# Patient Record
Sex: FEMALE | Race: WHITE | NOT HISPANIC OR LATINO | Employment: OTHER | URBAN - METROPOLITAN AREA
[De-identification: names, ages, dates, MRNs, and addresses within clinical notes are randomized per-mention and may not be internally consistent; named-entity substitution may affect disease eponyms.]

---

## 2017-04-24 ENCOUNTER — OFFICE VISIT (OUTPATIENT)
Dept: INTERNAL MEDICINE | Facility: CLINIC | Age: 52
End: 2017-04-24

## 2017-04-24 VITALS
HEART RATE: 82 BPM | SYSTOLIC BLOOD PRESSURE: 132 MMHG | BODY MASS INDEX: 35.78 KG/M2 | WEIGHT: 215 LBS | RESPIRATION RATE: 16 BRPM | DIASTOLIC BLOOD PRESSURE: 80 MMHG

## 2017-04-24 DIAGNOSIS — J30.89 SEASONAL ALLERGIC RHINITIS DUE TO OTHER ALLERGIC TRIGGER: ICD-10-CM

## 2017-04-24 DIAGNOSIS — M79.7 FIBROMYALGIA: ICD-10-CM

## 2017-04-24 DIAGNOSIS — F41.9 ANXIETY: ICD-10-CM

## 2017-04-24 DIAGNOSIS — I10 ESSENTIAL HYPERTENSION: Primary | ICD-10-CM

## 2017-04-24 DIAGNOSIS — E78.00 ELEVATED CHOLESTEROL: ICD-10-CM

## 2017-04-24 DIAGNOSIS — K21.9 GASTROESOPHAGEAL REFLUX DISEASE WITHOUT ESOPHAGITIS: ICD-10-CM

## 2017-04-24 DIAGNOSIS — K58.9 IRRITABLE BOWEL SYNDROME WITHOUT DIARRHEA: ICD-10-CM

## 2017-04-24 DIAGNOSIS — G47.00 INSOMNIA, UNSPECIFIED TYPE: ICD-10-CM

## 2017-04-24 DIAGNOSIS — R73.03 PREDIABETES: ICD-10-CM

## 2017-04-24 LAB
EXPIRATION DATE: NORMAL
HBA1C MFR BLD: 6.2 %
Lab: NORMAL

## 2017-04-24 PROCEDURE — 36415 COLL VENOUS BLD VENIPUNCTURE: CPT | Performed by: INTERNAL MEDICINE

## 2017-04-24 PROCEDURE — 83036 HEMOGLOBIN GLYCOSYLATED A1C: CPT | Performed by: INTERNAL MEDICINE

## 2017-04-24 PROCEDURE — 99214 OFFICE O/P EST MOD 30 MIN: CPT | Performed by: INTERNAL MEDICINE

## 2017-04-24 RX ORDER — LORATADINE 10 MG/1
10 TABLET ORAL DAILY
COMMUNITY
End: 2018-04-09

## 2017-04-24 RX ORDER — DICYCLOMINE HCL 20 MG
20 TABLET ORAL 3 TIMES DAILY PRN
Qty: 90 TABLET | Refills: 0 | Status: SHIPPED | OUTPATIENT
Start: 2017-04-24 | End: 2019-10-02 | Stop reason: SDUPTHER

## 2017-04-24 RX ORDER — LISINOPRIL AND HYDROCHLOROTHIAZIDE 25; 20 MG/1; MG/1
1 TABLET ORAL DAILY
Qty: 90 TABLET | Refills: 3 | Status: SHIPPED | OUTPATIENT
Start: 2017-04-24 | End: 2018-04-09 | Stop reason: SDUPTHER

## 2017-04-24 NOTE — PROGRESS NOTES
Subjective   Lizette Keith is a 51 y.o. female.   Pt is here to follow up on HTN,elevated cholesterol,seasonal allergies,GERD,IBS,fibromyalgia,anxiety,insomnia and prediabetes.               History of Present Illness   Pt is here to follow up on HTN,elevated cholesterol,seasonal allergies,GERD,IBS,fibromyalgia,anxiety,insomnia and prediabetes.    Pt states all chronic issues are doing well except for her IBS. She states her symptoms of diarrhea are daily after she eats her meals, she was prescribed Bentyl in the past but states she did not use it , she would like to restart this and see if it works before trying something different.                 The following portions of the patient's history were reviewed and updated as appropriate: allergies, current medications, past family history, past medical history, past social history, past surgical history and problem list.              Review of Systems   Constitutional: Negative.    HENT: Negative.    Eyes: Negative.    Respiratory: Negative.    Cardiovascular:        See HPI .   Gastrointestinal:        See HPI.    Endocrine:        See HPI.    Genitourinary: Negative.    Musculoskeletal:        See HPI.    Skin: Negative.    Allergic/Immunologic: Negative.    Neurological: Negative.    Hematological: Negative.    Psychiatric/Behavioral:        See HPI.                 Objective   Physical Exam   Constitutional: She is oriented to person, place, and time. She appears well-developed and well-nourished.   HENT:   Head: Normocephalic and atraumatic.   Right Ear: External ear normal.   Left Ear: External ear normal.   Nose: Nose normal.   Mouth/Throat: Oropharynx is clear and moist.   Eyes: Conjunctivae and EOM are normal. Pupils are equal, round, and reactive to light.   Neck: Normal range of motion. Neck supple. No tracheal deviation present. No thyromegaly present.   Cardiovascular: Normal rate, regular rhythm, normal heart sounds and intact distal pulses.     Pulmonary/Chest: Effort normal and breath sounds normal.   Abdominal: Soft. Bowel sounds are normal. She exhibits no distension. There is no tenderness.   Neurological: She is alert and oriented to person, place, and time. She has normal reflexes.   Skin: Skin is warm and dry.   Psychiatric: She has a normal mood and affect.   Nursing note and vitals reviewed.               Assessment/Plan      Essential hypertension  -     lisinopril-hydrochlorothiazide (PRINZIDE,ZESTORETIC) 20-25 MG per tablet; Take 1 tablet by mouth Daily.  -     Comprehensive Metabolic Panel  -     CBC (No Diff)    Elevated cholesterol  -     Lipid Panel    Seasonal allergic rhinitis due to other allergic trigger    Gastroesophageal reflux disease without esophagitis    Irritable bowel syndrome without diarrhea    Fibromyalgia    Anxiety    Insomnia, unspecified type    Prediabetes  -     POC Glycosylated Hemoglobin (Hb A1C)    Other orders  -     dicyclomine (BENTYL) 20 MG tablet; Take 1 tablet by mouth 3 (Three) Times a Day As Needed (IBS- diarrhea).          1.) Check CBC,CMP,lipid panel and A1C ( pt ate a piece cheese 2 hours ago)   2.) Follow up in 5 months   3.) Refill chronic meds.   4.) Bentyl 20 mg PO TID prn, how to dose and possible s/e discussed.

## 2017-04-25 LAB
ALBUMIN SERPL-MCNC: 4.6 G/DL (ref 3.5–5.5)
ALBUMIN/GLOB SERPL: 1.9 {RATIO} (ref 1.2–2.2)
ALP SERPL-CCNC: 122 IU/L (ref 39–117)
ALT SERPL-CCNC: 16 IU/L (ref 0–32)
AST SERPL-CCNC: 13 IU/L (ref 0–40)
BILIRUB SERPL-MCNC: 0.3 MG/DL (ref 0–1.2)
BUN SERPL-MCNC: 12 MG/DL (ref 6–24)
BUN/CREAT SERPL: 21 (ref 9–23)
CALCIUM SERPL-MCNC: 9.8 MG/DL (ref 8.7–10.2)
CHLORIDE SERPL-SCNC: 98 MMOL/L (ref 96–106)
CHOLEST SERPL-MCNC: 250 MG/DL (ref 100–199)
CO2 SERPL-SCNC: 21 MMOL/L (ref 18–29)
CREAT SERPL-MCNC: 0.57 MG/DL (ref 0.57–1)
ERYTHROCYTE [DISTWIDTH] IN BLOOD BY AUTOMATED COUNT: 13.8 % (ref 12.3–15.4)
GLOBULIN SER CALC-MCNC: 2.4 G/DL (ref 1.5–4.5)
GLUCOSE SERPL-MCNC: 102 MG/DL (ref 65–99)
HCT VFR BLD AUTO: 40.6 % (ref 34–46.6)
HDLC SERPL-MCNC: 39 MG/DL
HGB BLD-MCNC: 15 G/DL (ref 11.1–15.9)
LDLC SERPL CALC-MCNC: 155 MG/DL (ref 0–99)
MCH RBC QN AUTO: 32.4 PG (ref 26.6–33)
MCHC RBC AUTO-ENTMCNC: 36.9 G/DL (ref 31.5–35.7)
MCV RBC AUTO: 88 FL (ref 79–97)
MORPHOLOGY BLD-IMP: ABNORMAL
PLATELET # BLD AUTO: 280 X10E3/UL (ref 150–379)
POTASSIUM SERPL-SCNC: 3.7 MMOL/L (ref 3.5–5.2)
PROT SERPL-MCNC: 7 G/DL (ref 6–8.5)
RBC # BLD AUTO: 4.63 X10E6/UL (ref 3.77–5.28)
SODIUM SERPL-SCNC: 140 MMOL/L (ref 134–144)
TRIGL SERPL-MCNC: 278 MG/DL (ref 0–149)
VLDLC SERPL CALC-MCNC: 56 MG/DL (ref 5–40)
WBC # BLD AUTO: 12.1 X10E3/UL (ref 3.4–10.8)

## 2017-05-18 ENCOUNTER — TELEPHONE (OUTPATIENT)
Dept: INTERNAL MEDICINE | Facility: CLINIC | Age: 52
End: 2017-05-18

## 2017-05-19 ENCOUNTER — HOSPITAL ENCOUNTER (EMERGENCY)
Facility: HOSPITAL | Age: 52
Discharge: LEFT BEFORE DISCHARGE | End: 2017-05-19

## 2017-05-19 VITALS
BODY MASS INDEX: 35.82 KG/M2 | DIASTOLIC BLOOD PRESSURE: 81 MMHG | SYSTOLIC BLOOD PRESSURE: 129 MMHG | HEART RATE: 99 BPM | RESPIRATION RATE: 16 BRPM | WEIGHT: 215 LBS | HEIGHT: 65 IN | TEMPERATURE: 97.7 F | OXYGEN SATURATION: 99 %

## 2017-05-19 PROCEDURE — 99211 OFF/OP EST MAY X REQ PHY/QHP: CPT

## 2017-10-19 ENCOUNTER — TELEPHONE (OUTPATIENT)
Dept: OBSTETRICS AND GYNECOLOGY | Facility: CLINIC | Age: 52
End: 2017-10-19

## 2017-10-19 RX ORDER — ESTRADIOL 2 MG/1
2 TABLET ORAL DAILY
Qty: 30 TABLET | Refills: 0 | Status: SHIPPED | OUTPATIENT
Start: 2017-10-19 | End: 2017-12-11 | Stop reason: SDUPTHER

## 2017-10-19 NOTE — TELEPHONE ENCOUNTER
Johanne Park Pt    Former Pt of Dr RODRIGUEZ scheduled with Johanne for annual on 11/10/17.  Pt will need a refill on Estradiol 2 gm sent to [facility] on [facility].

## 2017-10-19 NOTE — TELEPHONE ENCOUNTER
ASHLEY Sanz patient  Patient is scheduled to see ASHLEY Sanz for her annual on 11/10/2017. Patient was last seen on 9/12/2016 by Dr. Domi Mckeon and was Rx's Estradiol 2 mg #30 with 12 refills. I will send in a prescription for Estradiol 2 mg #30 no refills

## 2017-12-11 ENCOUNTER — OFFICE VISIT (OUTPATIENT)
Dept: OBSTETRICS AND GYNECOLOGY | Facility: CLINIC | Age: 52
End: 2017-12-11

## 2017-12-11 VITALS
BODY MASS INDEX: 33.82 KG/M2 | DIASTOLIC BLOOD PRESSURE: 70 MMHG | HEIGHT: 65 IN | SYSTOLIC BLOOD PRESSURE: 126 MMHG | WEIGHT: 203 LBS

## 2017-12-11 DIAGNOSIS — Z01.419 WELL WOMAN EXAM WITH ROUTINE GYNECOLOGICAL EXAM: Primary | ICD-10-CM

## 2017-12-11 DIAGNOSIS — Z12.11 SCREENING FOR COLON CANCER: ICD-10-CM

## 2017-12-11 DIAGNOSIS — R30.0 DYSURIA: ICD-10-CM

## 2017-12-11 DIAGNOSIS — F17.200 TOBACCO DEPENDENCE: ICD-10-CM

## 2017-12-11 DIAGNOSIS — R39.14 FEELING OF INCOMPLETE BLADDER EMPTYING: ICD-10-CM

## 2017-12-11 DIAGNOSIS — Z12.31 ENCOUNTER FOR SCREENING MAMMOGRAM FOR BREAST CANCER: ICD-10-CM

## 2017-12-11 LAB
BILIRUB BLD-MCNC: ABNORMAL MG/DL
CLARITY, POC: ABNORMAL
COLOR UR: ABNORMAL
GLUCOSE UR STRIP-MCNC: NEGATIVE MG/DL
KETONES UR QL: ABNORMAL
LEUKOCYTE EST, POC: ABNORMAL
NITRITE UR-MCNC: NEGATIVE MG/ML
PH UR: 5 [PH] (ref 5–8)
PROT UR STRIP-MCNC: ABNORMAL MG/DL
RBC # UR STRIP: ABNORMAL /UL
SP GR UR: 1.02 (ref 1–1.03)
UROBILINOGEN UR QL: ABNORMAL

## 2017-12-11 PROCEDURE — 99396 PREV VISIT EST AGE 40-64: CPT | Performed by: NURSE PRACTITIONER

## 2017-12-11 PROCEDURE — 99406 BEHAV CHNG SMOKING 3-10 MIN: CPT | Performed by: NURSE PRACTITIONER

## 2017-12-11 RX ORDER — ESTRADIOL 2 MG/1
2 TABLET ORAL DAILY
Qty: 30 TABLET | Refills: 12 | Status: SHIPPED | OUTPATIENT
Start: 2017-12-11 | End: 2018-12-17 | Stop reason: SDUPTHER

## 2017-12-11 RX ORDER — NITROFURANTOIN 25; 75 MG/1; MG/1
100 CAPSULE ORAL 2 TIMES DAILY
Qty: 14 CAPSULE | Refills: 0 | Status: SHIPPED | OUTPATIENT
Start: 2017-12-11 | End: 2017-12-18

## 2017-12-11 NOTE — PROGRESS NOTES
[facility]AN VISIT      Lizette Keith  [id number]  1965      Chief Complaint: Annual Exam (c/o bladder leaking and occ dysuria)        History of present illness:    Lizette Keith is a 52 y.o. year old  menopausal female presenting to be seen for her annual exam.  She is a previous patient of Dr. Domi Mckeon, last seen for annual exam on 2016. This past year she has been on hormone replacement therapy.  There has not been vaginal bleeding in the last 12 months.  Menopausal symptoms are not present.    Her only concern upon arrival today is of occasional bladder leaking. She has noticed darker urine and occ dysuria recently. She describes a feeling of incomplete emptying as she will void and then leak. She admits to poor water intake. She works 3rd shift at [employer] and drinks mostly Mountain Dew to help her stay awake. She states she is working on cutting back and increasing her water.     UA obtained upon arrival today.     SEXUAL Hx:  She is not currently sexually active.  In the past year there has not been new sexual partners.    Condoms are not typically used.  She would not like to be screened for STD's at today's exam.  HEALTH Hx:  She exercises regularly: no (and has no plans to become more active).  She wears her seat belt:yes.  She has concerns about domestic violence: no.  She has noticed changes in height: no.   She is a current 1/2 - 1 PPD smoker.    Past Medical History:   Diagnosis Date   • Allergic    • Anxiety    • Blood in urine    • Chronic fatigue    • Depression    • Elevated cholesterol    • Endometriosis    • Fibromyalgia, primary    • GERD (gastroesophageal reflux disease)    • HL (hearing loss)    • Hypertension    • Irritable bowel syndrome        Past Surgical History:   Procedure Laterality Date   • GALLBLADDER SURGERY     • HYSTERECTOMY     • INNER EAR SURGERY     • TEMPOROMANDIBULAR JOINT ARTHROPLASTY         MEDICATIONS:  The current medication list was reviewed and reconciled.     Allergies:  is allergic to erythromycin and penicillins.    Family History   Problem Relation Age of Onset   • Melanoma Mother    • Diabetes Mother    • Heart disease Mother    • Arthritis Mother    • Depression Mother    • Colon polyps Mother    • Thyroid disease Mother    • Hyperlipidemia Mother    • Cancer Father      pancreatic and liver   • Diabetes Father    • Depression Father    • Colon polyps Father    • ADD / ADHD Son    • Diabetes Maternal Aunt    • Diabetes Maternal Uncle    • Diabetes Paternal Aunt    • Diabetes Paternal Uncle    • Cancer Maternal Grandmother      uterine/cervical   • Heart disease Maternal Grandfather    • Heart disease Paternal Grandfather        Health Maintenance:  Last mammogram was 2015 or earlier. Last pap smear was 2016, results were  normal PAP..    Review of Systems   Constitutional: Negative for fatigue, fever and unexpected weight change.   HENT: Negative for congestion, ear pain, hearing loss, sinus pressure and trouble swallowing.    Eyes: Negative for visual disturbance.   Respiratory: Negative for cough, chest tightness, shortness of breath and wheezing.    Cardiovascular: Negative for chest pain, palpitations and leg swelling.   Gastrointestinal: Negative for abdominal distention, abdominal pain, constipation, diarrhea, nausea and vomiting.   Endocrine: Negative for cold intolerance, heat intolerance, polydipsia, polyphagia and polyuria.   Genitourinary: Positive for difficulty urinating (incomplete emptying with leaking) and dysuria. Negative for dyspareunia, frequency, hematuria, pelvic pain, urgency, vaginal bleeding, vaginal discharge and vaginal pain.   Musculoskeletal: Negative for arthralgias, gait problem, joint swelling and myalgias.   Skin: Negative for color change, pallor and rash.   Neurological: Negative for dizziness, seizures, syncope, weakness, light-headedness, numbness and headaches.  "  Hematological: Negative for adenopathy. Does not bruise/bleed easily.   Psychiatric/Behavioral: Negative for agitation, confusion, sleep disturbance and suicidal ideas. The patient is not nervous/anxious.        Physical Exam  Vital Signs: /70  Ht 163.8 cm (64.5\")  Wt 92.1 kg (203 lb)  BMI 34.31 kg/m2   General Appearance:  alert, cooperative, no apparent distress, appears stated age and obese   Neurologic/Psychiatric: A&O x 3, gait steady, appropriate affect   HEENT:  Normocephalic, without obvious abnormality, mucous membranes moist   Neck: Supple, symmetrical, trachea midline, no adenopathy;  No thyromegaly, masses, or tenderness   Back:   Symmetric, no curvature, ROM normal, no CVA tenderness   Lungs:   Clear to auscultation bilaterally; respirations regular, even, and unlabored bilaterally   Heart:  Regular rate and rhythm, no murmurs appreciated   Breasts:  Symmetrical, no masses, no lesions and no nipple discharge   Abdomen:   Soft, non-tender, non-distended, no organomegaly and Exam limited d/t habitus.   Lymph nodes: No cervical, supraclavicular, inguinal or axillary adenopathy noted   Extremities: Normal, atraumatic; no clubbing, cyanosis, or edema    Skin: No rashes, ulcers, or suspicious lesions noted   Pelvic: External Genitalia  without lesions or skin changes  Vagina  is pink, moist, without lesions.   Vaginal Cuff  Female Vaginal Cuff: smooth, intact and without visible lesions  Uterus  surgically absent  Ovaries  surgically absent bilaterallly  Parametria  smooth  Rectovaginal  Female rectovaginal: deferred       Procedure Note:  No notes on file    Assessment and Plan:    Lizette was seen today for annual exam.    Diagnoses and all orders for this visit:    Well woman exam with routine gynecological exam    Tobacco dependence    Encounter for screening mammogram for breast cancer  -     Mammo Screening Digital Tomosynthesis Bilateral With CAD; Future    Dysuria  -     POC Urinalysis " Dipstick  -     Urine Culture - Urine, Urine, Clean Catch; Future    Feeling of incomplete bladder emptying  -     POC Urinalysis Dipstick  -     Urine Culture - Urine, Urine, Clean Catch; Future    Screening for colon cancer  -     Ambulatory Referral For Screening Colonoscopy    Other orders  -     nitrofurantoin, macrocrystal-monohydrate, (MACROBID) 100 MG capsule; Take 1 capsule by mouth 2 (Two) Times a Day for 7 days.  -     estradiol (ESTRACE) 2 MG tablet; Take 1 tablet by mouth Daily.        Pap was not done today.  I explained to Lizette that the Pap smears are no longer recommended in patient's after hysterectomy.   I stressed to Lizette that she still should be seen to be seen yearly for a full physical including breast and pelvic exam.    She was encouraged to get yearly mammograms.  Screening mammogram ordered today. She should report any palpable breast lump(s) or skin changes regardless of mammographic findings.  I explained to Lizette that notification regarding her mammogram results will come from the center performing the study.  Our office will not be routinely calling with mammogram results.  It is her responsibility to make sure that the results from the mammogram are communicated to her by the breast center.  If she has any questions about the results, she is welcome to call our office anytime.  She must keep mammograms UTD to continue her HRT going forward due to breast risk. She v/u.     She was recommended to begin colonoscopy at age 50 for colon cancer screening and bone density scans (DEXA) at age 60-65 for osteoporosis screening.    We discussed her recent urinary symptoms. UA in office today positive for ketone, trace protein, and blood. Nitrites negative. Will send for culture and treat with Macrobid today pending final C&S. She is encouraged to call with any worsening symptoms, fevers, or flank pain. We discussed importance of increasing water intake. She understands that large amounts  of sodas can dehydrate, markedly increase blood sugar, and are empty calories. She desires to work on this. She will be notified of final results.     I advised the patient of the risks in continuing to use tobacco. We specifically discussed the risks of smoking and ERT. We discussed a plan to purposefully cut back. Estrace refilled today as she is asymptomatic, but she was notified if smoking continues, she will have to discontinue her HRT. The patient has expressed the willingness to quit.    During this visit, I spent 3-10 mintues counseling the patient regarding smoking cessation.         Return in about 1 year (around 12/11/2018) for annual exam or PRN.      Johanne Park, ASHLEY      Note: Speech recognition transcription software was used to dictate portions of this document.  An attempt at proofreading has been made though minor errors in transcription may still be present.  Please do not hesitate to call our office with any questions.

## 2017-12-13 PROCEDURE — 87086 URINE CULTURE/COLONY COUNT: CPT | Performed by: NURSE PRACTITIONER

## 2017-12-15 LAB
BACTERIA SPEC AEROBE CULT: NORMAL
BACTERIA SPEC AEROBE CULT: NORMAL

## 2018-02-08 ENCOUNTER — LAB REQUISITION (OUTPATIENT)
Dept: LAB | Facility: HOSPITAL | Age: 53
End: 2018-02-08

## 2018-02-08 ENCOUNTER — OUTSIDE FACILITY SERVICE (OUTPATIENT)
Dept: GASTROENTEROLOGY | Facility: CLINIC | Age: 53
End: 2018-02-08

## 2018-02-08 DIAGNOSIS — Z12.11 ENCOUNTER FOR SCREENING FOR MALIGNANT NEOPLASM OF COLON: ICD-10-CM

## 2018-02-08 PROCEDURE — 45385 COLONOSCOPY W/LESION REMOVAL: CPT | Performed by: INTERNAL MEDICINE

## 2018-02-08 PROCEDURE — 45380 COLONOSCOPY AND BIOPSY: CPT | Performed by: INTERNAL MEDICINE

## 2018-02-08 PROCEDURE — 88305 TISSUE EXAM BY PATHOLOGIST: CPT | Performed by: INTERNAL MEDICINE

## 2018-02-08 PROCEDURE — 45388 COLONOSCOPY W/ABLATION: CPT | Performed by: INTERNAL MEDICINE

## 2018-02-09 LAB
CYTO UR: NORMAL
LAB AP CASE REPORT: NORMAL
LAB AP CLINICAL INFORMATION: NORMAL
Lab: NORMAL
PATH REPORT.FINAL DX SPEC: NORMAL
PATH REPORT.GROSS SPEC: NORMAL

## 2018-02-12 ENCOUNTER — TELEPHONE (OUTPATIENT)
Dept: GASTROENTEROLOGY | Facility: CLINIC | Age: 53
End: 2018-02-12

## 2018-02-12 NOTE — TELEPHONE ENCOUNTER
----- Message from Skyler Waller MD sent at 2/12/2018 10:33 AM EST -----  Please inform the patient that we would recommend a repeat colonoscopy in 3 years due to a combination of both the endoscopic results and what our pathologists saw under the microscope.    Thank you,    Dr. Waller

## 2018-02-21 ENCOUNTER — HOSPITAL ENCOUNTER (OUTPATIENT)
Dept: MAMMOGRAPHY | Facility: HOSPITAL | Age: 53
Discharge: HOME | End: 2018-02-21
Admitting: NURSE PRACTITIONER

## 2018-02-21 DIAGNOSIS — Z12.31 ENCOUNTER FOR SCREENING MAMMOGRAM FOR BREAST CANCER: ICD-10-CM

## 2018-02-21 PROCEDURE — 77067 SCR MAMMO BI INCL CAD: CPT

## 2018-02-21 PROCEDURE — 77063 BREAST TOMOSYNTHESIS BI: CPT

## 2018-02-21 PROCEDURE — 77067 SCR MAMMO BI INCL CAD: CPT | Performed by: RADIOLOGY

## 2018-02-21 PROCEDURE — 77063 BREAST TOMOSYNTHESIS BI: CPT | Performed by: RADIOLOGY

## 2018-04-04 ENCOUNTER — TRANSCRIBE ORDERS (OUTPATIENT)
Dept: PHYSICAL THERAPY | Facility: HOSPITAL | Age: 53
End: 2018-04-04

## 2018-04-04 ENCOUNTER — HOSPITAL ENCOUNTER (OUTPATIENT)
Dept: PHYSICAL THERAPY | Facility: HOSPITAL | Age: 53
Setting detail: THERAPIES SERIES
Discharge: HOME | End: 2018-04-04

## 2018-04-04 DIAGNOSIS — M77.11 RIGHT LATERAL EPICONDYLITIS: Primary | ICD-10-CM

## 2018-04-04 DIAGNOSIS — M77.11 LATERAL EPICONDYLITIS OF RIGHT ELBOW: Primary | ICD-10-CM

## 2018-04-04 PROCEDURE — 97110 THERAPEUTIC EXERCISES: CPT | Performed by: PHYSICAL THERAPIST

## 2018-04-04 PROCEDURE — 97161 PT EVAL LOW COMPLEX 20 MIN: CPT | Performed by: PHYSICAL THERAPIST

## 2018-04-04 NOTE — THERAPY EVALUATION
Outpatient Physical Therapy Ortho Initial Evaluation   [facility]     Patient Name: Lizette Keith  : 1965  MRN: [id number]  Today's Date: 2018      Visit Date: 2018    Patient Active Problem List   Diagnosis   • Blood in urine   • Seasonal allergies   • Hypertension   • Elevated cholesterol   • GERD (gastroesophageal reflux disease)   • IBS (irritable bowel syndrome)   • Fibromyalgia   • Hearing loss   • Chronic fatigue   • Depression   • Anxiety   • Insomnia   • Encounter for tobacco use cessation counseling   • Vasomotor flushing   • Tachycardia   • Libido, decreased   • Onychomycosis   • Prediabetes   • Tobacco dependence        Past Medical History:   Diagnosis Date   • Allergic    • Anxiety    • Blood in urine    • Chronic fatigue    • Depression    • Elevated cholesterol    • Endometriosis    • Fibromyalgia, primary    • GERD (gastroesophageal reflux disease)    • HL (hearing loss)    • Hypertension    • Irritable bowel syndrome         Past Surgical History:   Procedure Laterality Date   • COLONOSCOPY  2018   • GALLBLADDER SURGERY     • HYSTERECTOMY     • INNER EAR SURGERY     • OOPHORECTOMY     • TEMPOROMANDIBULAR JOINT ARTHROPLASTY         Visit Dx:     ICD-10-CM ICD-9-CM   1. Lateral epicondylitis of right elbow M77.11 726.32             Patient History     Row Name 18 1300             History    Chief Complaint Pain  -CP      Type of Pain Elbow pain  -CP      Date Current Problem(s) Began 18  -CP      Brief Description of Current Complaint Pt states her right elbow has hurt for the past 3 months. She doesn't recall any fall or particular incident. Pt states she has tried NSAIDs, muscle relaxer, and brace without success. She states she is limited with what she can do job task wise. Unable to lift full coffee pot, severe pain with washing dishes. Pt states she took oral steroids about 1 month ago without relief.   -CP      Previous treatment for THIS PROBLEM  Medication  -CP      Patient/Caregiver Goals Relieve pain  -CP      Current Tobacco Use yes  -CP      Smoking Status daily smoker, 1/2 pack /day  -CP      Patient's Rating of General Health Good  -CP      Hand Dominance right-handed  -CP      Occupation/sports/leisure activities Pt is employed at [employer]. Pt enjoyd reading books, states pain with holding books and turning pages. She states she is limited with driving distance d/t elbow pain.   -CP      Patient seeing anyone else for problem(s)? Yes  -CP      How has patient tried to help current problem? rest, NSAIDs, muscle relaxer   -CP      What clinical tests have you had for this problem? X-ray  -CP      History of Previous Related Injuries pt unable to recall.   -CP      Are you or can you be pregnant No  -CP         Pain     Pain Location Elbow  -CP      Pain at Present 5  -CP      Pain at Best 4  -CP      Pain at Worst 9   increases with movement  -CP      Pain Frequency Constant/continuous  -CP      Is your sleep disturbed? Yes  -CP      Is medication used to assist with sleep? Yes  -CP      What position do you sleep in? Left sidelying  -CP      Difficulties with ADL's? increased time required to perform. Pain in elbow with fixing hair and brushing teeth.   -CP      Difficulties with recreational activities? pain with reading, holding book.   -CP         Fall Risk Assessment    Any falls in the past year: No  -CP         Daily Activities    Primary Language English  -CP      Are you able to read Yes  -CP      Are you able to write Yes  -CP      Pt Participated in POC and Goals Yes  -CP         Safety    Are you being hurt, hit, or frightened by anyone at home or in your life? No  -CP        User Key  (r) = Recorded By, (t) = Taken By, (c) = Cosigned By    Initials Name Provider Type    CP Corinne E Perkins, PT Physical Therapist                PT Ortho     Row Name 04/04/18 1300       Subjective Comments    Subjective Comments Pt presents with c/o  right elbow pain.   -CP       Subjective Pain    Able to rate subjective pain? yes  -CP    Pre-Treatment Pain Level 5  -CP    Post-Treatment Pain Level 5  -CP       Special Tests/Palpation    Special Tests/Palpation Elbow/Forearm  -CP       Elbow/Forearm Palpation    Lateral Epicondyle Right:;Tender;Swollen  -CP    Extensor Tendon Right:;Guarded/taut  -CP    Radial Head Right:;Tender  -CP    Olecranon Right:   not tender  -CP    Elbow/Forearm Palpation? Yes  -CP       Elbow Accessory Motions    Elbow Accesssory Motions Tested? Yes  -CP    Radial deviation of ulna/radius on humerus (valgus) Right pain  -CP    Ulnar deviation of ulna/radius on humerus (varus) WNL  -CP       Elbow/Forearm Special Tests    Medial Epicondyle Test (Golfer's Elbow) Negative  -CP    Lateral Epicondyle Test (Tennis Elbow) Right:;Positive  -CP    Tinel's Sign (Ulnar Nerve Irritation) Negative  -CP    Pronator Teres Syndrome Test (Median Nerve Entrapment) Negative  -CP       General ROM    RT Upper Ext --   elbow flex WFL pain end range, ext limited by 5 deg from mike  -CP    GENERAL ROM COMMENTS R abd 163, flex 180, ER 90, IR T10 ext WFL  -CP       MMT (Manual Muscle Testing)    Additional Documentation General Assessment (Manual Muscle Testing) (Group)  -CP       General Assessment (Manual Muscle Testing)    General Manual Muscle Testing (MMT) Assessment upper extremity strength deficits identified  -CP       Upper Extremity (Manual Muscle Testing)    Upper Extremity: Manual Muscle Testing (MMT) right shoulder strength deficit;right elbow/forearm strength deficit;right wrist strength deficit;left UE strength is WFL  -CP       Right Shoulder (Manual Muscle Testing)    Right Shoulder Manual Muscle Testing (MMT) flexion;abduction;external rotation;internal rotation  -CP    MMT: Flexion, Right Shoulder flexion  -CP    MMT, Gross Movement: Right Shoulder Flexion (4/5) good  -CP    MMT: ABduction, Right Shoulder abduction  -CP    MMT, Gross  Movement: Right Shoulder ABduction (4/5) good  -CP    MMT: Internal Rotation, Right Shoulder internal rotation  -CP    MMT, Gross Movement: Right Shoulder Internal Rotation (4/5) good  -CP    MMT: External Rotation, Right Shoulder external rotation  -CP    MMT, Gross Movement: Right Shoulder External Rotation (4/5) good   4+  -CP       Right Elbow/Forearm (Manual Muscle Testing)    Right Elbow/Forearm Manual Muscle Testing (MMT) flexion;extension;supination;pronation  -CP    MMT: Flexion, Right Elbow flexion  -CP    MMT, Gross Movement: Right Elbow Flexion (3+/5) fair plus   pain  -CP    MMT: Extension, Right Elbow extension  -CP    MMT, Gross Movement: Right Elbow Extension (3-/5) fair minus  -CP    MMT: Supination, Right Forearm supination  -CP    MMT, Gross Movement: Right Forearm Supination (4/5) good  -CP    MMT: Pronation, Right Forearm pronation  -CP    MMT, Gross Movement: Right Forearm Pronation (4-/5) good minus   pain  -CP    Comment, MMT: Right Elbow/Forearm full pronation causes pain  -CP       Left Wrist (Manual Muscle Testing)    Comment, Manual Muscle Testing: Left Wrist WFL  -CP       Right Wrist (Manual Muscle Testing)    Right Wrist Manual Muscle Testing (MMT) flexion;extension  -CP    MMT, Gross Movement: Right Wrist Flexion (4/5) good  -CP    MMT, Gross Movement: Right Wrist Extension (4-/5) good minus   pain  -CP       Sensation    Light Touch No apparent deficits   hypersensitivity; however states d/t fibromyalgia  -CP      User Key  (r) = Recorded By, (t) = Taken By, (c) = Cosigned By    Initials Name Provider Type    CP Corinne E Perkins, PT Physical Therapist                                  PT OP Goals     Row Name 04/04/18 1300          PT Short Term Goals    STG Date to Achieve 04/18/18  -CP     STG 1 Patient will demonstrate independence with home exercise program.  -CP     STG 1 Progress New  -CP     STG 2 Patient will demonstrate right AROM elbow supination to 80°/ pronation 80°  without c/o pain.    -CP     STG 2 Progress New  -CP        Long Term Goals    LTG Date to Achieve 05/04/18  -CP     LTG 1 Patient will open jar, write, prepare meal, carry/lift object up to 5# without complaint of pain.  -CP     LTG 1 Progress New  -CP     LTG 2 Patient will demonstrate pain free full motion of shoulder, elbow, wrist; and pain free  strength.  -CP     LTG 2 Progress New  -CP     LTG 3 Patient will report overall Quick Dash improvements > 15 points to demonstrate increased independence with daily tasks.  -CP     LTG 3 Progress New  -CP     LTG 4 Patient will manage simulated work activities with pain less than 3 of 10.  -CP     LTG 4 Progress New  -CP        Time Calculation    PT Goal Re-Cert Due Date 07/03/18  -CP       User Key  (r) = Recorded By, (t) = Taken By, (c) = Cosigned By    Initials Name Provider Type    CP Corinne E Perkins, PT Physical Therapist                PT Assessment/Plan     Row Name 04/04/18 1300          PT Assessment    Functional Limitations Performance in work activities;Limitation in home management;Performance in leisure activities;Performance in self-care ADL  -CP     Impairments Pain;Range of motion;Joint mobility;Muscle strength  -CP     Assessment Comments Pt is a 51 yo female referred to PT for right lateral epicondylitis who demonstrated signs and symptoms consistent with diagnosis. Pt limited with right elbow AROM, strength, and decrease  strength. Pt unable to perform work tasks and would benefit from skilled PT to address above stated deficits.   -CP     Please refer to paper survey for additional self-reported information Yes  -CP     Rehab Potential Good  -CP     Patient/caregiver participated in establishment of treatment plan and goals Yes  -CP     Patient would benefit from skilled therapy intervention Yes  -CP        PT Plan    PT Frequency 2x/week  -CP     Predicted Duration of Therapy Intervention (OT Eval) 8 visits  -CP     Planned CPT's? PT  EVAL LOW COMPLEXITY: 98172;PT RE-EVAL: 85206;PT THER PROC EA 15 MIN: 98673;PT MANUAL THERAPY EA 15 MIN: 33195;PT ELECTRICAL STIM UNATTEND: ;PT ULTRASOUND EA 15 MIN: 10845;PT HOT/COLD PACK WC NONMCARE: 83062;PT IONTOPHORESIS EA 15 MIN: 48263  -CP     PT Plan Comments Assess compliance with initial HEP. Manual and modalities, including US, ionto, and/or DN next visit pending symptoms.   -CP       User Key  (r) = Recorded By, (t) = Taken By, (c) = Cosigned By    Initials Name Provider Type    CP Corinne E Perkins, PT Physical Therapist                  Exercises     Row Name 04/04/18 1300             Subjective Comments    Subjective Comments Pt presents with c/o right elbow pain.   -CP         Subjective Pain    Able to rate subjective pain? yes  -CP      Pre-Treatment Pain Level 5  -CP      Post-Treatment Pain Level 5  -CP        User Key  (r) = Recorded By, (t) = Taken By, (c) = Cosigned By    Initials Name Provider Type    CP Corinne E Perkins, PT Physical Therapist                        Outcome Measure Options: Quick DASH  Quick DASH  Open a tight or new jar.: Severe Difficulty  Do heavy household chores (e.g., wash walls, wash floors): Severe Difficulty  Carry a shopping bag or briefcase: Moderate Difficulty  Wash your back: Moderate Difficulty  Use a knife to cut food: Moderate Difficulty  Recreational activities in which you take some force or impact through your arm, should or hand (e.g. golf, hammering, tennis, etc.): Severe Difficulty  During the past week, to what extent has your arm, shoulder, or hand problem interfered with your normal social activites with family, friends, neighbors or groups?: Quite a bit  During the past week, were you limited in your work or other regular daily activities as a result of your arm, shoulder or hand problem?: Very limited  Arm, Shoulder, or hand pain: Severe  Tingling (pins and needles) in your arm, shoulder, or hand: Moderate  During the past week, how much  difficulty have you had sleeping because of the pain in your arm, shoulder or hand?: Moderate Difficiculty  Number of Questions Answered: 11  Quick DASH Score: 63.64         Time Calculation:   Start Time: 1328  Total Timed Code Minutes- PT: 10 minute(s)     Therapy Charges for Today     Code Description Service Date Service Provider Modifiers Qty    72339570461 HC PT EVAL LOW COMPLEXITY 3 4/4/2018 Corinne E Perkins, PT GP 1    41441035116 HC PT THER PROC EA 15 MIN 4/4/2018 Corinne E Perkins, PT GP 1          PT G-Codes  Outcome Measure Options: Quick DASH         Corinne E. Perkins, PT  4/4/2018

## 2018-04-09 ENCOUNTER — OFFICE VISIT (OUTPATIENT)
Dept: INTERNAL MEDICINE | Facility: CLINIC | Age: 53
End: 2018-04-09

## 2018-04-09 VITALS
HEIGHT: 64 IN | SYSTOLIC BLOOD PRESSURE: 130 MMHG | BODY MASS INDEX: 34.93 KG/M2 | TEMPERATURE: 96.9 F | WEIGHT: 204.6 LBS | DIASTOLIC BLOOD PRESSURE: 86 MMHG | HEART RATE: 104 BPM | OXYGEN SATURATION: 98 %

## 2018-04-09 DIAGNOSIS — I10 ESSENTIAL HYPERTENSION: ICD-10-CM

## 2018-04-09 DIAGNOSIS — R73.09 ABNORMAL GLUCOSE: ICD-10-CM

## 2018-04-09 DIAGNOSIS — M77.11 RIGHT LATERAL EPICONDYLITIS: ICD-10-CM

## 2018-04-09 DIAGNOSIS — E78.00 ELEVATED CHOLESTEROL: Primary | ICD-10-CM

## 2018-04-09 PROCEDURE — 99214 OFFICE O/P EST MOD 30 MIN: CPT | Performed by: FAMILY MEDICINE

## 2018-04-09 RX ORDER — NAPROXEN 500 MG/1
500 TABLET ORAL 2 TIMES DAILY WITH MEALS
COMMUNITY
End: 2019-06-27 | Stop reason: SDUPTHER

## 2018-04-09 RX ORDER — METHOCARBAMOL 500 MG/1
500 TABLET, FILM COATED ORAL 4 TIMES DAILY PRN
Qty: 120 TABLET | Refills: 3 | Status: SHIPPED | OUTPATIENT
Start: 2018-04-09 | End: 2018-09-08

## 2018-04-09 RX ORDER — LISINOPRIL AND HYDROCHLOROTHIAZIDE 25; 20 MG/1; MG/1
1 TABLET ORAL DAILY
Qty: 30 TABLET | Refills: 3 | Status: SHIPPED | OUTPATIENT
Start: 2018-04-09 | End: 2018-09-08 | Stop reason: SDUPTHER

## 2018-04-09 RX ORDER — METHOCARBAMOL 500 MG/1
500 TABLET, FILM COATED ORAL 4 TIMES DAILY
COMMUNITY
End: 2018-04-09 | Stop reason: SDUPTHER

## 2018-04-09 NOTE — PROGRESS NOTES
"Dulce Keith is a 52 y.o. female.     Chief Complaint   Patient presents with   • Establish Care       Visit Vitals  /86   Pulse 104   Temp 96.9 °F (36.1 °C)   Ht 163.8 cm (64.49\")   Wt 92.8 kg (204 lb 9.6 oz)   SpO2 98%   BMI 34.59 kg/m²       Upper Extremity Issue   This is a chronic (right lateral epicondylitis) problem. The current episode started more than 1 month ago. The problem occurs constantly. The problem has been unchanged. Associated symptoms include arthralgias, fatigue, headaches and myalgias. Pertinent negatives include no abdominal pain, anorexia, change in bowel habit, chest pain, chills, congestion, coughing, diaphoresis, fever, joint swelling, nausea, neck pain, numbness, rash, sore throat, swollen glands, urinary symptoms, vertigo, visual change, vomiting or weakness. The symptoms are aggravated by exertion (pt is a , pt is right handed). Treatments tried: muscle relaxer and PT. The treatment provided mild relief.   Hypertension   This is a chronic problem. The current episode started more than 1 year ago. The problem is unchanged. The problem is controlled. Associated symptoms include anxiety (mild), headaches and malaise/fatigue. Pertinent negatives include no blurred vision, chest pain, neck pain, orthopnea, palpitations, peripheral edema, PND, shortness of breath or sweats. There are no associated agents to hypertension. Risk factors for coronary artery disease include family history, dyslipidemia, post-menopausal state, obesity, smoking/tobacco exposure and stress. Past treatments include ACE inhibitors and diuretics. Current antihypertension treatment includes ACE inhibitors and diuretics. The current treatment provides significant improvement. There are no compliance problems.  There is no history of angina, kidney disease, CAD/MI, CVA, heart failure, left ventricular hypertrophy, PVD, retinopathy or a thyroid problem. There is no history of sleep apnea.    "   Pt has had tennis elbow on the right for 3 months.  Pt is seeing Dr Celso Dawn. Pt does go to PT.   Pt states that the robaxin helps the tennis elbow     The following portions of the patient's history were reviewed and updated as appropriate: allergies, current medications, past family history, past medical history, past social history, past surgical history and problem list.    Past Medical History:   Diagnosis Date   • Allergic    • Anemia     during pregnancy   • Anxiety    • Arthritis    • Blood in urine    • Chronic fatigue    • Colon polyp 02/2018    7 at last colonoscopy   • Depression    • Elevated cholesterol    • Endometriosis    • Fibromyalgia    • Fibromyalgia, primary    • Fracture    • GERD (gastroesophageal reflux disease)    • H/O mammogram 02/2018   • HL (hearing loss)    • Hypertension    • Irritable bowel syndrome    • Pap smear for cervical cancer screening 2018?     Past Surgical History:   Procedure Laterality Date   • CHOLECYSTECTOMY     • COLONOSCOPY  02/06/2018    3 year f/u polypectomy Dr MARTA Waller   • GALLBLADDER SURGERY     • HYSTERECTOMY     • INNER EAR SURGERY     • OOPHORECTOMY     • TEMPOROMANDIBULAR JOINT ARTHROPLASTY  1984     Allergies   Allergen Reactions   • Erythromycin GI Intolerance     Tolerates zpak   • Penicillins Hives   • Statins Myalgia     Family History   Problem Relation Age of Onset   • Melanoma Mother    • Diabetes Mother    • Heart disease Mother    • Arthritis Mother    • Depression Mother    • Colon polyps Mother    • Thyroid disease Mother    • Hyperlipidemia Mother    • Cancer Mother    • Heart attack Mother    • Migraines Mother    • Cancer Father      pancreatic and liver   • Diabetes Father    • Depression Father    • Colon polyps Father    • ADD / ADHD Son    • Diabetes Maternal Aunt    • Diabetes Maternal Uncle    • Diabetes Paternal Aunt    • Diabetes Paternal Uncle    • Cancer Maternal Grandmother      uterine/cervical   • Heart disease  Maternal Grandfather    • Heart disease Paternal Grandfather    • Migraines Sister    • Breast cancer Neg Hx    • Ovarian cancer Neg Hx        Social History     Social History   • Marital status:      Spouse name: N/A   • Number of children: N/A   • Years of education: N/A     Occupational History   • Not on file.     Social History Main Topics   • Smoking status: Current Every Day Smoker     Packs/day: 0.50     Years: 34.00     Types: Cigarettes   • Smokeless tobacco: Never Used   • Alcohol use No   • Drug use: No   • Sexual activity: Not Currently     Birth control/ protection: None     Other Topics Concern   • Not on file     Social History Narrative   • No narrative on file       Review of Systems   Constitutional: Positive for fatigue and malaise/fatigue. Negative for chills, diaphoresis and fever.   HENT: Negative for congestion, ear pain, nosebleeds, postnasal drip, rhinorrhea, sinus pressure, sneezing and sore throat.    Eyes: Negative.  Negative for blurred vision, redness and itching.   Respiratory: Negative.  Negative for cough, shortness of breath and wheezing.    Cardiovascular: Negative.  Negative for chest pain, palpitations, orthopnea, leg swelling and PND.   Gastrointestinal: Negative.  Negative for abdominal pain, anorexia, change in bowel habit, constipation, diarrhea, nausea and vomiting.   Endocrine: Negative.  Negative for cold intolerance, heat intolerance, polydipsia, polyphagia and polyuria.   Genitourinary: Negative.  Negative for dysuria, frequency, hematuria and urgency.   Musculoskeletal: Positive for arthralgias and myalgias. Negative for back pain, gait problem, joint swelling and neck pain.   Skin: Negative.  Negative for color change and rash.   Allergic/Immunologic: Positive for environmental allergies.   Neurological: Positive for headaches. Negative for dizziness, vertigo, syncope, weakness, light-headedness and numbness.   Hematological: Negative.  Negative for  adenopathy. Does not bruise/bleed easily.   Psychiatric/Behavioral: Negative.  Negative for dysphoric mood. The patient is not nervous/anxious.         Depression and anxiety are stable on medication.       Objective   Physical Exam   Constitutional: She is oriented to person, place, and time. She appears well-developed.   HENT:   Head: Normocephalic.   Right Ear: External ear normal.   Left Ear: External ear normal.   Nose: Nose normal.   Eyes: Conjunctivae and EOM are normal. Pupils are equal, round, and reactive to light.   Neck: Trachea normal and normal range of motion. Neck supple. Carotid bruit is not present. No thyroid mass and no thyromegaly present.   Cardiovascular: Normal rate and regular rhythm.    No murmur heard.  Pulmonary/Chest: Effort normal and breath sounds normal. No respiratory distress. She has no decreased breath sounds. She has no wheezes. She has no rhonchi. She has no rales.   Abdominal: Soft. Bowel sounds are normal. There is no tenderness.   Musculoskeletal: Normal range of motion.   Neurological: She is alert and oriented to person, place, and time.   Skin: Skin is warm and dry.   Psychiatric: She has a normal mood and affect. Her behavior is normal.   Nursing note and vitals reviewed.      Assessment/Plan   Lizette was seen today for establish care.    Diagnoses and all orders for this visit:    Elevated cholesterol  -     Cancel: Comprehensive Metabolic Panel  -     Cancel: Lipid Panel  -     Comprehensive Metabolic Panel  -     Lipid Panel    Essential hypertension  -     lisinopril-hydrochlorothiazide (PRINZIDE,ZESTORETIC) 20-25 MG per tablet; Take 1 tablet by mouth Daily.  -     Cancel: Comprehensive Metabolic Panel  -     Cancel: CBC & Differential  -     Cancel: TSH  -     Cancel: Lipid Panel  -     Comprehensive Metabolic Panel  -     CBC & Differential  -     TSH  -     Lipid Panel    Right lateral epicondylitis  -     methocarbamol (ROBAXIN) 500 MG tablet; Take 1 tablet by  mouth 4 (Four) Times a Day As Needed for Muscle Spasms.    Abnormal glucose  -     Cancel: Hemoglobin A1c  -     Hemoglobin A1c        Discussed smoking cessation           Current Outpatient Prescriptions:   •  aspirin 325 MG tablet, Take 325 mg by mouth Daily., Disp: , Rfl:   •  cetirizine (ZyrTEC) 10 MG tablet, Take 10 mg by mouth daily., Disp: , Rfl:   •  dicyclomine (BENTYL) 20 MG tablet, Take 1 tablet by mouth 3 (Three) Times a Day As Needed (IBS- diarrhea)., Disp: 90 tablet, Rfl: 0  •  DULoxetine (CYMBALTA) 30 MG capsule, Take 30 mg by mouth Daily., Disp: , Rfl:   •  estradiol (ESTRACE) 2 MG tablet, Take 1 tablet by mouth Daily., Disp: 30 tablet, Rfl: 12  •  lisinopril-hydrochlorothiazide (PRINZIDE,ZESTORETIC) 20-25 MG per tablet, Take 1 tablet by mouth Daily., Disp: 30 tablet, Rfl: 3  •  methocarbamol (ROBAXIN) 500 MG tablet, Take 1 tablet by mouth 4 (Four) Times a Day As Needed for Muscle Spasms., Disp: 120 tablet, Rfl: 3  •  naproxen (NAPROSYN) 500 MG tablet, Take 500 mg by mouth 2 (Two) Times a Day With Meals., Disp: , Rfl:     Return in about 3 months (around 7/9/2018), or if symptoms worsen or fail to improve, for Recheck.

## 2018-04-10 ENCOUNTER — TELEPHONE (OUTPATIENT)
Dept: INTERNAL MEDICINE | Facility: CLINIC | Age: 53
End: 2018-04-10

## 2018-04-10 LAB
ALBUMIN SERPL-MCNC: 4.5 G/DL (ref 3.2–4.8)
ALBUMIN/GLOB SERPL: 2 G/DL (ref 1.5–2.5)
ALP SERPL-CCNC: 118 U/L (ref 25–100)
ALT SERPL-CCNC: 14 U/L (ref 7–40)
AST SERPL-CCNC: 15 U/L (ref 0–33)
BASOPHILS # BLD AUTO: 0.03 10*3/MM3 (ref 0–0.2)
BASOPHILS NFR BLD AUTO: 0.2 % (ref 0–1)
BILIRUB SERPL-MCNC: 0.3 MG/DL (ref 0.3–1.2)
BUN SERPL-MCNC: 13 MG/DL (ref 9–23)
BUN/CREAT SERPL: 21.7 (ref 7–25)
CALCIUM SERPL-MCNC: 9.9 MG/DL (ref 8.7–10.4)
CHLORIDE SERPL-SCNC: 105 MMOL/L (ref 99–109)
CHOLEST SERPL-MCNC: 278 MG/DL (ref 0–200)
CO2 SERPL-SCNC: 27 MMOL/L (ref 20–31)
CREAT SERPL-MCNC: 0.6 MG/DL (ref 0.6–1.3)
EOSINOPHIL # BLD AUTO: 0.16 10*3/MM3 (ref 0–0.3)
EOSINOPHIL NFR BLD AUTO: 1.3 % (ref 0–3)
ERYTHROCYTE [DISTWIDTH] IN BLOOD BY AUTOMATED COUNT: 13.7 % (ref 11.3–14.5)
GFR SERPLBLD CREATININE-BSD FMLA CKD-EPI: 106 ML/MIN/1.73
GFR SERPLBLD CREATININE-BSD FMLA CKD-EPI: 128 ML/MIN/1.73
GLOBULIN SER CALC-MCNC: 2.3 GM/DL
GLUCOSE SERPL-MCNC: 144 MG/DL (ref 70–100)
HBA1C MFR BLD: 6.3 % (ref 4.8–5.6)
HCT VFR BLD AUTO: 43.5 % (ref 34.5–44)
HDLC SERPL-MCNC: 46 MG/DL (ref 40–60)
HGB BLD-MCNC: 14.7 G/DL (ref 11.5–15.5)
IMM GRANULOCYTES # BLD: 0.02 10*3/MM3 (ref 0–0.03)
IMM GRANULOCYTES NFR BLD: 0.2 % (ref 0–0.6)
LDLC SERPL CALC-MCNC: 168 MG/DL (ref 0–100)
LYMPHOCYTES # BLD AUTO: 4.12 10*3/MM3 (ref 0.6–4.8)
LYMPHOCYTES NFR BLD AUTO: 32.3 % (ref 24–44)
MCH RBC QN AUTO: 31.3 PG (ref 27–31)
MCHC RBC AUTO-ENTMCNC: 33.8 G/DL (ref 32–36)
MCV RBC AUTO: 92.6 FL (ref 80–99)
MONOCYTES # BLD AUTO: 0.8 10*3/MM3 (ref 0–1)
MONOCYTES NFR BLD AUTO: 6.3 % (ref 0–12)
NEUTROPHILS # BLD AUTO: 7.62 10*3/MM3 (ref 1.5–8.3)
NEUTROPHILS NFR BLD AUTO: 59.7 % (ref 41–71)
PLATELET # BLD AUTO: 252 10*3/MM3 (ref 150–450)
POTASSIUM SERPL-SCNC: 3.6 MMOL/L (ref 3.5–5.5)
PROT SERPL-MCNC: 6.8 G/DL (ref 5.7–8.2)
RBC # BLD AUTO: 4.7 10*6/MM3 (ref 3.89–5.14)
SODIUM SERPL-SCNC: 140 MMOL/L (ref 132–146)
TRIGL SERPL-MCNC: 321 MG/DL (ref 0–150)
TSH SERPL DL<=0.005 MIU/L-ACNC: 3.03 MIU/ML (ref 0.35–5.35)
VLDLC SERPL CALC-MCNC: 64.2 MG/DL
WBC # BLD AUTO: 12.75 10*3/MM3 (ref 3.5–10.8)

## 2018-04-10 NOTE — TELEPHONE ENCOUNTER
----- Message from Denise LOZANO MD sent at 4/10/2018  8:03 AM EDT -----  Please call the patient regarding her abnormal result. Glucose and HGA1c in prediabetic range, cholesterol and triglycerides are elevated. Which statins has she tried?  Low animal fat, low sugar, low alcohol diet, increase fiber.

## 2018-04-11 ENCOUNTER — HOSPITAL ENCOUNTER (OUTPATIENT)
Dept: PHYSICAL THERAPY | Facility: HOSPITAL | Age: 53
Setting detail: THERAPIES SERIES
Discharge: HOME | End: 2018-04-11

## 2018-04-11 DIAGNOSIS — M77.11 LATERAL EPICONDYLITIS OF RIGHT ELBOW: Primary | ICD-10-CM

## 2018-04-11 PROCEDURE — 97110 THERAPEUTIC EXERCISES: CPT | Performed by: PHYSICAL THERAPIST

## 2018-04-11 PROCEDURE — 97035 APP MDLTY 1+ULTRASOUND EA 15: CPT | Performed by: PHYSICAL THERAPIST

## 2018-04-11 NOTE — THERAPY TREATMENT NOTE
Outpatient Physical Therapy Ortho Treatment Note   [facility]     Patient Name: Lizette Keith  : 1965  MRN: [id number]  Today's Date: 2018      Visit Date: 2018    Visit Dx:    ICD-10-CM ICD-9-CM   1. Lateral epicondylitis of right elbow M77.11 726.32       Patient Active Problem List   Diagnosis   • Blood in urine   • Seasonal allergies   • Hypertension   • Elevated cholesterol   • GERD (gastroesophageal reflux disease)   • IBS (irritable bowel syndrome)   • Fibromyalgia   • Hearing loss   • Chronic fatigue   • Depression   • Anxiety   • Insomnia   • Encounter for tobacco use cessation counseling   • Vasomotor flushing   • Tachycardia   • Libido, decreased   • Onychomycosis   • Prediabetes   • Tobacco dependence        Past Medical History:   Diagnosis Date   • Allergic    • Anemia     during pregnancy   • Anxiety    • Arthritis    • Blood in urine    • Chronic fatigue    • Colon polyp 2018    7 at last colonoscopy   • Depression    • Elevated cholesterol    • Endometriosis    • Fibromyalgia    • Fibromyalgia, primary    • Fracture    • GERD (gastroesophageal reflux disease)    • H/O mammogram 2018   • HL (hearing loss)    • Hypertension    • Irritable bowel syndrome    • Pap smear for cervical cancer screening 2018?        Past Surgical History:   Procedure Laterality Date   • CHOLECYSTECTOMY     • COLONOSCOPY  2018    3 year f/u polypectomy Dr MARTA Waller   • GALLBLADDER SURGERY     • HYSTERECTOMY     • INNER EAR SURGERY     • OOPHORECTOMY     • TEMPOROMANDIBULAR JOINT ARTHROPLASTY                               PT Assessment/Plan     Row Name 18 1300          PT Assessment    Assessment Comments Pt tolerated initial ther ex in clinic, limited d/t increase lateral epicondlye pain. Pt educated on HEP, trial of US for improved pain mgmt used today.   -CP        PT Plan    PT Plan Comments Assess compliance with written HEP. Assess response from limited ther ex and US.  Could trial DN or ionto, pending symptoms next visit.   -CP       User Key  (r) = Recorded By, (t) = Taken By, (c) = Cosigned By    Initials Name Provider Type    CP Corinne E Perkins, PT Physical Therapist                Modalities     Row Name 04/11/18 1300             Ultrasound [id number]    Location right lateral elbow  -CP      Rx Minutes 8  -CP      Duty Cycle 50  -CP      Frequency 3.0 MHz  -CP      Intensity - Wts/cm 1.1  -CP        User Key  (r) = Recorded By, (t) = Taken By, (c) = Cosigned By    Initials Name Provider Type    CP Corinne E Perkins, PT Physical Therapist                Exercises     Row Name 04/11/18 1300             Subjective Comments    Subjective Comments Pt states she has increased stress and pain on arrival.   -CP         Subjective Pain    Able to rate subjective pain? yes  -CP      Pre-Treatment Pain Level 5  -CP      Post-Treatment Pain Level 5  -CP         Exercise 1    Exercise Name 1 AAROM elbow flex/ext with 1lb dowel  -CP      Cueing 1 Verbal  -CP      Reps 1 10x each  -CP      Additional Comments Total ther ex time: 31 min including pt education of HEP.   -CP         Exercise 2    Exercise Name 2 AAROM pronation/supination  -CP      Cueing 2 Verbal  -CP      Reps 2 12x each  -CP         Exercise 3    Exercise Name 3 prayer stretch  -CP      Cueing 3 Verbal  -CP      Reps 3 3  -CP         Exercise 4    Exercise Name 4 digiflex red individual , whole   -CP      Cueing 4 Verbal  -CP      Reps 4 10x each  -CP         Exercise 5    Exercise Name 5 Seated pulley into flexion, scaption with elbow ext/flex  -CP      Cueing 5 Verbal  -CP      Time 5 1 min each  -CP         Exercise 6    Exercise Name 6 wrist ext stretch moving  -CP      Cueing 6 Verbal  -CP      Reps 6 8  -CP         Exercise 7    Exercise Name 7 Table walk away stretch for elbow ext, wrist flex stretch and wrist ext stretch  -CP      Cueing 7 Verbal  -CP      Reps 7 5  -CP        User Key  (r) = Recorded By, (t) =  Taken By, (c) = Cosigned By    Initials Name Provider Type    CP Corinne E Perkins, PT Physical Therapist                             Therapy Education  Education Details: HEP: prayer stretch, wrist ext stretch, elbow flex/ext, towel roll squeeze, and wrist flex stretch/walk away stretch.   Given: HEP  Program: Reinforced  How Provided: Verbal, Written, Demonstration  Provided to: Patient  Level of Understanding: Verbalized, Demonstrated              Time Calculation:   Start Time: 1300  Total Timed Code Minutes- PT: 31 minute(s)    Therapy Charges for Today     Code Description Service Date Service Provider Modifiers Qty    86975711864  PT THER PROC EA 15 MIN 4/11/2018 Corinne E Perkins, PT GP 2    33453381953 HC PT ULTRASOUND EA 15 MIN 4/11/2018 Corinne E Perkins, PT GP 1                    Corinne E. Perkins, PT  4/11/2018

## 2018-04-16 ENCOUNTER — HOSPITAL ENCOUNTER (OUTPATIENT)
Dept: PHYSICAL THERAPY | Facility: HOSPITAL | Age: 53
Setting detail: THERAPIES SERIES
Discharge: HOME | End: 2018-04-16

## 2018-04-16 DIAGNOSIS — M77.11 LATERAL EPICONDYLITIS OF RIGHT ELBOW: Primary | ICD-10-CM

## 2018-04-16 PROCEDURE — 97035 APP MDLTY 1+ULTRASOUND EA 15: CPT | Performed by: PHYSICAL THERAPIST

## 2018-04-16 PROCEDURE — 97110 THERAPEUTIC EXERCISES: CPT | Performed by: PHYSICAL THERAPIST

## 2018-04-16 NOTE — THERAPY TREATMENT NOTE
Outpatient Physical Therapy Ortho Treatment Note   [facility]     Patient Name: Lizette Keith  : 1965  MRN: [id number]  Today's Date: 2018      Visit Date: 2018    Visit Dx:    ICD-10-CM ICD-9-CM   1. Lateral epicondylitis of right elbow M77.11 726.32       Patient Active Problem List   Diagnosis   • Blood in urine   • Seasonal allergies   • Hypertension   • Elevated cholesterol   • GERD (gastroesophageal reflux disease)   • IBS (irritable bowel syndrome)   • Fibromyalgia   • Hearing loss   • Chronic fatigue   • Depression   • Anxiety   • Insomnia   • Encounter for tobacco use cessation counseling   • Vasomotor flushing   • Tachycardia   • Libido, decreased   • Onychomycosis   • Prediabetes   • Tobacco dependence        Past Medical History:   Diagnosis Date   • Allergic    • Anemia     during pregnancy   • Anxiety    • Arthritis    • Blood in urine    • Chronic fatigue    • Colon polyp 2018    7 at last colonoscopy   • Depression    • Elevated cholesterol    • Endometriosis    • Fibromyalgia    • Fibromyalgia, primary    • Fracture    • GERD (gastroesophageal reflux disease)    • H/O mammogram 2018   • HL (hearing loss)    • Hypertension    • Irritable bowel syndrome    • Pap smear for cervical cancer screening 2018?        Past Surgical History:   Procedure Laterality Date   • CHOLECYSTECTOMY     • COLONOSCOPY  2018    3 year f/u polypectomy Dr MARTA Waller   • GALLBLADDER SURGERY     • HYSTERECTOMY     • INNER EAR SURGERY     • OOPHORECTOMY     • TEMPOROMANDIBULAR JOINT ARTHROPLASTY                               PT Assessment/Plan     Row Name 18 1030          PT Assessment    Assessment Comments Pt verbalized improved R lateral elbow pain with end range flex, ext, pronation and supination. She gets relief after US. Increased emphasis placed on compliance with current HEP.   -CP        PT Plan    PT Plan Comments Could trial manual mobs for improved end range and pain  management pending response. Progress ther ex to include theraband next visit.   -CP       User Key  (r) = Recorded By, (t) = Taken By, (c) = Cosigned By    Initials Name Provider Type    CP Corinne E Perkins, PT Physical Therapist                Modalities     Row Name 04/16/18 1000             Ultrasound [id number]    Location right lateral elbow  -CP      Rx Minutes 8  -CP      Duty Cycle 100  -CP      Frequency 3.0 MHz  -CP      Intensity - Wts/cm 1.1  -CP        User Key  (r) = Recorded By, (t) = Taken By, (c) = Cosigned By    Initials Name Provider Type    CP Corinne E Perkins, PT Physical Therapist                Exercises     Row Name 04/16/18 1000             Subjective Comments    Subjective Comments Patient states she did well after US last visit.   -CP         Subjective Pain    Able to rate subjective pain? yes  -CP      Pre-Treatment Pain Level 3  -CP      Post-Treatment Pain Level 2  -CP         Exercise 1    Exercise Name 1 AAROM elbow flex/ext with 1lb dowel, pronation/supination   -CP      Cueing 1 Verbal  -CP      Reps 1 10x eacg  -CP      Additional Comments Total ther ex: 28 minutes  -CP         Exercise 2    Exercise Name 2 [employer] level 1 2 min for, 2 min backward  -CP      Cueing 2 Verbal  -CP      Time 2 4  -CP         Exercise 3    Exercise Name 3 prayer stretch  -CP      Reps 3 3  -CP         Exercise 4    Exercise Name 4 digiflex red individual , whole   -CP      Cueing 4 Verbal  -CP      Reps 4 10X each  -CP         Exercise 5    Exercise Name 5 Standing shoulder AAROM into end range flexion, abduction  -CP      Cueing 5 Verbal  -CP      Reps 5 10x each  -CP         Exercise 6    Exercise Name 6 wrist ext stretch moving  -CP      Cueing 6 Verbal  -CP      Reps 6 8  -CP         Exercise 7    Exercise Name 7 Table walk away stretch for elbow ext, wrist flex stretch and wrist ext stretch  -CP      Cueing 7 Verbal  -CP      Reps 7 5  -CP         Exercise 8    Exercise Name 8 Mid rows with  GTB  -CP      Cueing 8 Verbal;Demo  -CP      Reps 8 10x   -CP        User Key  (r) = Recorded By, (t) = Taken By, (c) = Cosigned By    Initials Name Provider Type    CP Corinne E Perkins, PT Physical Therapist                                            Time Calculation:   Start Time: 1038  Total Timed Code Minutes- PT: 28 minute(s)    Therapy Charges for Today     Code Description Service Date Service Provider Modifiers Qty    25260535879 HC PT THER PROC EA 15 MIN 4/16/2018 Corinne E Perkins, PT GP 2    93168048402 HC PT ULTRASOUND EA 15 MIN 4/16/2018 Corinne E Perkins, PT GP 1                    Corinne E. Perkins, PT  4/16/2018

## 2018-05-02 ENCOUNTER — DOCUMENTATION (OUTPATIENT)
Dept: PHYSICAL THERAPY | Facility: HOSPITAL | Age: 53
End: 2018-05-02

## 2018-05-02 DIAGNOSIS — M77.11 LATERAL EPICONDYLITIS OF RIGHT ELBOW: Primary | ICD-10-CM

## 2018-05-02 NOTE — THERAPY DISCHARGE NOTE
Outpatient Physical Therapy Discharge Summary         Patient Name: Lizette Keith  : 1965  MRN: [id number]    Today's Date: 2018    Visit Dx:    ICD-10-CM ICD-9-CM   1. Lateral epicondylitis of right elbow M77.11 726.32             PT OP Goals     Row Name 18 0900          PT Short Term Goals    STG Date to Achieve 18  -CP     STG 1 Patient will demonstrate independence with home exercise program.  -CP     STG 1 Progress Not Met  -CP     STG 2 Patient will demonstrate right AROM elbow supination to 80°/ pronation 80° without c/o pain.    -CP     STG 2 Progress Not Met  -CP        Long Term Goals    LTG Date to Achieve 18  -CP     LTG 1 Patient will open jar, write, prepare meal, carry/lift object up to 5# without complaint of pain.  -CP     LTG 1 Progress Not Met  -CP     LTG 2 Patient will demonstrate pain free full motion of shoulder, elbow, wrist; and pain free  strength.  -CP     LTG 2 Progress Not Met  -CP     LTG 3 Patient will report overall Quick Dash improvements > 15 points to demonstrate increased independence with daily tasks.  -CP     LTG 3 Progress Not Met  -CP     LTG 4 Patient will manage simulated work activities with pain less than 3 of 10.  -CP     LTG 4 Progress Not Met  -CP       User Key  (r) = Recorded By, (t) = Taken By, (c) = Cosigned By    Initials Name Provider Type    CP Corinne E Perkins, PT Physical Therapist          OP PT Discharge Summary  Date of Discharge: 18  Reason for Discharge: Non-compliant  Outcomes Achieved: Other  Discharge Destination: Unknown  Discharge Instructions/Additional Comments: Pt was seen for PT IE and 2 treatments. She had 1 cancel and 3 no shows. Pt will be d/c to home d/t lack of compliance. PT unable to assess pt functional status due to freq no show/cancel.       Time Calculation:                    Corinne E. Perkins, PT  2018

## 2018-09-08 DIAGNOSIS — I10 ESSENTIAL HYPERTENSION: ICD-10-CM

## 2018-09-10 RX ORDER — LISINOPRIL AND HYDROCHLOROTHIAZIDE 25; 20 MG/1; MG/1
TABLET ORAL
Qty: 30 TABLET | Refills: 0 | Status: SHIPPED | OUTPATIENT
Start: 2018-09-10 | End: 2018-09-21 | Stop reason: SDUPTHER

## 2018-09-21 ENCOUNTER — OFFICE VISIT (OUTPATIENT)
Dept: INTERNAL MEDICINE | Facility: CLINIC | Age: 53
End: 2018-09-21

## 2018-09-21 VITALS
TEMPERATURE: 96.9 F | OXYGEN SATURATION: 98 % | HEART RATE: 118 BPM | SYSTOLIC BLOOD PRESSURE: 118 MMHG | BODY MASS INDEX: 34.35 KG/M2 | WEIGHT: 206.2 LBS | HEIGHT: 65 IN | DIASTOLIC BLOOD PRESSURE: 76 MMHG

## 2018-09-21 DIAGNOSIS — I10 ESSENTIAL HYPERTENSION: Primary | ICD-10-CM

## 2018-09-21 DIAGNOSIS — M25.50 ARTHRALGIA, UNSPECIFIED JOINT: ICD-10-CM

## 2018-09-21 DIAGNOSIS — E78.00 ELEVATED CHOLESTEROL: ICD-10-CM

## 2018-09-21 DIAGNOSIS — R61 DIAPHORESIS: ICD-10-CM

## 2018-09-21 DIAGNOSIS — K58.9 IRRITABLE BOWEL SYNDROME WITHOUT DIARRHEA: ICD-10-CM

## 2018-09-21 DIAGNOSIS — G57.93 NEUROPATHY OF BOTH FEET: ICD-10-CM

## 2018-09-21 DIAGNOSIS — M79.10 MYALGIA: ICD-10-CM

## 2018-09-21 DIAGNOSIS — L53.8 PALMAR ERYTHEMA: ICD-10-CM

## 2018-09-21 DIAGNOSIS — R73.09 ABNORMAL GLUCOSE: ICD-10-CM

## 2018-09-21 DIAGNOSIS — R53.83 OTHER FATIGUE: ICD-10-CM

## 2018-09-21 LAB — HBA1C MFR BLD: 6.2 %

## 2018-09-21 PROCEDURE — 83036 HEMOGLOBIN GLYCOSYLATED A1C: CPT | Performed by: FAMILY MEDICINE

## 2018-09-21 PROCEDURE — 99214 OFFICE O/P EST MOD 30 MIN: CPT | Performed by: FAMILY MEDICINE

## 2018-09-21 RX ORDER — DULOXETIN HYDROCHLORIDE 30 MG/1
30 CAPSULE, DELAYED RELEASE ORAL DAILY
Qty: 90 CAPSULE | Refills: 1 | Status: SHIPPED | OUTPATIENT
Start: 2018-09-21 | End: 2019-04-25 | Stop reason: DRUGHIGH

## 2018-09-21 RX ORDER — TIZANIDINE 4 MG/1
4 TABLET ORAL NIGHTLY PRN
Qty: 30 TABLET | Refills: 1 | Status: SHIPPED | OUTPATIENT
Start: 2018-09-21 | End: 2018-12-17 | Stop reason: SDUPTHER

## 2018-09-21 RX ORDER — LISINOPRIL AND HYDROCHLOROTHIAZIDE 25; 20 MG/1; MG/1
1 TABLET ORAL DAILY
Qty: 90 TABLET | Refills: 1 | Status: SHIPPED | OUTPATIENT
Start: 2018-09-21 | End: 2018-12-04 | Stop reason: HOSPADM

## 2018-09-21 NOTE — PROGRESS NOTES
"Subjective   Lizette Keith is a 53 y.o. female.     Chief Complaint   Patient presents with   • Hypertension     5 month follow up   • abnormal glucose   • elevated cholesterol   • Anxiety   • Depression       Visit Vitals  /76   Pulse 118   Temp 96.9 °F (36.1 °C)   Ht 165.1 cm (65\")   Wt 93.5 kg (206 lb 3.2 oz)   SpO2 98%   BMI 34.31 kg/m²         Hypertension   This is a chronic problem. The current episode started more than 1 year ago. The problem is unchanged. The problem is controlled. Associated symptoms include malaise/fatigue, peripheral edema and sweats. Pertinent negatives include no anxiety, blurred vision, chest pain, headaches, neck pain, orthopnea, palpitations, PND or shortness of breath. There are no associated agents to hypertension. Risk factors for coronary artery disease include family history, dyslipidemia, obesity, post-menopausal state and smoking/tobacco exposure. Current antihypertension treatment includes ACE inhibitors and diuretics. The current treatment provides significant improvement. There are no compliance problems.  There is no history of angina, kidney disease, CAD/MI, CVA, heart failure, left ventricular hypertrophy, PVD or retinopathy. There is no history of sleep apnea or a thyroid problem.   Fatigue   This is a chronic problem. The current episode started more than 1 year ago. The problem occurs constantly. The problem has been unchanged. Associated symptoms include coughing (pt attributes to allergies), diaphoresis, fatigue, myalgias, nausea (if overheated) and urinary symptoms. Pertinent negatives include no abdominal pain, anorexia, change in bowel habit, chest pain, chills, congestion, fever, headaches, joint swelling, neck pain, numbness, rash, sore throat, swollen glands, vertigo, visual change, vomiting or weakness. Arthralgias: multiple joints hurt. Nothing aggravates the symptoms. She has tried rest for the symptoms. The treatment provided no relief.      Pt " states that she is not anxious or depressed.  Pt has multiple joints hurting. Pt went to Urgent care for right hip pain and hip locking up and received tizanidine. ,.  Pt was taking the tizanidine and felt much better.    Pt needs BP refill.  Pt has neuropathy in both feet that is helped by the cymbalta.   Pt is in pain management for her back pain.  The following portions of the patient's history were reviewed and updated as appropriate: allergies, current medications, past family history, past medical history, past social history, past surgical history and problem list.    Past Medical History:   Diagnosis Date   • Allergic    • Anemia     during pregnancy   • Anxiety    • Arthritis    • Blood in urine    • Chronic fatigue    • Colon polyp 02/2018    7 at last colonoscopy   • Depression    • Elevated cholesterol    • Endometriosis    • Fibromyalgia    • Fibromyalgia, primary    • Fracture    • GERD (gastroesophageal reflux disease)    • H/O mammogram 02/2018   • HL (hearing loss)    • Hypertension    • Irritable bowel syndrome    • Pap smear for cervical cancer screening 2018?      Past Surgical History:   Procedure Laterality Date   • CHOLECYSTECTOMY     • COLONOSCOPY  02/06/2018    3 year f/u polypectomy Dr MARTA Waller   • GALLBLADDER SURGERY     • HYSTERECTOMY     • INNER EAR SURGERY     • OOPHORECTOMY     • TEMPOROMANDIBULAR JOINT ARTHROPLASTY  1984      Family History   Problem Relation Age of Onset   • Melanoma Mother    • Diabetes Mother    • Heart disease Mother    • Arthritis Mother    • Depression Mother    • Colon polyps Mother    • Thyroid disease Mother    • Hyperlipidemia Mother    • Cancer Mother    • Heart attack Mother    • Migraines Mother    • Cancer Father         pancreatic and liver   • Diabetes Father    • Depression Father    • Colon polyps Father    • ADD / ADHD Son    • Diabetes Maternal Aunt    • Diabetes Maternal Uncle    • Diabetes Paternal Aunt    • Diabetes Paternal Uncle    • Cancer  Maternal Grandmother         uterine/cervical   • Heart disease Maternal Grandfather    • Heart disease Paternal Grandfather    • Migraines Sister    • Breast cancer Neg Hx    • Ovarian cancer Neg Hx       Social History     Social History   • Marital status:      Spouse name: N/A   • Number of children: N/A   • Years of education: N/A     Occupational History   • Not on file.     Social History Main Topics   • Smoking status: Current Every Day Smoker     Packs/day: 0.75     Years: 34.00     Types: Cigarettes   • Smokeless tobacco: Never Used   • Alcohol use No   • Drug use: No   • Sexual activity: Not Currently     Birth control/ protection: None     Other Topics Concern   • Not on file     Social History Narrative   • No narrative on file        Review of Systems   Constitutional: Positive for diaphoresis, fatigue and malaise/fatigue. Negative for chills and fever.   HENT: Negative.  Negative for congestion, ear pain, nosebleeds, postnasal drip, rhinorrhea, sinus pressure, sneezing and sore throat.    Eyes: Negative.  Negative for blurred vision, redness and itching.   Respiratory: Positive for cough (pt attributes to allergies). Negative for shortness of breath and wheezing.    Cardiovascular: Negative.  Negative for chest pain, palpitations, orthopnea and PND.   Gastrointestinal: Positive for constipation, diarrhea and nausea (if overheated). Negative for abdominal pain, anorexia, change in bowel habit and vomiting.        IBS   Endocrine: Positive for heat intolerance. Negative for cold intolerance.   Genitourinary: Negative.  Negative for dysuria, frequency, hematuria and urgency.   Musculoskeletal: Positive for back pain (low back pain) and myalgias. Negative for joint swelling and neck pain. Arthralgias: multiple joints hurt.        Palmar erythema   Skin: Positive for color change. Negative for rash.        Palmar erythema   Allergic/Immunologic: Negative.  Negative for environmental allergies.    Neurological: Negative.  Negative for dizziness, vertigo, syncope, weakness, light-headedness, numbness and headaches.   Hematological: Negative.  Negative for adenopathy. Does not bruise/bleed easily.   Psychiatric/Behavioral: Negative.  Negative for dysphoric mood. The patient is not nervous/anxious.        Objective   Physical Exam   Constitutional: She is oriented to person, place, and time. She appears well-developed.   HENT:   Head: Normocephalic.   Right Ear: External ear normal.   Left Ear: External ear normal.   Nose: Nose normal.   Eyes: Pupils are equal, round, and reactive to light. Conjunctivae, EOM and lids are normal.   Neck: Trachea normal and normal range of motion. Neck supple. Carotid bruit is not present. No thyroid mass and no thyromegaly present.   Cardiovascular: Normal rate and regular rhythm.    No murmur heard.  Pulmonary/Chest: Effort normal and breath sounds normal. No respiratory distress. She has no decreased breath sounds. She has no wheezes. She has no rhonchi. She has no rales. She exhibits no tenderness.   Abdominal: Soft. Bowel sounds are normal. There is no tenderness.   Musculoskeletal: Normal range of motion.   Neurological: She is alert and oriented to person, place, and time.   Skin: Skin is warm and dry. There is erythema (both palms).   Psychiatric: She has a normal mood and affect. Her behavior is normal.   Nursing note and vitals reviewed.      Assessment/Plan   Lizette was seen today for hypertension, abnormal glucose, elevated cholesterol, anxiety and depression.    Diagnoses and all orders for this visit:    Essential hypertension  -     lisinopril-hydrochlorothiazide (PRINZIDE,ZESTORETIC) 20-25 MG per tablet; Take 1 tablet by mouth Daily.    Abnormal glucose  -     POC Glycosylated Hemoglobin (Hb A1C)  -     Hemoglobin A1c    Neuropathy of both feet  -     DULoxetine (CYMBALTA) 30 MG capsule; Take 1 capsule by mouth Daily.  -     TSH  -     T4, Free  -     Vitamin  B12  -     Folate    Arthralgia, unspecified joint  -     Rheumatoid Factor  -     Nuclear Antigen Antibody, IFA  -     C-reactive Protein  -     Sedimentation Rate  -     tiZANidine (ZANAFLEX) 4 MG tablet; Take 1 tablet by mouth At Night As Needed for Muscle Spasms.  -     Ambulatory Referral to Rheumatology    Other fatigue  -     Comprehensive Metabolic Panel  -     TSH  -     T4, Free  -     Vitamin B12  -     Folate  -     Vitamin D 25 Hydroxy  -     CBC & Differential  -     Celiac Disease Panel    Palmar erythema  -     Hepatitis Panel, Acute    Irritable bowel syndrome without diarrhea    Elevated cholesterol  -     Comprehensive Metabolic Panel  -     Lipid Panel    Diaphoresis  -     TSPOT    Myalgia  -     tiZANidine (ZANAFLEX) 4 MG tablet; Take 1 tablet by mouth At Night As Needed for Muscle Spasms.  -     Ambulatory Referral to Rheumatology        Fodmap diet           Current Outpatient Prescriptions:   •  aspirin 325 MG tablet, Take 325 mg by mouth Daily., Disp: , Rfl:   •  cetirizine (ZyrTEC) 10 MG tablet, Take 10 mg by mouth daily., Disp: , Rfl:   •  dicyclomine (BENTYL) 20 MG tablet, Take 1 tablet by mouth 3 (Three) Times a Day As Needed (IBS- diarrhea)., Disp: 90 tablet, Rfl: 0  •  DULoxetine (CYMBALTA) 30 MG capsule, Take 1 capsule by mouth Daily., Disp: 90 capsule, Rfl: 1  •  estradiol (ESTRACE) 2 MG tablet, Take 1 tablet by mouth Daily., Disp: 30 tablet, Rfl: 12  •  lisinopril-hydrochlorothiazide (PRINZIDE,ZESTORETIC) 20-25 MG per tablet, Take 1 tablet by mouth Daily., Disp: 90 tablet, Rfl: 1  •  naproxen (NAPROSYN) 500 MG tablet, Take 500 mg by mouth 2 (Two) Times a Day With Meals., Disp: , Rfl:   •  tiZANidine (ZANAFLEX) 4 MG tablet, Take 1 tablet by mouth At Night As Needed for Muscle Spasms., Disp: 30 tablet, Rfl: 1    Return in about 3 months (around 12/21/2018), or if symptoms worsen or fail to improve, for Recheck, Annual.

## 2018-09-28 ENCOUNTER — LAB (OUTPATIENT)
Dept: INTERNAL MEDICINE | Facility: CLINIC | Age: 53
End: 2018-09-28

## 2018-09-28 DIAGNOSIS — I10 ESSENTIAL HYPERTENSION: ICD-10-CM

## 2018-09-28 DIAGNOSIS — G57.93 NEUROPATHY OF BOTH FEET: ICD-10-CM

## 2018-09-28 DIAGNOSIS — R61 DIAPHORESIS: ICD-10-CM

## 2018-09-28 DIAGNOSIS — K58.9 IRRITABLE BOWEL SYNDROME WITHOUT DIARRHEA: ICD-10-CM

## 2018-09-28 DIAGNOSIS — E78.00 ELEVATED CHOLESTEROL: ICD-10-CM

## 2018-09-28 DIAGNOSIS — L53.8 PALMAR ERYTHEMA: ICD-10-CM

## 2018-09-28 DIAGNOSIS — R73.09 ABNORMAL GLUCOSE: ICD-10-CM

## 2018-09-28 DIAGNOSIS — M25.50 ARTHRALGIA, UNSPECIFIED JOINT: ICD-10-CM

## 2018-09-28 DIAGNOSIS — M79.10 MYALGIA: ICD-10-CM

## 2018-09-28 DIAGNOSIS — R53.83 OTHER FATIGUE: ICD-10-CM

## 2018-09-28 PROCEDURE — 86481 TB AG RESPONSE T-CELL SUSP: CPT | Performed by: FAMILY MEDICINE

## 2018-10-01 LAB
25(OH)D3+25(OH)D2 SERPL-MCNC: 17.6 NG/ML
ALBUMIN SERPL-MCNC: 4 G/DL (ref 3.2–4.8)
ALBUMIN/GLOB SERPL: 2 G/DL (ref 1.5–2.5)
ALP SERPL-CCNC: 117 U/L (ref 25–100)
ALT SERPL-CCNC: 11 U/L (ref 7–40)
ANA TITR SER IF: NEGATIVE {TITER}
AST SERPL-CCNC: 12 U/L (ref 0–33)
BASOPHILS # BLD AUTO: 0.03 10*3/MM3 (ref 0–0.2)
BASOPHILS NFR BLD AUTO: 0.3 % (ref 0–1)
BILIRUB SERPL-MCNC: 0.3 MG/DL (ref 0.3–1.2)
BUN SERPL-MCNC: 12 MG/DL (ref 9–23)
BUN/CREAT SERPL: 21.8 (ref 7–25)
CALCIUM SERPL-MCNC: 9 MG/DL (ref 8.7–10.4)
CHLORIDE SERPL-SCNC: 98 MMOL/L (ref 99–109)
CHOLEST SERPL-MCNC: 239 MG/DL (ref 0–200)
CO2 SERPL-SCNC: 30 MMOL/L (ref 20–31)
CREAT SERPL-MCNC: 0.55 MG/DL (ref 0.6–1.3)
CRP SERPL-MCNC: 2.04 MG/DL (ref 0–1)
ENDOMYSIUM IGA SER QL: NEGATIVE
EOSINOPHIL # BLD AUTO: 0.13 10*3/MM3 (ref 0–0.3)
EOSINOPHIL NFR BLD AUTO: 1.3 % (ref 0–3)
ERYTHROCYTE [DISTWIDTH] IN BLOOD BY AUTOMATED COUNT: 13.5 % (ref 11.3–14.5)
ERYTHROCYTE [SEDIMENTATION RATE] IN BLOOD BY WESTERGREN METHOD: 33 MM/HR (ref 0–30)
FOLATE SERPL-MCNC: 8.4 NG/ML (ref 3.2–20)
GLOBULIN SER CALC-MCNC: 2 GM/DL
GLUCOSE SERPL-MCNC: 106 MG/DL (ref 70–100)
HAV IGM SERPL QL IA: NEGATIVE
HBA1C MFR BLD: 6.1 % (ref 4.8–5.6)
HBV CORE IGM SERPL QL IA: NEGATIVE
HBV SURFACE AG SERPL QL IA: NEGATIVE
HCT VFR BLD AUTO: 42.4 % (ref 34.5–44)
HCV AB S/CO SERPL IA: <0.1 S/CO RATIO (ref 0–0.9)
HDLC SERPL-MCNC: 40 MG/DL (ref 40–60)
HGB BLD-MCNC: 14.1 G/DL (ref 11.5–15.5)
IGA SERPL-MCNC: 152 MG/DL (ref 87–352)
IMM GRANULOCYTES # BLD: 0.01 10*3/MM3 (ref 0–0.03)
IMM GRANULOCYTES NFR BLD: 0.1 % (ref 0–0.6)
LDLC SERPL CALC-MCNC: 133 MG/DL (ref 0–100)
LYMPHOCYTES # BLD AUTO: 3.3 10*3/MM3 (ref 0.6–4.8)
LYMPHOCYTES NFR BLD AUTO: 33.5 % (ref 24–44)
MCH RBC QN AUTO: 31.1 PG (ref 27–31)
MCHC RBC AUTO-ENTMCNC: 33.3 G/DL (ref 32–36)
MCV RBC AUTO: 93.4 FL (ref 80–99)
MONOCYTES # BLD AUTO: 0.5 10*3/MM3 (ref 0–1)
MONOCYTES NFR BLD AUTO: 5.1 % (ref 0–12)
NEUTROPHILS # BLD AUTO: 5.87 10*3/MM3 (ref 1.5–8.3)
NEUTROPHILS NFR BLD AUTO: 59.7 % (ref 41–71)
PLATELET # BLD AUTO: 245 10*3/MM3 (ref 150–450)
POTASSIUM SERPL-SCNC: 3.2 MMOL/L (ref 3.5–5.5)
PROT SERPL-MCNC: 6 G/DL (ref 5.7–8.2)
RBC # BLD AUTO: 4.54 10*6/MM3 (ref 3.89–5.14)
RHEUMATOID FACT SERPL-ACNC: <10 IU/ML (ref 0–13.9)
SODIUM SERPL-SCNC: 139 MMOL/L (ref 132–146)
T4 FREE SERPL-MCNC: 0.8 NG/DL (ref 0.89–1.76)
TRIGL SERPL-MCNC: 328 MG/DL (ref 0–150)
TSH SERPL DL<=0.005 MIU/L-ACNC: 1.04 MIU/ML (ref 0.35–5.35)
TTG IGA SER-ACNC: <2 U/ML (ref 0–3)
VIT B12 SERPL-MCNC: 414 PG/ML (ref 211–911)
VLDLC SERPL CALC-MCNC: 65.6 MG/DL
WBC # BLD AUTO: 9.84 10*3/MM3 (ref 3.5–10.8)

## 2018-10-02 LAB — REF LAB TEST METHOD: NORMAL

## 2018-10-03 ENCOUNTER — TELEPHONE (OUTPATIENT)
Dept: INTERNAL MEDICINE | Facility: CLINIC | Age: 53
End: 2018-10-03

## 2018-10-03 NOTE — TELEPHONE ENCOUNTER
----- Message from Denise LOZANO MD sent at 10/1/2018  6:56 PM EDT -----  Please call the patient regarding her abnormal result. Potassium is low, is she eating fresh fruit and vegetables? May need to supplement potassium.   TSH is normal, free T4 is slightly low. If she is tired may need to supplement thyroid if she is not taking biotin(Hair and nail supplement) which can interfere with testing.   Lipids, bglucose and HGA1c elevated. Need to consider statins,also low animal fat diet that is high in fiber and low in sugar and alcohol.  CRP and sed rate elevated,some inflammation going on.  Vitamin D is low, OTC supplement 1000 units per day.

## 2018-12-02 ENCOUNTER — APPOINTMENT (OUTPATIENT)
Dept: CT IMAGING | Facility: HOSPITAL | Age: 53
End: 2018-12-02

## 2018-12-02 ENCOUNTER — HOSPITAL ENCOUNTER (INPATIENT)
Facility: HOSPITAL | Age: 53
LOS: 2 days | Discharge: HOME | End: 2018-12-04
Attending: EMERGENCY MEDICINE | Admitting: INTERNAL MEDICINE

## 2018-12-02 ENCOUNTER — APPOINTMENT (OUTPATIENT)
Dept: GENERAL RADIOLOGY | Facility: HOSPITAL | Age: 53
End: 2018-12-02

## 2018-12-02 DIAGNOSIS — R65.10 SIRS (SYSTEMIC INFLAMMATORY RESPONSE SYNDROME) (HCC): Primary | ICD-10-CM

## 2018-12-02 DIAGNOSIS — R79.89 ELEVATED LACTIC ACID LEVEL: ICD-10-CM

## 2018-12-02 DIAGNOSIS — I95.9 HYPOTENSION, UNSPECIFIED HYPOTENSION TYPE: ICD-10-CM

## 2018-12-02 DIAGNOSIS — B34.9 ACUTE VIRAL SYNDROME: ICD-10-CM

## 2018-12-02 DIAGNOSIS — E86.0 DEHYDRATION: ICD-10-CM

## 2018-12-02 PROBLEM — N17.9 AKI (ACUTE KIDNEY INJURY) (HCC): Status: ACTIVE | Noted: 2018-12-02

## 2018-12-02 PROBLEM — F41.9 ANXIETY AND DEPRESSION: Status: ACTIVE | Noted: 2018-12-02

## 2018-12-02 PROBLEM — R51.9 HEADACHE: Status: ACTIVE | Noted: 2018-12-02

## 2018-12-02 PROBLEM — F32.A ANXIETY AND DEPRESSION: Status: ACTIVE | Noted: 2018-12-02

## 2018-12-02 PROBLEM — Z72.0 TOBACCO ABUSE: Status: ACTIVE | Noted: 2018-12-02

## 2018-12-02 LAB
ALBUMIN SERPL-MCNC: 4.64 G/DL (ref 3.2–4.8)
ALBUMIN/GLOB SERPL: 1.7 G/DL (ref 1.5–2.5)
ALP SERPL-CCNC: 149 U/L (ref 25–100)
ALT SERPL W P-5'-P-CCNC: 15 U/L (ref 7–40)
ANION GAP SERPL CALCULATED.3IONS-SCNC: 8 MMOL/L (ref 3–11)
AST SERPL-CCNC: 17 U/L (ref 0–33)
BACTERIA UR QL AUTO: ABNORMAL /HPF
BASOPHILS # BLD AUTO: 0.06 10*3/MM3 (ref 0–0.2)
BASOPHILS NFR BLD AUTO: 0.5 % (ref 0–1)
BILIRUB SERPL-MCNC: 0.5 MG/DL (ref 0.3–1.2)
BILIRUB UR QL STRIP: NEGATIVE
BUN BLD-MCNC: 14 MG/DL (ref 9–23)
BUN/CREAT SERPL: 9.7 (ref 7–25)
CALCIUM SPEC-SCNC: 9.6 MG/DL (ref 8.7–10.4)
CHLORIDE SERPL-SCNC: 102 MMOL/L (ref 99–109)
CLARITY UR: ABNORMAL
CO2 SERPL-SCNC: 24 MMOL/L (ref 20–31)
COLOR UR: YELLOW
CREAT BLD-MCNC: 1.44 MG/DL (ref 0.6–1.3)
D-LACTATE SERPL-SCNC: 1.4 MMOL/L (ref 0.5–2)
D-LACTATE SERPL-SCNC: 3 MMOL/L (ref 0.5–2)
DEPRECATED RDW RBC AUTO: 46.2 FL (ref 37–54)
EOSINOPHIL # BLD AUTO: 0.18 10*3/MM3 (ref 0–0.3)
EOSINOPHIL NFR BLD AUTO: 1.5 % (ref 0–3)
ERYTHROCYTE [DISTWIDTH] IN BLOOD BY AUTOMATED COUNT: 13.9 % (ref 11.3–14.5)
FLUAV SUBTYP SPEC NAA+PROBE: NOT DETECTED
FLUBV RNA ISLT QL NAA+PROBE: NOT DETECTED
GFR SERPL CREATININE-BSD FRML MDRD: 38 ML/MIN/1.73
GLOBULIN UR ELPH-MCNC: 2.8 GM/DL
GLUCOSE BLD-MCNC: 120 MG/DL (ref 70–100)
GLUCOSE UR STRIP-MCNC: NEGATIVE MG/DL
HCT VFR BLD AUTO: 45.3 % (ref 34.5–44)
HGB BLD-MCNC: 15.4 G/DL (ref 11.5–15.5)
HGB UR QL STRIP.AUTO: NEGATIVE
HOLD SPECIMEN: NORMAL
HYALINE CASTS UR QL AUTO: ABNORMAL /LPF
IMM GRANULOCYTES # BLD: 0.03 10*3/MM3 (ref 0–0.03)
IMM GRANULOCYTES NFR BLD: 0.2 % (ref 0–0.6)
KETONES UR QL STRIP: ABNORMAL
LEUKOCYTE ESTERASE UR QL STRIP.AUTO: NEGATIVE
LYMPHOCYTES # BLD AUTO: 2.65 10*3/MM3 (ref 0.6–4.8)
LYMPHOCYTES NFR BLD AUTO: 21.8 % (ref 24–44)
MCH RBC QN AUTO: 31.1 PG (ref 27–31)
MCHC RBC AUTO-ENTMCNC: 34 G/DL (ref 32–36)
MCV RBC AUTO: 91.5 FL (ref 80–99)
MONOCYTES # BLD AUTO: 1.16 10*3/MM3 (ref 0–1)
MONOCYTES NFR BLD AUTO: 9.6 % (ref 0–12)
NEUTROPHILS # BLD AUTO: 8.08 10*3/MM3 (ref 1.5–8.3)
NEUTROPHILS NFR BLD AUTO: 66.6 % (ref 41–71)
NITRITE UR QL STRIP: NEGATIVE
PH UR STRIP.AUTO: 7 [PH] (ref 5–8)
PLATELET # BLD AUTO: 235 10*3/MM3 (ref 150–450)
PMV BLD AUTO: 9.9 FL (ref 6–12)
POTASSIUM BLD-SCNC: 3.4 MMOL/L (ref 3.5–5.5)
PROT SERPL-MCNC: 7.4 G/DL (ref 5.7–8.2)
PROT UR QL STRIP: ABNORMAL
RBC # BLD AUTO: 4.95 10*6/MM3 (ref 3.89–5.14)
RBC # UR: ABNORMAL /HPF
REF LAB TEST METHOD: ABNORMAL
S PYO AG THROAT QL: NEGATIVE
SODIUM BLD-SCNC: 134 MMOL/L (ref 132–146)
SP GR UR STRIP: 1.01 (ref 1–1.03)
SQUAMOUS #/AREA URNS HPF: ABNORMAL /HPF
TROPONIN I SERPL-MCNC: 0 NG/ML (ref 0–0.07)
TROPONIN I SERPL-MCNC: 0 NG/ML (ref 0–0.07)
UROBILINOGEN UR QL STRIP: ABNORMAL
WBC NRBC COR # BLD: 12.13 10*3/MM3 (ref 3.5–10.8)
WBC UR QL AUTO: ABNORMAL /HPF
WHOLE BLOOD HOLD SPECIMEN: NORMAL
WHOLE BLOOD HOLD SPECIMEN: NORMAL

## 2018-12-02 PROCEDURE — 83605 ASSAY OF LACTIC ACID: CPT | Performed by: EMERGENCY MEDICINE

## 2018-12-02 PROCEDURE — 87040 BLOOD CULTURE FOR BACTERIA: CPT | Performed by: EMERGENCY MEDICINE

## 2018-12-02 PROCEDURE — 87880 STREP A ASSAY W/OPTIC: CPT | Performed by: EMERGENCY MEDICINE

## 2018-12-02 PROCEDURE — 70450 CT HEAD/BRAIN W/O DYE: CPT

## 2018-12-02 PROCEDURE — 87081 CULTURE SCREEN ONLY: CPT | Performed by: EMERGENCY MEDICINE

## 2018-12-02 PROCEDURE — 80053 COMPREHEN METABOLIC PANEL: CPT | Performed by: EMERGENCY MEDICINE

## 2018-12-02 PROCEDURE — 84484 ASSAY OF TROPONIN QUANT: CPT

## 2018-12-02 PROCEDURE — 93005 ELECTROCARDIOGRAM TRACING: CPT | Performed by: PHYSICIAN ASSISTANT

## 2018-12-02 PROCEDURE — 87502 INFLUENZA DNA AMP PROBE: CPT | Performed by: EMERGENCY MEDICINE

## 2018-12-02 PROCEDURE — 71045 X-RAY EXAM CHEST 1 VIEW: CPT

## 2018-12-02 PROCEDURE — 25010000002 VANCOMYCIN 10 G RECONSTITUTED SOLUTION: Performed by: PHYSICIAN ASSISTANT

## 2018-12-02 PROCEDURE — 99406 BEHAV CHNG SMOKING 3-10 MIN: CPT | Performed by: PHYSICIAN ASSISTANT

## 2018-12-02 PROCEDURE — 99223 1ST HOSP IP/OBS HIGH 75: CPT | Performed by: INTERNAL MEDICINE

## 2018-12-02 PROCEDURE — 25010000002 MEROPENEM: Performed by: PHYSICIAN ASSISTANT

## 2018-12-02 PROCEDURE — 93005 ELECTROCARDIOGRAM TRACING: CPT | Performed by: EMERGENCY MEDICINE

## 2018-12-02 PROCEDURE — 81001 URINALYSIS AUTO W/SCOPE: CPT | Performed by: PHYSICIAN ASSISTANT

## 2018-12-02 PROCEDURE — 85025 COMPLETE CBC W/AUTO DIFF WBC: CPT | Performed by: EMERGENCY MEDICINE

## 2018-12-02 PROCEDURE — 74176 CT ABD & PELVIS W/O CONTRAST: CPT

## 2018-12-02 PROCEDURE — 99285 EMERGENCY DEPT VISIT HI MDM: CPT

## 2018-12-02 RX ORDER — DULOXETIN HYDROCHLORIDE 30 MG/1
30 CAPSULE, DELAYED RELEASE ORAL DAILY
Status: DISCONTINUED | OUTPATIENT
Start: 2018-12-03 | End: 2018-12-04 | Stop reason: HOSPADM

## 2018-12-02 RX ORDER — SODIUM CHLORIDE 9 MG/ML
100 INJECTION, SOLUTION INTRAVENOUS CONTINUOUS
Status: DISCONTINUED | OUTPATIENT
Start: 2018-12-02 | End: 2018-12-04 | Stop reason: HOSPADM

## 2018-12-02 RX ORDER — ASPIRIN 325 MG
325 TABLET ORAL DAILY
Status: DISCONTINUED | OUTPATIENT
Start: 2018-12-03 | End: 2018-12-04 | Stop reason: HOSPADM

## 2018-12-02 RX ORDER — SODIUM CHLORIDE 0.9 % (FLUSH) 0.9 %
3-10 SYRINGE (ML) INJECTION AS NEEDED
Status: DISCONTINUED | OUTPATIENT
Start: 2018-12-02 | End: 2018-12-04 | Stop reason: HOSPADM

## 2018-12-02 RX ORDER — SODIUM CHLORIDE 0.9 % (FLUSH) 0.9 %
3 SYRINGE (ML) INJECTION EVERY 12 HOURS SCHEDULED
Status: DISCONTINUED | OUTPATIENT
Start: 2018-12-02 | End: 2018-12-04 | Stop reason: HOSPADM

## 2018-12-02 RX ORDER — ACETAMINOPHEN 325 MG/1
650 TABLET ORAL EVERY 4 HOURS PRN
Status: DISCONTINUED | OUTPATIENT
Start: 2018-12-02 | End: 2018-12-04 | Stop reason: HOSPADM

## 2018-12-02 RX ORDER — SODIUM CHLORIDE 0.9 % (FLUSH) 0.9 %
10 SYRINGE (ML) INJECTION AS NEEDED
Status: DISCONTINUED | OUTPATIENT
Start: 2018-12-02 | End: 2018-12-04 | Stop reason: HOSPADM

## 2018-12-02 RX ORDER — NICOTINE 21 MG/24HR
1 PATCH, TRANSDERMAL 24 HOURS TRANSDERMAL ONCE
Status: COMPLETED | OUTPATIENT
Start: 2018-12-02 | End: 2018-12-03

## 2018-12-02 RX ORDER — TIZANIDINE 4 MG/1
4 TABLET ORAL NIGHTLY PRN
Status: DISCONTINUED | OUTPATIENT
Start: 2018-12-02 | End: 2018-12-04 | Stop reason: HOSPADM

## 2018-12-02 RX ORDER — NICOTINE 21 MG/24HR
1 PATCH, TRANSDERMAL 24 HOURS TRANSDERMAL
Status: DISCONTINUED | OUTPATIENT
Start: 2018-12-03 | End: 2018-12-04 | Stop reason: HOSPADM

## 2018-12-02 RX ORDER — ESTRADIOL 0.5 MG/1
2 TABLET ORAL DAILY
Status: DISCONTINUED | OUTPATIENT
Start: 2018-12-03 | End: 2018-12-04 | Stop reason: HOSPADM

## 2018-12-02 RX ORDER — DICYCLOMINE HCL 20 MG
20 TABLET ORAL 3 TIMES DAILY PRN
Status: DISCONTINUED | OUTPATIENT
Start: 2018-12-02 | End: 2018-12-04 | Stop reason: HOSPADM

## 2018-12-02 RX ADMIN — SODIUM CHLORIDE 100 ML/HR: 9 INJECTION, SOLUTION INTRAVENOUS at 22:30

## 2018-12-02 RX ADMIN — NICOTINE 1 PATCH: 14 PATCH, EXTENDED RELEASE TRANSDERMAL at 22:43

## 2018-12-02 RX ADMIN — MEROPENEM 1 G: 1 INJECTION, POWDER, FOR SOLUTION INTRAVENOUS at 18:14

## 2018-12-02 RX ADMIN — SODIUM CHLORIDE 1710 ML: 9 INJECTION, SOLUTION INTRAVENOUS at 17:56

## 2018-12-02 RX ADMIN — VANCOMYCIN HYDROCHLORIDE 1250 MG: 10 INJECTION, POWDER, LYOPHILIZED, FOR SOLUTION INTRAVENOUS at 18:12

## 2018-12-02 RX ADMIN — SODIUM CHLORIDE 500 ML: 9 INJECTION, SOLUTION INTRAVENOUS at 19:56

## 2018-12-03 LAB
ANION GAP SERPL CALCULATED.3IONS-SCNC: 7 MMOL/L (ref 3–11)
BUN BLD-MCNC: 17 MG/DL (ref 9–23)
BUN/CREAT SERPL: 19.1 (ref 7–25)
CALCIUM SPEC-SCNC: 8.4 MG/DL (ref 8.7–10.4)
CHLORIDE SERPL-SCNC: 106 MMOL/L (ref 99–109)
CO2 SERPL-SCNC: 26 MMOL/L (ref 20–31)
CREAT BLD-MCNC: 0.89 MG/DL (ref 0.6–1.3)
DEPRECATED RDW RBC AUTO: 46.8 FL (ref 37–54)
ERYTHROCYTE [DISTWIDTH] IN BLOOD BY AUTOMATED COUNT: 14 % (ref 11.3–14.5)
GFR SERPL CREATININE-BSD FRML MDRD: 67 ML/MIN/1.73
GLUCOSE BLD-MCNC: 112 MG/DL (ref 70–100)
HCT VFR BLD AUTO: 38.9 % (ref 34.5–44)
HGB BLD-MCNC: 12.7 G/DL (ref 11.5–15.5)
MCH RBC QN AUTO: 30 PG (ref 27–31)
MCHC RBC AUTO-ENTMCNC: 32.6 G/DL (ref 32–36)
MCV RBC AUTO: 91.7 FL (ref 80–99)
PLATELET # BLD AUTO: 165 10*3/MM3 (ref 150–450)
PMV BLD AUTO: 9.6 FL (ref 6–12)
POTASSIUM BLD-SCNC: 2.8 MMOL/L (ref 3.5–5.5)
RBC # BLD AUTO: 4.24 10*6/MM3 (ref 3.89–5.14)
SODIUM BLD-SCNC: 139 MMOL/L (ref 132–146)
WBC NRBC COR # BLD: 6.5 10*3/MM3 (ref 3.5–10.8)

## 2018-12-03 PROCEDURE — 25010000002 MEROPENEM: Performed by: PHYSICIAN ASSISTANT

## 2018-12-03 PROCEDURE — 80048 BASIC METABOLIC PNL TOTAL CA: CPT | Performed by: PHYSICIAN ASSISTANT

## 2018-12-03 PROCEDURE — 85027 COMPLETE CBC AUTOMATED: CPT | Performed by: PHYSICIAN ASSISTANT

## 2018-12-03 PROCEDURE — 25010000002 VANCOMYCIN: Performed by: PHYSICIAN ASSISTANT

## 2018-12-03 PROCEDURE — 99233 SBSQ HOSP IP/OBS HIGH 50: CPT | Performed by: INTERNAL MEDICINE

## 2018-12-03 PROCEDURE — 25010000002 CEFTRIAXONE PER 250 MG: Performed by: INTERNAL MEDICINE

## 2018-12-03 RX ORDER — POTASSIUM CHLORIDE 750 MG/1
40 CAPSULE, EXTENDED RELEASE ORAL AS NEEDED
Status: DISCONTINUED | OUTPATIENT
Start: 2018-12-03 | End: 2018-12-04 | Stop reason: HOSPADM

## 2018-12-03 RX ORDER — POTASSIUM CHLORIDE 7.45 MG/ML
10 INJECTION INTRAVENOUS
Status: DISCONTINUED | OUTPATIENT
Start: 2018-12-03 | End: 2018-12-04 | Stop reason: HOSPADM

## 2018-12-03 RX ORDER — SACCHAROMYCES BOULARDII 250 MG
250 CAPSULE ORAL 2 TIMES DAILY
Status: DISCONTINUED | OUTPATIENT
Start: 2018-12-03 | End: 2018-12-04 | Stop reason: HOSPADM

## 2018-12-03 RX ORDER — CALCIUM CARBONATE 200(500)MG
2 TABLET,CHEWABLE ORAL 3 TIMES DAILY PRN
Status: DISCONTINUED | OUTPATIENT
Start: 2018-12-03 | End: 2018-12-04 | Stop reason: HOSPADM

## 2018-12-03 RX ORDER — DOXYCYCLINE 100 MG/1
100 CAPSULE ORAL EVERY 12 HOURS SCHEDULED
Status: DISCONTINUED | OUTPATIENT
Start: 2018-12-03 | End: 2018-12-04 | Stop reason: HOSPADM

## 2018-12-03 RX ORDER — CEFTRIAXONE SODIUM 1 G/50ML
1 INJECTION, SOLUTION INTRAVENOUS EVERY 24 HOURS
Status: DISCONTINUED | OUTPATIENT
Start: 2018-12-03 | End: 2018-12-04 | Stop reason: HOSPADM

## 2018-12-03 RX ORDER — POTASSIUM CHLORIDE 1.5 G/1.77G
40 POWDER, FOR SOLUTION ORAL AS NEEDED
Status: DISCONTINUED | OUTPATIENT
Start: 2018-12-03 | End: 2018-12-04 | Stop reason: HOSPADM

## 2018-12-03 RX ADMIN — Medication 2 TABLET: at 16:43

## 2018-12-03 RX ADMIN — DICYCLOMINE HYDROCHLORIDE 20 MG: 20 TABLET ORAL at 09:28

## 2018-12-03 RX ADMIN — MEROPENEM 1 G: 1 INJECTION, POWDER, FOR SOLUTION INTRAVENOUS at 12:07

## 2018-12-03 RX ADMIN — HYDROCODONE POLISTIREX AND CHLORPHENIRAMINE POLISTIREX 5 ML: 10; 8 SUSPENSION, EXTENDED RELEASE ORAL at 16:39

## 2018-12-03 RX ADMIN — ESTRADIOL 2 MG: 0.5 TABLET ORAL at 09:20

## 2018-12-03 RX ADMIN — DULOXETINE HYDROCHLORIDE 30 MG: 30 CAPSULE, DELAYED RELEASE ORAL at 21:41

## 2018-12-03 RX ADMIN — Medication 250 MG: at 12:07

## 2018-12-03 RX ADMIN — SODIUM CHLORIDE, PRESERVATIVE FREE 3 ML: 5 INJECTION INTRAVENOUS at 00:16

## 2018-12-03 RX ADMIN — CEFTRIAXONE SODIUM 1 G: 1 INJECTION, SOLUTION INTRAVENOUS at 16:40

## 2018-12-03 RX ADMIN — DOXYCYCLINE 100 MG: 100 CAPSULE ORAL at 21:41

## 2018-12-03 RX ADMIN — VANCOMYCIN HYDROCHLORIDE 1500 MG: 10 INJECTION, POWDER, LYOPHILIZED, FOR SOLUTION INTRAVENOUS at 06:15

## 2018-12-03 RX ADMIN — POTASSIUM CHLORIDE 40 MEQ: 750 CAPSULE, EXTENDED RELEASE ORAL at 15:26

## 2018-12-03 RX ADMIN — ASPIRIN 325 MG ORAL TABLET 325 MG: 325 PILL ORAL at 09:20

## 2018-12-03 RX ADMIN — POTASSIUM CHLORIDE 40 MEQ: 750 CAPSULE, EXTENDED RELEASE ORAL at 21:41

## 2018-12-03 RX ADMIN — SODIUM CHLORIDE, PRESERVATIVE FREE 3 ML: 5 INJECTION INTRAVENOUS at 09:20

## 2018-12-03 RX ADMIN — SODIUM CHLORIDE, PRESERVATIVE FREE 3 ML: 5 INJECTION INTRAVENOUS at 22:58

## 2018-12-03 RX ADMIN — NICOTINE 1 PATCH: 14 PATCH TRANSDERMAL at 09:20

## 2018-12-03 RX ADMIN — Medication 250 MG: at 21:41

## 2018-12-03 RX ADMIN — MEROPENEM 1 G: 1 INJECTION, POWDER, FOR SOLUTION INTRAVENOUS at 01:15

## 2018-12-03 RX ADMIN — DULOXETINE HYDROCHLORIDE 30 MG: 30 CAPSULE, DELAYED RELEASE ORAL at 01:15

## 2018-12-03 NOTE — ED PROVIDER NOTES
Subjective   53-year-old female presents to emergency department complaining of one-week history of cough congestion flulike symptoms, development of global headache associated with phonophobia photophobia, no blurred vision decreased visual field or visual field cuts.  No stiff neck or midline back pain.  She states symptoms have progressively worsened over the past several days, resulting in emesis ×1 today, increased headache, and this prompted her to present to the ER for evaluation.  She did receive her flu vaccine yesterday.  She also has been around a close family member with similar symptoms.  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  She smokes approximately one pack of cigarettes per day.  She has a history of fibromyalgia, anxiety, arthritis, anemia, endometriosis, GERD, hypertension, irritable bowel syndrome.  Primary care provider is Dr. Denise Hanks.  Medication list reviewed, she is allergic to erythromycin and penicillin and statins.        History provided by:  Parent  Illness   Location:  Illness  Quality:  Per HPI  Severity:  Severe  Onset quality:  Gradual  Duration:  1 week  Timing:  Constant  Progression:  Worsening  Chronicity:  New  Context:  Per HPI  Relieved by:  Per HPI  Worsened by:  Per HPI  Ineffective treatments:  Symptomatic care  Associated symptoms: congestion, cough, fatigue, fever, headaches, myalgias, nausea, rhinorrhea, sore throat and vomiting    Associated symptoms: no abdominal pain, no chest pain, no diarrhea, no ear pain, no shortness of breath and no wheezing        Review of Systems   Constitutional: Positive for fatigue and fever.   HENT: Positive for congestion, rhinorrhea and sore throat. Negative for ear pain.    Respiratory: Positive for cough. Negative for shortness of breath and wheezing.    Cardiovascular: Negative for chest pain.   Gastrointestinal: Positive for nausea and vomiting. Negative for abdominal pain and diarrhea.   Musculoskeletal: Positive for  myalgias.   Neurological: Positive for headaches.   All other systems reviewed and are negative.      Past Medical History:   Diagnosis Date   • Allergic    • Anemia     during pregnancy   • Anxiety    • Arthritis    • Blood in urine    • Chronic fatigue    • Colon polyp 02/2018    7 at last colonoscopy   • Depression    • Elevated cholesterol    • Endometriosis    • Fibromyalgia    • Fibromyalgia, primary    • Fracture    • GERD (gastroesophageal reflux disease)    • H/O mammogram 02/2018   • HL (hearing loss)    • Hypertension    • Irritable bowel syndrome    • Pap smear for cervical cancer screening 2018?       Allergies   Allergen Reactions   • Erythromycin GI Intolerance     Tolerates zpak   • Penicillins Hives   • Statins Myalgia       Past Surgical History:   Procedure Laterality Date   • CHOLECYSTECTOMY     • COLONOSCOPY  02/06/2018    3 year f/u polypectomy Dr MARTA Waller   • GALLBLADDER SURGERY     • HYSTERECTOMY     • INNER EAR SURGERY     • OOPHORECTOMY     • TEMPOROMANDIBULAR JOINT ARTHROPLASTY  1984       Family History   Problem Relation Age of Onset   • Melanoma Mother    • Diabetes Mother    • Heart disease Mother    • Arthritis Mother    • Depression Mother    • Colon polyps Mother    • Thyroid disease Mother    • Hyperlipidemia Mother    • Cancer Mother    • Heart attack Mother    • Migraines Mother    • Cancer Father         pancreatic and liver   • Diabetes Father    • Depression Father    • Colon polyps Father    • ADD / ADHD Son    • Diabetes Maternal Aunt    • Diabetes Maternal Uncle    • Diabetes Paternal Aunt    • Diabetes Paternal Uncle    • Cancer Maternal Grandmother         uterine/cervical   • Heart disease Maternal Grandfather    • Heart disease Paternal Grandfather    • Migraines Sister    • Breast cancer Neg Hx    • Ovarian cancer Neg Hx        Social History     Socioeconomic History   • Marital status:      Spouse name: Not on file   • Number of children: Not on file   •  Years of education: Not on file   • Highest education level: Not on file   Tobacco Use   • Smoking status: Current Every Day Smoker     Packs/day: 0.75     Years: 34.00     Pack years: 25.50     Types: Cigarettes   • Smokeless tobacco: Never Used   Substance and Sexual Activity   • Alcohol use: No   • Drug use: No   • Sexual activity: Not Currently     Birth control/protection: None           Objective   Physical Exam   Constitutional: She appears well-developed and well-nourished.   Hypotensive, blood pressure 86/53, heart rate 125, respirations 18, she is confused, awake and alert, oriented ×1, able to answer questions, but is easily confused   HENT:   Head: Normocephalic and atraumatic.   Right Ear: External ear normal.   Left Ear: External ear normal.   Nose: Nose normal.   Mouth/Throat: Oropharynx is clear and moist. No oropharyngeal exudate.   Eyes: Conjunctivae and EOM are normal. Pupils are equal, round, and reactive to light. Right eye exhibits no discharge. Left eye exhibits no discharge. No scleral icterus.   Neck: Normal range of motion. Neck supple. No JVD present. No tracheal deviation present. No thyromegaly present.   Full range of motion, no midline tenderness.  No nuchal rigidity.  No meningismus.   Cardiovascular: Regular rhythm, normal heart sounds and intact distal pulses. Exam reveals no gallop and no friction rub.   No murmur heard.  Heart rate 125 and regular, no murmurs   Pulmonary/Chest: Effort normal and breath sounds normal. No stridor. No respiratory distress. She has no wheezes. She has no rales. She exhibits no tenderness.   Abdominal: Soft. Bowel sounds are normal. She exhibits no distension and no mass. There is no tenderness. There is no rebound and no guarding. No hernia.   Musculoskeletal: Normal range of motion. She exhibits no edema, tenderness or deformity.   Lymphadenopathy:     She has no cervical adenopathy.   Neurological: She is alert. She displays normal reflexes. No  cranial nerve deficit or sensory deficit. She exhibits normal muscle tone. Coordination normal.   Skin: Skin is warm. Capillary refill takes less than 2 seconds. No rash noted. She is diaphoretic. No erythema. There is pallor.   Psychiatric: She has a normal mood and affect. Her behavior is normal.   Nursing note and vitals reviewed.      Critical Care  Performed by: Kai Ellsworth PA-C  Authorized by: Senthil Quezada MD     Critical care provider statement:     Critical care time (minutes):  35    Critical care was necessary to treat or prevent imminent or life-threatening deterioration of the following conditions:  Circulatory failure, sepsis, shock and dehydration    Critical care was time spent personally by me on the following activities:  Discussions with consultants, evaluation of patient's response to treatment, examination of patient, re-evaluation of patient's condition, ordering and review of radiographic studies, ordering and review of laboratory studies and ordering and performing treatments and interventions               ED Course  ED Course as of Dec 02 2159   Sun Dec 02, 2018   1811 WBC: (!) 12.13 [TG]   2157 Patient responded favorably to fluid resuscitation with 2 L of saline.  Blood pressure improved to 118/75, heart rate 93 with O2 saturation of 97%.  White blood cell count 12.3 with relatively normal differential.  Initial GFR is 38 with creatinine 1.44.  Rapid flu and strep screens are negative.  Blood cultures are pending.  Chest x-ray and CT the abdomen negative for acute findings.  She was empirically covered with vancomycin and Merrem.  Discussed with Dr. Quezada and Dr. Nielsen, patient will be admitted to the hospitalist service for further evaluation.  [TG]      ED Course User Index  [TG] Kai Ellsworth PA-C        Recent Results (from the past 24 hour(s))   Comprehensive Metabolic Panel    Collection Time: 12/02/18  5:40 PM   Result Value Ref Range    Glucose 120 (H) 70  - 100 mg/dL    BUN 14 9 - 23 mg/dL    Creatinine 1.44 (H) 0.60 - 1.30 mg/dL    Sodium 134 132 - 146 mmol/L    Potassium 3.4 (L) 3.5 - 5.5 mmol/L    Chloride 102 99 - 109 mmol/L    CO2 24.0 20.0 - 31.0 mmol/L    Calcium 9.6 8.7 - 10.4 mg/dL    Total Protein 7.4 5.7 - 8.2 g/dL    Albumin 4.64 3.20 - 4.80 g/dL    ALT (SGPT) 15 7 - 40 U/L    AST (SGOT) 17 0 - 33 U/L    Alkaline Phosphatase 149 (H) 25 - 100 U/L    Total Bilirubin 0.5 0.3 - 1.2 mg/dL    eGFR Non African Amer 38 (L) >60 mL/min/1.73    Globulin 2.8 gm/dL    A/G Ratio 1.7 1.5 - 2.5 g/dL    BUN/Creatinine Ratio 9.7 7.0 - 25.0    Anion Gap 8.0 3.0 - 11.0 mmol/L   Lactic Acid, Plasma    Collection Time: 12/02/18  5:40 PM   Result Value Ref Range    Lactate 3.0 (C) 0.5 - 2.0 mmol/L   CBC Auto Differential    Collection Time: 12/02/18  5:40 PM   Result Value Ref Range    WBC 12.13 (H) 3.50 - 10.80 10*3/mm3    RBC 4.95 3.89 - 5.14 10*6/mm3    Hemoglobin 15.4 11.5 - 15.5 g/dL    Hematocrit 45.3 (H) 34.5 - 44.0 %    MCV 91.5 80.0 - 99.0 fL    MCH 31.1 (H) 27.0 - 31.0 pg    MCHC 34.0 32.0 - 36.0 g/dL    RDW 13.9 11.3 - 14.5 %    RDW-SD 46.2 37.0 - 54.0 fl    MPV 9.9 6.0 - 12.0 fL    Platelets 235 150 - 450 10*3/mm3    Neutrophil % 66.6 41.0 - 71.0 %    Lymphocyte % 21.8 (L) 24.0 - 44.0 %    Monocyte % 9.6 0.0 - 12.0 %    Eosinophil % 1.5 0.0 - 3.0 %    Basophil % 0.5 0.0 - 1.0 %    Immature Grans % 0.2 0.0 - 0.6 %    Neutrophils, Absolute 8.08 1.50 - 8.30 10*3/mm3    Lymphocytes, Absolute 2.65 0.60 - 4.80 10*3/mm3    Monocytes, Absolute 1.16 (H) 0.00 - 1.00 10*3/mm3    Eosinophils, Absolute 0.18 0.00 - 0.30 10*3/mm3    Basophils, Absolute 0.06 0.00 - 0.20 10*3/mm3    Immature Grans, Absolute 0.03 0.00 - 0.03 10*3/mm3   Light Blue Top    Collection Time: 12/02/18  5:40 PM   Result Value Ref Range    Extra Tube hold for add-on    Green Top (Gel)    Collection Time: 12/02/18  5:40 PM   Result Value Ref Range    Extra Tube Hold for add-ons.    Lavender Top    Collection  Time: 12/02/18  5:40 PM   Result Value Ref Range    Extra Tube hold for add-on    Gold Top - SST    Collection Time: 12/02/18  5:40 PM   Result Value Ref Range    Extra Tube Hold for add-ons.    Influenza A & B, RT PCR - Swab, Nasopharynx    Collection Time: 12/02/18  5:44 PM   Result Value Ref Range    Influenza A PCR Not Detected Not Detected    Influenza B PCR Not Detected Not Detected   Rapid Strep A Screen - Swab, Throat    Collection Time: 12/02/18  5:44 PM   Result Value Ref Range    Strep A Ag Negative Negative   POC Troponin, Rapid    Collection Time: 12/02/18  5:56 PM   Result Value Ref Range    Troponin I 0.00 0.00 - 0.07 ng/mL   Urinalysis With Microscopic If Indicated (No Culture) - Urine, Clean Catch    Collection Time: 12/02/18  7:56 PM   Result Value Ref Range    Color, UA Yellow Yellow, Straw    Appearance, UA Cloudy (A) Clear    pH, UA 7.0 5.0 - 8.0    Specific Gravity, UA 1.007 1.001 - 1.030    Glucose, UA Negative Negative    Ketones, UA Trace (A) Negative    Bilirubin, UA Negative Negative    Blood, UA Negative Negative    Protein,  mg/dL (2+) (A) Negative    Leuk Esterase, UA Negative Negative    Nitrite, UA Negative Negative    Urobilinogen, UA 0.2 E.U./dL 0.2 - 1.0 E.U./dL   Urinalysis, Microscopic Only - Urine, Clean Catch    Collection Time: 12/02/18  7:56 PM   Result Value Ref Range    RBC, UA 3-6 (A) None Seen, 0-2 /HPF    WBC, UA 6-12 (A) None Seen, 0-2 /HPF    Bacteria, UA Trace None Seen, Trace /HPF    Squamous Epithelial Cells, UA 13-20 (A) None Seen, 0-2 /HPF    Hyaline Casts, UA 13-20 0 - 6 /LPF    Methodology Manual Light Microscopy    POC Troponin, Rapid    Collection Time: 12/02/18  8:07 PM   Result Value Ref Range    Troponin I 0.00 0.00 - 0.07 ng/mL     Note: In addition to lab results from this visit, the labs listed above may include labs taken at another facility or during a different encounter within the last 24 hours. Please correlate lab times with ED admission and  discharge times for further clarification of the services performed during this visit.    CT Head Without Contrast   Final Result   1. Air-fluid levels within the right maxillary and left sphenoid sinuses.    Correlate clinically for acute sinusitis.    2. No hydrocephalus, acute intracranial hemorrhage, or mass effect.       THIS DOCUMENT HAS BEEN ELECTRONICALLY SIGNED BY VILMA JENKINS MD      XR Chest 1 View   Preliminary Result   No acute cardiopulmonary disease.       DICTATED:   12/2/2018   EDITED/ls :   12/2/2018               CT Abdomen Pelvis Without Contrast   Preliminary Result   No acute intra-abdominal or pelvic abnormality.       DICTATED:   12/2/2018   EDITED/ls :   12/2/2018                 Vitals:    12/02/18 1915 12/02/18 1930 12/02/18 1945 12/02/18 2000   BP: 111/67 101/63 115/67 118/75   BP Location:       Patient Position:       Pulse:    93   Resp:       Temp:       TempSrc:       SpO2: 96% 97% 93% 97%   Weight:       Height:         Medications   sodium chloride 0.9 % flush 10 mL (not administered)   meropenem (MERREM) 1 g/100 mL 0.9% NS VTB (mbp) (not administered)   Pharmacy to dose vancomycin (not administered)   vancomycin 1500 mg/500 mL 0.9% NS IVPB ([facility]) (not administered)   ! Vanc trough at 05:30am 12/4 - HOLD 6am vanc dose until trough reviewed by Pharmacist (not administered)   nicotine (NICODERM CQ) 14 MG/24HR patch 1 patch (not administered)   nicotine (NICODERM CQ) 14 MG/24HR patch 1 patch (not administered)   sodium chloride 0.9% - IBW for BMI > 30 bolus 1,710 mL (0 mL Intravenous Stopped 12/2/18 1931)   vancomycin 1250 mg/250 mL 0.9% NS IVPB ([facility]) (0 mg Intravenous Stopped 12/2/18 1948)   meropenem (MERREM) 1 g/100 mL 0.9% NS VTB ([facility]p) (0 g Intravenous Stopped 12/2/18 1844)   sodium chloride 0.9 % bolus 500 mL (0 mL Intravenous Stopped 12/2/18 2040)     ECG/EMG Results (last 24 hours)     Procedure Component Value Units Date/Time    ECG 12 Lead [[id number]] Collected:  12/02/18 1085      Updated:  12/02/18 1758    ECG 12 Lead [[id number]] Collected:  12/02/18 2024     Updated:  12/02/18 2024                MDM      Final diagnoses:   SIRS (systemic inflammatory response syndrome) (CMS/HCC)   Dehydration   Hypotension, unspecified hypotension type   Acute viral syndrome   Elevated lactic acid level            Kai Ellsworth PA-C  12/02/18 1889

## 2018-12-03 NOTE — PROGRESS NOTES
Discharge Planning Assessment  [facility]     Patient Name: Lizette Keith  MRN: [id number]  Today's Date: 12/3/2018    Admit Date: 12/2/2018    Discharge Needs Assessment     Row Name 12/03/18 6742       Living Environment    Lives With  alone    Unique Family Situation  SisterNatalia stops and helps her sometimes    Current Living Arrangements  home/apartment/condo;other (see comments) No steps    Primary Care Provided by  self    Provides Primary Care For  no one, unable/limited ability to care for self    Caregiving Concerns  Lives alone.  Sister, Natalia Reji assists as  needed    Family Caregiver if Needed  sibling(s)    Family Caregiver Names  Cody Gurrola    Quality of Family Relationships  supportive;helpful    Able to Return to Prior Arrangements  yes    Living Arrangement Comments  Lives alone in first floor apartment.  Independent with ADL with Good functional status.         Resource/Environmental Concerns    Transportation Concerns  car, none       Transition Planning    Patient/Family Anticipates Transition to  home    Patient/Family Anticipated Services at Transition  none    Transportation Anticipated  family or friend will provide       Discharge Needs Assessment    Readmission Within the Last 30 Days  no previous admission in last 30 days    Concerns to be Addressed  no discharge needs identified    Equipment Currently Used at Home  none    Anticipated Changes Related to Illness  none    Equipment Needed After Discharge  none        Discharge Plan     Row Name 12/03/18 8874       Plan    Plan  Plan is home to self care with assistance from family members as needed    Patient/Family in Agreement with Plan  yes    Plan Comments  Met with patient at bedside.  States she lives alone and Natalia Mendoza,  assists as needed      Final Discharge Disposition Code  01 - home or self-care        Destination      No service coordination in this encounter.      Durable Medical  Equipment      No service coordination in this encounter.      Dialysis/Infusion      No service coordination in this encounter.      Home Medical Care      No service coordination in this encounter.      Community Resources      No service coordination in this encounter.          Demographic Summary     Row Name 12/03/18 1350       General Information    Admission Type  inpatient    Arrived From  emergency department    Referral Source  admission list    Reason for Consult  discharge planning        Functional Status     Row Name 12/03/18 1353       Functional Status    Usual Activity Tolerance  good    Current Activity Tolerance  good    Functional Status Comments  Independent with ADL       Functional Status, IADL    Medications  independent    Meal Preparation  independent    Housekeeping  independent    Laundry  independent    Shopping  independent        Psychosocial    No documentation.       Abuse/Neglect    No documentation.       Legal    No documentation.       Substance Abuse    No documentation.       Patient Forms    No documentation.           Blanka Gao RN

## 2018-12-03 NOTE — PROGRESS NOTES
[facility] Medicine Services  PROGRESS NOTE    Patient Name: Lizette Keith  : 1965  MRN: [id number]    Date of Admission: 2018  Length of Stay: 1  Primary Care Physician: Denise Hanks MD    Subjective   Subjective     CC:  F/u SIRS    HPI:  Patient resting in bed. Still has headache, now complaining of some diarrhea. Otherwise feeling okay.    Review of Systems  Gen- No fevers, chills  CV- No chest pain, palpitations  Resp- No cough, dyspnea  GI- No N/V/D, abd pain      Otherwise ROS is negative except as mentioned in the HPI.    Objective   Objective     Vital Signs:   Temp:  [96.6 °F (35.9 °C)-98.2 °F (36.8 °C)] 97.5 °F (36.4 °C)  Heart Rate:  [] 73  Resp:  [16-18] 18  BP: ()/(53-76) 118/71        Physical Exam:  Constitutional: No acute distress, awake, alert  HENT: NCAT, mucous membranes moist  Respiratory: Clear to auscultation bilaterally, respiratory effort normal   Cardiovascular: RRR, no murmurs, rubs, or gallops, palpable pedal pulses bilaterally  Gastrointestinal: Positive bowel sounds, soft, nontender, nondistended  Musculoskeletal: No bilateral ankle edema  Psychiatric: Appropriate affect, cooperative  Neurologic: Oriented x 3, strength symmetric in all extremities, Cranial Nerves grossly intact to confrontation, speech clear  Skin: No rashes      Results Reviewed:  I have personally reviewed current lab, radiology, and data and agree.    Results from last 7 days   Lab Units  18   0456  18   1740   WBC 10*3/mm3  6.50  12.13*   HEMOGLOBIN g/dL  12.7  15.4   HEMATOCRIT %  38.9  45.3*   PLATELETS 10*3/mm3  165  235     Results from last 7 days   Lab Units  18   0456  18   1740   SODIUM mmol/L  139  134   POTASSIUM mmol/L  2.8*  3.4*   CHLORIDE mmol/L  106  102   CO2 mmol/L  26.0  24.0   BUN mg/dL  17  14   CREATININE mg/dL  0.89  1.44*   GLUCOSE mg/dL  112*  120*   CALCIUM mg/dL  8.4*  9.6   ALT (SGPT) U/L   --   15   AST  (SGOT) U/L   --   17     Estimated Creatinine Clearance: 85.7 mL/min (by C-G formula based on SCr of 0.89 mg/dL).  No results found for: BNP    Microbiology Results Abnormal     Procedure Component Value - Date/Time    Beta Strep Culture, Throat - Swab, Throat [[id number]]  (Normal) Collected:  12/02/18 1744    Lab Status:  Preliminary result Specimen:  Swab from Throat Updated:  12/03/18 0656     Throat Culture, Beta Strep No Beta Hemolytic Streptococcus Isolated    Blood Culture - Blood, Arm, Left [[id number]] Collected:  12/02/18 1811    Lab Status:  Preliminary result Specimen:  Blood from Arm, Left Updated:  12/03/18 0645     Blood Culture No growth at less than 24 hours    Blood Culture - Blood, Arm, Right [[id number]] Collected:  12/02/18 1811    Lab Status:  Preliminary result Specimen:  Blood from Arm, Right Updated:  12/03/18 0645     Blood Culture No growth at less than 24 hours    Influenza A & B, RT PCR - Swab, Nasopharynx [[id number]]  (Normal) Collected:  12/02/18 1744    Lab Status:  Final result Specimen:  Swab from Nasopharynx Updated:  12/02/18 1926     Influenza A PCR Not Detected     Influenza B PCR Not Detected    Rapid Strep A Screen - Swab, Throat [[id number]]  (Normal) Collected:  12/02/18 1744    Lab Status:  Final result Specimen:  Swab from Throat Updated:  12/02/18 1800     Strep A Ag Negative    Narrative:       Test performed by Direct Antigen Testing.          Imaging Results (last 24 hours)     Procedure Component Value Units Date/Time    CT Head Without Contrast [[id number]] Collected:  12/02/18 2046     Updated:  12/02/18 2102    Narrative:       EXAM:    CT Head Without Intravenous Contrast     EXAM DATE/TIME:    12/2/2018 8:46 PM     CLINICAL HISTORY:    53 years old, female; Viral infection, unspecified; Dehydration; Hypotension,   unspecified; Systemic inflammatory response syndrome (sirs) of non-infectious   origin without acute organ dysfunction; Other specified abnormal findings  of   blood chemistry; Pain; Headache; Headache not specified; Additional info:   Headache, acute, norm neuro exam     TECHNIQUE:    Axial computed tomography images of the head/brain without intravenous   contrast.    All CT scans at this facility use at least one of these dose optimization   techniques: automated exposure control; mA and/or kV adjustment per patient   size (includes targeted exams where dose is matched to clinical indication); or   iterative reconstruction.     COMPARISON:    No relevant prior studies available.     FINDINGS:    Brain:  No evidence for acute transcortical infarct. No mass effect or midline   shift. No extra-axial collection. No acute intracranial hemorrhage. Basal   cisterns are patent.    Ventricles: Normal. No ventriculomegaly.    Bones/joints: Normal. No acute fracture.    Sinuses:  Air-fluid levels within the right maxillary and left sphenoid   sinuses. Mucosal thickening involving the maxillary sinuses and ethmoid air   cells.    Mastoid air cells:  Tympanomastoid cavities are clear.    Soft tissues: Normal.       Impression:       1. Air-fluid levels within the right maxillary and left sphenoid sinuses.   Correlate clinically for acute sinusitis.   2. No hydrocephalus, acute intracranial hemorrhage, or mass effect.     THIS DOCUMENT HAS BEEN ELECTRONICALLY SIGNED BY VILMA JENKINS MD    XR Chest 1 View [[id number]] Collected:  12/02/18 1858     Updated:  12/02/18 1858    Narrative:          EXAMINATION: XR CHEST 1 VW - 12/2/2018     INDICATION: Fever, hypotension.     COMPARISON: 3/10/2016     FINDINGS: Portable chest reveals cardiac and mediastinal silhouettes  within normal limits. The lung fields are grossly clear. No focal  parenchymal opacification is present. No pleural effusion or  pneumothorax. The bony structures are unremarkable. The pulmonary  vascularity is within normal limits.       Impression:       No acute cardiopulmonary disease.     DICTATED:    12/2/2018  EDITED/ls :   12/2/2018           CT Abdomen Pelvis Without Contrast [[id number]] Collected:  12/02/18 1856     Updated:  12/02/18 1856    Narrative:       EXAMINATION: CT ABDOMEN PELVIS WO CONTRAST - 12/2/2018     INDICATION:  Flulike symptoms.     TECHNIQUE: Multiple axial CT imaging is obtained of the abdomen and  pelvis from the lung bases to the pubic symphysis without the  administration of oral or intravenous contrast.     The radiation dose reduction device was turned on for each scan per the  ALARA (As Low as Reasonably Achievable) protocol.     COMPARISON: None.     FINDINGS:      ABDOMEN: The lung bases are grossly clear. The liver is homogeneous in  appearance. Fatty infiltration identified of the liver. The spleen is  homogeneous in appearance. Pancreas is unremarkable. The kidneys and  adrenal glands are within normal limits. Vascular calcification seen  within the abdominal aorta. No free fluid or free air. The abdominal  portion gastrointestinal tract is within normal limits. No abnormal mass  or fluid collections identified. No abdominal or retroperitoneal  lymphadenopathy.     The appendix is radiographically normal in appearance.     PELVIS: The pelvic organs are unremarkable. Pelvic portion  gastrointestinal tract is within normal limits. No free fluid or free  air. No abnormal mass or fluid collections identified. The bony  structures are unremarkable.       Impression:       No acute intra-abdominal or pelvic abnormality.     DICTATED:   12/2/2018  EDITED/ls :   12/2/2018                    I have reviewed the medications.      [START ON 12/4/2018] Pharmacy Consult  Does not apply Once   aspirin 325 mg Oral Daily   DULoxetine 30 mg Oral Daily   estradiol 2 mg Oral Daily   meropenem 1 g Intravenous Q8H   nicotine 1 patch Transdermal Q24H   nicotine 1 patch Transdermal Once   saccharomyces boulardii 250 mg Oral BID   sodium chloride 3 mL Intravenous Q12H   vancomycin 15 mg/kg  Intravenous Q12H         Assessment/Plan   Assessment / Plan     Active Hospital Problems    Diagnosis   • **SIRS (systemic inflammatory response syndrome) (CMS/[facility])   • Anxiety and depression   • Tobacco abuse   • WINSTON (acute kidney injury) (CMS/[facility])   • Headache   • GERD (gastroesophageal reflux disease)   • Hypertension   • IBS (irritable bowel syndrome)   • Hyperlipemia   • Fibromyalgia          Brief Hospital Course to date:  Lizette Keith is a 53 y.o. female with a past medical history significant for HLD, HTN, IBS, fibromyalgia, and anxiety/depression who presents with complaints of headache, cough, congestion, vomiting. Work-up in the ED consistent with sepsis without clear source, with hypotension and tachycardic to 131, leukocytosis, lactic acidosis, WINSTON.    Assessment & Plan:  - So far work-up negative for clear source of presentation. UA, CXR, CT abd/pelvis, flu PCR, rapid strep all negative. CT head shows right maxillary and left sphenoid air fluid levels concerning for acute sinusitis. Blood cx are negative at 24 hours.  - Will de-escalate abx, currently on Vanc, Meropenem, to CTX and doxy and monitor, if afebrile and feeling improved tomorrow, possible discharge home on empiric doxy.  - lactic acidosis and WINSTON have improved with fluids  - add florastor for diarrhea, if continues would check C.diff.  - continue other home meds as appropriate.    DVT Prophylaxis:  TEDs/SCDs    CODE STATUS:   Code Status and Medical Interventions:   Ordered at: 12/02/18 2039     Level Of Support Discussed With:    Patient     Code Status:    CPR     Medical Interventions (Level of Support Prior to Arrest):    Full       Disposition: I expect the patient to be discharged 1-2 days      Electronically signed by Clover Castro DO, 12/03/18, 2:46 PM.

## 2018-12-03 NOTE — PAYOR COMM NOTE
"Lizette Topete (53 y.o. Female) initial notification and clinicals. Thanks. Eda Choi, RN      Date of Birth Social Security Number Address Home Phone MRN    1965  [address] [id number]  [id number]    [employer] Marital Status          Scientology        Admission Date Admission Type Admitting Provider Attending Provider Department, Room/Bed    12/2/18 Emergency Sukhdeep Nielsen MD Patel, Parita D, MD [facility] 2F, S215/1    Discharge Date Discharge Disposition Discharge Destination                       Attending Provider:  Sukhdeep Nielsen MD    Allergies:  Erythromycin, Penicillins, Statins    Isolation:  None   Infection:  None   Code Status:  CPR    Ht:  165.1 cm (65\")   Wt:  96.1 kg (211 lb 14.4 oz)    Admission Cmt:  None   Principal Problem:  SIRS (systemic inflammatory response syndrome) (CMS/HCC) [R65.10]                 Active Insurance as of 12/2/2018     Primary Coverage     Payor Plan Insurance Group Employer/Plan Group    HU[employer] MEDICAID [id number]     Payor Plan Address Payor Plan Phone Number Payor Plan Fax Number Effective Dates    PO  [phone]  1/1/2014 - None Entered    [place] [id number]       Subscriber Name Subscriber Birth Date Member ID       LIZETTE TOPETE 1965 [id number]                 Emergency Contacts      (Rel.) Home Phone Work Phone Mobile Phone    Lacie Vizcaino (Mother) -- -- [phone]            Problem List           Codes Noted - Resolved       Hospital    Anxiety and depression ICD-10-CM: F41.9, F32.9  ICD-9-CM: 300.00, 311 12/2/2018 - Present    Tobacco abuse ICD-10-CM: Z72.0  ICD-9-CM: 305.1 12/2/2018 - Present    * (Principal) SIRS (systemic inflammatory response syndrome) (CMS/HCC) ICD-10-CM: R65.10  ICD-9-CM: 995.90 12/2/2018 - Present    WINSTON (acute kidney injury) (CMS/HCC) ICD-10-CM: N17.9  ICD-9-CM: 584.9 12/2/2018 - Present    Headache ICD-10-CM: R51  ICD-9-CM: " 784.0 2018 - Present    Hypertension ICD-10-CM: I10  ICD-9-CM: 401.9 2016 - Present    Hyperlipemia ICD-10-CM: E78.5  ICD-9-CM: 272.4 2016 - Present    GERD (gastroesophageal reflux disease) ICD-10-CM: K21.9  ICD-9-CM: 530.81 2016 - Present    IBS (irritable bowel syndrome) ICD-10-CM: K58.9  ICD-9-CM: 564.1 2016 - Present    Fibromyalgia ICD-10-CM: M79.7  ICD-9-CM: 729.1 2016 - Present       Non-Hospital    Tobacco dependence ICD-10-CM: F17.200  ICD-9-CM: 305.1 2017 - Present    Prediabetes ICD-10-CM: R73.03  ICD-9-CM: 790.29 2016 - Present    Onychomycosis ICD-10-CM: B35.1  ICD-9-CM: 110.1 10/26/2016 - Present    Libido, decreased ICD-10-CM: R68.82  ICD-9-CM: 799.81 2016 - Present    Vasomotor flushing ICD-10-CM: R23.2  ICD-9-CM: 782.62 2016 - Present    Tachycardia ICD-10-CM: R00.0  ICD-9-CM: 785.0 2016 - Present    Blood in urine ICD-10-CM: R31.9  ICD-9-CM: 599.70 Unknown - Present    Seasonal allergies ICD-10-CM: J30.2  ICD-9-CM: 477.9 2016 - Present    Hearing loss ICD-10-CM: H91.90  ICD-9-CM: 389.9 2016 - Present    Chronic fatigue ICD-10-CM: R53.82  ICD-9-CM: 780.79 2016 - Present    Depression ICD-10-CM: F32.9  ICD-9-CM: 311 2016 - Present    Anxiety ICD-10-CM: F41.9  ICD-9-CM: 300.00 2016 - Present    Insomnia ICD-10-CM: G47.00  ICD-9-CM: 780.52 2016 - Present    Encounter for tobacco use cessation counseling ICD-10-CM: Z71.6  ICD-9-CM: V65.42 2016 - Present             History & Physical      Sukhdeep Nielsen MD at 2018  8:45 PM              [facility] Medicine Services  HISTORY AND PHYSICAL    Patient Name: Lizette Keith  : 1965  MRN: [id number]  Primary Care Physician: Denise Hanks MD  Date of admission: 2018      Subjective   Subjective     Chief Complaint:  headache    HPI:  Lizette Keith is a 53 y.o. female with a past medical history significant for HLD,  HTN, IBS, fibromyalgia, and anxiety/depression who presents with complaints of intractable global headache going on for the past two days. Pain is constant and described as a dull, pounding sensation. Worse with with cough. There is associated photophobia, phonophobia, and nausea with vomiting X 1 episode. No blood in emesis. There is no neck stiffness, visual disturbance, syncope, or focal weakness/parathesias. Patient is also reporting progressive congestion with dry cough and sore throat. States she has been around her sick sister who has a virus. She got her Flu shot yesterday. Patient was initially evaluated for severe headache this afternoon at urgent care but per level of severity was directed to ED. She is also noting a recent visit to urgent care 1 week ago for a rash presumed related to allergy. Has been on a medrol dose pack since then. Patient has no other complaints at this time. Denies associated fever/chills, abdominal pain, dysuria or diarrhea. She is a smoker of 1/2 PPD. Will admit for further evaluation and treatment.     Emergency Department Evaluation; hypotensive and tachycardic to 131. . Creatinine 1.44. Alk phos 149. WBC 12.13. Rapid strep, influenza PCR negative. CXR, UA negative.     Review of Systems   Constitutional: Negative for chills and fever.   HENT: Positive for congestion, rhinorrhea, sinus pressure and sore throat. Negative for trouble swallowing and voice change.    Eyes: Positive for photophobia. Negative for visual disturbance.   Respiratory: Positive for cough. Negative for shortness of breath.    Cardiovascular: Negative for chest pain and leg swelling.   Gastrointestinal: Positive for nausea and vomiting. Negative for abdominal pain and diarrhea.   Endocrine: Negative for cold intolerance and heat intolerance.   Genitourinary: Negative for dysuria and flank pain.   Musculoskeletal: Negative for arthralgias.   Skin: Negative for pallor and rash.   Allergic/Immunologic:  Negative for immunocompromised state.   Neurological: Positive for headaches. Negative for dizziness, seizures, speech difficulty and numbness.   Hematological: Does not bruise/bleed easily.   Psychiatric/Behavioral: Negative for agitation and confusion.          Otherwise 10-system ROS reviewed and is negative except as mentioned in the HPI.    Personal History     Past Medical History:   Diagnosis Date   • Allergic    • Anemia     during pregnancy   • Anxiety    • Arthritis    • Blood in urine    • Chronic fatigue    • Colon polyp 02/2018    7 at last colonoscopy   • Depression    • Elevated cholesterol    • Endometriosis    • Fibromyalgia    • Fibromyalgia, primary    • Fracture    • GERD (gastroesophageal reflux disease)    • H/O mammogram 02/2018   • HL (hearing loss)    • Hypertension    • Irritable bowel syndrome    • Pap smear for cervical cancer screening 2018?       Past Surgical History:   Procedure Laterality Date   • CHOLECYSTECTOMY     • COLONOSCOPY  02/06/2018    3 year f/u polypectomy Dr MARTA Waller   • GALLBLADDER SURGERY     • HYSTERECTOMY     • INNER EAR SURGERY     • OOPHORECTOMY     • TEMPOROMANDIBULAR JOINT ARTHROPLASTY  1984       Family History: family history includes ADD / ADHD in her son; Arthritis in her mother; Cancer in her father, maternal grandmother, and mother; Colon polyps in her father and mother; Depression in her father and mother; Diabetes in her father, maternal aunt, maternal uncle, mother, paternal aunt, and paternal uncle; Heart attack in her mother; Heart disease in her maternal grandfather, mother, and paternal grandfather; Hyperlipidemia in her mother; Melanoma in her mother; Migraines in her mother and sister; Thyroid disease in her mother. Otherwise pertinent FHx was reviewed and unremarkable.     Social History:  reports that she has been smoking cigarettes.  She has a 25.50 pack-year smoking history. she has never used smokeless tobacco. She reports that she does not  drink alcohol or use drugs.  Social History     Social History Narrative   • Not on file       Medications:  Available home medication information reviewed.    Allergies   Allergen Reactions   • Erythromycin GI Intolerance     Tolerates zpak   • Penicillins Hives   • Statins Myalgia       Objective   Objective     Vital Signs:   Temp:  [96.6 °F (35.9 °C)-98 °F (36.7 °C)] 98 °F (36.7 °C)  Heart Rate:  [] 93  Resp:  [18] 18  BP: ()/(53-75) 118/75        Physical Exam   Constitutional: No acute distress, awake, alert  Eyes: PERRLA, sclerae anicteric, no conjunctival injection  HENT: NCAT, mucous membranes moist, mild erythema and edema or oropharynx  Neck: Supple, no thyromegaly, no lymphadenopathy, trachea midline  Respiratory: Clear to auscultation bilaterally, nonlabored respirations   Cardiovascular: RRR, no murmurs, rubs, or gallops, palpable pedal pulses bilaterally  Gastrointestinal: Positive bowel sounds, soft, nontender, nondistended  Musculoskeletal: No bilateral ankle edema, no clubbing or cyanosis to extremities  Psychiatric: Appropriate affect, cooperative  Neurologic: Oriented x 3, strength symmetric in all extremities, Cranial Nerves grossly intact to confrontation, speech clear  Skin: No rashes      Results Reviewed:  I have personally reviewed current lab, radiology, and data and agree.    Results from last 7 days   Lab Units  12/02/18   1740   WBC 10*3/mm3  12.13*   HEMOGLOBIN g/dL  15.4   HEMATOCRIT %  45.3*   PLATELETS 10*3/mm3  235     Results from last 7 days   Lab Units  12/02/18   1740   SODIUM mmol/L  134   POTASSIUM mmol/L  3.4*   CHLORIDE mmol/L  102   CO2 mmol/L  24.0   BUN mg/dL  14   CREATININE mg/dL  1.44*   GLUCOSE mg/dL  120*   CALCIUM mg/dL  9.6   ALT (SGPT) U/L  15   AST (SGOT) U/L  17     Estimated Creatinine Clearance: 52.8 mL/min (A) (by C-G formula based on SCr of 1.44 mg/dL (H)).  Brief Urine Lab Results  (Last result in the past 365 days)      Color   Clarity   Blood    Leuk Est   Nitrite   Protein   CREAT   Urine HCG        12/02/18 1956 Yellow Cloudy Negative Negative Negative 100 mg/dL (2+)             No results found for: BNP  Imaging Results (last 24 hours)     Procedure Component Value Units Date/Time    XR Chest 1 View [[id number]] Collected:  12/02/18 1858     Updated:  12/02/18 1858    Narrative:          EXAMINATION: XR CHEST 1 VW - 12/2/2018     INDICATION: Fever, hypotension.     COMPARISON: 3/10/2016     FINDINGS: Portable chest reveals cardiac and mediastinal silhouettes  within normal limits. The lung fields are grossly clear. No focal  parenchymal opacification is present. No pleural effusion or  pneumothorax. The bony structures are unremarkable. The pulmonary  vascularity is within normal limits.       Impression:       No acute cardiopulmonary disease.     DICTATED:   12/2/2018  EDITED/ls :   12/2/2018           CT Abdomen Pelvis Without Contrast [[id number]] Collected:  12/02/18 1856     Updated:  12/02/18 1856    Narrative:       EXAMINATION: CT ABDOMEN PELVIS WO CONTRAST - 12/2/2018     INDICATION:  Flulike symptoms.     TECHNIQUE: Multiple axial CT imaging is obtained of the abdomen and  pelvis from the lung bases to the pubic symphysis without the  administration of oral or intravenous contrast.     The radiation dose reduction device was turned on for each scan per the  ALARA (As Low as Reasonably Achievable) protocol.     COMPARISON: None.     FINDINGS:      ABDOMEN: The lung bases are grossly clear. The liver is homogeneous in  appearance. Fatty infiltration identified of the liver. The spleen is  homogeneous in appearance. Pancreas is unremarkable. The kidneys and  adrenal glands are within normal limits. Vascular calcification seen  within the abdominal aorta. No free fluid or free air. The abdominal  portion gastrointestinal tract is within normal limits. No abnormal mass  or fluid collections identified. No abdominal or  retroperitoneal  lymphadenopathy.     The appendix is radiographically normal in appearance.     PELVIS: The pelvic organs are unremarkable. Pelvic portion  gastrointestinal tract is within normal limits. No free fluid or free  air. No abnormal mass or fluid collections identified. The bony  structures are unremarkable.       Impression:       No acute intra-abdominal or pelvic abnormality.     DICTATED:   2018  EDITED/ls :   2018                    Assessment/Plan   Assessment / Plan     Active Hospital Problems    Diagnosis   • **SIRS (systemic inflammatory response syndrome) (CMS/HCC)   • WINSTON (acute kidney injury) (CMS/HCC)   • Headache   • Hypertension   • Anxiety and depression   • Tobacco abuse   • GERD (gastroesophageal reflux disease)   • IBS (irritable bowel syndrome)   • Hyperlipemia   • Fibromyalgia         Assessment & Plan:  1. SIRS:  - CXR, UA, CT A/P negative. Mildly toxic appearing. Lactic acidosis with elevated procalcitonin.  - broad spectrum coverage with merrem and zosyn. Blood cultures pending  - will obtain CT head without contrast. Do not suspect meningitis at this time per physical exam; no nuchal rigidity. Will consider as an option down the line pending clinical progression.  - administered 2L bolus in ED. Continue IVF at 100 cc/hr  - additional supportive care as needed  - consider ID consult in am  - am labs     2. WISNTON:  - last creatinine 2018 0.55  - pre-renal. Volume depletion/dehydration  - continue with IVF as above and hold/avoid nephrotoxins    3. Hypertension:  - BP labile. Holding anti-hypertensive medications. Resume as tolerated    4. Tobacco abuse:  - nicotine patch as needed. Smokes 1/2 PPD. Tobacco Cessation Counselin minutes of tobacco cessation counseling provided, including but not limited to, risks of ongoing tobacco use, pertinence to current or future health status and availability/examples of cessation resources.  Patient expresses desire to  quit.      DVT prophylaxis: mechanical    CODE STATUS:  Full code, full support  Code Status and Medical Interventions:   Ordered at: 12/02/18 2039     Level Of Support Discussed With:    Patient     Code Status:    CPR     Medical Interventions (Level of Support Prior to Arrest):    Full       Admission Status:  I believe this patient meets INPATIENT status due to the need for care which can only be reasonably provided in an hospital setting such as aggressive/expedited ancillary services and/or consultation services, the necessity for IV medications, close physician monitoring and/or the possible need for procedures.  In such, I feel patient’s risk for adverse outcomes and need for care warrant INPATIENT evaluation and predict the patient’s care encounter to likely last beyond 2 midnights.        Electronically signed by Pamela Delvalle PA-C, 12/02/18, 8:45 PM.    PHYSICIAN NOTE(ADDENDUM)       Vitals:    12/02/18 2201   BP: 80/56-1:30/76    Pulse:     Resp: 16   Temp: 98.6    SpO2: 96% on room air.         HPI.  53-year-old female presented to ED with the chief complaint of cough-dry, do not complain of any overt fever, associated with congestion, runny nose, generalized headache frontal parietal, associated with phonophobia photophobia, do not have any neck pain, headache worse with coughing.  Do not complain of any wheezing.  Today patient was very weak, had near syncope event associated with vomiting after that.  Do not complain of any chest pain, abdominal pain, diarrhea dizziness,  Got Merrem/vancomycin in the ED.    Exam  RS- CTA-BL, ,  No wheezing , no crackles, good effort.  CVS- s1s2 Regular, no murmur.  ABD- soft, non tender, not distended, no organomegaly.  EXT- no edema.  ENT-minimal erythema in the posterior pharynx, do not palpate any lymph nodes.  NEURO- AAO-3, no focal defecit.      Old records reviewed and summarized in PM hx.      PM hx  Hypertension-lisinopril/HCTZ 20/25 mg by mouth  daily  Fibromyalgia  Anxiety      LABS  EKG sinus rhythm 110 bpm with Q waves in lead 3 no acute ST-T wave changes, QTC of 473, chest x-ray do not show any acute changes, abdomen do not show any acute abnormality.  Troponin negative, potassium of 3.4, BUN/creatinine 14/1.4, alkaline phosphatase 149, other LFTs normal, TSH of 1.04, free T4 of 0.8,  Lactic acid of 3.0, WBC of 12, hemoglobin of 15, neutrophils-66  UA negative    A/P  -As documented by PAs note.      I have independently seen and examined the patient with PA.  and the note above reflects my changes and contributions. I have discussed with findings, diagnosis and plans with the patient and family.     Sukhdeep Nielsen MD 12/02/18 10:08 PM             Electronically signed by Suhkdeep Nielsen MD at 12/3/2018 12:16 AM          Emergency Department Notes      Kai Ellsworth PA-C at 12/2/2018  8:24 PM      Procedure Orders    1. Critical Care [[id number]] ordered by Kai Ellsworth PA-C at 12/02/18 2025                Subjective   53-year-old female presents to emergency department complaining of one-week history of cough congestion flulike symptoms, development of global headache associated with phonophobia photophobia, no blurred vision decreased visual field or visual field cuts.  No stiff neck or midline back pain.  She states symptoms have progressively worsened over the past several days, resulting in emesis ×1 today, increased headache, and this prompted her to present to the ER for evaluation.  She did receive her flu vaccine yesterday.  She also has been around a close family member with similar symptoms.  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  She smokes approximately one pack of cigarettes per day.  She has a history of fibromyalgia, anxiety, arthritis, anemia, endometriosis, GERD, hypertension, irritable bowel syndrome.  Primary care provider is Dr. Denise Hanks.  Medication list reviewed, she is allergic to erythromycin and  penicillin and statins.        History provided by:  Parent  Illness   Location:  Illness  Quality:  Per HPI  Severity:  Severe  Onset quality:  Gradual  Duration:  1 week  Timing:  Constant  Progression:  Worsening  Chronicity:  New  Context:  Per HPI  Relieved by:  Per HPI  Worsened by:  Per HPI  Ineffective treatments:  Symptomatic care  Associated symptoms: congestion, cough, fatigue, fever, headaches, myalgias, nausea, rhinorrhea, sore throat and vomiting    Associated symptoms: no abdominal pain, no chest pain, no diarrhea, no ear pain, no shortness of breath and no wheezing        Review of Systems   Constitutional: Positive for fatigue and fever.   HENT: Positive for congestion, rhinorrhea and sore throat. Negative for ear pain.    Respiratory: Positive for cough. Negative for shortness of breath and wheezing.    Cardiovascular: Negative for chest pain.   Gastrointestinal: Positive for nausea and vomiting. Negative for abdominal pain and diarrhea.   Musculoskeletal: Positive for myalgias.   Neurological: Positive for headaches.   All other systems reviewed and are negative.      Past Medical History:   Diagnosis Date   • Allergic    • Anemia     during pregnancy   • Anxiety    • Arthritis    • Blood in urine    • Chronic fatigue    • Colon polyp 02/2018    7 at last colonoscopy   • Depression    • Elevated cholesterol    • Endometriosis    • Fibromyalgia    • Fibromyalgia, primary    • Fracture    • GERD (gastroesophageal reflux disease)    • H/O mammogram 02/2018   • HL (hearing loss)    • Hypertension    • Irritable bowel syndrome    • Pap smear for cervical cancer screening 2018?       Allergies   Allergen Reactions   • Erythromycin GI Intolerance     Tolerates zpak   • Penicillins Hives   • Statins Myalgia       Past Surgical History:   Procedure Laterality Date   • CHOLECYSTECTOMY     • COLONOSCOPY  02/06/2018    3 year f/u polypectomy Dr MARTA Waller   • GALLBLADDER SURGERY     • HYSTERECTOMY     • INNER  EAR SURGERY     • OOPHORECTOMY     • TEMPOROMANDIBULAR JOINT ARTHROPLASTY  1984       Family History   Problem Relation Age of Onset   • Melanoma Mother    • Diabetes Mother    • Heart disease Mother    • Arthritis Mother    • Depression Mother    • Colon polyps Mother    • Thyroid disease Mother    • Hyperlipidemia Mother    • Cancer Mother    • Heart attack Mother    • Migraines Mother    • Cancer Father         pancreatic and liver   • Diabetes Father    • Depression Father    • Colon polyps Father    • ADD / ADHD Son    • Diabetes Maternal Aunt    • Diabetes Maternal Uncle    • Diabetes Paternal Aunt    • Diabetes Paternal Uncle    • Cancer Maternal Grandmother         uterine/cervical   • Heart disease Maternal Grandfather    • Heart disease Paternal Grandfather    • Migraines Sister    • Breast cancer Neg Hx    • Ovarian cancer Neg Hx        Social History     Socioeconomic History   • Marital status:      Spouse name: Not on file   • Number of children: Not on file   • Years of education: Not on file   • Highest education level: Not on file   Tobacco Use   • Smoking status: Current Every Day Smoker     Packs/day: 0.75     Years: 34.00     Pack years: 25.50     Types: Cigarettes   • Smokeless tobacco: Never Used   Substance and Sexual Activity   • Alcohol use: No   • Drug use: No   • Sexual activity: Not Currently     Birth control/protection: None           Objective   Physical Exam   Constitutional: She appears well-developed and well-nourished.   Hypotensive, blood pressure 86/53, heart rate 125, respirations 18, she is confused, awake and alert, oriented ×1, able to answer questions, but is easily confused   HENT:   Head: Normocephalic and atraumatic.   Right Ear: External ear normal.   Left Ear: External ear normal.   Nose: Nose normal.   Mouth/Throat: Oropharynx is clear and moist. No oropharyngeal exudate.   Eyes: Conjunctivae and EOM are normal. Pupils are equal, round, and reactive to light.  Right eye exhibits no discharge. Left eye exhibits no discharge. No scleral icterus.   Neck: Normal range of motion. Neck supple. No JVD present. No tracheal deviation present. No thyromegaly present.   Full range of motion, no midline tenderness.  No nuchal rigidity.  No meningismus.   Cardiovascular: Regular rhythm, normal heart sounds and intact distal pulses. Exam reveals no gallop and no friction rub.   No murmur heard.  Heart rate 125 and regular, no murmurs   Pulmonary/Chest: Effort normal and breath sounds normal. No stridor. No respiratory distress. She has no wheezes. She has no rales. She exhibits no tenderness.   Abdominal: Soft. Bowel sounds are normal. She exhibits no distension and no mass. There is no tenderness. There is no rebound and no guarding. No hernia.   Musculoskeletal: Normal range of motion. She exhibits no edema, tenderness or deformity.   Lymphadenopathy:     She has no cervical adenopathy.   Neurological: She is alert. She displays normal reflexes. No cranial nerve deficit or sensory deficit. She exhibits normal muscle tone. Coordination normal.   Skin: Skin is warm. Capillary refill takes less than 2 seconds. No rash noted. She is diaphoretic. No erythema. There is pallor.   Psychiatric: She has a normal mood and affect. Her behavior is normal.   Nursing note and vitals reviewed.      Critical Care  Performed by: Kai Ellsworth PA-C  Authorized by: Senthil Quezada MD     Critical care provider statement:     Critical care time (minutes):  35    Critical care was necessary to treat or prevent imminent or life-threatening deterioration of the following conditions:  Circulatory failure, sepsis, shock and dehydration    Critical care was time spent personally by me on the following activities:  Discussions with consultants, evaluation of patient's response to treatment, examination of patient, re-evaluation of patient's condition, ordering and review of radiographic studies,  ordering and review of laboratory studies and ordering and performing treatments and interventions              ED Course  ED Course as of Dec 02 2159   Sun Dec 02, 2018   1811 WBC: (!) 12.13 [TG]   2157 Patient responded favorably to fluid resuscitation with 2 L of saline.  Blood pressure improved to 118/75, heart rate 93 with O2 saturation of 97%.  White blood cell count 12.3 with relatively normal differential.  Initial GFR is 38 with creatinine 1.44.  Rapid flu and strep screens are negative.  Blood cultures are pending.  Chest x-ray and CT the abdomen negative for acute findings.  She was empirically covered with vancomycin and Merrem.  Discussed with Dr. Quezada and Dr. Nielsen, patient will be admitted to the hospitalist service for further evaluation.  [TG]      ED Course User Index  [TG] Kai Ellsworth PA-C        Recent Results (from the past 24 hour(s))   Comprehensive Metabolic Panel    Collection Time: 12/02/18  5:40 PM   Result Value Ref Range    Glucose 120 (H) 70 - 100 mg/dL    BUN 14 9 - 23 mg/dL    Creatinine 1.44 (H) 0.60 - 1.30 mg/dL    Sodium 134 132 - 146 mmol/L    Potassium 3.4 (L) 3.5 - 5.5 mmol/L    Chloride 102 99 - 109 mmol/L    CO2 24.0 20.0 - 31.0 mmol/L    Calcium 9.6 8.7 - 10.4 mg/dL    Total Protein 7.4 5.7 - 8.2 g/dL    Albumin 4.64 3.20 - 4.80 g/dL    ALT (SGPT) 15 7 - 40 U/L    AST (SGOT) 17 0 - 33 U/L    Alkaline Phosphatase 149 (H) 25 - 100 U/L    Total Bilirubin 0.5 0.3 - 1.2 mg/dL    eGFR Non African Amer 38 (L) >60 mL/min/1.73    Globulin 2.8 gm/dL    A/G Ratio 1.7 1.5 - 2.5 g/dL    BUN/Creatinine Ratio 9.7 7.0 - 25.0    Anion Gap 8.0 3.0 - 11.0 mmol/L   Lactic Acid, Plasma    Collection Time: 12/02/18  5:40 PM   Result Value Ref Range    Lactate 3.0 (C) 0.5 - 2.0 mmol/L   CBC Auto Differential    Collection Time: 12/02/18  5:40 PM   Result Value Ref Range    WBC 12.13 (H) 3.50 - 10.80 10*3/mm3    RBC 4.95 3.89 - 5.14 10*6/mm3    Hemoglobin 15.4 11.5 - 15.5 g/dL     Hematocrit 45.3 (H) 34.5 - 44.0 %    MCV 91.5 80.0 - 99.0 fL    MCH 31.1 (H) 27.0 - 31.0 pg    MCHC 34.0 32.0 - 36.0 g/dL    RDW 13.9 11.3 - 14.5 %    RDW-SD 46.2 37.0 - 54.0 fl    MPV 9.9 6.0 - 12.0 fL    Platelets 235 150 - 450 10*3/mm3    Neutrophil % 66.6 41.0 - 71.0 %    Lymphocyte % 21.8 (L) 24.0 - 44.0 %    Monocyte % 9.6 0.0 - 12.0 %    Eosinophil % 1.5 0.0 - 3.0 %    Basophil % 0.5 0.0 - 1.0 %    Immature Grans % 0.2 0.0 - 0.6 %    Neutrophils, Absolute 8.08 1.50 - 8.30 10*3/mm3    Lymphocytes, Absolute 2.65 0.60 - 4.80 10*3/mm3    Monocytes, Absolute 1.16 (H) 0.00 - 1.00 10*3/mm3    Eosinophils, Absolute 0.18 0.00 - 0.30 10*3/mm3    Basophils, Absolute 0.06 0.00 - 0.20 10*3/mm3    Immature Grans, Absolute 0.03 0.00 - 0.03 10*3/mm3   Light Blue Top    Collection Time: 12/02/18  5:40 PM   Result Value Ref Range    Extra Tube hold for add-on    Green Top (Gel)    Collection Time: 12/02/18  5:40 PM   Result Value Ref Range    Extra Tube Hold for add-ons.    Lavender Top    Collection Time: 12/02/18  5:40 PM   Result Value Ref Range    Extra Tube hold for add-on    Gold Top - SST    Collection Time: 12/02/18  5:40 PM   Result Value Ref Range    Extra Tube Hold for add-ons.    Influenza A & B, RT PCR - Swab, Nasopharynx    Collection Time: 12/02/18  5:44 PM   Result Value Ref Range    Influenza A PCR Not Detected Not Detected    Influenza B PCR Not Detected Not Detected   Rapid Strep A Screen - Swab, Throat    Collection Time: 12/02/18  5:44 PM   Result Value Ref Range    Strep A Ag Negative Negative   POC Troponin, Rapid    Collection Time: 12/02/18  5:56 PM   Result Value Ref Range    Troponin I 0.00 0.00 - 0.07 ng/mL   Urinalysis With Microscopic If Indicated (No Culture) - Urine, Clean Catch    Collection Time: 12/02/18  7:56 PM   Result Value Ref Range    Color, UA Yellow Yellow, Straw    Appearance, UA Cloudy (A) Clear    pH, UA 7.0 5.0 - 8.0    Specific Gravity, UA 1.007 1.001 - 1.030    Glucose, UA  Negative Negative    Ketones, UA Trace (A) Negative    Bilirubin, UA Negative Negative    Blood, UA Negative Negative    Protein,  mg/dL (2+) (A) Negative    Leuk Esterase, UA Negative Negative    Nitrite, UA Negative Negative    Urobilinogen, UA 0.2 E.U./dL 0.2 - 1.0 E.U./dL   Urinalysis, Microscopic Only - Urine, Clean Catch    Collection Time: 12/02/18  7:56 PM   Result Value Ref Range    RBC, UA 3-6 (A) None Seen, 0-2 /HPF    WBC, UA 6-12 (A) None Seen, 0-2 /HPF    Bacteria, UA Trace None Seen, Trace /HPF    Squamous Epithelial Cells, UA 13-20 (A) None Seen, 0-2 /HPF    Hyaline Casts, UA 13-20 0 - 6 /LPF    Methodology Manual Light Microscopy    POC Troponin, Rapid    Collection Time: 12/02/18  8:07 PM   Result Value Ref Range    Troponin I 0.00 0.00 - 0.07 ng/mL     Note: In addition to lab results from this visit, the labs listed above may include labs taken at another facility or during a different encounter within the last 24 hours. Please correlate lab times with ED admission and discharge times for further clarification of the services performed during this visit.    CT Head Without Contrast   Final Result   1. Air-fluid levels within the right maxillary and left sphenoid sinuses.    Correlate clinically for acute sinusitis.    2. No hydrocephalus, acute intracranial hemorrhage, or mass effect.       THIS DOCUMENT HAS BEEN ELECTRONICALLY SIGNED BY VILMA JENKINS MD      XR Chest 1 View   Preliminary Result   No acute cardiopulmonary disease.       DICTATED:   12/2/2018   EDITED/ls :   12/2/2018               CT Abdomen Pelvis Without Contrast   Preliminary Result   No acute intra-abdominal or pelvic abnormality.       DICTATED:   12/2/2018   EDITED/ls :   12/2/2018                 Vitals:    12/02/18 1915 12/02/18 1930 12/02/18 1945 12/02/18 2000   BP: 111/67 101/63 115/67 118/75   BP Location:       Patient Position:       Pulse:    93   Resp:       Temp:       TempSrc:       SpO2: 96% 97% 93% 97%  "  Weight:       Height:         Medications   sodium chloride 0.9 % flush 10 mL (not administered)   meropenem (MERREM) 1 g/100 mL 0.9% NS VTB (mbp) (not administered)   Pharmacy to dose vancomycin (not administered)   vancomycin 1500 mg/500 mL 0.9% NS IVPB ([facility]) (not administered)   ! Vanc trough at 05:30am 12/4 - HOLD 6am vanc dose until trough reviewed by Pharmacist (not administered)   nicotine (NICODERM CQ) 14 MG/24HR patch 1 patch (not administered)   nicotine (NICODERM CQ) 14 MG/24HR patch 1 patch (not administered)   sodium chloride 0.9% - IBW for BMI > 30 bolus 1,710 mL (0 mL Intravenous Stopped 12/2/18 1931)   vancomycin 1250 mg/250 mL 0.9% NS IVPB ([facility]) (0 mg Intravenous Stopped 12/2/18 1948)   meropenem (MERREM) 1 g/100 mL 0.9% NS VTB ([facility]) (0 g Intravenous Stopped 12/2/18 1844)   sodium chloride 0.9 % bolus 500 mL (0 mL Intravenous Stopped 12/2/18 2040)     ECG/EMG Results (last 24 hours)     Procedure Component Value Units Date/Time    ECG 12 Lead [[id number]] Collected:  12/02/18 1757     Updated:  12/02/18 1758    ECG 12 Lead [[id number]] Collected:  12/02/18 2024     Updated:  12/02/18 2024                MDM      Final diagnoses:   SIRS (systemic inflammatory response syndrome) (CMS/HCC)   Dehydration   Hypotension, unspecified hypotension type   Acute viral syndrome   Elevated lactic acid level            Kai Ellsworth PA-C  12/02/18 2159      Electronically signed by Kai Ellsworth PA-C at 12/2/2018  9:59 PM       ICU Vital Signs     Row Name 12/03/18 0000 12/02/18 2336 12/02/18 2215 12/02/18 22:01:10 12/02/18 2200       Height and Weight    Height  --  --  165.1 cm (65\")  --  --    Height Method  --  --  Stated  --  --    Weight  --  --  96.1 kg (211 lb 14.4 oz)  --  --    Weight Method  --  --  Bed scale  --  --    Ideal Body Weight (IBW) (kg)  --  --  57.29  --  --    BSA (Calculated - sq m)  --  --  2.03 sq meters  --  --    BMI (Calculated)  --  --  35.3  --  --    Weight in (lb) " "to have BMI = 25  --  --  149.9  --  --       Vitals    Temp  --  98 °F (36.7 °C)  98.2 °F (36.8 °C)  --  --    Temp src  --  Oral  Oral  --  --    Pulse  --  84  81  92  --    Heart Rate Source  --  Monitor  Monitor  --  --    Resp  --  16  18  16  --    Resp Rate Source  --  Visual  Visual  --  --    BP  --  118/70  124/75  --  --    Noninvasive MAP (mmHg)  --  85  --  --  --    BP Location  --  Right arm  Right arm  --  --    BP Method  --  Automatic  Automatic  --  --    Patient Position  --  Lying  Lying  --  --       Oxygen Therapy    SpO2  --  97 %  96 %  96 %  --    Device (Oxygen Therapy)  room air  --  --  --  room air    Row Name 12/02/18 2145 12/02/18 2000 12/02/18 1945 12/02/18 1930 12/02/18 1915       Vitals    Pulse  --  93  --  --  --    BP  130/76  118/75  115/67  101/63  111/67    Noninvasive MAP (mmHg)  90  86  82  75  77       Oxygen Therapy    SpO2  --  97 %  93 %  97 %  96 %    Device (Oxygen Therapy)  --  room air  --  --  --    Row Name 12/02/18 1900 12/02/18 1719 12/02/18 1643             Height and Weight    Height  --  165.1 cm (65\")  --      Height Method  --  Stated  --      Weight  --  95.3 kg (210 lb)  --      Weight Method  --  Stated  --      Ideal Body Weight (IBW) (kg)  --  57.29  --      BSA (Calculated - sq m)  --  2.02 sq meters  --      BMI (Calculated)  --  34.9  --      Weight in (lb) to have BMI = 25  --  149.9  --         Vitals    Temp  --  98 °F (36.7 °C)  96.6 °F (35.9 °C)      Temp src  --  Oral  Temporal      Pulse  --  125  (Abnormal)   131  (Abnormal)       Heart Rate Source  --  Monitor  Monitor      Resp  --  18 18      Resp Rate Source  --  Visual  Visual      BP  103/63  86/53  (Abnormal)   78/56  (Abnormal)       Noninvasive MAP (mmHg)  72  --  --      BP Location  --  Right arm  Right arm      BP Method  --  Automatic  Manual      Patient Position  --  Sitting  Sitting         Oxygen Therapy    SpO2  97 %  97 %  98 %      Pulse Oximetry Type  --  --  " Intermittent      Device (Oxygen Therapy)  --  room air  --          Intake & Output (last day)       12/02 0701 - 12/03 0700    IV Piggyback 2560    Total Intake(mL/kg) 2560 (26.6)    Net +2560         Urine Unmeasured Occurrence 1 x        Hospital Medications (all)       Dose Frequency Start End    ! Vanc trough at 05:30am 12/4 - HOLD 6am vanc dose until trough reviewed by Pharmacist  Once 12/4/2018     Sig - Route: 1 (One) Time. - Does not apply    acetaminophen (TYLENOL) tablet 650 mg 650 mg Every 4 Hours PRN 12/2/2018     Sig - Route: Take 2 tablets by mouth Every 4 (Four) Hours As Needed for Mild Pain . - Oral    aspirin tablet 325 mg 325 mg Daily 12/3/2018     Sig - Route: Take 1 tablet by mouth Daily. - Oral    dicyclomine (BENTYL) tablet 20 mg 20 mg 3 Times Daily PRN 12/2/2018     Sig - Route: Take 1 tablet by mouth 3 (Three) Times a Day As Needed (IBS- diarrhea). - Oral    DULoxetine (CYMBALTA) DR capsule 30 mg 30 mg Daily 12/3/2018     Sig - Route: Take 1 capsule by mouth Daily. - Oral    estradiol (ESTRACE) tablet 2 mg 2 mg Daily 12/3/2018     Sig - Route: Take 4 tablets by mouth Daily. - Oral    meropenem (MERREM) 1 g/100 mL 0.9% NS VTB (mbp) 1 g Once 12/2/2018 12/2/2018    Sig - Route: Infuse 100 mL into a venous catheter 1 (One) Time. - Intravenous    meropenem (MERREM) 1 g/100 mL 0.9% NS VTB ([facility]) 1 g Every 8 Hours 12/3/2018 12/10/2018    Sig - Route: Infuse 100 mL into a venous catheter Every 8 (Eight) Hours. - Intravenous    nicotine (NICODERM CQ) 14 MG/24HR patch 1 patch 1 patch Every 24 Hours Scheduled 12/3/2018     Sig - Route: Place 1 patch on the skin as directed by provider Daily. - Transdermal    nicotine (NICODERM CQ) 14 MG/24HR patch 1 patch 1 patch Once 12/2/2018     Sig - Route: Place 1 patch on the skin as directed by provider 1 (One) Time. - Transdermal    Pharmacy to dose vancomycin  Continuous PRN 12/2/2018 12/9/2018    Sig - Route: Continuous As Needed for Consult. - Does not apply     sodium chloride 0.9 % bolus 500 mL 500 mL Once 12/2/2018 12/2/2018    Sig - Route: Infuse 500 mL into a venous catheter 1 (One) Time. - Intravenous    sodium chloride 0.9 % flush 10 mL 10 mL As Needed 12/2/2018     Sig - Route: Infuse 10 mL into a venous catheter As Needed for Line Care. - Intravenous    Cosign for Ordering: Accepted by Senthil Quezada MD on 12/2/2018  9:47 PM    sodium chloride 0.9 % flush 3 mL 3 mL Every 12 Hours Scheduled 12/2/2018     Sig - Route: Infuse 3 mL into a venous catheter Every 12 (Twelve) Hours. - Intravenous    sodium chloride 0.9 % flush 3-10 mL 3-10 mL As Needed 12/2/2018     Sig - Route: Infuse 3-10 mL into a venous catheter As Needed for Line Care. - Intravenous    sodium chloride 0.9 % infusion 100 mL/hr Continuous 12/2/2018     Sig - Route: Infuse 100 mL/hr into a venous catheter Continuous. - Intravenous    sodium chloride 0.9% - IBW for BMI > 30 bolus 1,710 mL 30 mL/kg × 57 kg (Ideal) Once 12/2/2018 12/2/2018    Sig - Route: Infuse 1,710 mL into a venous catheter 1 (One) Time. - Intravenous    tiZANidine (ZANAFLEX) tablet 4 mg 4 mg Nightly PRN 12/2/2018     Sig - Route: Take 1 tablet by mouth At Night As Needed for Muscle Spasms. - Oral    vancomycin 1250 mg/250 mL 0.9% NS IVPB ([facility]) 1,250 mg Once 12/2/2018 12/2/2018    Sig - Route: Infuse 250 mL into a venous catheter 1 (One) Time. - Intravenous    vancomycin 1500 mg/500 mL 0.9% NS IVPB ([facility]) 15 mg/kg × 95.3 kg Every 12 Hours 12/3/2018 12/10/2018    Sig - Route: Infuse 500 mL into a venous catheter Every 12 (Twelve) Hours. - Intravenous            Lab Results (last 24 hours)     Procedure Component Value Units Date/Time    Lactic Acid, Reflex [[id number]]  (Normal) Collected:  12/02/18 2242    Specimen:  Blood Updated:  12/02/18 2302     Lactate 1.4 mmol/L      Comment: Falsely depressed results may occur on samples drawn from patients receiving N-Acetylcysteine (NAC) or Metamizole.       Lactic Acid, Reflex Timer  (This will reflex a repeat order 3-3:15 hours after ordered.) [[id number]] Collected:  12/02/18 1740    Specimen:  Blood Updated:  12/02/18 2230     Extra Tube Hold for add-ons.     Comment: Auto resulted.       POC Troponin, Rapid [[id number]]  (Normal) Collected:  12/02/18 2007    Specimen:  Blood Updated:  12/02/18 2025     Troponin I 0.00 ng/mL      Comment: Serial Number: [id number]:  [id number]       Urinalysis With Microscopic If Indicated (No Culture) - Urine, Clean Catch [[id number]]  (Abnormal) Collected:  12/02/18 1956    Specimen:  Urine, Clean Catch Updated:  12/02/18 2005     Color, UA Yellow     Appearance, UA Cloudy     pH, UA 7.0     Specific Gravity, UA 1.007     Glucose, UA Negative     Ketones, UA Trace     Bilirubin, UA Negative     Blood, UA Negative     Protein,  mg/dL (2+)     Leuk Esterase, UA Negative     Nitrite, UA Negative     Urobilinogen, UA 0.2 E.U./dL    Urinalysis, Microscopic Only - Urine, Clean Catch [[id number]]  (Abnormal) Collected:  12/02/18 1956    Specimen:  Urine, Clean Catch Updated:  12/02/18 2005     RBC, UA 3-6 /HPF      WBC, UA 6-12 /HPF      Bacteria, UA Trace /HPF      Squamous Epithelial Cells, UA 13-20 /HPF      Hyaline Casts, UA 13-20 /LPF      Methodology Manual Light Microscopy    Influenza A & B, RT PCR - Swab, Nasopharynx [[id number]]  (Normal) Collected:  12/02/18 1744    Specimen:  Swab from Nasopharynx Updated:  12/02/18 1926     Influenza A PCR Not Detected     Influenza B PCR Not Detected    Lactic Acid, Plasma [[id number]]  (Abnormal) Collected:  12/02/18 1740    Specimen:  Blood Updated:  12/02/18 1922     Lactate 3.0 mmol/L      Comment: Falsely depressed results may occur on samples drawn from patients receiving N-Acetylcysteine (NAC) or Metamizole.       [place] Draw [[id number]] Collected:  12/02/18 1740    Specimen:  Blood Updated:  12/02/18 1845    Narrative:       The following orders were created for panel order [place] Draw.  Procedure                                Abnormality         Status                     ---------                               -----------         ------                     Light Blue Top[[id number]]                                   Final result               Green Top (Gel)[[id number]]                                  Final result               Lavender Top[[id number]]                                     Final result               Gold Top - SST[[id number]]                                   Final result               Green Top (No Gel)[[id number]]                                                            Please view results for these tests on the individual orders.    Light Blue Top [[id number]] Collected:  12/02/18 1740    Specimen:  Blood Updated:  12/02/18 1845     Extra Tube hold for add-on     Comment: Auto resulted       Green Top (Gel) [[id number]] Collected:  12/02/18 1740    Specimen:  Blood Updated:  12/02/18 1845     Extra Tube Hold for add-ons.     Comment: Auto resulted.       Lavender Top [[id number]] Collected:  12/02/18 1740    Specimen:  Blood Updated:  12/02/18 1845     Extra Tube hold for add-on     Comment: Auto resulted       Gold Top - SST [[id number]] Collected:  12/02/18 1740    Specimen:  Blood Updated:  12/02/18 1845     Extra Tube Hold for add-ons.     Comment: Auto resulted.       Blood Culture - Blood, Arm, Left [[id number]] Collected:  12/02/18 1811    Specimen:  Blood from Arm, Left Updated:  12/02/18 1836    Blood Culture - Blood, Arm, Right [[id number]] Collected:  12/02/18 1811    Specimen:  Blood from Arm, Right Updated:  12/02/18 1836    Comprehensive Metabolic Panel [[id number]]  (Abnormal) Collected:  12/02/18 1740    Specimen:  Blood Updated:  12/02/18 1812     Glucose 120 mg/dL      BUN 14 mg/dL      Creatinine 1.44 mg/dL      Sodium 134 mmol/L      Potassium 3.4 mmol/L      Chloride 102 mmol/L      CO2 24.0 mmol/L      Calcium 9.6 mg/dL      Total Protein 7.4 g/dL      Albumin 4.64 g/dL      ALT  (SGPT) 15 U/L      AST (SGOT) 17 U/L      Alkaline Phosphatase 149 U/L      Total Bilirubin 0.5 mg/dL      eGFR Non African Amer 38 mL/min/1.73      Globulin 2.8 gm/dL      A/G Ratio 1.7 g/dL      BUN/Creatinine Ratio 9.7     Anion Gap 8.0 mmol/L     Narrative:       [employer] Guidelines    Stage     Description        GFR  1         Normal or High     90+  2         Mild decrease      60-89  3         Moderate decrease  30-59  4         Severe decrease    15-29  5         Kidney failure     <15    The MDRD GFR formula is only valid for adults with stable renal function between ages 18 and 70.    POC Troponin, Rapid [[id number]]  (Normal) Collected:  12/02/18 1756    Specimen:  Blood Updated:  12/02/18 1812     Troponin I 0.00 ng/mL      Comment: Serial Number: [id number]:  [id number]       Rapid Strep A Screen - Swab, Throat [[id number]]  (Normal) Collected:  12/02/18 1744    Specimen:  Swab from Throat Updated:  12/02/18 1800     Strep A Ag Negative    Narrative:       Test performed by Direct Antigen Testing.    Beta Strep Culture, Throat - Swab, Throat [[id number]] Collected:  12/02/18 1744    Specimen:  Swab from Throat Updated:  12/02/18 1800    CBC & Differential [[id number]] Collected:  12/02/18 1740    Specimen:  Blood Updated:  12/02/18 1755    Narrative:       The following orders were created for panel order CBC & Differential.  Procedure                               Abnormality         Status                     ---------                               -----------         ------                     CBC Auto Differential[[id number]]        Abnormal            Final result                 Please view results for these tests on the individual orders.    CBC Auto Differential [[id number]]  (Abnormal) Collected:  12/02/18 1740    Specimen:  Blood Updated:  12/02/18 1755     WBC 12.13 10*3/mm3      RBC 4.95 10*6/mm3      Hemoglobin 15.4 g/dL      Hematocrit 45.3 %      MCV 91.5 fL      MCH 31.1  pg      MCHC 34.0 g/dL      RDW 13.9 %      RDW-SD 46.2 fl      MPV 9.9 fL      Platelets 235 10*3/mm3      Neutrophil % 66.6 %      Lymphocyte % 21.8 %      Monocyte % 9.6 %      Eosinophil % 1.5 %      Basophil % 0.5 %      Immature Grans % 0.2 %      Neutrophils, Absolute 8.08 10*3/mm3      Lymphocytes, Absolute 2.65 10*3/mm3      Monocytes, Absolute 1.16 10*3/mm3      Eosinophils, Absolute 0.18 10*3/mm3      Basophils, Absolute 0.06 10*3/mm3      Immature Grans, Absolute 0.03 10*3/mm3         Imaging Results (last 24 hours)     Procedure Component Value Units Date/Time    CT Head Without Contrast [[id number]] Collected:  12/02/18 2046     Updated:  12/02/18 2102    Narrative:       EXAM:    CT Head Without Intravenous Contrast     EXAM DATE/TIME:    12/2/2018 8:46 PM     CLINICAL HISTORY:    53 years old, female; Viral infection, unspecified; Dehydration; Hypotension,   unspecified; Systemic inflammatory response syndrome (sirs) of non-infectious   origin without acute organ dysfunction; Other specified abnormal findings of   blood chemistry; Pain; Headache; Headache not specified; Additional info:   Headache, acute, norm neuro exam     TECHNIQUE:    Axial computed tomography images of the head/brain without intravenous   contrast.    All CT scans at this facility use at least one of these dose optimization   techniques: automated exposure control; mA and/or kV adjustment per patient   size (includes targeted exams where dose is matched to clinical indication); or   iterative reconstruction.     COMPARISON:    No relevant prior studies available.     FINDINGS:    Brain:  No evidence for acute transcortical infarct. No mass effect or midline   shift. No extra-axial collection. No acute intracranial hemorrhage. Basal   cisterns are patent.    Ventricles: Normal. No ventriculomegaly.    Bones/joints: Normal. No acute fracture.    Sinuses:  Air-fluid levels within the right maxillary and left sphenoid   sinuses.  Mucosal thickening involving the maxillary sinuses and ethmoid air   cells.    Mastoid air cells:  Tympanomastoid cavities are clear.    Soft tissues: Normal.       Impression:       1. Air-fluid levels within the right maxillary and left sphenoid sinuses.   Correlate clinically for acute sinusitis.   2. No hydrocephalus, acute intracranial hemorrhage, or mass effect.     THIS DOCUMENT HAS BEEN ELECTRONICALLY SIGNED BY VILMA JENKINS MD    XR Chest 1 View [[id number]] Collected:  12/02/18 1858     Updated:  12/02/18 1858    Narrative:          EXAMINATION: XR CHEST 1 VW - 12/2/2018     INDICATION: Fever, hypotension.     COMPARISON: 3/10/2016     FINDINGS: Portable chest reveals cardiac and mediastinal silhouettes  within normal limits. The lung fields are grossly clear. No focal  parenchymal opacification is present. No pleural effusion or  pneumothorax. The bony structures are unremarkable. The pulmonary  vascularity is within normal limits.       Impression:       No acute cardiopulmonary disease.     DICTATED:   12/2/2018  EDITED/ls :   12/2/2018           CT Abdomen Pelvis Without Contrast [[id number]] Collected:  12/02/18 1856     Updated:  12/02/18 1856    Narrative:       EXAMINATION: CT ABDOMEN PELVIS WO CONTRAST - 12/2/2018     INDICATION:  Flulike symptoms.     TECHNIQUE: Multiple axial CT imaging is obtained of the abdomen and  pelvis from the lung bases to the pubic symphysis without the  administration of oral or intravenous contrast.     The radiation dose reduction device was turned on for each scan per the  ALARA (As Low as Reasonably Achievable) protocol.     COMPARISON: None.     FINDINGS:      ABDOMEN: The lung bases are grossly clear. The liver is homogeneous in  appearance. Fatty infiltration identified of the liver. The spleen is  homogeneous in appearance. Pancreas is unremarkable. The kidneys and  adrenal glands are within normal limits. Vascular calcification seen  within the abdominal aorta. No  free fluid or free air. The abdominal  portion gastrointestinal tract is within normal limits. No abnormal mass  or fluid collections identified. No abdominal or retroperitoneal  lymphadenopathy.     The appendix is radiographically normal in appearance.     PELVIS: The pelvic organs are unremarkable. Pelvic portion  gastrointestinal tract is within normal limits. No free fluid or free  air. No abnormal mass or fluid collections identified. The bony  structures are unremarkable.       Impression:       No acute intra-abdominal or pelvic abnormality.     DICTATED:   12/2/2018  EDITED/ls :   12/2/2018               ECG/EMG Results (last 24 hours)     Procedure Component Value Units Date/Time    ECG 12 Lead [[id number]] Collected:  12/02/18 1757     Updated:  12/02/18 1758    ECG 12 Lead [[id number]] Collected:  12/02/18 2024     Updated:  12/02/18 2024          Orders (last 24 hrs)     Start     Ordered    12/04/18 0600  ! Vanc trough at 05:30am 12/4 - HOLD 6am vanc dose until trough reviewed by Pharmacist  Once      12/02/18 2055 12/04/18 0530  Vancomycin, Trough  Timed      12/02/18 2055 12/03/18 0900  aspirin tablet 325 mg  Daily      12/02/18 2212 12/03/18 0900  estradiol (ESTRACE) tablet 2 mg  Daily      12/02/18 2212 12/03/18 0900  nicotine (NICODERM CQ) 14 MG/24HR patch 1 patch  Every 24 Hours Scheduled      12/02/18 2107 12/03/18 0600  Basic Metabolic Panel  Morning Draw      12/02/18 2212 12/03/18 0600  CBC (No Diff)  Morning Draw      12/02/18 2212 12/03/18 0600  vancomycin 1500 mg/500 mL 0.9% NS IVPB ([facility])  Every 12 Hours      12/02/18 2055 12/03/18 0200  meropenem (MERREM) 1 g/100 mL 0.9% NS VTB ([facility])  Every 8 Hours      12/02/18 2041 12/03/18 0030  DULoxetine (CYMBALTA) DR capsule 30 mg  Daily      12/02/18 2212 12/03/18 0000  Vital Signs  Every 4 Hours      12/02/18 2212 12/02/18 2319  Inpatient Admission  Once      12/02/18 2319 12/02/18 2231  Lactic Acid, Reflex  STAT       12/02/18 2230    12/02/18 2214  sodium chloride 0.9 % flush 3 mL  Every 12 Hours Scheduled      12/02/18 2212    12/02/18 2214  sodium chloride 0.9 % infusion  Continuous      12/02/18 2212    12/02/18 2213  Intake & Output  Every Shift      12/02/18 2212    12/02/18 2213  Weigh Patient  Once      12/02/18 2212    12/02/18 2213  Oxygen Therapy- Nasal Cannula; Titrate for SPO2: 90%  Continuous      12/02/18 2212    12/02/18 2213  Insert Peripheral IV  Once      12/02/18 2212    12/02/18 2213  Saline Lock & Maintain IV Access  Continuous      12/02/18 2212    12/02/18 2213  Place Sequential Compression Device  Once      12/02/18 2212    12/02/18 2213  Maintain Sequential Compression Device  Continuous      12/02/18 2212    12/02/18 2213  Diet Regular  Diet Effective Now      12/02/18 2212    12/02/18 2212  acetaminophen (TYLENOL) tablet 650 mg  Every 4 Hours PRN      12/02/18 2212    12/02/18 2212  dicyclomine (BENTYL) tablet 20 mg  3 Times Daily PRN      12/02/18 2212    12/02/18 2212  tiZANidine (ZANAFLEX) tablet 4 mg  Nightly PRN      12/02/18 2212    12/02/18 2212  sodium chloride 0.9 % flush 3-10 mL  As Needed      12/02/18 2212    12/02/18 2110  nicotine (NICODERM CQ) 14 MG/24HR patch 1 patch  Once      12/02/18 2108 12/02/18 2040  Pharmacy to dose vancomycin  Continuous PRN      12/02/18 2041 12/02/18 2039  Code Status and Medical Interventions:  Continuous      12/02/18 2039 12/02/18 2037  CT Head Without Contrast  1 Time Imaging      12/02/18 2037 12/02/18 2026  Critical Care  Once     Comments:  This order was created via procedure documentation    12/02/18 2025 12/02/18 2026  POC Troponin, Rapid  Once      12/02/18 2007 12/02/18 2011  Tele Bed Request  Once      12/02/18 2010 12/02/18 2004  Urinalysis, Microscopic Only - Urine, Clean Catch  Once      12/02/18 2003 12/02/18 1956  Urinalysis With Microscopic If Indicated (No Culture) - Urine, Clean Catch  Once      12/02/18 1955     12/02/18 1950  ECG 12 Lead  Once      12/02/18 1919    12/02/18 1950  POCT Troponin, rapid  Once      12/02/18 1919    12/02/18 1948  sodium chloride 0.9 % bolus 500 mL  Once      12/02/18 1946    12/02/18 1923  Lactic Acid, Reflex Timer (This will reflex a repeat order 3-3:15 hours after ordered.)  Once      12/02/18 1922    12/02/18 1813  POC Troponin, Rapid  Once      12/02/18 1756    12/02/18 1801  Beta Strep Culture, Throat - Swab, Throat  Once      12/02/18 1800    12/02/18 1751  vancomycin 1250 mg/250 mL 0.9% NS IVPB ([facility])  Once      12/02/18 1749    12/02/18 1751  meropenem (MERREM) 1 g/100 mL 0.9% NS VTB ([facility])  Once      12/02/18 1749    12/02/18 1749  sodium chloride 0.9% - IBW for BMI > 30 bolus 1,710 mL  Once      12/02/18 1747    12/02/18 1749  POCT Troponin, Rapid  STAT      12/02/18 1749    12/02/18 1749  ECG 12 Lead  STAT      12/02/18 1749    12/02/18 1747  CT Abdomen Pelvis Without Contrast  1 Time Imaging      12/02/18 1747    12/02/18 1746  XR Chest 1 View  1 Time Imaging      12/02/18 1747    12/02/18 1745  Rapid Strep A Screen - Swab, Throat  STAT      12/02/18 1744    12/02/18 1744  Influenza A & B, RT PCR - Swab, Nasopharynx  Once      12/02/18 1744    12/02/18 1735  Undress and Gown  Once      12/02/18 1734    12/02/18 1735  Continuous Pulse Oximetry  Per Hospital Policy      12/02/18 1734    12/02/18 1735  Vital Signs  Every 30 Minutes      12/02/18 1734    12/02/18 1735  Insert peripheral IV  Once      12/02/18 1734    12/02/18 1735  CBC & Differential  Once      12/02/18 1734    12/02/18 1735  Comprehensive Metabolic Panel  Once      12/02/18 1734    12/02/18 1735  Lactic Acid, Plasma  Once      12/02/18 1734    12/02/18 1735  [place] Draw  Once      12/02/18 1734    12/02/18 1735  Blood Culture - Blood,  Once      12/02/18 1734    12/02/18 1735  Blood Culture - Blood,  Once      12/02/18 1734    12/02/18 1735  CBC Auto Differential  PROCEDURE ONCE      12/02/18 1734    12/02/18 1735   Light Blue Top  PROCEDURE ONCE      12/02/18 1734    12/02/18 1735  Green Top (Gel)  PROCEDURE ONCE      12/02/18 1734    12/02/18 1735  Lavender Top  PROCEDURE ONCE      12/02/18 1734    12/02/18 1735  Gold Top - SST  PROCEDURE ONCE      12/02/18 1734    12/02/18 1735  Green Top (No Gel)  PROCEDURE ONCE,   Status:  Canceled      12/02/18 1734    12/02/18 1735  Influenza Antigen, Rapid - Swab, Nasopharynx  Once,   Status:  Canceled      12/02/18 1734    12/02/18 1734  sodium chloride 0.9 % flush 10 mL  As Needed      12/02/18 1734    Unscheduled  Oxygen Therapy- Nasal Cannula; 2 LPM; Titrate for SPO2: 92%, Greater Than or Equal To  Continuous PRN      12/02/18 1734          Physician Progress Notes (last 24 hours) (Notes from 12/2/2018  1:24 AM through 12/3/2018  1:24 AM)     No notes of this type exist for this encounter.        Consult Notes (last 24 hours) (Notes from 12/2/2018  1:24 AM through 12/3/2018  1:24 AM)     No notes of this type exist for this encounter.

## 2018-12-03 NOTE — PLAN OF CARE
Problem: Patient Care Overview  Goal: Plan of Care Review  Outcome: Ongoing (interventions implemented as appropriate)   12/03/18 0103   Coping/Psychosocial   Plan of Care Reviewed With patient   Plan of Care Review   Progress no change   OTHER   Outcome Summary Patient arrived from ER, stable VS, alert and oriented, feels better after NS bolus in the ER, UAL with standby, will continue to monitor

## 2018-12-04 VITALS
DIASTOLIC BLOOD PRESSURE: 96 MMHG | SYSTOLIC BLOOD PRESSURE: 157 MMHG | HEART RATE: 80 BPM | WEIGHT: 211.9 LBS | BODY MASS INDEX: 35.31 KG/M2 | OXYGEN SATURATION: 98 % | RESPIRATION RATE: 16 BRPM | TEMPERATURE: 98.2 F | HEIGHT: 65 IN

## 2018-12-04 LAB
ANION GAP SERPL CALCULATED.3IONS-SCNC: 9 MMOL/L (ref 3–11)
BACTERIA SPEC AEROBE CULT: NORMAL
BASOPHILS # BLD AUTO: 0.04 10*3/MM3 (ref 0–0.2)
BASOPHILS NFR BLD AUTO: 0.5 % (ref 0–1)
BUN BLD-MCNC: 15 MG/DL (ref 9–23)
BUN/CREAT SERPL: 25 (ref 7–25)
CALCIUM SPEC-SCNC: 8.8 MG/DL (ref 8.7–10.4)
CHLORIDE SERPL-SCNC: 112 MMOL/L (ref 99–109)
CO2 SERPL-SCNC: 20 MMOL/L (ref 20–31)
CREAT BLD-MCNC: 0.6 MG/DL (ref 0.6–1.3)
DEPRECATED RDW RBC AUTO: 44.9 FL (ref 37–54)
EOSINOPHIL # BLD AUTO: 0.23 10*3/MM3 (ref 0–0.3)
EOSINOPHIL NFR BLD AUTO: 2.9 % (ref 0–3)
ERYTHROCYTE [DISTWIDTH] IN BLOOD BY AUTOMATED COUNT: 13.6 % (ref 11.3–14.5)
GFR SERPL CREATININE-BSD FRML MDRD: 105 ML/MIN/1.73
GLUCOSE BLD-MCNC: 108 MG/DL (ref 70–100)
HCT VFR BLD AUTO: 36.8 % (ref 34.5–44)
HGB BLD-MCNC: 12.7 G/DL (ref 11.5–15.5)
IMM GRANULOCYTES # BLD: 0.04 10*3/MM3 (ref 0–0.03)
IMM GRANULOCYTES NFR BLD: 0.5 % (ref 0–0.6)
LYMPHOCYTES # BLD AUTO: 2.48 10*3/MM3 (ref 0.6–4.8)
LYMPHOCYTES NFR BLD AUTO: 31.5 % (ref 24–44)
MAGNESIUM SERPL-MCNC: 1.4 MG/DL (ref 1.3–2.7)
MCH RBC QN AUTO: 31 PG (ref 27–31)
MCHC RBC AUTO-ENTMCNC: 34.5 G/DL (ref 32–36)
MCV RBC AUTO: 89.8 FL (ref 80–99)
MONOCYTES # BLD AUTO: 0.48 10*3/MM3 (ref 0–1)
MONOCYTES NFR BLD AUTO: 6.1 % (ref 0–12)
NEUTROPHILS # BLD AUTO: 4.61 10*3/MM3 (ref 1.5–8.3)
NEUTROPHILS NFR BLD AUTO: 58.5 % (ref 41–71)
PLATELET # BLD AUTO: 183 10*3/MM3 (ref 150–450)
PMV BLD AUTO: 9.6 FL (ref 6–12)
POTASSIUM BLD-SCNC: 4 MMOL/L (ref 3.5–5.5)
RBC # BLD AUTO: 4.1 10*6/MM3 (ref 3.89–5.14)
SODIUM BLD-SCNC: 141 MMOL/L (ref 132–146)
WBC NRBC COR # BLD: 7.88 10*3/MM3 (ref 3.5–10.8)

## 2018-12-04 PROCEDURE — 85025 COMPLETE CBC W/AUTO DIFF WBC: CPT | Performed by: INTERNAL MEDICINE

## 2018-12-04 PROCEDURE — 80048 BASIC METABOLIC PNL TOTAL CA: CPT

## 2018-12-04 PROCEDURE — 99239 HOSP IP/OBS DSCHRG MGMT >30: CPT | Performed by: NURSE PRACTITIONER

## 2018-12-04 PROCEDURE — 83735 ASSAY OF MAGNESIUM: CPT | Performed by: INTERNAL MEDICINE

## 2018-12-04 PROCEDURE — 25010000002 MAGNESIUM SULFATE 2 GM/50ML SOLUTION: Performed by: NURSE PRACTITIONER

## 2018-12-04 RX ORDER — SACCHAROMYCES BOULARDII 250 MG
250 CAPSULE ORAL 2 TIMES DAILY
Qty: 28 CAPSULE | Refills: 0 | Status: SHIPPED | OUTPATIENT
Start: 2018-12-04 | End: 2018-12-18

## 2018-12-04 RX ORDER — MAGNESIUM SULFATE HEPTAHYDRATE 40 MG/ML
2 INJECTION, SOLUTION INTRAVENOUS AS NEEDED
Status: DISCONTINUED | OUTPATIENT
Start: 2018-12-04 | End: 2018-12-04 | Stop reason: HOSPADM

## 2018-12-04 RX ORDER — MAGNESIUM SULFATE HEPTAHYDRATE 40 MG/ML
4 INJECTION, SOLUTION INTRAVENOUS AS NEEDED
Status: DISCONTINUED | OUTPATIENT
Start: 2018-12-04 | End: 2018-12-04 | Stop reason: HOSPADM

## 2018-12-04 RX ORDER — DOXYCYCLINE 100 MG/1
100 CAPSULE ORAL EVERY 12 HOURS SCHEDULED
Qty: 10 CAPSULE | Refills: 0 | Status: SHIPPED | OUTPATIENT
Start: 2018-12-04 | End: 2018-12-09

## 2018-12-04 RX ORDER — CEFDINIR 300 MG/1
300 CAPSULE ORAL 2 TIMES DAILY
Qty: 12 CAPSULE | Refills: 0 | Status: SHIPPED | OUTPATIENT
Start: 2018-12-04 | End: 2018-12-10

## 2018-12-04 RX ADMIN — ASPIRIN 325 MG ORAL TABLET 325 MG: 325 PILL ORAL at 09:43

## 2018-12-04 RX ADMIN — DICYCLOMINE HYDROCHLORIDE 20 MG: 20 TABLET ORAL at 05:31

## 2018-12-04 RX ADMIN — POTASSIUM CHLORIDE 40 MEQ: 750 CAPSULE, EXTENDED RELEASE ORAL at 04:32

## 2018-12-04 RX ADMIN — MAGNESIUM SULFATE HEPTAHYDRATE 2 G: 40 INJECTION, SOLUTION INTRAVENOUS at 12:58

## 2018-12-04 RX ADMIN — DOXYCYCLINE 100 MG: 100 CAPSULE ORAL at 09:43

## 2018-12-04 RX ADMIN — Medication 250 MG: at 09:42

## 2018-12-04 RX ADMIN — ESTRADIOL 2 MG: 0.5 TABLET ORAL at 09:43

## 2018-12-04 NOTE — PLAN OF CARE
Problem: Patient Care Overview  Goal: Plan of Care Review   12/04/18 1537   Coping/Psychosocial   Plan of Care Reviewed With patient   Plan of Care Review   Progress improving   OTHER   Outcome Summary ready for d/c. No more diarrhea       Problem: Fall Risk (Adult)  Goal: Absence of Fall   12/04/18 1537   Fall Risk (Adult)   Absence of Fall achieves outcome

## 2018-12-04 NOTE — PLAN OF CARE
Problem: Patient Care Overview  Goal: Plan of Care Review  Outcome: Ongoing (interventions implemented as appropriate)   12/04/18 0055   Coping/Psychosocial   Plan of Care Reviewed With patient   Plan of Care Review   Progress improving   OTHER   Outcome Summary Patient alert and oriented, stable VS, ambulated to the bathroom, changed IV site, will continue to monitor

## 2018-12-04 NOTE — PROGRESS NOTES
Continued Stay Note   Jeovany     Patient Name: Lizette Keith  MRN: [id number]  Today's Date: 12/4/2018    Admit Date: 12/2/2018    Discharge Plan     Row Name 12/04/18 1026       Plan    Plan  Plan is home to self care with assistance from sister as needed    Patient/Family in Agreement with Plan  yes    Plan Comments  Pt lives alone and is independent.  Sister, Natalia Mendoza, assists as needed.        Discharge Codes    No documentation.             Blanka Gao RN

## 2018-12-04 NOTE — DISCHARGE SUMMARY
[facility] Medicine Services  DISCHARGE SUMMARY    Patient Name: Lizette Keith  : 1965  MRN: [id number]    Date of Admission: 2018  Date of Discharge:  18  Primary Care Physician: Denise Hanks MD    Consults     No orders found from 11/3/2018 to 12/3/2018.        Hospital Course     Presenting Problem:   SIRS (systemic inflammatory response syndrome) (CMS/HCC) [R65.10]    Active Hospital Problems    Diagnosis Date Noted   • **SIRS (systemic inflammatory response syndrome) (CMS/HCC) [R65.10] 2018   • Anxiety and depression [F41.9, F32.9] 2018   • Tobacco abuse [Z72.0] 2018   • WINSTON (acute kidney injury) (CMS/HCC) [N17.9] 2018   • Headache [R51] 2018   • GERD (gastroesophageal reflux disease) [K21.9] 2016   • Hypertension [I10] 2016   • IBS (irritable bowel syndrome) [K58.9] 2016   • Hyperlipemia [E78.5] 2016   • Fibromyalgia [M79.7] 2016      Resolved Hospital Problems   No resolved problems to display.          Hospital Course:  Lizette Keith is a 53 y.o. female with a past medical history significant for HLD, HTN, IBS, fibromyalgia, and anxiety/depression who presents with complaints of headache, cough, congestion, vomiting. Work-up in the ED consistent with sepsis without clear source, with hypotension and tachycardic to 131, leukocytosis, lactic acidosis, WINSTON.    Patient was admitted to hospital medicine for further evaluation. UA, CXR, CT abd/pelvis, flu PCR, rapid strep all negative. CT head shows right maxillary and left sphenoid air fluid levels concerning for acute sinusitis. Blood cx are negative so far. Antibiotics were de-escalated. Patient has continued to improve and will be sent home on omnicef and doxycyline to complete 7 days of therapy. Lactic acidosis and WINSTON resolved with IVF's. Patient had some diarrhea which seemed to improve with florastor and bentyl. We have held patient  antihypertensive medication and blood pressure has been stable. Will defer to PCP on restarting if needed. Patient was instructed to call PCP if sbp was consistently > 150    Patient has remained clinically stable and will be discharged home today. She will need to follow up with PCP in one week.     Discharge Follow Up Recommendations for labs/diagnostics:      Day of Discharge     HPI:   Patient is sitting up in bed in NAD. She feels better today. Had some diarrhea but seemed to improved with bentyl and probiotic. Headache improved. Has been up walking without difficulty. No acute events overnight per nursing.     Review of Systems  Gen- No fevers, chills  CV- No chest pain, palpitations  Resp- No cough, dyspnea  GI- No N/, +/D, abd pain    Otherwise ROS is negative except as mentioned in the HPI.    Vital Signs:   Temp:  [97.3 °F (36.3 °C)-98.2 °F (36.8 °C)] 98.2 °F (36.8 °C)  Heart Rate:  [78-85] 85  Resp:  [14-18] 14  BP: (118-135)/(77-92) 135/85     Physical Exam:  Constitutional: No acute distress, awake, alert  HENT: NCAT, mucous membranes moist  Respiratory: Clear to auscultation bilaterally, respiratory effort normal, RA 97%  Cardiovascular: RRR, no murmurs, rubs, or gallops, palpable pedal pulses bilaterally  Gastrointestinal: Positive bowel sounds, soft, nontender, nondistended  Musculoskeletal: No bilateral ankle edema  Psychiatric: Appropriate affect, cooperative  Neurologic: Oriented x 3, strength symmetric in all extremities, Cranial Nerves grossly intact to confrontation, speech clear  Skin: No rashes       Pertinent  and/or Most Recent Results     Results from last 7 days   Lab Units  12/04/18   0618  12/03/18   0456  12/02/18   1740   WBC 10*3/mm3  7.88  6.50  12.13*   HEMOGLOBIN g/dL  12.7  12.7  15.4   HEMATOCRIT %  36.8  38.9  45.3*   PLATELETS 10*3/mm3  183  165  235   SODIUM mmol/L  141  139  134   POTASSIUM mmol/L  4.0  2.8*  3.4*   CHLORIDE mmol/L  112*  106  102   CO2 mmol/L  20.0  26.0   24.0   BUN mg/dL  15  17  14   CREATININE mg/dL  0.60  0.89  1.44*   GLUCOSE mg/dL  108*  112*  120*   CALCIUM mg/dL  8.8  8.4*  9.6     Results from last 7 days   Lab Units  12/02/18   1740   BILIRUBIN mg/dL  0.5   ALK PHOS U/L  149*   ALT (SGPT) U/L  15   AST (SGOT) U/L  17           Invalid input(s): TG, LDLCALC, LDLREALC      Brief Urine Lab Results  (Last result in the past 365 days)      Color   Clarity   Blood   Leuk Est   Nitrite   Protein   CREAT   Urine HCG        12/02/18 1956 Yellow Cloudy Negative Negative Negative 100 mg/dL (2+)               Microbiology Results Abnormal     Procedure Component Value - Date/Time    Beta Strep Culture, Throat - Swab, Throat [[id number]]  (Normal) Collected:  12/02/18 1744    Lab Status:  Final result Specimen:  Swab from Throat Updated:  12/04/18 0718     Throat Culture, Beta Strep No Beta Hemolytic Streptococcus Isolated    Blood Culture - Blood, Arm, Left [[id number]] Collected:  12/02/18 1811    Lab Status:  Preliminary result Specimen:  Blood from Arm, Left Updated:  12/03/18 1845     Blood Culture No growth at 24 hours    Blood Culture - Blood, Arm, Right [[id number]] Collected:  12/02/18 1811    Lab Status:  Preliminary result Specimen:  Blood from Arm, Right Updated:  12/03/18 1845     Blood Culture No growth at 24 hours    Influenza A & B, RT PCR - Swab, Nasopharynx [[id number]]  (Normal) Collected:  12/02/18 1744    Lab Status:  Final result Specimen:  Swab from Nasopharynx Updated:  12/02/18 1926     Influenza A PCR Not Detected     Influenza B PCR Not Detected    Rapid Strep A Screen - Swab, Throat [[id number]]  (Normal) Collected:  12/02/18 1744    Lab Status:  Final result Specimen:  Swab from Throat Updated:  12/02/18 1800     Strep A Ag Negative    Narrative:       Test performed by Direct Antigen Testing.          Imaging Results (all)     Procedure Component Value Units Date/Time    XR Chest 1 View [[id number]] Collected:  12/02/18 1858     Updated:   12/03/18 1538    Narrative:          EXAMINATION: XR CHEST 1 VW - 12/2/2018     INDICATION: Fever, hypotension.     COMPARISON: 3/10/2016     FINDINGS: Portable chest reveals cardiac and mediastinal silhouettes  within normal limits. The lung fields are grossly clear. No focal  parenchymal opacification is present. No pleural effusion or  pneumothorax. The bony structures are unremarkable. The pulmonary  vascularity is within normal limits.       Impression:       No acute cardiopulmonary disease.     DICTATED:   12/2/2018  EDITED/ls :   12/2/2018      This report was finalized on 12/3/2018 3:35 PM by Dr. Celsa Caldwell MD.       CT Abdomen Pelvis Without Contrast [[id number]] Collected:  12/02/18 1856     Updated:  12/03/18 1537    Narrative:       EXAMINATION: CT ABDOMEN PELVIS WO CONTRAST - 12/2/2018     INDICATION:  Flulike symptoms.     TECHNIQUE: Multiple axial CT imaging is obtained of the abdomen and  pelvis from the lung bases to the pubic symphysis without the  administration of oral or intravenous contrast.     The radiation dose reduction device was turned on for each scan per the  ALARA (As Low as Reasonably Achievable) protocol.     COMPARISON: None.     FINDINGS:      ABDOMEN: The lung bases are grossly clear. The liver is homogeneous in  appearance. Fatty infiltration identified of the liver. The spleen is  homogeneous in appearance. Pancreas is unremarkable. The kidneys and  adrenal glands are within normal limits. Vascular calcification seen  within the abdominal aorta. No free fluid or free air. The abdominal  portion gastrointestinal tract is within normal limits. No abnormal mass  or fluid collections identified. No abdominal or retroperitoneal  lymphadenopathy.     The appendix is radiographically normal in appearance.     PELVIS: The pelvic organs are unremarkable. Pelvic portion  gastrointestinal tract is within normal limits. No free fluid or free  air. No abnormal mass or fluid  collections identified. The bony  structures are unremarkable.       Impression:       No acute intra-abdominal or pelvic abnormality.     DICTATED:   12/2/2018  EDITED/ls :   12/2/2018         This report was finalized on 12/3/2018 3:35 PM by Dr. Celsa Caldwell MD.       CT Head Without Contrast [[id number]] Collected:  12/02/18 2046     Updated:  12/02/18 2102    Narrative:       EXAM:    CT Head Without Intravenous Contrast     EXAM DATE/TIME:    12/2/2018 8:46 PM     CLINICAL HISTORY:    53 years old, female; Viral infection, unspecified; Dehydration; Hypotension,   unspecified; Systemic inflammatory response syndrome (sirs) of non-infectious   origin without acute organ dysfunction; Other specified abnormal findings of   blood chemistry; Pain; Headache; Headache not specified; Additional info:   Headache, acute, norm neuro exam     TECHNIQUE:    Axial computed tomography images of the head/brain without intravenous   contrast.    All CT scans at this facility use at least one of these dose optimization   techniques: automated exposure control; mA and/or kV adjustment per patient   size (includes targeted exams where dose is matched to clinical indication); or   iterative reconstruction.     COMPARISON:    No relevant prior studies available.     FINDINGS:    Brain:  No evidence for acute transcortical infarct. No mass effect or midline   shift. No extra-axial collection. No acute intracranial hemorrhage. Basal   cisterns are patent.    Ventricles: Normal. No ventriculomegaly.    Bones/joints: Normal. No acute fracture.    Sinuses:  Air-fluid levels within the right maxillary and left sphenoid   sinuses. Mucosal thickening involving the maxillary sinuses and ethmoid air   cells.    Mastoid air cells:  Tympanomastoid cavities are clear.    Soft tissues: Normal.       Impression:       1. Air-fluid levels within the right maxillary and left sphenoid sinuses.   Correlate clinically for acute sinusitis.   2. No  hydrocephalus, acute intracranial hemorrhage, or mass effect.     THIS DOCUMENT HAS BEEN ELECTRONICALLY SIGNED BY VILMA JENKINS MD                          Order Current Status    Blood Culture - Blood, Arm, Left Preliminary result    Blood Culture - Blood, Arm, Right Preliminary result        Discharge Details        Discharge Medications      New Medications      Instructions Start Date   cefdinir 300 MG capsule  Commonly known as:  OMNICEF   300 mg, Oral, 2 Times Daily      doxycycline 100 MG capsule  Commonly known as:  MONODOX   100 mg, Oral, Every 12 Hours Scheduled      saccharomyces boulardii 250 MG capsule  Commonly known as:  FLORASTOR   250 mg, Oral, 2 Times Daily         Continue These Medications      Instructions Start Date   aspirin 325 MG tablet   325 mg, Oral, Daily      cetirizine 10 MG tablet  Commonly known as:  zyrTEC   10 mg, Oral, Daily      dicyclomine 20 MG tablet  Commonly known as:  BENTYL   20 mg, Oral, 3 Times Daily PRN      DULoxetine 30 MG capsule  Commonly known as:  CYMBALTA   30 mg, Oral, Daily      estradiol 2 MG tablet  Commonly known as:  ESTRACE   2 mg, Oral, Daily      naproxen 500 MG tablet  Commonly known as:  NAPROSYN   500 mg, Oral, 2 Times Daily With Meals      tiZANidine 4 MG tablet  Commonly known as:  ZANAFLEX   4 mg, Oral, Nightly PRN         Stop These Medications    lisinopril-hydrochlorothiazide 20-25 MG per tablet  Commonly known as:  PRINZIDE,ZESTORETIC     MethylPREDNISolone 4 MG tablet  Commonly known as:  MEDROL (DEBRA)              Discharge Disposition:  Home or Self Care    Discharge Diet:  Diet Instructions     Diet: Regular; Thin      Discharge Diet:  Regular    Fluid Consistency:  Thin          Discharge Activity:   Activity Instructions     Activity as Tolerated      Measure Blood Pressure      Check bp at home at least twice a day. If blood pressure is consistently > 150 then call PCP about restarting blood pressure medicine              Special  Instructions:  Code Status/Level of Support:  Code Status and Medical Interventions:   Ordered at: 12/02/18 2039     Level Of Support Discussed With:    Patient     Code Status:    CPR     Medical Interventions (Level of Support Prior to Arrest):    Full       Future Appointments   Date Time Provider Department Center   12/12/2018  3:00 PM Johanne Park APRN [facility] None   12/17/2018  1:15 PM Denise Hanks MD [facility] None       Additional Instructions for the Follow-ups that You Need to Schedule     Discharge Follow-up with PCP   As directed       Currently Documented PCP:    Denise Hanks MD    PCP Phone Number:    [phone]     Follow Up Details:  pcp one week               Time Spent on Discharge:  35 minutes    Electronically signed by ASHLEY Marsh, 12/04/18, 12:47 PM.

## 2018-12-05 ENCOUNTER — TRANSITIONAL CARE MANAGEMENT TELEPHONE ENCOUNTER (OUTPATIENT)
Dept: INTERNAL MEDICINE | Facility: CLINIC | Age: 53
End: 2018-12-05

## 2018-12-05 NOTE — OUTREACH NOTE
MARIANELA call completed.  Please refer to TCM call flowsheet for call documentation.    The patient says she is feeling some better but states she is not completely back to baseline. Pt c/o cough, sinus drainage, HA, decreased appetite, and diarrhea. She does note improvement in diarrhea and plans to get probiotic filled tomorrow. She denies any fever, chills, n/v., or chest pain. She states she is voiding well and without difficulty. Pt voiced concern regarding discontinuation of Lisinopril-HCTZ.  Encouraged pt to monitor BP and HR at home, keep a log, and call PCP if SBP consistently >150 as per DC Summary and pt voiced understanding. She is scheduled to f/u for a physical with Dr. Hanks 12/17/18. Offered to coordinate earlier appt for MARIANELA however pt wanted to check with Dr. Hanks to see if PCP sooner evaluation. 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 Discharge papers do not specify time off of work restrictions. Pt would like to know if Dr. Hanks recommends taking off of work this weekend to allow more recovery time. Pt would like a call back at [phone].

## 2018-12-05 NOTE — PAYOR COMM NOTE
"Brittni Gutierrez RN Utilization Review [phone]  Fax # [phone]  Ref # [id number]          Lizette Topete (53 y.o. Female)     Date of Birth Social Security Number Address Home Phone MRN    1965  [address] [id number]  [id number]    Alevism Marital Status          Buddhist        Admission Date Admission Type Admitting Provider Attending Provider Department, Room/Bed    18 Emergency Clover Castro, DO  [facility] 2F, S215/    Discharge Date Discharge Disposition Discharge Destination        2018 Home or Self Care              Attending Provider:  (none)   Allergies:  Erythromycin, Penicillins, Statins    Isolation:  None   Infection:  None   Code Status:  Prior    Ht:  165.1 cm (65\")   Wt:  96.1 kg (211 lb 14.4 oz)    Admission Cmt:  None   Principal Problem:  SIRS (systemic inflammatory response syndrome) (CMS/[facility]) [R65.10]                 Active Insurance as of 2018     Primary Coverage     Payor Plan Insurance Group Employer/Plan Group    [employer] MEDICAID [employer] [id number]     Payor Plan Address Payor Plan Phone Number Payor Plan Fax Number Effective Dates    PO  [phone]  2014 - None Entered    [address] [address] [id number]       Subscriber Name Subscriber Birth Date Member ID       LIZETTE TOPETE 1965 [id number]                 Emergency Contacts      (Rel.) Home Phone Work Phone Mobile Phone    Lacie Vizcaino (Mother) -- -- [phone]               Discharge Summary      Alia Willingham, APRN at 2018 12:47 PM              [facility]  DISCHARGE SUMMARY    Patient Name: Lizette Topete  : 1965  MRN: [id number]    Date of Admission: 2018  Date of Discharge:  18  Primary Care Physician: Denise Hanks MD    Consults     No orders found from 11/3/2018 to 12/3/2018.        Hospital Course     Presenting Problem:   SIRS (systemic " inflammatory response syndrome) (CMS/[facility]) [R65.10]    Active Hospital Problems    Diagnosis Date Noted   • **SIRS (systemic inflammatory response syndrome) (CMS/[facility]) [R65.10] 12/02/2018   • Anxiety and depression [F41.9, F32.9] 12/02/2018   • Tobacco abuse [Z72.0] 12/02/2018   • WINSTON (acute kidney injury) (CMS/[facility]) [N17.9] 12/02/2018   • Headache [R51] 12/02/2018   • GERD (gastroesophageal reflux disease) [K21.9] 05/25/2016   • Hypertension [I10] 05/25/2016   • IBS (irritable bowel syndrome) [K58.9] 05/25/2016   • Hyperlipemia [E78.5] 05/25/2016   • Fibromyalgia [M79.7] 05/25/2016      Resolved Hospital Problems   No resolved problems to display.          Hospital Course:  Lizette Keith is a 53 y.o. female with a past medical history significant for HLD, HTN, IBS, fibromyalgia, and anxiety/depression who presents with complaints of headache, cough, congestion, vomiting. Work-up in the ED consistent with sepsis without clear source, with hypotension and tachycardic to 131, leukocytosis, lactic acidosis, WINSTON.    Patient was admitted to hospital medicine for further evaluation. UA, CXR, CT abd/pelvis, flu PCR, rapid strep all negative. CT head shows right maxillary and left sphenoid air fluid levels concerning for acute sinusitis. Blood cx are negative so far. Antibiotics were de-escalated. Patient has continued to improve and will be sent home on omnicef and doxycyline to complete 7 days of therapy. Lactic acidosis and WINSTON resolved with IVF's. Patient had some diarrhea which seemed to improve with florastor and bentyl. We have held patient antihypertensive medication and blood pressure has been stable. Will defer to PCP on restarting if needed. Patient was instructed to call PCP if sbp was consistently > 150    Patient has remained clinically stable and will be discharged home today. She will need to follow up with PCP in one week.     Discharge Follow Up Recommendations for labs/diagnostics:      Day of Discharge      HPI:   Patient is sitting up in bed in NAD. She feels better today. Had some diarrhea but seemed to improved with bentyl and probiotic. Headache improved. Has been up walking without difficulty. No acute events overnight per nursing.     Review of Systems  Gen- No fevers, chills  CV- No chest pain, palpitations  Resp- No cough, dyspnea  GI- No N/, +/D, abd pain    Otherwise ROS is negative except as mentioned in the HPI.    Vital Signs:   Temp:  [97.3 °F (36.3 °C)-98.2 °F (36.8 °C)] 98.2 °F (36.8 °C)  Heart Rate:  [78-85] 85  Resp:  [14-18] 14  BP: (118-135)/(77-92) 135/85     Physical Exam:  Constitutional: No acute distress, awake, alert  HENT: NCAT, mucous membranes moist  Respiratory: Clear to auscultation bilaterally, respiratory effort normal, RA 97%  Cardiovascular: RRR, no murmurs, rubs, or gallops, palpable pedal pulses bilaterally  Gastrointestinal: Positive bowel sounds, soft, nontender, nondistended  Musculoskeletal: No bilateral ankle edema  Psychiatric: Appropriate affect, cooperative  Neurologic: Oriented x 3, strength symmetric in all extremities, Cranial Nerves grossly intact to confrontation, speech clear  Skin: No rashes       Pertinent  and/or Most Recent Results     Results from last 7 days   Lab Units  12/04/18   0618  12/03/18   0456  12/02/18   1740   WBC 10*3/mm3  7.88  6.50  12.13*   HEMOGLOBIN g/dL  12.7  12.7  15.4   HEMATOCRIT %  36.8  38.9  45.3*   PLATELETS 10*3/mm3  183  165  235   SODIUM mmol/L  141  139  134   POTASSIUM mmol/L  4.0  2.8*  3.4*   CHLORIDE mmol/L  112*  106  102   CO2 mmol/L  20.0  26.0  24.0   BUN mg/dL  15  17  14   CREATININE mg/dL  0.60  0.89  1.44*   GLUCOSE mg/dL  108*  112*  120*   CALCIUM mg/dL  8.8  8.4*  9.6     Results from last 7 days   Lab Units  12/02/18   1740   BILIRUBIN mg/dL  0.5   ALK PHOS U/L  149*   ALT (SGPT) U/L  15   AST (SGOT) U/L  17           Invalid input(s): TG, LDLCALC, LDLREALC      Brief Urine Lab Results  (Last result in the past  365 days)      Color   Clarity   Blood   Leuk Est   Nitrite   Protein   CREAT   Urine HCG        12/02/18 1956 Yellow Cloudy Negative Negative Negative 100 mg/dL (2+)               Microbiology Results Abnormal     Procedure Component Value - Date/Time    Beta Strep Culture, Throat - Swab, Throat [[id number]]  (Normal) Collected:  12/02/18 1744    Lab Status:  Final result Specimen:  Swab from Throat Updated:  12/04/18 0718     Throat Culture, Beta Strep No Beta Hemolytic Streptococcus Isolated    Blood Culture - Blood, Arm, Left [[id number]] Collected:  12/02/18 1811    Lab Status:  Preliminary result Specimen:  Blood from Arm, Left Updated:  12/03/18 1845     Blood Culture No growth at 24 hours    Blood Culture - Blood, Arm, Right [[id number]] Collected:  12/02/18 1811    Lab Status:  Preliminary result Specimen:  Blood from Arm, Right Updated:  12/03/18 1845     Blood Culture No growth at 24 hours    Influenza A & B, RT PCR - Swab, Nasopharynx [[id number]]  (Normal) Collected:  12/02/18 1744    Lab Status:  Final result Specimen:  Swab from Nasopharynx Updated:  12/02/18 1926     Influenza A PCR Not Detected     Influenza B PCR Not Detected    Rapid Strep A Screen - Swab, Throat [[id number]]  (Normal) Collected:  12/02/18 1744    Lab Status:  Final result Specimen:  Swab from Throat Updated:  12/02/18 1800     Strep A Ag Negative    Narrative:       Test performed by Direct Antigen Testing.          Imaging Results (all)     Procedure Component Value Units Date/Time    XR Chest 1 View [[id number]] Collected:  12/02/18 1858     Updated:  12/03/18 1538    Narrative:          EXAMINATION: XR CHEST 1 VW - 12/2/2018     INDICATION: Fever, hypotension.     COMPARISON: 3/10/2016     FINDINGS: Portable chest reveals cardiac and mediastinal silhouettes  within normal limits. The lung fields are grossly clear. No focal  parenchymal opacification is present. No pleural effusion or  pneumothorax. The bony structures are  unremarkable. The pulmonary  vascularity is within normal limits.       Impression:       No acute cardiopulmonary disease.     DICTATED:   12/2/2018  EDITED/ls :   12/2/2018      This report was finalized on 12/3/2018 3:35 PM by Dr. Celsa Caldwell MD.       CT Abdomen Pelvis Without Contrast [[id number]] Collected:  12/02/18 1856     Updated:  12/03/18 1537    Narrative:       EXAMINATION: CT ABDOMEN PELVIS WO CONTRAST - 12/2/2018     INDICATION:  Flulike symptoms.     TECHNIQUE: Multiple axial CT imaging is obtained of the abdomen and  pelvis from the lung bases to the pubic symphysis without the  administration of oral or intravenous contrast.     The radiation dose reduction device was turned on for each scan per the  ALARA (As Low as Reasonably Achievable) protocol.     COMPARISON: None.     FINDINGS:      ABDOMEN: The lung bases are grossly clear. The liver is homogeneous in  appearance. Fatty infiltration identified of the liver. The spleen is  homogeneous in appearance. Pancreas is unremarkable. The kidneys and  adrenal glands are within normal limits. Vascular calcification seen  within the abdominal aorta. No free fluid or free air. The abdominal  portion gastrointestinal tract is within normal limits. No abnormal mass  or fluid collections identified. No abdominal or retroperitoneal  lymphadenopathy.     The appendix is radiographically normal in appearance.     PELVIS: The pelvic organs are unremarkable. Pelvic portion  gastrointestinal tract is within normal limits. No free fluid or free  air. No abnormal mass or fluid collections identified. The bony  structures are unremarkable.       Impression:       No acute intra-abdominal or pelvic abnormality.     DICTATED:   12/2/2018  EDITED/ls :   12/2/2018         This report was finalized on 12/3/2018 3:35 PM by Dr. Celsa Caldwell MD.       CT Head Without Contrast [[id number]] Collected:  12/02/18 2046     Updated:  12/02/18 2102    Narrative:        EXAM:    CT Head Without Intravenous Contrast     EXAM DATE/TIME:    12/2/2018 8:46 PM     CLINICAL HISTORY:    53 years old, female; Viral infection, unspecified; Dehydration; Hypotension,   unspecified; Systemic inflammatory response syndrome (sirs) of non-infectious   origin without acute organ dysfunction; Other specified abnormal findings of   blood chemistry; Pain; Headache; Headache not specified; Additional info:   Headache, acute, norm neuro exam     TECHNIQUE:    Axial computed tomography images of the head/brain without intravenous   contrast.    All CT scans at this facility use at least one of these dose optimization   techniques: automated exposure control; mA and/or kV adjustment per patient   size (includes targeted exams where dose is matched to clinical indication); or   iterative reconstruction.     COMPARISON:    No relevant prior studies available.     FINDINGS:    Brain:  No evidence for acute transcortical infarct. No mass effect or midline   shift. No extra-axial collection. No acute intracranial hemorrhage. Basal   cisterns are patent.    Ventricles: Normal. No ventriculomegaly.    Bones/joints: Normal. No acute fracture.    Sinuses:  Air-fluid levels within the right maxillary and left sphenoid   sinuses. Mucosal thickening involving the maxillary sinuses and ethmoid air   cells.    Mastoid air cells:  Tympanomastoid cavities are clear.    Soft tissues: Normal.       Impression:       1. Air-fluid levels within the right maxillary and left sphenoid sinuses.   Correlate clinically for acute sinusitis.   2. No hydrocephalus, acute intracranial hemorrhage, or mass effect.     THIS DOCUMENT HAS BEEN ELECTRONICALLY SIGNED BY VILMA JENKINS MD                          Order Current Status    Blood Culture - Blood, Arm, Left Preliminary result    Blood Culture - Blood, Arm, Right Preliminary result        Discharge Details        Discharge Medications      New Medications      Instructions Start  Date   cefdinir 300 MG capsule  Commonly known as:  OMNICEF   300 mg, Oral, 2 Times Daily      doxycycline 100 MG capsule  Commonly known as:  MONODOX   100 mg, Oral, Every 12 Hours Scheduled      saccharomyces boulardii 250 MG capsule  Commonly known as:  FLORASTOR   250 mg, Oral, 2 Times Daily         Continue These Medications      Instructions Start Date   aspirin 325 MG tablet   325 mg, Oral, Daily      cetirizine 10 MG tablet  Commonly known as:  zyrTEC   10 mg, Oral, Daily      dicyclomine 20 MG tablet  Commonly known as:  BENTYL   20 mg, Oral, 3 Times Daily PRN      DULoxetine 30 MG capsule  Commonly known as:  CYMBALTA   30 mg, Oral, Daily      estradiol 2 MG tablet  Commonly known as:  ESTRACE   2 mg, Oral, Daily      naproxen 500 MG tablet  Commonly known as:  NAPROSYN   500 mg, Oral, 2 Times Daily With Meals      tiZANidine 4 MG tablet  Commonly known as:  ZANAFLEX   4 mg, Oral, Nightly PRN         Stop These Medications    lisinopril-hydrochlorothiazide 20-25 MG per tablet  Commonly known as:  PRINZIDE,ZESTORETIC     MethylPREDNISolone 4 MG tablet  Commonly known as:  MEDROL (DEBRA)              Discharge Disposition:  Home or Self Care    Discharge Diet:  Diet Instructions     Diet: Regular; Thin      Discharge Diet:  Regular    Fluid Consistency:  Thin          Discharge Activity:   Activity Instructions     Activity as Tolerated      Measure Blood Pressure      Check bp at home at least twice a day. If blood pressure is consistently > 150 then call PCP about restarting blood pressure medicine              Special Instructions:  Code Status/Level of Support:  Code Status and Medical Interventions:   Ordered at: 12/02/18 2039     Level Of Support Discussed With:    Patient     Code Status:    CPR     Medical Interventions (Level of Support Prior to Arrest):    Full       Future Appointments   Date Time Provider Department Center   12/12/2018  3:00 PM Johanne Park APRN [facility] None    12/17/2018  1:15 PM Denise Hanks MD [facility] None       Additional Instructions for the Follow-ups that You Need to Schedule     Discharge Follow-up with PCP   As directed       Currently Documented PCP:    Denise Hanks MD    PCP Phone Number:    [phone]     Follow Up Details:  pcp one week               Time Spent on Discharge:  35 minutes    Electronically signed by ASHLEY Marsh, 12/04/18, 12:47 PM.        Electronically signed by Yeyo Castro DO at 12/4/2018  2:54 PM       Discharge Order (From admission, onward)    Start     Ordered    12/04/18 1434  Discharge patient  Once     Expected Discharge Date:  12/04/18    Discharge Disposition:  Home or Self Care    Physician of Record for Attribution - Please select from Treatment Team:  YEYO CASTRO [[id number]]    Review needed by CMO to determine Physician of Record:  No       Question Answer Comment   Physician of Record for Attribution - Please select from Treatment Team YEYO CASTRO    Review needed by CMO to determine Physician of Record No        12/04/18 1439

## 2018-12-06 ENCOUNTER — TELEPHONE (OUTPATIENT)
Dept: INTERNAL MEDICINE | Facility: CLINIC | Age: 53
End: 2018-12-06

## 2018-12-07 ENCOUNTER — OFFICE VISIT (OUTPATIENT)
Dept: INTERNAL MEDICINE | Facility: CLINIC | Age: 53
End: 2018-12-07

## 2018-12-07 ENCOUNTER — TELEPHONE (OUTPATIENT)
Dept: INTERNAL MEDICINE | Facility: CLINIC | Age: 53
End: 2018-12-07

## 2018-12-07 VITALS
BODY MASS INDEX: 33.72 KG/M2 | HEART RATE: 109 BPM | TEMPERATURE: 97.8 F | HEIGHT: 65 IN | WEIGHT: 202.4 LBS | SYSTOLIC BLOOD PRESSURE: 132 MMHG | OXYGEN SATURATION: 99 % | DIASTOLIC BLOOD PRESSURE: 90 MMHG

## 2018-12-07 DIAGNOSIS — N17.9 AKI (ACUTE KIDNEY INJURY) (HCC): ICD-10-CM

## 2018-12-07 DIAGNOSIS — I10 ESSENTIAL HYPERTENSION: Primary | ICD-10-CM

## 2018-12-07 LAB
BACTERIA SPEC AEROBE CULT: NORMAL
BACTERIA SPEC AEROBE CULT: NORMAL

## 2018-12-07 PROCEDURE — 99496 TRANSJ CARE MGMT HIGH F2F 7D: CPT | Performed by: FAMILY MEDICINE

## 2018-12-07 RX ORDER — LISINOPRIL AND HYDROCHLOROTHIAZIDE 25; 20 MG/1; MG/1
1 TABLET ORAL DAILY
COMMUNITY
End: 2019-04-25 | Stop reason: SDUPTHER

## 2018-12-07 RX ORDER — AMLODIPINE BESYLATE 5 MG/1
5 TABLET ORAL DAILY
Qty: 30 TABLET | Refills: 1 | Status: SHIPPED | OUTPATIENT
Start: 2018-12-07 | End: 2018-12-17 | Stop reason: SDUPTHER

## 2018-12-07 NOTE — PROGRESS NOTES
"Transitional Care Follow Up Visit  Subjective     Lizette Keith is a 53 y.o. female who presents for a transitional care management visit.    Within 48 business hours after discharge our office contacted her via telephone to coordinate her care and needs.      I reviewed and discussed the details of that call along with the discharge summary, hospital problems, inpatient lab results, inpatient diagnostic studies, and consultation reports with Lizette.     Current outpatient and discharge medications have been reconciled for the patient.    Visit Vitals  /90 (BP Location: Right arm, Patient Position: Sitting)   Pulse 109   Temp 97.8 °F (36.6 °C)   Ht 165.1 cm (65\")   Wt 91.8 kg (202 lb 6.4 oz)   SpO2 99%   BMI 33.68 kg/m²        Date of TCM Phone Call 12/5/2018   [facility]   Date of Admission 12/2/2018   Date of Discharge 12/4/2018   Discharge Disposition Home or Self Care     Risk for Readmission (LACE) Score: 5 (12/4/2018  6:00 AM)      History of Present Illness  Presenting Problem: Course During Hospital Stay:  SIRS (systemic inflammatory response syndrome) (CMS/HCC) [R65.10]          Active Hospital Problems     Diagnosis Date Noted   • **SIRS (systemic inflammatory response syndrome) (CMS/HCC) [R65.10] 12/02/2018   • Anxiety and depression [F41.9, F32.9] 12/02/2018   • Tobacco abuse [Z72.0] 12/02/2018   • WINSTON (acute kidney injury) (CMS/HCC) [N17.9] 12/02/2018   • Headache [R51] 12/02/2018   • GERD (gastroesophageal reflux disease) [K21.9] 05/25/2016   • Hypertension [I10] 05/25/2016   • IBS (irritable bowel syndrome) [K58.9] 05/25/2016   • Hyperlipemia [E78.5] 05/25/2016   • Fibromyalgia [M79.7] 05/25/2016       Resolved Hospital Problems   No resolved problems to display.            Hospital Course:  Lizette Keith is a 53 y.o. female with a past medical history significant for HLD, HTN, IBS, fibromyalgia, and anxiety/depression who presents with complaints of headache, " cough, congestion, vomiting. Work-up in the ED consistent with sepsis without clear source, with hypotension and tachycardic to 131, leukocytosis, lactic acidosis, WINSTON.     Patient was admitted to hospital medicine for further evaluation. UA, CXR, CT abd/pelvis, flu PCR, rapid strep all negative. CT head shows right maxillary and left sphenoid air fluid levels concerning for acute sinusitis. Blood cx are negative so far. Antibiotics were de-escalated. Patient has continued to improve and will be sent home on omnicef and doxycyline to complete 7 days of therapy. Lactic acidosis and WINSTON resolved with IVF's. Patient had some diarrhea which seemed to improve with florastor and bentyl. We have held patient antihypertensive medication and blood pressure has been stable. Will defer to PCP on restarting if needed. Patient was instructed to call PCP if sbp was consistently > 150     Patient has remained clinically stable and will be discharged home today. She will need to follow up with PCP in one week.      Discharge Follow Up Recommendations for labs/diagnostics:     ____________________________________        Pt has had cough and congestion and headache with low blood pressure and went to Urgent Care and was sent to hospital where she had a septic work up.  Pt was released on doxycyline and cefdinir and after 2 doses her blood pressure went up.  Pt still has cough and congestion still. Pt does not feel well enough to go back to work.  Pt has to take an extra dose of lisinopril/hctz 20/25 to get her blood pressure down since out of hospital.  Pt states that BP was up to 155/110 this am.   Pt had CKD in the hospital.  Pt has a family hx of DM     Pt had very low potassium in hospital.  Pt has excessive sweating on the Cymbalta for neuropathy.     Pt was feeling better with the vitamin D, MG and K supplements  The following portions of the patient's history were reviewed and updated as appropriate: allergies, current  medications, past family history, past medical history, past social history, past surgical history and problem list.    Past Medical History:   Diagnosis Date   • Allergic    • Anemia     during pregnancy   • Anxiety    • Arthritis    • Blood in urine    • Chronic fatigue    • Colon polyp 02/2018    7 at last colonoscopy   • Depression    • Elevated cholesterol    • Endometriosis    • Fibromyalgia    • Fibromyalgia, primary    • Fracture    • GERD (gastroesophageal reflux disease)    • H/O mammogram 02/2018   • HL (hearing loss)    • Hypertension    • Irritable bowel syndrome    • Pap smear for cervical cancer screening 2018?      Past Surgical History:   Procedure Laterality Date   • CHOLECYSTECTOMY     • COLONOSCOPY  02/06/2018    3 year f/u polypectomy Dr MARTA Waller   • GALLBLADDER SURGERY     • HYSTERECTOMY     • INNER EAR SURGERY     • OOPHORECTOMY     • TEMPOROMANDIBULAR JOINT ARTHROPLASTY  1984      Family History   Problem Relation Age of Onset   • Melanoma Mother    • Diabetes Mother    • Heart disease Mother    • Arthritis Mother    • Depression Mother    • Colon polyps Mother    • Thyroid disease Mother    • Hyperlipidemia Mother    • Cancer Mother    • Heart attack Mother    • Migraines Mother    • Cancer Father         pancreatic and liver   • Diabetes Father    • Depression Father    • Colon polyps Father    • ADD / ADHD Son    • Diabetes Maternal Aunt    • Diabetes Maternal Uncle    • Diabetes Paternal Aunt    • Diabetes Paternal Uncle    • Cancer Maternal Grandmother         uterine/cervical   • Heart disease Maternal Grandfather    • Heart disease Paternal Grandfather    • Migraines Sister    • Breast cancer Neg Hx    • Ovarian cancer Neg Hx       Social History     Socioeconomic History   • Marital status:      Spouse name: Not on file   • Number of children: Not on file   • Years of education: Not on file   • Highest education level: Not on file   Social Needs   • Financial resource strain:  Not on file   • Food insecurity - worry: Not on file   • Food insecurity - inability: Not on file   • Transportation needs - medical: Not on file   • Transportation needs - non-medical: Not on file   Occupational History   • Not on file   Tobacco Use   • Smoking status: Current Every Day Smoker     Packs/day: 0.75     Years: 34.00     Pack years: 25.50     Types: Cigarettes   • Smokeless tobacco: Never Used   Substance and Sexual Activity   • Alcohol use: No   • Drug use: No   • Sexual activity: Not Currently     Birth control/protection: None   Other Topics Concern   • Not on file   Social History Narrative   • Not on file        Review of Systems   Constitutional: Positive for diaphoresis and fatigue. Negative for chills and fever.   HENT: Positive for sinus pressure. Negative for ear pain, nosebleeds, postnasal drip, rhinorrhea, sneezing and sore throat.    Eyes: Negative.  Negative for redness and itching.   Respiratory: Positive for cough. Negative for shortness of breath and wheezing.    Cardiovascular: Negative.  Negative for chest pain and palpitations.   Gastrointestinal: Positive for abdominal pain. Negative for constipation, diarrhea, nausea and vomiting.   Endocrine: Negative.  Negative for cold intolerance and heat intolerance.   Genitourinary: Negative.  Negative for dysuria, frequency, hematuria and urgency.   Musculoskeletal: Negative.  Negative for arthralgias, back pain and neck pain.   Skin: Negative.  Negative for color change and rash.   Allergic/Immunologic: Negative.  Negative for environmental allergies.   Neurological: Positive for headaches. Negative for dizziness, syncope and light-headedness.   Hematological: Negative.  Negative for adenopathy. Does not bruise/bleed easily.   Psychiatric/Behavioral: Negative.  Negative for dysphoric mood. The patient is not nervous/anxious.        Objective   Physical Exam   Constitutional: She is oriented to person, place, and time. She appears  well-developed.   HENT:   Head: Normocephalic.   Right Ear: External ear normal.   Left Ear: External ear normal.   Nose: Nose normal.   Eyes: Conjunctivae, EOM and lids are normal. Pupils are equal, round, and reactive to light.   Neck: Trachea normal and normal range of motion. Neck supple. Carotid bruit is not present. No thyroid mass and no thyromegaly present.   Cardiovascular: Normal rate and regular rhythm.   No murmur heard.  Pulmonary/Chest: Effort normal and breath sounds normal. No respiratory distress. She has no decreased breath sounds. She has no wheezes. She has no rhonchi. She has no rales. She exhibits no tenderness.   Abdominal: Soft. Bowel sounds are normal. There is no tenderness.   Musculoskeletal: Normal range of motion.        Left elbow: She exhibits swelling.        Arms:  Neurological: She is alert and oriented to person, place, and time.   Skin: Skin is warm and dry.   Psychiatric: She has a normal mood and affect. Her behavior is normal.   Nursing note and vitals reviewed.      Assessment/Plan   Lizette was seen today for hypertension and transitional care management.    Diagnoses and all orders for this visit:    Essential hypertension  -     Basic Metabolic Panel    WINSTON (acute kidney injury) (CMS/[facility])  -     Basic Metabolic Panel    Other orders  -     amLODIPine (NORVASC) 5 MG tablet; Take 1 tablet by mouth Daily.     add amlodipine  Restart doxycycline which she has tolerated.  Hold omnicef -Mom allergic.  Restart lisinopril/hctz 20/25 daily  If the blood pressure decreases too much then hold lisinopril/hctz  Letter for work, out until 12/11/18      Current Outpatient Medications:   •  aspirin 325 MG tablet, Take 325 mg by mouth Daily., Disp: , Rfl:   •  lisinopril-hydrochlorothiazide (PRINZIDE,ZESTORETIC) 20-25 MG per tablet, Take 1 tablet by mouth Daily., Disp: , Rfl:   •  amLODIPine (NORVASC) 5 MG tablet, Take 1 tablet by mouth Daily., Disp: 30 tablet, Rfl: 1  •  cefdinir (OMNICEF)  300 MG capsule, Take 1 capsule by mouth 2 (Two) Times a Day for 6 days., Disp: 12 capsule, Rfl: 0  •  cetirizine (ZyrTEC) 10 MG tablet, Take 10 mg by mouth daily., Disp: , Rfl:   •  dicyclomine (BENTYL) 20 MG tablet, Take 1 tablet by mouth 3 (Three) Times a Day As Needed (IBS- diarrhea)., Disp: 90 tablet, Rfl: 0  •  doxycycline (MONODOX) 100 MG capsule, Take 1 capsule by mouth Every 12 (Twelve) Hours for 10 doses., Disp: 10 capsule, Rfl: 0  •  DULoxetine (CYMBALTA) 30 MG capsule, Take 1 capsule by mouth Daily., Disp: 90 capsule, Rfl: 1  •  estradiol (ESTRACE) 2 MG tablet, Take 1 tablet by mouth Daily., Disp: 30 tablet, Rfl: 12  •  naproxen (NAPROSYN) 500 MG tablet, Take 500 mg by mouth 2 (Two) Times a Day With Meals., Disp: , Rfl:   •  saccharomyces boulardii (FLORASTOR) 250 MG capsule, Take 1 capsule by mouth 2 (Two) Times a Day for 14 days., Disp: 28 capsule, Rfl: 0  •  tiZANidine (ZANAFLEX) 4 MG tablet, Take 1 tablet by mouth At Night As Needed for Muscle Spasms., Disp: 30 tablet, Rfl: 1        Return in about 10 days (around 12/17/2018), or if symptoms worsen or fail to improve, for Next scheduled follow up, Recheck.      EXAM:    CT Head Without Intravenous Contrast      EXAM DATE/TIME:    12/2/2018 8:46 PM      CLINICAL HISTORY:    53 years old, female; Viral infection, unspecified; Dehydration; Hypotension,   unspecified; Systemic inflammatory response syndrome (sirs) of non-infectious   origin without acute organ dysfunction; Other specified abnormal findings of   blood chemistry; Pain; Headache; Headache not specified; Additional info:   Headache, acute, norm neuro exam      TECHNIQUE:    Axial computed tomography images of the head/brain without intravenous   contrast.    All CT scans at this facility use at least one of these dose optimization   techniques: automated exposure control; mA and/or kV adjustment per patient   size (includes targeted exams where dose is matched to clinical indication); or    iterative reconstruction.      COMPARISON:    No relevant prior studies available.      FINDINGS:    Brain:  No evidence for acute transcortical infarct. No mass effect or midline   shift. No extra-axial collection. No acute intracranial hemorrhage. Basal   cisterns are patent.    Ventricles: Normal. No ventriculomegaly.    Bones/joints: Normal. No acute fracture.    Sinuses:  Air-fluid levels within the right maxillary and left sphenoid   sinuses. Mucosal thickening involving the maxillary sinuses and ethmoid air   cells.    Mastoid air cells:  Tympanomastoid cavities are clear.    Soft tissues: Normal.      IMPRESSION:  1. Air-fluid levels within the right maxillary and left sphenoid sinuses.   Correlate clinically for acute sinusitis.   2. No hydrocephalus, acute intracranial hemorrhage, or mass effect.      THIS DOCUMENT HAS BEEN ELECTRONICALLY SIGNED BY VILMA JENKINS MD   Imaging     CT Head Without Contrast (Order: [id number]) - 12/2/2018          EXAMINATION: XR CHEST 1 VW - 12/2/2018     INDICATION: Fever, hypotension.     COMPARISON: 3/10/2016     FINDINGS: Portable chest reveals cardiac and mediastinal silhouettes  within normal limits. The lung fields are grossly clear. No focal  parenchymal opacification is present. No pleural effusion or  pneumothorax. The bony structures are unremarkable. The pulmonary  vascularity is within normal limits.     IMPRESSION:  No acute cardiopulmonary disease.     DICTATED:   12/2/2018  EDITED/ls :   12/2/2018      This report was finalized on 12/3/2018 3:35 PM by Dr. Celsa Caldwell MD.     EXAMINATION: CT ABDOMEN PELVIS WO CONTRAST - 12/2/2018     INDICATION:  Flulike symptoms.     TECHNIQUE: Multiple axial CT imaging is obtained of the abdomen and  pelvis from the lung bases to the pubic symphysis without the  administration of oral or intravenous contrast.     The radiation dose reduction device was turned on for each scan per the  ALARA (As Low as Reasonably  Achievable) protocol.     COMPARISON: None.     FINDINGS:      ABDOMEN: The lung bases are grossly clear. The liver is homogeneous in  appearance. Fatty infiltration identified of the liver. The spleen is  homogeneous in appearance. Pancreas is unremarkable. The kidneys and  adrenal glands are within normal limits. Vascular calcification seen  within the abdominal aorta. No free fluid or free air. The abdominal  portion gastrointestinal tract is within normal limits. No abnormal mass  or fluid collections identified. No abdominal or retroperitoneal  lymphadenopathy.     The appendix is radiographically normal in appearance.     PELVIS: The pelvic organs are unremarkable. Pelvic portion  gastrointestinal tract is within normal limits. No free fluid or free  air. No abnormal mass or fluid collections identified. The bony  structures are unremarkable.     IMPRESSION:  No acute intra-abdominal or pelvic abnormality.     DICTATED:   12/2/2018  EDITED/ls :   12/2/2018         This report was finalized on 12/3/2018 3:35 PM by Dr. Celsa Caldwell MD.            Results from last 7 days   Lab Units  12/04/18   0618  12/03/18   0456  12/02/18   1740   WBC 10*3/mm3  7.88  6.50  12.13*   HEMOGLOBIN g/dL  12.7  12.7  15.4   HEMATOCRIT %  36.8  38.9  45.3*   PLATELETS 10*3/mm3  183  165  235   SODIUM mmol/L  141  139  134   POTASSIUM mmol/L  4.0  2.8*  3.4*   CHLORIDE mmol/L  112*  106  102   CO2 mmol/L  20.0  26.0  24.0   BUN mg/dL  15  17  14   CREATININE mg/dL  0.60  0.89  1.44*   GLUCOSE mg/dL  108*  112*  120*   CALCIUM mg/dL  8.8  8.4*  9.6           Results from last 7 days   Lab Units  12/02/18   1740   BILIRUBIN mg/dL  0.5   ALK PHOS U/L  149*   ALT (SGPT) U/L  15   AST (SGOT) U/L  17             Invalid input(s): TG, LDLCALC, LDLREALC                           Brief Urine Lab Results  (Last result in the past 365 days)       Color   Clarity   Blood   Leuk Est   Nitrite   Protein   CREAT   Urine HCG         12/02/18  1956 Yellow Cloudy Negative Negative Negative 100 mg/dL (2+)                               Microbiology Results Abnormal      Procedure Component Value - Date/Time     Beta Strep Culture, Throat - Swab, Throat [[id number]]  (Normal) Collected:  12/02/18 1744     Lab Status:  Final result Specimen:  Swab from Throat Updated:  12/04/18 0718       Throat Culture, Beta Strep No Beta Hemolytic Streptococcus Isolated     Blood Culture - Blood, Arm, Left [[id number]] Collected:  12/02/18 1811     Lab Status:  Preliminary result Specimen:  Blood from Arm, Left Updated:  12/03/18 1845       Blood Culture No growth at 24 hours     Blood Culture - Blood, Arm, Right [[id number]] Collected:  12/02/18 1811     Lab Status:  Preliminary result Specimen:  Blood from Arm, Right Updated:  12/03/18 1845       Blood Culture No growth at 24 hours     Influenza A & B, RT PCR - Swab, Nasopharynx [[id number]]  (Normal) Collected:  12/02/18 1744     Lab Status:  Final result Specimen:  Swab from Nasopharynx Updated:  12/02/18 1926       Influenza A PCR Not Detected       Influenza B PCR Not Detected     Rapid Strep A Screen - Swab, Throat [[id number]]  (Normal) Collected:  12/02/18 1744     Lab Status:  Final result Specimen:  Swab from Throat Updated:  12/02/18 1800       Strep A Ag Negative     Narrative:        Test performed by [facility].

## 2018-12-08 ENCOUNTER — TELEPHONE (OUTPATIENT)
Dept: INTERNAL MEDICINE | Facility: CLINIC | Age: 53
End: 2018-12-08

## 2018-12-08 LAB
BUN SERPL-MCNC: 11 MG/DL (ref 9–23)
BUN/CREAT SERPL: 13.6 (ref 7–25)
CALCIUM SERPL-MCNC: 9.9 MG/DL (ref 8.7–10.4)
CHLORIDE SERPL-SCNC: 99 MMOL/L (ref 99–109)
CO2 SERPL-SCNC: 28 MMOL/L (ref 20–31)
CREAT SERPL-MCNC: 0.81 MG/DL (ref 0.6–1.3)
GLUCOSE SERPL-MCNC: 100 MG/DL (ref 70–100)
POTASSIUM SERPL-SCNC: 3.4 MMOL/L (ref 3.5–5.5)
SODIUM SERPL-SCNC: 137 MMOL/L (ref 132–146)

## 2018-12-08 RX ORDER — POTASSIUM CHLORIDE 750 MG/1
10 TABLET, FILM COATED, EXTENDED RELEASE ORAL DAILY
Qty: 30 TABLET | Refills: 2 | Status: SHIPPED | OUTPATIENT
Start: 2018-12-08 | End: 2019-04-25 | Stop reason: SDUPTHER

## 2018-12-11 ENCOUNTER — TELEPHONE (OUTPATIENT)
Dept: INTERNAL MEDICINE | Facility: CLINIC | Age: 53
End: 2018-12-11

## 2018-12-11 NOTE — TELEPHONE ENCOUNTER
Pt was notified about the Vitamin K, she wanted to know what she would be taking this for? Pt also states that when she is in a larger crowd she notices her heart rate being up and feels anxious but not sure if she is anxious first and then rapid heart rate, checked BP at [facility] when an episode happened HR was 142. Pt states that her mother notices at times around her mouth she is pale and white and looks like she is going to pas out, but pt states she feels fine. Please advise

## 2018-12-11 NOTE — TELEPHONE ENCOUNTER
----- Message from Denise LOZANO MD sent at 12/8/2018  7:13 PM EST -----  Please call the patient regarding her abnormal result. Supplemental K sent to pharmacy, stop the potassium if she stops the lisinopril/hctz-

## 2018-12-12 NOTE — TELEPHONE ENCOUNTER
K+ is symbol for potassium. Pt HCTZ causes body to loose potassium. If not taking HCTZ she should be able to get enough potassium from fruit and veg.  Pt may be having panic attack in crowds. May need to see counselor to discuss breathing techniuqes

## 2018-12-12 NOTE — TELEPHONE ENCOUNTER
Pt notified of info and will contact [email or web address] for help in finding a counselor since she has medicaid.

## 2018-12-17 ENCOUNTER — OFFICE VISIT (OUTPATIENT)
Dept: INTERNAL MEDICINE | Facility: CLINIC | Age: 53
End: 2018-12-17

## 2018-12-17 VITALS
HEIGHT: 65 IN | OXYGEN SATURATION: 97 % | WEIGHT: 208 LBS | HEART RATE: 105 BPM | TEMPERATURE: 96.8 F | DIASTOLIC BLOOD PRESSURE: 70 MMHG | SYSTOLIC BLOOD PRESSURE: 108 MMHG | BODY MASS INDEX: 34.66 KG/M2

## 2018-12-17 DIAGNOSIS — M79.10 MYALGIA: ICD-10-CM

## 2018-12-17 DIAGNOSIS — Z01.419 WELL WOMAN EXAM WITH ROUTINE GYNECOLOGICAL EXAM: ICD-10-CM

## 2018-12-17 DIAGNOSIS — N95.1 MENOPAUSAL SYMPTOM: ICD-10-CM

## 2018-12-17 DIAGNOSIS — A63.0 CONDYLOMA ACUMINATUM OF VULVA: ICD-10-CM

## 2018-12-17 DIAGNOSIS — E78.2 MIXED HYPERLIPIDEMIA: ICD-10-CM

## 2018-12-17 DIAGNOSIS — I10 ESSENTIAL HYPERTENSION: ICD-10-CM

## 2018-12-17 DIAGNOSIS — Z00.00 ANNUAL PHYSICAL EXAM: Primary | ICD-10-CM

## 2018-12-17 DIAGNOSIS — M25.50 ARTHRALGIA, UNSPECIFIED JOINT: ICD-10-CM

## 2018-12-17 DIAGNOSIS — R53.83 OTHER FATIGUE: ICD-10-CM

## 2018-12-17 DIAGNOSIS — M25.551 HIP PAIN, RIGHT: ICD-10-CM

## 2018-12-17 DIAGNOSIS — R31.21 ASYMPTOMATIC MICROSCOPIC HEMATURIA: ICD-10-CM

## 2018-12-17 DIAGNOSIS — Z12.11 SCREENING FOR COLON CANCER: ICD-10-CM

## 2018-12-17 LAB
BILIRUB BLD-MCNC: NEGATIVE MG/DL
CLARITY, POC: ABNORMAL
COLOR UR: ABNORMAL
DEVELOPER EXPIRATION DATE: ABNORMAL
DEVELOPER LOT NUMBER: ABNORMAL
EXPIRATION DATE: ABNORMAL
FECAL OCCULT BLOOD SCREEN, POC: NEGATIVE
GLUCOSE UR STRIP-MCNC: NEGATIVE MG/DL
KETONES UR QL: NEGATIVE
LEUKOCYTE EST, POC: NEGATIVE
Lab: ABNORMAL
NEGATIVE CONTROL: NEGATIVE
NITRITE UR-MCNC: NEGATIVE MG/ML
PH UR: 6 [PH] (ref 5–8)
POSITIVE CONTROL: POSITIVE
PROT UR STRIP-MCNC: NEGATIVE MG/DL
RBC # UR STRIP: ABNORMAL /UL
SP GR UR: 1.02 (ref 1–1.03)
UROBILINOGEN UR QL: NORMAL

## 2018-12-17 PROCEDURE — 81003 URINALYSIS AUTO W/O SCOPE: CPT | Performed by: FAMILY MEDICINE

## 2018-12-17 PROCEDURE — 99396 PREV VISIT EST AGE 40-64: CPT | Performed by: FAMILY MEDICINE

## 2018-12-17 PROCEDURE — 82270 OCCULT BLOOD FECES: CPT | Performed by: FAMILY MEDICINE

## 2018-12-17 RX ORDER — TIZANIDINE 4 MG/1
4 TABLET ORAL NIGHTLY PRN
Qty: 30 TABLET | Refills: 1 | Status: SHIPPED | OUTPATIENT
Start: 2018-12-17 | End: 2019-03-12 | Stop reason: SDUPTHER

## 2018-12-17 RX ORDER — AMLODIPINE BESYLATE 5 MG/1
5 TABLET ORAL DAILY
Qty: 30 TABLET | Refills: 3 | Status: SHIPPED | OUTPATIENT
Start: 2018-12-17 | End: 2019-04-25 | Stop reason: SDUPTHER

## 2018-12-17 RX ORDER — ESTRADIOL 2 MG/1
2 TABLET ORAL DAILY
Qty: 30 TABLET | Refills: 12 | Status: SHIPPED | OUTPATIENT
Start: 2018-12-17 | End: 2019-10-02 | Stop reason: SDUPTHER

## 2018-12-17 NOTE — PATIENT INSTRUCTIONS
For more information:    Quit Now [place]  1-800-QUIT-NOW  https://[place].quitlogix.org/en-[place]/  Steps to Quit Smoking  Smoking tobacco can be harmful to your health and can affect almost every organ in your body. Smoking puts you, and those around you, at risk for developing many serious chronic diseases. Quitting smoking is difficult, but it is one of the best things that you can do for your health. It is never too late to quit.  What are the benefits of quitting smoking?  When you quit smoking, you lower your risk of developing serious diseases and conditions, such as:  · Lung cancer or lung disease, such as COPD.  · Heart disease.  · Stroke.  · Heart attack.  · Infertility.  · Osteoporosis and bone fractures.  Additionally, symptoms such as coughing, wheezing, and shortness of breath may get better when you quit. You may also find that you get sick less often because your body is stronger at fighting off colds and infections. If you are pregnant, quitting smoking can help to reduce your chances of having a baby of low birth weight.  How do I get ready to quit?  When you decide to quit smoking, create a plan to make sure that you are successful. Before you quit:  · Pick a date to quit. Set a date within the next two weeks to give you time to prepare.  · Write down the reasons why you are quitting. Keep this list in places where you will see it often, such as on your bathroom mirror or in your car or wallet.  · Identify the people, places, things, and activities that make you want to smoke (triggers) and avoid them. Make sure to take these actions:  ¨ Throw away all cigarettes at home, at work, and in your car.  ¨ Throw away smoking accessories, such as ashtrays and lighters.  ¨ Clean your car and make sure to empty the ashtray.  ¨ Clean your home, including curtains and carpets.  · Tell your family, friends, and coworkers that you are quitting. Support from your loved ones can make quitting easier.  · Talk with  your health care provider about your options for quitting smoking.  · Find out what treatment options are covered by your health insurance.  What strategies can I use to quit smoking?  Talk with your healthcare provider about different strategies to quit smoking. Some strategies include:  · Quitting smoking altogether instead of gradually lessening how much you smoke over a period of time. Research shows that quitting “cold turkey” is more successful than gradually quitting.  · Attending in-person counseling to help you build problem-solving skills. You are more likely to have success in quitting if you attend several counseling sessions. Even short sessions of 10 minutes can be effective.  · Finding resources and support systems that can help you to quit smoking and remain smoke-free after you quit. These resources are most helpful when you use them often. They can include:  ¨ Online chats with a counselor.  ¨ Telephone quitlines.  ¨ Printed self-help materials.  ¨ Support groups or group counseling.  ¨ Text messaging programs.  ¨ Mobile phone applications.  · Taking medicines to help you quit smoking. (If you are pregnant or breastfeeding, talk with your health care provider first.) Some medicines contain nicotine and some do not. Both types of medicines help with cravings, but the medicines that include nicotine help to relieve withdrawal symptoms. Your health care provider may recommend:  ¨ Nicotine patches, gum, or lozenges.  ¨ Nicotine inhalers or sprays.  ¨ Non-nicotine medicine that is taken by mouth.  Talk with your health care provider about combining strategies, such as taking medicines while you are also receiving in-person counseling. Using these two strategies together makes you more likely to succeed in quitting than if you used either strategy on its own.  If you are pregnant or breastfeeding, talk with your health care provider about finding counseling or other support strategies to quit smoking. Do  not take medicine to help you quit smoking unless told to do so by your health care provider.  What things can I do to make it easier to quit?  Quitting smoking might feel overwhelming at first, but there is a lot that you can do to make it easier. Take these important actions:  · Reach out to your family and friends and ask that they support and encourage you during this time. Call telephone quitlines, reach out to support groups, or work with a counselor for support.  · Ask people who smoke to avoid smoking around you.  · Avoid places that trigger you to smoke, such as bars, parties, or smoke-break areas at work.  · Spend time around people who do not smoke.  · Lessen stress in your life, because stress can be a smoking trigger for some people. To lessen stress, try:  ¨ Exercising regularly.  ¨ Deep-breathing exercises.  ¨ Yoga.  ¨ Meditating.  ¨ Performing a body scan. This involves closing your eyes, scanning your body from head to toe, and noticing which parts of your body are particularly tense. Purposefully relax the muscles in those areas.  · Download or purchase mobile phone or tablet apps (applications) that can help you stick to your quit plan by providing reminders, tips, and encouragement. There are many free apps, such as QuitGuide from the CDC (Centers for Disease Control and Prevention). You can find other support for quitting smoking (smoking cessation) through smokefree.gov and other websites.  How will I feel when I quit smoking?  Within the first 24 hours of quitting smoking, you may start to feel some withdrawal symptoms. These symptoms are usually most noticeable 2-3 days after quitting, but they usually do not last beyond 2-3 weeks. Changes or symptoms that you might experience include:  · Mood swings.  · Restlessness, anxiety, or irritation.  · Difficulty concentrating.  · Dizziness.  · Strong cravings for sugary foods in addition to nicotine.  · Mild weight  gain.  · Constipation.  · Nausea.  · Coughing or a sore throat.  · Changes in how your medicines work in your body.  · A depressed mood.  · Difficulty sleeping (insomnia).  After the first 2-3 weeks of quitting, you may start to notice more positive results, such as:  · Improved sense of smell and taste.  · Decreased coughing and sore throat.  · Slower heart rate.  · Lower blood pressure.  · Clearer skin.  · The ability to breathe more easily.  · Fewer sick days.  Quitting smoking is very challenging for most people. Do not get discouraged if you are not successful the first time. Some people need to make many attempts to quit before they achieve long-term success. Do your best to stick to your quit plan, and talk with your health care provider if you have any questions or concerns.  This information is not intended to replace advice given to you by your health care provider. Make sure you discuss any questions you have with your health care provider.  Document Released: 12/12/2002 Document Revised: 08/15/2017 Document Reviewed: 05/03/2016  [employer] Interactive Patient Education © 2017 [employer]  Smoking Tobacco Information  Smoking tobacco will very likely harm your health. Tobacco contains a poisonous (toxic), colorless chemical called nicotine. Nicotine affects the brain and makes tobacco addictive. This change in your brain can make it hard to stop smoking. Tobacco also has other toxic chemicals that can hurt your body and raise your risk of many cancers.  How can smoking tobacco affect me?  Smoking tobacco can increase your chances of having serious health conditions, such as:  · Cancer. Smoking is most commonly associated with lung cancer, but can lead to cancer in other parts of the body.  · Chronic obstructive pulmonary disease (COPD). This is a long-term lung condition that makes it hard to breathe. It also gets worse over time.  · High blood pressure (hypertension), heart disease, stroke, or heart  attack.  · Lung infections, such as pneumonia.  · Cataracts. This is when the lenses in the eyes become clouded.  · Digestive problems. This may include peptic ulcers, heartburn, and gastroesophageal reflux disease (GERD).  · Oral health problems, such as gum disease and tooth loss.  · Loss of taste and smell.    Smoking can affect your appearance by causing:  · Wrinkles.  · Yellow or stained teeth, fingers, and fingernails.    Smoking tobacco can also affect your social life.  · Many workplaces, restaurants, hotels, and public places are tobacco-free. This means that you may experience challenges in finding places to smoke when away from home.  · The cost of a smoking habit can be expensive. Expenses for someone who smokes come in two ways:  ? You spend money on a regular basis to buy tobacco.  ? Your health care costs in the long-term are higher if you smoke.  · Tobacco smoke can also affect the health of those around you. Children of smokers have greater chances of:  ? Sudden infant death syndrome (SIDS).  ? Ear infections.  ? Lung infections.    What lifestyle changes can be made?  · Do not start smoking. Quit if you already do.  · To quit smoking:  ? Make a plan to quit smoking and commit yourself to it. Look for programs to help you and ask your health care provider for recommendations and ideas.  ? Talk with your health care provider about using nicotine replacement medicines to help you quit. Medicine replacement medicines include gum, lozenges, patches, sprays, or pills.  ? Do not replace cigarette smoking with electronic cigarettes, which are commonly called e-cigarettes. The safety of e-cigarettes is not known, and some may contain harmful chemicals.  ? Avoid places, people, or situations that tempt you to smoke.  ? If you try to quit but return to smoking, don't give up hope. It is very common for people to try a number of times before they fully succeed. When you feel ready again, give it another  try.  · Quitting smoking might affect the way you eat as well as your weight. Be prepared to monitor your eating habits. Get support in planning and following a healthy diet.  · Ask your health care provider about having regular tests (screenings) to check for cancer. This may include blood tests, imaging tests, and other tests.  · Exercise regularly. Consider taking walks, joining a gym, or doing yoga or exercise classes.  · Develop skills to manage your stress. These skills include meditation.  What are the benefits of quitting smoking?  By quitting smoking, you may:  · Lower your risk of getting cancer and other diseases caused by smoking.  · Live longer.  · Breathe better.  · Lower your blood pressure and heart rate.  · Stop your addiction to tobacco.  · Stop creating secondhand smoke that hurts other people.  · Improve your sense of taste and smell.  · Look better over time, due to having fewer wrinkles and less staining.    What can happen if changes are not made?  If you do not stop smoking, you may:  · Get cancer and other diseases.  · Develop COPD or other long-term (chronic) lung conditions.  · Develop serious problems with your heart and blood vessels (cardiovascular system).  · Need more tests to screen for problems caused by smoking.  · Have higher, long-term healthcare costs from medicines or treatments related to smoking.  · Continue to have worsening changes in your lungs, mouth, and nose.    Where to find support:  To get support to quit smoking, consider:  · Asking your health care provider for more information and resources.  · Taking classes to learn more about quitting smoking.  · Looking for local organizations that offer resources about quitting smoking.  · Joining a support group for people who want to quit smoking in your local community.    Where to find more information:  You may find more information about quitting smoking from:  · HelpGuide.org:  www.helpguide.org/articles/addictions/how-to-quit-smoking.htm  · Smokefree.gov: smokefree.gov  · American Lung Association: www.lung.org    Contact a health care provider if:  · You have problems breathing.  · Your lips, nose, or fingers turn blue.  · You have chest pain.  · You are coughing up blood.  · You feel faint or you pass out.  · You have other noticeable changes that cause you to worry.  Summary  · Smoking tobacco can negatively affect your health, the health of those around you, your finances, and your social life.  · Do not start smoking. Quit if you already do. If you need help quitting, ask your health care provider.  · Think about joining a support group for people who want to quit smoking in your local community. There are many effective programs that will help you to quit this behavior.  This information is not intended to replace advice given to you by your health care provider. Make sure you discuss any questions you have with your health care provider.  Document Released: 01/02/2018 Document Revised: 01/02/2018 Document Reviewed: 01/02/2018  [employer] Interactive Patient Education © 2018 [employer]

## 2018-12-17 NOTE — PROGRESS NOTES
"Subjective   Lizette Keith is a 53 y.o. female.     Chief Complaint   Patient presents with   • Annual Exam     has had hysterectomy       Visit Vitals  /70 (BP Location: Left arm, Patient Position: Sitting)   Pulse 105   Temp 96.8 °F (36 °C)   Ht 165.7 cm (65.25\")   Wt 94.3 kg (208 lb)   SpO2 97%   BMI 34.35 kg/m²         Gynecologic Exam   The patient's pertinent negatives include no genital itching, genital lesions, genital odor, genital rash, missed menses, pelvic pain, vaginal bleeding or vaginal discharge. The patient is experiencing no pain. Pertinent negatives include no abdominal pain, back pain, chills, constipation, diarrhea, dysuria, fever, flank pain, frequency, headaches, hematuria, nausea, rash, sore throat, urgency or vomiting.      Pt has area right medial thigh that feels like it locks up. Hip catches on right, with pain. Pt is unable to ABduct right hip.     Pt has been back to work a week and is staying tired.   The following portions of the patient's history were reviewed and updated as appropriate: allergies, current medications, past family history, past medical history, past social history, past surgical history and problem list.    Past Medical History:   Diagnosis Date   • Allergic    • Anemia     during pregnancy   • Anxiety    • Arthritis    • Blood in urine    • Chronic fatigue    • Colon polyp 02/2018    9 at last colonoscopy   • Depression    • Elevated cholesterol    • Endometriosis    • Fibromyalgia    • Fibromyalgia, primary    • Fracture    • GERD (gastroesophageal reflux disease)    • H/O mammogram 02/2018   • HL (hearing loss)    • Hypertension    • Irritable bowel syndrome    • Pap smear for cervical cancer screening 2018?      Past Surgical History:   Procedure Laterality Date   • CHOLECYSTECTOMY     • COLONOSCOPY  02/06/2018    3 year f/u polypectomy Dr MARTA Waller   • GALLBLADDER SURGERY     • HYSTERECTOMY     • INNER EAR SURGERY     • OOPHORECTOMY     • " TEMPOROMANDIBULAR JOINT ARTHROPLASTY  1984      Family History   Problem Relation Age of Onset   • Melanoma Mother    • Diabetes Mother    • Heart disease Mother    • Arthritis Mother    • Depression Mother    • Colon polyps Mother    • Thyroid disease Mother    • Hyperlipidemia Mother    • Cancer Mother    • Heart attack Mother    • Migraines Mother    • Cancer Father         pancreatic and liver   • Diabetes Father    • Depression Father    • Colon polyps Father    • ADD / ADHD Son    • Diabetes Maternal Aunt    • Diabetes Maternal Uncle    • Diabetes Paternal Aunt    • Diabetes Paternal Uncle    • Cancer Maternal Grandmother         uterine/cervical   • Heart disease Maternal Grandfather    • Heart disease Paternal Grandfather    • Migraines Sister    • Breast cancer Neg Hx    • Ovarian cancer Neg Hx       Social History     Socioeconomic History   • Marital status:      Spouse name: Not on file   • Number of children: Not on file   • Years of education: Not on file   • Highest education level: Not on file   Social Needs   • Financial resource strain: Not on file   • Food insecurity - worry: Not on file   • Food insecurity - inability: Not on file   • Transportation needs - medical: Not on file   • Transportation needs - non-medical: Not on file   Occupational History   • Not on file   Tobacco Use   • Smoking status: Current Every Day Smoker     Packs/day: 0.75     Years: 34.00     Pack years: 25.50     Types: Cigarettes   • Smokeless tobacco: Never Used   Substance and Sexual Activity   • Alcohol use: No   • Drug use: No   • Sexual activity: Not Currently     Birth control/protection: None   Other Topics Concern   • Not on file   Social History Narrative   • Not on file        Review of Systems   Constitutional: Positive for fatigue. Negative for activity change, appetite change, chills, diaphoresis, fever and unexpected weight change.   HENT: Negative.  Negative for congestion, dental problem, drooling,  ear discharge, ear pain, facial swelling, hearing loss, mouth sores, nosebleeds, postnasal drip, rhinorrhea, sinus pressure, sinus pain, sneezing, sore throat, tinnitus, trouble swallowing and voice change.    Eyes: Negative.  Negative for photophobia, pain, discharge, redness, itching and visual disturbance.   Respiratory: Negative.  Negative for apnea, cough, choking, chest tightness, shortness of breath, wheezing and stridor.    Cardiovascular: Negative.  Negative for chest pain, palpitations and leg swelling.   Gastrointestinal: Negative.  Negative for abdominal distention, abdominal pain, anal bleeding, blood in stool, constipation, diarrhea, nausea, rectal pain and vomiting.   Endocrine: Negative.  Negative for cold intolerance, heat intolerance, polydipsia, polyphagia and polyuria.   Genitourinary: Negative.  Negative for decreased urine volume, difficulty urinating, dyspareunia, dysuria, enuresis, flank pain, frequency, genital sores, hematuria, menstrual problem, missed menses, pelvic pain, urgency, vaginal bleeding, vaginal discharge and vaginal pain.        Bladder leakage   Musculoskeletal: Negative.  Negative for arthralgias, back pain, gait problem, joint swelling, myalgias, neck pain and neck stiffness.   Skin: Negative.  Negative for color change, pallor, rash and wound.   Allergic/Immunologic: Negative.  Negative for environmental allergies, food allergies and immunocompromised state.   Neurological: Negative.  Negative for dizziness, tremors, seizures, syncope, facial asymmetry, speech difficulty, weakness, light-headedness, numbness and headaches.   Hematological: Negative.  Negative for adenopathy. Does not bruise/bleed easily.   Psychiatric/Behavioral: Negative.  Negative for agitation, behavioral problems, confusion, decreased concentration, dysphoric mood, hallucinations, self-injury, sleep disturbance and suicidal ideas. The patient is not nervous/anxious and is not hyperactive.         Objective   Physical Exam   Constitutional: She is oriented to person, place, and time. She appears well-developed and well-nourished. No distress.   HENT:   Head: Normocephalic and atraumatic.   Right Ear: Tympanic membrane, external ear and ear canal normal.   Left Ear: Tympanic membrane, external ear and ear canal normal.   Nose: Nose normal.   Mouth/Throat: Uvula is midline, oropharynx is clear and moist and mucous membranes are normal. Mucous membranes are not pale, not dry and not cyanotic. No oropharyngeal exudate, posterior oropharyngeal edema or posterior oropharyngeal erythema.   Eyes: Conjunctivae, EOM and lids are normal. Pupils are equal, round, and reactive to light. Right eye exhibits no chemosis, no discharge, no exudate and no hordeolum. No foreign body present in the right eye. Left eye exhibits no chemosis, no discharge, no exudate and no hordeolum. No foreign body present in the left eye. Right conjunctiva is not injected. Right conjunctiva has no hemorrhage. Left conjunctiva is not injected. Left conjunctiva has no hemorrhage. No scleral icterus. Right eye exhibits normal extraocular motion and no nystagmus. Left eye exhibits normal extraocular motion and no nystagmus.   Fundoscopic exam:       The right eye shows red reflex.        The left eye shows red reflex.   Neck: Trachea normal and normal range of motion. Neck supple. Carotid bruit is not present. No tracheal deviation present. No thyroid mass and no thyromegaly present.   Cardiovascular: Normal rate, regular rhythm, normal heart sounds and intact distal pulses. Exam reveals no gallop and no friction rub.   No murmur heard.  Pulses:       Dorsalis pedis pulses are 2+ on the right side, and 2+ on the left side.        Posterior tibial pulses are 2+ on the right side, and 2+ on the left side.   Pulmonary/Chest: Effort normal and breath sounds normal. No respiratory distress. She has no decreased breath sounds. She has no wheezes. She  has no rhonchi. She has no rales. Chest wall is not dull to percussion. She exhibits no tenderness. Right breast exhibits no inverted nipple, no mass, no nipple discharge, no skin change and no tenderness. Left breast exhibits no inverted nipple, no mass, no nipple discharge, no skin change and no tenderness.   Abdominal: Soft. Bowel sounds are normal. She exhibits no distension and no mass. There is no hepatosplenomegaly. There is no tenderness. There is no rebound and no guarding. Hernia confirmed negative in the right inguinal area and confirmed negative in the left inguinal area.   Genitourinary: Rectum normal and vagina normal. Rectal exam shows no external hemorrhoid, no internal hemorrhoid, no fissure, no mass, no tenderness, anal tone normal and guaiac negative stool.       There is lesion (condyloma) on the right labia. There is no rash, tenderness or injury on the right labia. There is lesion (condyloma) on the left labia. There is no rash, tenderness or injury on the left labia. No erythema or bleeding in the vagina. No vaginal discharge found.   Genitourinary Comments: Uterus surgically absent.   Musculoskeletal: Normal range of motion. She exhibits no edema, tenderness or deformity.   Lymphadenopathy:        Head (right side): No submandibular adenopathy present.        Head (left side): No submandibular adenopathy present.     She has no cervical adenopathy.        Right cervical: No superficial cervical, no deep cervical and no posterior cervical adenopathy present.       Left cervical: No superficial cervical, no deep cervical and no posterior cervical adenopathy present.     She has no axillary adenopathy.        Right axillary: No pectoral and no lateral adenopathy present.        Left axillary: No pectoral and no lateral adenopathy present.No inguinal adenopathy noted on the right or left side.        Right: No inguinal and no supraclavicular adenopathy present.        Left: No inguinal and no  supraclavicular adenopathy present.   Neurological: She is alert and oriented to person, place, and time. She has normal strength and normal reflexes. No cranial nerve deficit or sensory deficit. Coordination normal.   Reflex Scores:       Bicep reflexes are 2+ on the right side and 2+ on the left side.       Brachioradialis reflexes are 2+ on the right side and 2+ on the left side.       Patellar reflexes are 2+ on the right side and 2+ on the left side.  Skin: Skin is warm and dry. No rash noted. She is not diaphoretic. No cyanosis. Nails show no clubbing.   Psychiatric: She has a normal mood and affect. Her speech is normal and behavior is normal. Judgment and thought content normal. Cognition and memory are normal.   Nursing note and vitals reviewed.      Assessment/Plan   Lizette was seen today for annual exam.    Diagnoses and all orders for this visit:    Annual physical exam  -     POCT urinalysis dipstick, automated    Screening for colon cancer  -     POCT occult blood x 1 stool    Hip pain, right  -     XR Hip With or Without Pelvis 2 - 3 View Right; Future    Arthralgia, unspecified joint  -     tiZANidine (ZANAFLEX) 4 MG tablet; Take 1 tablet by mouth At Night As Needed for Muscle Spasms.    Myalgia  -     tiZANidine (ZANAFLEX) 4 MG tablet; Take 1 tablet by mouth At Night As Needed for Muscle Spasms.    Asymptomatic microscopic hematuria  -     Urine Culture - Urine, Urine, Clean Catch    Essential hypertension  -     amLODIPine (NORVASC) 5 MG tablet; Take 1 tablet by mouth Daily.  -     Lipid Panel  -     Comprehensive Metabolic Panel    Mixed hyperlipidemia  -     Lipid Panel  -     Comprehensive Metabolic Panel    Other fatigue  -     TSH  -     T4, Free  -     Vitamin B12  -     Vitamin B6  -     Vitamin D 25 Hydroxy  -     CBC & Differential    Well woman exam with routine gynecological exam  -     Liquid-based Pap Smear, Screening; Future    Menopausal symptom  -     estradiol (ESTRACE) 2 MG  tablet; Take 1 tablet by mouth Daily.    Condyloma acuminatum of vulva  -     Ambulatory Referral to Gynecology      Discussed smoking cessation  Please follow a low animal fat diet that is also low in sugar, low in junk food, low in sweet drinks and low in alcohol.  Please increase the amount of fiber in your diet as well as increasing your daily exercise, such as walking.    Pt states that she had her flu shot.           Current Outpatient Medications:   •  amLODIPine (NORVASC) 5 MG tablet, Take 1 tablet by mouth Daily., Disp: 30 tablet, Rfl: 3  •  aspirin 325 MG tablet, Take 325 mg by mouth Daily., Disp: , Rfl:   •  cetirizine (ZyrTEC) 10 MG tablet, Take 10 mg by mouth daily., Disp: , Rfl:   •  dicyclomine (BENTYL) 20 MG tablet, Take 1 tablet by mouth 3 (Three) Times a Day As Needed (IBS- diarrhea)., Disp: 90 tablet, Rfl: 0  •  DULoxetine (CYMBALTA) 30 MG capsule, Take 1 capsule by mouth Daily., Disp: 90 capsule, Rfl: 1  •  estradiol (ESTRACE) 2 MG tablet, Take 1 tablet by mouth Daily., Disp: 30 tablet, Rfl: 12  •  lisinopril-hydrochlorothiazide (PRINZIDE,ZESTORETIC) 20-25 MG per tablet, Take 1 tablet by mouth Daily., Disp: , Rfl:   •  naproxen (NAPROSYN) 500 MG tablet, Take 500 mg by mouth 2 (Two) Times a Day With Meals., Disp: , Rfl:   •  potassium chloride (KLOR-CON) 10 MEQ CR tablet, Take 1 tablet by mouth Daily. For potassium, Disp: 30 tablet, Rfl: 2  •  saccharomyces boulardii (FLORASTOR) 250 MG capsule, Take 1 capsule by mouth 2 (Two) Times a Day for 14 days., Disp: 28 capsule, Rfl: 0  •  tiZANidine (ZANAFLEX) 4 MG tablet, Take 1 tablet by mouth At Night As Needed for Muscle Spasms., Disp: 30 tablet, Rfl: 1    Return in about 3 months (around 3/17/2019), or if symptoms worsen or fail to improve, for Recheck.      Recent Results (from the past 168 hour(s))   POCT urinalysis dipstick, automated    Collection Time: 12/17/18  1:42 PM   Result Value Ref Range    Color Dark Yellow Yellow, Straw, Dark Yellow,  Elizabeth    Clarity, UA Cloudy (A) Clear    Specific Gravity  1.025 1.005 - 1.030    pH, Urine 6.0 5.0 - 8.0    Leukocytes Negative Negative    Nitrite, UA Negative Negative    Protein, POC Negative Negative mg/dL    Glucose, UA Negative Negative, 1000 mg/dL (3+) mg/dL    Ketones, UA Negative Negative    Urobilinogen, UA Normal Normal    Bilirubin Negative Negative    Blood, UA 1+ (A) Negative   POCT occult blood x 1 stool    Collection Time: 12/17/18  3:29 PM   Result Value Ref Range    Fecal Occult Blood Negative (A) Negative    Lot Number [id number]     Expiration Date 6/30/20     DEVELOPER LOT NUMBER [id number]     DEVELOPER EXPIRATION DATE 1/31/21     Positive Control Positive Positive    Negative Control Negative Negative        Current outpatient and discharge medications have been reconciled for the patient.  Reviewed by: Denise LOZANO MD

## 2018-12-19 LAB
25(OH)D3+25(OH)D2 SERPL-MCNC: 20.3 NG/ML (ref 30–100)
ALBUMIN SERPL-MCNC: 4.1 G/DL (ref 3.5–5.5)
ALBUMIN/GLOB SERPL: 1.5 {RATIO} (ref 1.2–2.2)
ALP SERPL-CCNC: 120 IU/L (ref 39–117)
ALT SERPL-CCNC: 16 IU/L (ref 0–32)
AST SERPL-CCNC: 14 IU/L (ref 0–40)
BACTERIA UR CULT: NORMAL
BACTERIA UR CULT: NORMAL
BASOPHILS # BLD AUTO: 0.1 X10E3/UL (ref 0–0.2)
BASOPHILS NFR BLD AUTO: 1 %
BILIRUB SERPL-MCNC: 0.2 MG/DL (ref 0–1.2)
BUN SERPL-MCNC: 11 MG/DL (ref 6–24)
BUN/CREAT SERPL: 18 (ref 9–23)
CALCIUM SERPL-MCNC: 9.4 MG/DL (ref 8.7–10.2)
CHLORIDE SERPL-SCNC: 102 MMOL/L (ref 96–106)
CHOLEST SERPL-MCNC: 263 MG/DL (ref 100–199)
CO2 SERPL-SCNC: 21 MMOL/L (ref 20–29)
CREAT SERPL-MCNC: 0.62 MG/DL (ref 0.57–1)
EOSINOPHIL # BLD AUTO: 0.1 X10E3/UL (ref 0–0.4)
EOSINOPHIL NFR BLD AUTO: 1 %
ERYTHROCYTE [DISTWIDTH] IN BLOOD BY AUTOMATED COUNT: 14.2 % (ref 12.3–15.4)
GLOBULIN SER CALC-MCNC: 2.8 G/DL (ref 1.5–4.5)
GLUCOSE SERPL-MCNC: 132 MG/DL (ref 65–99)
HCT VFR BLD AUTO: 41.6 % (ref 34–46.6)
HDLC SERPL-MCNC: 42 MG/DL
HGB BLD-MCNC: 14.2 G/DL (ref 11.1–15.9)
IMM GRANULOCYTES # BLD: 0 X10E3/UL (ref 0–0.1)
IMM GRANULOCYTES NFR BLD: 0 %
LDLC SERPL CALC-MCNC: 160 MG/DL (ref 0–99)
LYMPHOCYTES # BLD AUTO: 3.1 X10E3/UL (ref 0.7–3.1)
LYMPHOCYTES NFR BLD AUTO: 30 %
Lab: NORMAL
MCH RBC QN AUTO: 30.7 PG (ref 26.6–33)
MCHC RBC AUTO-ENTMCNC: 34.1 G/DL (ref 31.5–35.7)
MCV RBC AUTO: 90 FL (ref 79–97)
MONOCYTES # BLD AUTO: 0.6 X10E3/UL (ref 0.1–0.9)
MONOCYTES NFR BLD AUTO: 6 %
NEUTROPHILS # BLD AUTO: 6.3 X10E3/UL (ref 1.4–7)
NEUTROPHILS NFR BLD AUTO: 62 %
PLATELET # BLD AUTO: 338 X10E3/UL (ref 150–379)
POTASSIUM SERPL-SCNC: 3.6 MMOL/L (ref 3.5–5.2)
PROT SERPL-MCNC: 6.9 G/DL (ref 6–8.5)
RBC # BLD AUTO: 4.63 X10E6/UL (ref 3.77–5.28)
SODIUM SERPL-SCNC: 143 MMOL/L (ref 134–144)
T4 FREE SERPL-MCNC: 1.01 NG/DL (ref 0.82–1.77)
TRIGL SERPL-MCNC: 305 MG/DL (ref 0–149)
TSH SERPL DL<=0.005 MIU/L-ACNC: 1.42 UIU/ML (ref 0.45–4.5)
VIT B12 SERPL-MCNC: 587 PG/ML (ref 232–1245)
VLDLC SERPL CALC-MCNC: 61 MG/DL (ref 5–40)
WBC # BLD AUTO: 10.2 X10E3/UL (ref 3.4–10.8)

## 2018-12-26 LAB — VIT B6 SERPL-MCNC: 2.9 UG/L (ref 2–32.8)

## 2019-03-12 ENCOUNTER — OFFICE VISIT (OUTPATIENT)
Dept: INTERNAL MEDICINE | Facility: CLINIC | Age: 54
End: 2019-03-12

## 2019-03-12 VITALS
DIASTOLIC BLOOD PRESSURE: 90 MMHG | HEART RATE: 107 BPM | TEMPERATURE: 97.5 F | SYSTOLIC BLOOD PRESSURE: 152 MMHG | OXYGEN SATURATION: 98 % | WEIGHT: 211 LBS | BODY MASS INDEX: 35.16 KG/M2 | HEIGHT: 65 IN

## 2019-03-12 DIAGNOSIS — M25.50 ARTHRALGIA, UNSPECIFIED JOINT: ICD-10-CM

## 2019-03-12 DIAGNOSIS — M54.40 CHRONIC LOW BACK PAIN WITH SCIATICA, SCIATICA LATERALITY UNSPECIFIED, UNSPECIFIED BACK PAIN LATERALITY: ICD-10-CM

## 2019-03-12 DIAGNOSIS — G89.29 CHRONIC LOW BACK PAIN WITH SCIATICA, SCIATICA LATERALITY UNSPECIFIED, UNSPECIFIED BACK PAIN LATERALITY: ICD-10-CM

## 2019-03-12 DIAGNOSIS — M54.42 ACUTE MIDLINE LOW BACK PAIN WITH BILATERAL SCIATICA: Primary | ICD-10-CM

## 2019-03-12 DIAGNOSIS — M79.10 MYALGIA: ICD-10-CM

## 2019-03-12 DIAGNOSIS — S39.012A LUMBAR STRAIN, INITIAL ENCOUNTER: ICD-10-CM

## 2019-03-12 DIAGNOSIS — M54.41 ACUTE MIDLINE LOW BACK PAIN WITH BILATERAL SCIATICA: Primary | ICD-10-CM

## 2019-03-12 PROCEDURE — 99213 OFFICE O/P EST LOW 20 MIN: CPT | Performed by: FAMILY MEDICINE

## 2019-03-12 PROCEDURE — 96372 THER/PROPH/DIAG INJ SC/IM: CPT | Performed by: FAMILY MEDICINE

## 2019-03-12 RX ORDER — TIZANIDINE 4 MG/1
4 TABLET ORAL EVERY 8 HOURS PRN
Qty: 30 TABLET | Refills: 2 | Status: SHIPPED | OUTPATIENT
Start: 2019-03-12 | End: 2019-04-25 | Stop reason: SDUPTHER

## 2019-03-12 RX ORDER — METHYLPREDNISOLONE 4 MG/1
TABLET ORAL
Qty: 21 EACH | Refills: 0 | Status: SHIPPED | OUTPATIENT
Start: 2019-03-12 | End: 2019-04-25

## 2019-03-12 RX ORDER — KETOROLAC TROMETHAMINE 30 MG/ML
60 INJECTION, SOLUTION INTRAMUSCULAR; INTRAVENOUS ONCE
Status: COMPLETED | OUTPATIENT
Start: 2019-03-12 | End: 2019-03-12

## 2019-03-12 RX ADMIN — KETOROLAC TROMETHAMINE 60 MG: 30 INJECTION, SOLUTION INTRAMUSCULAR; INTRAVENOUS at 12:48

## 2019-03-12 NOTE — PATIENT INSTRUCTIONS
Core Strength Exercises  Core exercises help to build strength in the muscles between your ribs and your hips (abdominal muscles). These muscles help to support your body and keep your spine stable. It is important to maintain strength in your core to prevent injury and pain.  Some activities, such as yoga and Pilates, can help to strengthen core muscles. You can also strengthen core muscles with exercises at home. It is important to talk to your health care provider before you start a new exercise routine.  What are the benefits of core strength exercises?  Core strength exercises can:  · Reduce back pain.  · Help to rebuild strength after a back or spine injury.  · Help to prevent injury during physical activity, especially injuries to the back and knees.    How to do core strength exercises  Repeat these exercises 10-15 times, or until you are tired. Do exercises exactly as told by your health care provider and adjust them as directed. It is normal to feel mild stretching, pulling, tightness, or discomfort as you do these exercises. If you feel any pain while doing these exercises, stop. If your pain continues or gets worse when doing core exercises, contact your health care provider.  You may want to use a padded yoga or exercise mat for strength exercises that are done on the floor.  Bridging  1. Lie on your back on a firm surface with your knees bent and your feet flat on the floor.  2. Raise your hips so that your knees, hips, and shoulders form a straight line together. Keep your abdominal muscles tight.  3. Hold this position for 3-5 seconds.  4. Slowly lower your hips to the starting position.  5. Let your muscles relax completely between repetitions.  Single-leg bridge  1. Lie on your back on a firm surface with your knees bent and your feet flat on the floor.  2. Raise your hips so that your knees, hips, and shoulders form a straight line together. Keep your abdominal muscles tight.  3. Lift one foot off  the floor, then completely straighten that leg.  4. Hold this position for 3-5 seconds.  5. Put the straight leg back down in the bent position.  6. Slowly lower your hips to the starting position.  7. Repeat these steps using your other leg.  Side bridge  1. Lie on your side with your knees bent. Prop yourself up on the elbow that is near the floor.  2. Using your abdominal muscles and your elbow that is on the floor, raise your body off the floor. Raise your hip so that your shoulder, hip, and foot form a straight line together.  3. Hold this position for 10 seconds. Keep your head and neck raised and away from your shoulder (in their normal, neutral position). Keep your abdominal muscles tight.  4. Slowly lower your hip to the starting position.  5. Repeat and try to hold this position longer, working your way up to 30 seconds.  Abdominal crunch  1. Lie on your back on a firm surface. Bend your knees and keep your feet flat on the floor.  2. Cross your arms over your chest.  3. Without bending your neck, tip your chin slightly toward your chest.  4. Tighten your abdominal muscles as you lift your chest just high enough to lift your shoulder blades off of the floor. Do not hold your breath. You can do this with short lifts or long lifts.  5. Slowly return to the starting position.  Bird dog  1. Get on your hands and knees, with your legs shoulder-width apart and your arms under your shoulders. Keep your back straight.  2. Tighten your abdominal muscles.  3. Raise one of your legs off the floor and straighten it. Try to keep it parallel to the floor.  4. Slowly lower your leg to the starting position.  5. Raise one of your arms off the floor and straighten it. Try to keep it parallel to the floor.  6. Slowly lower your arm to the starting position.  7. Repeat with the other arm and leg. If possible, try raising a leg and arm at the same time, on opposite sides of the body. For example, raise your left hand and your  right leg.  Plank  1. Lie on your belly.  2. Prop up your body onto your forearms and your feet, keeping your legs straight. Your body should make a straight line between your shoulders and feet.  3. Hold this position for 10 seconds while keeping your abdominal muscles tight.  4. Lower your body to the starting position.  5. Repeat and try to hold this position longer, working your way up to 30 seconds.  Cross-core strengthening  1. Stand with your feet shoulder-width apart.  2. Hold a ball out in front of you. Keep your arms straight.  3. Tighten your abdominal muscles and slowly rotate at your waist from side to side. Keep your feet flat.  4. Once you are comfortable, try repeating this exercise with a heavier ball.  Top core strengthening  1. Stand about 18 inches (46 cm) in front of a wall, with your back to the wall.  2. Keep your feet flat and shoulder-width apart.  3. Tighten your abdominal muscles.  4. Bend your hips and knees.  5. Slowly reach between your legs to touch the wall behind you.  6. Slowly stand back up.  7. Raise your arms over your head and reach behind you.  8. Return to the starting position.  General tips  · Do not do any exercises that cause pain. If you have pain while exercising, talk to your health care provider.  · Always stretch before and after doing these exercises. This can help prevent injury.  · Maintain a healthy weight. Ask your health care provider what weight is healthy for you.  Contact a health care provider if:  · You have back pain that gets worse or does not go away.  · You feel pain while doing core strength exercises.  Get help right away if:  · You have severe pain that does not get better with medicine.  Summary  · Core exercises help to build strength in the muscles between your ribs and your waist.  · Core muscles help to support your body and keep your spine stable.  · Some activities, such as yoga and Pilates, can help to strengthen core muscles.  · Core  strength exercises can help back pain and can prevent injury.  · If you feel any pain while doing core strength exercises, stop.  This information is not intended to replace advice given to you by your health care provider. Make sure you discuss any questions you have with your health care provider.  Document Released: 2018 Document Revised: 2018 Document Reviewed: 2018  [employer] Patient Education © 2019 [employer]  Back Exercises  The following exercises strengthen the muscles that help to support the back. They also help to keep the lower back flexible. Doing these exercises can help to prevent back pain or lessen existing pain.  If you have back pain or discomfort, try doing these exercises 2-3 times each day or as told by your health care provider. When the pain goes away, do them once each day, but increase the number of times that you repeat the steps for each exercise (do more repetitions). If you do not have back pain or discomfort, do these exercises once each day or as told by your health care provider.  Exercises  Single Knee to Chest  Repeat these steps 3-5 times for each le. Lie on your back on a firm bed or the floor with your legs extended.  2. Bring one knee to your chest. Your other leg should stay extended and in contact with the floor.  3. Hold your knee in place by grabbing your knee or thigh.  4. Pull on your knee until you feel a gentle stretch in your lower back.  5. Hold the stretch for 10-30 seconds.  6. Slowly release and straighten your leg.    Pelvic Tilt  Repeat these steps 5-10 times:  1. Lie on your back on a firm bed or the floor with your legs extended.  2. Bend your knees so they are pointing toward the ceiling and your feet are flat on the floor.  3. Tighten your lower abdominal muscles to press your lower back against the floor. This motion will tilt your pelvis so your tailbone points up toward the ceiling instead of pointing to your feet or  the floor.  4. With gentle tension and even breathing, hold this position for 5-10 seconds.    Cat-Cow    Repeat these steps until your lower back becomes more flexible:  1. Get into a hands-and-knees position on a firm surface. Keep your hands under your shoulders, and keep your knees under your hips. You may place padding under your knees for comfort.  2. Let your head hang down, and point your tailbone toward the floor so your lower back becomes rounded like the back of a cat.  3. Hold this position for 5 seconds.  4. Slowly lift your head and point your tailbone up toward the ceiling so your back forms a sagging arch like the back of a cow.  5. Hold this position for 5 seconds.    Press-Ups    Repeat these steps 5-10 times:  1. Lie on your abdomen (face-down) on the floor.  2. Place your palms near your head, about shoulder-width apart.  3. While you keep your back as relaxed as possible and keep your hips on the floor, slowly straighten your arms to raise the top half of your body and lift your shoulders. Do not use your back muscles to raise your upper torso. You may adjust the placement of your hands to make yourself more comfortable.  4. Hold this position for 5 seconds while you keep your back relaxed.  5. Slowly return to lying flat on the floor.    Bridges    Repeat these steps 10 times:  1. Lie on your back on a firm surface.  2. Bend your knees so they are pointing toward the ceiling and your feet are flat on the floor.  3. Tighten your buttocks muscles and lift your buttocks off of the floor until your waist is at almost the same height as your knees. You should feel the muscles working in your buttocks and the back of your thighs. If you do not feel these muscles, slide your feet 1-2 inches farther away from your buttocks.  4. Hold this position for 3-5 seconds.  5. Slowly lower your hips to the starting position, and allow your buttocks muscles to relax completely.    If this exercise is too easy,  try doing it with your arms crossed over your chest.  Abdominal Crunches  Repeat these steps 5-10 times:  1. Lie on your back on a firm bed or the floor with your legs extended.  2. Bend your knees so they are pointing toward the ceiling and your feet are flat on the floor.  3. Cross your arms over your chest.  4. Tip your chin slightly toward your chest without bending your neck.  5. Tighten your abdominal muscles and slowly raise your trunk (torso) high enough to lift your shoulder blades a tiny bit off of the floor. Avoid raising your torso higher than that, because it can put too much stress on your low back and it does not help to strengthen your abdominal muscles.  6. Slowly return to your starting position.    Back Lifts  Repeat these steps 5-10 times:  1. Lie on your abdomen (face-down) with your arms at your sides, and rest your forehead on the floor.  2. Tighten the muscles in your legs and your buttocks.  3. Slowly lift your chest off of the floor while you keep your hips pressed to the floor. Keep the back of your head in line with the curve in your back. Your eyes should be looking at the floor.  4. Hold this position for 3-5 seconds.  5. Slowly return to your starting position.    Contact a health care provider if:  · Your back pain or discomfort gets much worse when you do an exercise.  · Your back pain or discomfort does not lessen within 2 hours after you exercise.  If you have any of these problems, stop doing these exercises right away. Do not do them again unless your health care provider says that you can.  Get help right away if:  · You develop sudden, severe back pain. If this happens, stop doing the exercises right away. Do not do them again unless your health care provider says that you can.  This information is not intended to replace advice given to you by your health care provider. Make sure you discuss any questions you have with your health care provider.  Document Released: 01/25/2006  Document Revised: 07/31/2018 Document Reviewed: 02/11/2016  [employer] Patient Education © 2019 [employer]

## 2019-03-12 NOTE — PROGRESS NOTES
"Subjective   Lizette Keith is a 53 y.o. female.     Chief Complaint   Patient presents with   • Back Pain     Pt states this is different than the pain at the  on 2/28/19. She states she had bent over to pick her dog's water bowl and then excrutiating pain. She has taken tylenol and robaxan  750mg bid with no relief.       Visit Vitals  /90   Pulse 107   Temp 97.5 °F (36.4 °C)   Ht 165 cm (64.96\")   Wt 95.7 kg (211 lb)   SpO2 98%   BMI 35.16 kg/m²         Back Pain   This is a new problem. The current episode started 1 to 4 weeks ago. The problem occurs constantly. The problem has been rapidly worsening since onset. The pain is present in the lumbar spine. The pain radiates to the right thigh, left thigh, right foot, left foot, left knee and right knee (back intermittently radiates to feet with burning in feet). The pain is at a severity of 9/10. The pain is severe. The pain is the same all the time. The symptoms are aggravated by position (pt better on back with legs bent, worse when she is straight. ). Associated symptoms include bladder incontinence (chronic leakage), leg pain, paresthesias, tingling (feet) and weakness. Pertinent negatives include no abdominal pain, bowel incontinence, chest pain, dysuria, fever, headaches, numbness, paresis, pelvic pain, perianal numbness or weight loss. Risk factors include menopause and obesity. She has tried analgesics, muscle relaxant and bed rest for the symptoms. The treatment provided no relief.      Pt has been having back and right groin pain for 3 weeks.  Pt was in urgent care last Wednesday because she was unable to walk from the pain.  Last night pt bent over to  dog bowl and had severe pain/. Pt has taken 4-750 mg robaxin and 4 tylenol arthritis without improvement.    BP is up because of pain.  Pt has to pull herself up on door and walk bent over.   Right hip tender for 3 weeks.     Steroid kecia that she was given for back pain did help.     Pt " has referral to PT  The following portions of the patient's history were reviewed and updated as appropriate: allergies, current medications, past family history, past medical history, past social history, past surgical history and problem list.    Past Medical History:   Diagnosis Date   • Allergic    • Anemia     during pregnancy   • Anxiety    • Arthritis    • Blood in urine    • Chronic fatigue    • Colon polyp 02/2018    9 at last colonoscopy   • Depression    • Elevated cholesterol    • Endometriosis    • Fibromyalgia    • Fibromyalgia, primary    • Fracture    • GERD (gastroesophageal reflux disease)    • H/O mammogram 02/2018   • HL (hearing loss)    • Hypertension    • Irritable bowel syndrome    • Pap smear for cervical cancer screening 2018?      Past Surgical History:   Procedure Laterality Date   • CHOLECYSTECTOMY     • COLONOSCOPY  02/06/2018    3 year f/u polypectomy Dr MARTA Waller   • GALLBLADDER SURGERY     • HYSTERECTOMY     • INNER EAR SURGERY     • OOPHORECTOMY     • TEMPOROMANDIBULAR JOINT ARTHROPLASTY  1984      Family History   Problem Relation Age of Onset   • Melanoma Mother    • Diabetes Mother    • Heart disease Mother    • Arthritis Mother    • Depression Mother    • Colon polyps Mother    • Thyroid disease Mother    • Hyperlipidemia Mother    • Cancer Mother    • Heart attack Mother    • Migraines Mother    • Cancer Father         pancreatic and liver   • Diabetes Father    • Depression Father    • Colon polyps Father    • ADD / ADHD Son    • Diabetes Maternal Aunt    • Diabetes Maternal Uncle    • Diabetes Paternal Aunt    • Diabetes Paternal Uncle    • Cancer Maternal Grandmother         uterine/cervical   • Heart disease Maternal Grandfather    • Heart disease Paternal Grandfather    • Migraines Sister    • Breast cancer Neg Hx    • Ovarian cancer Neg Hx       Social History     Socioeconomic History   • Marital status:      Spouse name: Not on file   • Number of children: Not on  file   • Years of education: Not on file   • Highest education level: Not on file   Social Needs   • Financial resource strain: Not on file   • Food insecurity - worry: Not on file   • Food insecurity - inability: Not on file   • Transportation needs - medical: Not on file   • Transportation needs - non-medical: Not on file   Occupational History   • Not on file   Tobacco Use   • Smoking status: Current Every Day Smoker     Packs/day: 0.75     Years: 34.00     Pack years: 25.50     Types: Cigarettes   • Smokeless tobacco: Never Used   Substance and Sexual Activity   • Alcohol use: No   • Drug use: No   • Sexual activity: Not Currently     Birth control/protection: None   Other Topics Concern   • Not on file   Social History Narrative   • Not on file      Allergies   Allergen Reactions   • Erythromycin GI Intolerance     Tolerates zpak   • Penicillins Hives   • Statins Myalgia       Review of Systems   Constitutional: Positive for chills. Negative for diaphoresis, fatigue, fever and weight loss.   HENT: Positive for sinus pressure. Negative for ear pain, nosebleeds, postnasal drip, rhinorrhea, sneezing and sore throat.    Eyes: Negative.  Negative for redness and itching.   Respiratory: Negative.  Negative for cough, shortness of breath and wheezing.    Cardiovascular: Negative.  Negative for chest pain and palpitations.   Gastrointestinal: Negative.  Negative for abdominal pain, bowel incontinence, constipation, diarrhea, nausea and vomiting.   Endocrine: Negative.  Negative for cold intolerance and heat intolerance.   Genitourinary: Positive for bladder incontinence (chronic leakage). Negative for dysuria, frequency, hematuria, pelvic pain and urgency.   Musculoskeletal: Positive for arthralgias (right hip), back pain and gait problem. Negative for neck pain.   Skin: Negative.  Negative for color change and rash.   Allergic/Immunologic: Positive for environmental allergies.   Neurological: Positive for tingling  (feet), weakness and paresthesias. Negative for dizziness, syncope, light-headedness, numbness and headaches.   Hematological: Negative.  Negative for adenopathy. Does not bruise/bleed easily.   Psychiatric/Behavioral: Positive for sleep disturbance. Negative for dysphoric mood. The patient is not nervous/anxious.        Objective   Physical Exam   Constitutional: She is oriented to person, place, and time. She appears well-developed.   HENT:   Head: Normocephalic.   Right Ear: External ear normal.   Left Ear: External ear normal.   Nose: Nose normal.   Eyes: Conjunctivae, EOM and lids are normal. Pupils are equal, round, and reactive to light.   Neck: Trachea normal and normal range of motion. Neck supple. Carotid bruit is not present. No thyroid mass and no thyromegaly present.   Cardiovascular: Normal rate and regular rhythm.   No murmur heard.  Pulmonary/Chest: Effort normal and breath sounds normal. No respiratory distress. She has no decreased breath sounds. She has no wheezes. She has no rhonchi. She has no rales. She exhibits no tenderness.   Abdominal: Soft. Bowel sounds are normal. There is no tenderness.   Musculoskeletal:        Right hip: She exhibits decreased range of motion and tenderness.        Lumbar back: She exhibits decreased range of motion and spasm. She exhibits no tenderness.   Seated leg raise neg on left and only tender right hip on right lift   Neurological: She is alert and oriented to person, place, and time. Gait abnormal.   Reflex Scores:       Patellar reflexes are 2+ on the right side and 2+ on the left side.  Skin: Skin is warm and dry.   Psychiatric: She has a normal mood and affect. Her behavior is normal.   Nursing note and vitals reviewed.      Assessment/Plan   Lizette was seen today for back pain.    Diagnoses and all orders for this visit:    Acute midline low back pain with bilateral sciatica  -     ketorolac (TORADOL) injection 60 mg; Inject 2 mL into the appropriate  muscle as directed by prescriber 1 (One) Time.    Arthralgia, unspecified joint  -     tiZANidine (ZANAFLEX) 4 MG tablet; Take 1 tablet by mouth Every 8 (Eight) Hours As Needed for Muscle Spasms.    Myalgia  -     tiZANidine (ZANAFLEX) 4 MG tablet; Take 1 tablet by mouth Every 8 (Eight) Hours As Needed for Muscle Spasms.    Lumbar strain, initial encounter  -     methylPREDNISolone (MEDROL, DEBRA,) 4 MG tablet; Take as directed on package instructions.    Chronic low back pain with sciatica, sciatica laterality unspecified, unspecified back pain laterality  -     methylPREDNISolone (MEDROL, DEBRA,) 4 MG tablet; Take as directed on package instructions.      Core strengthening and back exercises handoug    BP check on return next week         Current Outpatient Medications:   •  amLODIPine (NORVASC) 5 MG tablet, Take 1 tablet by mouth Daily., Disp: 30 tablet, Rfl: 3  •  aspirin 325 MG tablet, Take 325 mg by mouth Daily., Disp: , Rfl:   •  cetirizine (ZyrTEC) 10 MG tablet, Take 10 mg by mouth daily., Disp: , Rfl:   •  dicyclomine (BENTYL) 20 MG tablet, Take 1 tablet by mouth 3 (Three) Times a Day As Needed (IBS- diarrhea)., Disp: 90 tablet, Rfl: 0  •  DULoxetine (CYMBALTA) 30 MG capsule, Take 1 capsule by mouth Daily., Disp: 90 capsule, Rfl: 1  •  estradiol (ESTRACE) 2 MG tablet, Take 1 tablet by mouth Daily., Disp: 30 tablet, Rfl: 12  •  lisinopril-hydrochlorothiazide (PRINZIDE,ZESTORETIC) 20-25 MG per tablet, Take 1 tablet by mouth Daily., Disp: , Rfl:   •  naproxen (NAPROSYN) 500 MG tablet, Take 500 mg by mouth 2 (Two) Times a Day With Meals., Disp: , Rfl:   •  potassium chloride (KLOR-CON) 10 MEQ CR tablet, Take 1 tablet by mouth Daily. For potassium, Disp: 30 tablet, Rfl: 2  •  tiZANidine (ZANAFLEX) 4 MG tablet, Take 1 tablet by mouth Every 8 (Eight) Hours As Needed for Muscle Spasms., Disp: 30 tablet, Rfl: 2  •  methylPREDNISolone (MEDROL, DEBRA,) 4 MG tablet, Take as directed on package instructions., Disp: 21 each,  Rfl: 0  No current facility-administered medications for this visit.     Return in about 6 days (around 3/18/2019), or if symptoms worsen or fail to improve, for Recheck, Next scheduled follow up.

## 2019-04-25 ENCOUNTER — OFFICE VISIT (OUTPATIENT)
Dept: INTERNAL MEDICINE | Facility: CLINIC | Age: 54
End: 2019-04-25

## 2019-04-25 VITALS
DIASTOLIC BLOOD PRESSURE: 84 MMHG | HEIGHT: 65 IN | TEMPERATURE: 97.5 F | OXYGEN SATURATION: 98 % | HEART RATE: 101 BPM | WEIGHT: 207.6 LBS | SYSTOLIC BLOOD PRESSURE: 120 MMHG | BODY MASS INDEX: 34.59 KG/M2

## 2019-04-25 DIAGNOSIS — R23.2 VASOMOTOR FLUSHING: ICD-10-CM

## 2019-04-25 DIAGNOSIS — M79.10 MYALGIA: ICD-10-CM

## 2019-04-25 DIAGNOSIS — I10 ESSENTIAL HYPERTENSION: Primary | ICD-10-CM

## 2019-04-25 DIAGNOSIS — F32.A ANXIETY AND DEPRESSION: ICD-10-CM

## 2019-04-25 DIAGNOSIS — F41.9 ANXIETY AND DEPRESSION: ICD-10-CM

## 2019-04-25 DIAGNOSIS — R07.9 CHEST PAIN, UNSPECIFIED TYPE: ICD-10-CM

## 2019-04-25 DIAGNOSIS — M25.50 ARTHRALGIA, UNSPECIFIED JOINT: ICD-10-CM

## 2019-04-25 DIAGNOSIS — E55.9 VITAMIN D DEFICIENCY: ICD-10-CM

## 2019-04-25 DIAGNOSIS — R53.82 CHRONIC FATIGUE: ICD-10-CM

## 2019-04-25 DIAGNOSIS — E78.2 MIXED HYPERLIPIDEMIA: ICD-10-CM

## 2019-04-25 PROCEDURE — 93000 ELECTROCARDIOGRAM COMPLETE: CPT | Performed by: FAMILY MEDICINE

## 2019-04-25 PROCEDURE — 99214 OFFICE O/P EST MOD 30 MIN: CPT | Performed by: FAMILY MEDICINE

## 2019-04-25 RX ORDER — LISINOPRIL AND HYDROCHLOROTHIAZIDE 25; 20 MG/1; MG/1
1 TABLET ORAL DAILY
Qty: 30 TABLET | Refills: 2 | Status: SHIPPED | OUTPATIENT
Start: 2019-04-25 | End: 2019-05-29 | Stop reason: SINTOL

## 2019-04-25 RX ORDER — AMLODIPINE BESYLATE 5 MG/1
5 TABLET ORAL DAILY
Qty: 30 TABLET | Refills: 2 | Status: SHIPPED | OUTPATIENT
Start: 2019-04-25 | End: 2019-07-29 | Stop reason: SDUPTHER

## 2019-04-25 RX ORDER — DULOXETIN HYDROCHLORIDE 60 MG/1
60 CAPSULE, DELAYED RELEASE ORAL DAILY
Qty: 30 CAPSULE | Refills: 2 | Status: SHIPPED | OUTPATIENT
Start: 2019-04-25 | End: 2019-07-29 | Stop reason: SDUPTHER

## 2019-04-25 RX ORDER — POTASSIUM CHLORIDE 750 MG/1
10 TABLET, FILM COATED, EXTENDED RELEASE ORAL DAILY
Qty: 30 TABLET | Refills: 2 | Status: SHIPPED | OUTPATIENT
Start: 2019-04-25 | End: 2019-07-29 | Stop reason: SDUPTHER

## 2019-04-25 RX ORDER — TIZANIDINE 4 MG/1
4 TABLET ORAL EVERY 8 HOURS PRN
Qty: 90 TABLET | Refills: 2 | Status: SHIPPED | OUTPATIENT
Start: 2019-04-25 | End: 2019-10-02 | Stop reason: SDUPTHER
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
## 2019-04-26 DIAGNOSIS — D72.89 ATYPICAL LYMPHOCYTES PRESENT ON PERIPHERAL BLOOD SMEAR: Primary | ICD-10-CM

## 2019-04-26 DIAGNOSIS — R73.09 ABNORMAL GLUCOSE: ICD-10-CM

## 2019-04-26 LAB
25(OH)D3+25(OH)D2 SERPL-MCNC: 32.5 NG/ML (ref 30–100)
ALBUMIN SERPL-MCNC: 4.7 G/DL (ref 3.5–5.2)
ALBUMIN/GLOB SERPL: 1.7 G/DL
ALP SERPL-CCNC: 138 U/L (ref 39–117)
ALT SERPL-CCNC: 24 U/L (ref 1–33)
AST SERPL-CCNC: 25 U/L (ref 1–32)
BASOPHILS # BLD AUTO: (no result) 10*3/UL
BASOPHILS # BLD MANUAL: 0.12 10*3/MM3 (ref 0–0.2)
BASOPHILS NFR BLD MANUAL: 1 % (ref 0–1.5)
BILIRUB SERPL-MCNC: 0.2 MG/DL (ref 0.2–1.2)
BUN SERPL-MCNC: 10 MG/DL (ref 6–20)
BUN/CREAT SERPL: 16.4 (ref 7–25)
CALCIUM SERPL-MCNC: 10.4 MG/DL (ref 8.6–10.5)
CHLORIDE SERPL-SCNC: 101 MMOL/L (ref 98–107)
CHOLEST SERPL-MCNC: 288 MG/DL (ref 0–200)
CK SERPL-CCNC: 27 U/L (ref 20–180)
CO2 SERPL-SCNC: 23.8 MMOL/L (ref 22–29)
CREAT SERPL-MCNC: 0.61 MG/DL (ref 0.57–1)
DIFFERENTIAL COMMENT: ABNORMAL
EOSINOPHIL # BLD AUTO: (no result) 10*3/UL
EOSINOPHIL # BLD MANUAL: 0.5 10*3/MM3 (ref 0–0.4)
EOSINOPHIL NFR BLD AUTO: (no result) %
EOSINOPHIL NFR BLD MANUAL: 4.1 % (ref 0.3–6.2)
ERYTHROCYTE [DISTWIDTH] IN BLOOD BY AUTOMATED COUNT: 14.4 % (ref 12.3–15.4)
GLOBULIN SER CALC-MCNC: 2.8 GM/DL
GLUCOSE SERPL-MCNC: 114 MG/DL (ref 65–99)
HCT VFR BLD AUTO: 45.9 % (ref 34–46.6)
HDLC SERPL-MCNC: 44 MG/DL (ref 40–60)
HGB BLD-MCNC: 15.1 G/DL (ref 12–15.9)
LDLC SERPL CALC-MCNC: 195 MG/DL (ref 0–100)
LYMPHOCYTES # BLD AUTO: (no result) 10*3/UL
LYMPHOCYTES # BLD MANUAL: 3.51 10*3/MM3 (ref 0.7–3.1)
LYMPHOCYTES NFR BLD AUTO: (no result) %
LYMPHOCYTES NFR BLD MANUAL: 28.6 % (ref 19.6–45.3)
MCH RBC QN AUTO: 30.9 PG (ref 26.6–33)
MCHC RBC AUTO-ENTMCNC: 32.9 G/DL (ref 31.5–35.7)
MCV RBC AUTO: 93.9 FL (ref 79–97)
MONOCYTES # BLD MANUAL: 0.25 10*3/MM3 (ref 0.1–0.9)
MONOCYTES NFR BLD AUTO: (no result) %
MONOCYTES NFR BLD MANUAL: 2 % (ref 5–12)
NEUTROPHILS # BLD MANUAL: 7.64 10*3/MM3 (ref 1.7–7)
NEUTROPHILS NFR BLD AUTO: (no result) %
NEUTROPHILS NFR BLD MANUAL: 62.2 % (ref 42.7–76)
PLATELET # BLD AUTO: 321 10*3/MM3 (ref 140–450)
PLATELET BLD QL SMEAR: ABNORMAL
POTASSIUM SERPL-SCNC: 3.7 MMOL/L (ref 3.5–5.2)
PROT SERPL-MCNC: 7.5 G/DL (ref 6–8.5)
RBC # BLD AUTO: 4.89 10*6/MM3 (ref 3.77–5.28)
RBC MORPH BLD: ABNORMAL
SODIUM SERPL-SCNC: 141 MMOL/L (ref 136–145)
TRIGL SERPL-MCNC: 244 MG/DL (ref 0–150)
TSH SERPL DL<=0.005 MIU/L-ACNC: 1.47 UIU/ML (ref 0.45–4.5)
VLDLC SERPL CALC-MCNC: 48.8 MG/DL
WBC # BLD AUTO: 12.29 10*3/MM3 (ref 3.4–10.8)

## 2019-05-06 ENCOUNTER — TELEPHONE (OUTPATIENT)
Dept: INTERNAL MEDICINE | Facility: CLINIC | Age: 54
End: 2019-05-06

## 2019-05-06 NOTE — TELEPHONE ENCOUNTER
----- Message from Denise LOZANO MD sent at 4/26/2019  8:02 AM EDT -----  Please call the patient regarding her abnormal result. Triglycerides and LDL high. Low animal fat, low sugar, low alcohol diet. Need to strongly consider adding statin such as lipitor.  Glucose elevated. Need to have finger stick HGA1c. Pt has atypical lymphs on white count, need to check mono test. I have already ordered this as a future order in the computer.

## 2019-05-08 ENCOUNTER — LAB (OUTPATIENT)
Dept: INTERNAL MEDICINE | Facility: CLINIC | Age: 54
End: 2019-05-08

## 2019-05-08 DIAGNOSIS — D72.89 ATYPICAL LYMPHOCYTES PRESENT ON PERIPHERAL BLOOD SMEAR: ICD-10-CM

## 2019-05-29 ENCOUNTER — OFFICE VISIT (OUTPATIENT)
Dept: INTERNAL MEDICINE | Facility: CLINIC | Age: 54
End: 2019-05-29

## 2019-05-29 VITALS
TEMPERATURE: 97.4 F | WEIGHT: 202.8 LBS | HEIGHT: 65 IN | SYSTOLIC BLOOD PRESSURE: 138 MMHG | OXYGEN SATURATION: 97 % | DIASTOLIC BLOOD PRESSURE: 84 MMHG | HEART RATE: 87 BPM | BODY MASS INDEX: 33.79 KG/M2

## 2019-05-29 DIAGNOSIS — M72.2 PLANTAR FASCIAL FIBROMATOSIS OF BOTH FEET: Primary | ICD-10-CM

## 2019-05-29 DIAGNOSIS — M72.2 PLANTAR FASCIITIS, BILATERAL: ICD-10-CM

## 2019-05-29 DIAGNOSIS — I10 ESSENTIAL HYPERTENSION: ICD-10-CM

## 2019-05-29 DIAGNOSIS — D72.9 ABNORMAL WHITE BLOOD CELL (WBC) COUNT: ICD-10-CM

## 2019-05-29 DIAGNOSIS — L98.9 SKIN LESION: ICD-10-CM

## 2019-05-29 PROCEDURE — 99214 OFFICE O/P EST MOD 30 MIN: CPT | Performed by: FAMILY MEDICINE

## 2019-05-29 RX ORDER — LOSARTAN POTASSIUM AND HYDROCHLOROTHIAZIDE 12.5; 5 MG/1; MG/1
1 TABLET ORAL DAILY
Qty: 30 TABLET | Refills: 2 | Status: SHIPPED | OUTPATIENT
Start: 2019-05-29 | End: 2019-07-29 | Stop reason: SDUPTHER

## 2019-05-29 NOTE — PATIENT INSTRUCTIONS
Cholesterol  Cholesterol is a white, waxy, fat-like substance that is needed by the human body in small amounts. The liver makes all the cholesterol we need. Cholesterol is carried from the liver by the blood through the blood vessels. Deposits of cholesterol (plaques) may build up on blood vessel (artery) walls. Plaques make the arteries narrower and stiffer. Cholesterol plaques increase the risk for heart attack and stroke.  You cannot feel your cholesterol level even if it is very high. The only way to know that it is high is to have a blood test. Once you know your cholesterol levels, you should keep a record of the test results. Work with your health care provider to keep your levels in the desired range.  What do the results mean?  · Total cholesterol is a rough measure of all the cholesterol in your blood.  · LDL (low-density lipoprotein) is the “bad” cholesterol. This is the type that causes plaque to build up on the artery walls. You want this level to be low.  · HDL (high-density lipoprotein) is the “good” cholesterol because it cleans the arteries and carries the LDL away. You want this level to be high.  · Triglycerides are fat that the body can either burn for energy or store. High levels are closely linked to heart disease.  What are the desired levels of cholesterol?  · Total cholesterol below 200.  · LDL below 100 for people who are at risk, below 70 for people at very high risk.  · HDL above 40 is good. A level of 60 or higher is considered to be protective against heart disease.  · Triglycerides below 150.  How can I lower my cholesterol?  Diet  Follow your diet program as told by your health care provider.  · Choose fish or white meat chicken and turkey, roasted or baked. Limit fatty cuts of red meat, fried foods, and processed meats, such as sausage and lunch meats.  · Eat lots of fresh fruits and vegetables.  · Choose whole grains, beans, pasta, potatoes, and cereals.  · Choose olive oil, corn  "oil, or canola oil, and use only small amounts.  · Avoid butter, mayonnaise, shortening, or palm kernel oils.  · Avoid foods with trans fats.  · Drink skim or nonfat milk and eat low-fat or nonfat yogurt and cheeses. Avoid whole milk, cream, ice cream, egg yolks, and full-fat cheeses.  · Healthier desserts include devin food cake, zac snaps, animal crackers, hard candy, popsicles, and low-fat or nonfat frozen yogurt. Avoid pastries, cakes, pies, and cookies.    Exercise  · Follow your exercise program as told by your health care provider. A regular program:  ? Helps to decrease LDL and raise HDL.  ? Helps with weight control.  · Do things that increase your activity level, such as gardening, walking, and taking the stairs.  · Ask your health care provider about ways that you can be more active in your daily life.    Medicine  · Take over-the-counter and prescription medicines only as told by your health care provider.  ? Medicine may be prescribed by your health care provider to help lower cholesterol and decrease the risk for heart disease. This is usually done if diet and exercise have failed to bring down cholesterol levels.  ? If you have several risk factors, you may need medicine even if your levels are normal.    This information is not intended to replace advice given to you by your health care provider. Make sure you discuss any questions you have with your health care provider.  Document Released: 09/12/2002 Document Revised: 07/15/2017 Document Reviewed: 06/17/2017  [employer] Interactive Patient Education © 2019 [employer]  DASH Eating Plan  DASH stands for \"Dietary Approaches to Stop Hypertension.\" The DASH eating plan is a healthy eating plan that has been shown to reduce high blood pressure (hypertension). It may also reduce your risk for type 2 diabetes, heart disease, and stroke. The DASH eating plan may also help with weight loss.  What are tips for following this plan?  General " "guidelines  · Avoid eating more than 2,300 mg (milligrams) of salt (sodium) a day. If you have hypertension, you may need to reduce your sodium intake to 1,500 mg a day.  · Limit alcohol intake to no more than 1 drink a day for nonpregnant women and 2 drinks a day for men. One drink equals 12 oz of beer, 5 oz of wine, or 1½ oz of hard liquor.  · Work with your health care provider to maintain a healthy body weight or to lose weight. Ask what an ideal weight is for you.  · Get at least 30 minutes of exercise that causes your heart to beat faster (aerobic exercise) most days of the week. Activities may include walking, swimming, or biking.  · Work with your health care provider or diet and nutrition specialist (dietitian) to adjust your eating plan to your individual calorie needs.  Reading food labels  · Check food labels for the amount of sodium per serving. Choose foods with less than 5 percent of the Daily Value of sodium. Generally, foods with less than 300 mg of sodium per serving fit into this eating plan.  · To find whole grains, look for the word \"whole\" as the first word in the ingredient list.  Shopping  · Buy products labeled as \"low-sodium\" or \"no salt added.\"  · Buy fresh foods. Avoid canned foods and premade or frozen meals.  Cooking  · Avoid adding salt when cooking. Use salt-free seasonings or herbs instead of table salt or sea salt. Check with your health care provider or pharmacist before using salt substitutes.  · Do not perry foods. Cook foods using healthy methods such as baking, boiling, grilling, and broiling instead.  · Cook with heart-healthy oils, such as olive, canola, soybean, or sunflower oil.  Meal planning    · Eat a balanced diet that includes:  ? 5 or more servings of fruits and vegetables each day. At each meal, try to fill half of your plate with fruits and vegetables.  ? Up to 6-8 servings of whole grains each day.  ? Less than 6 oz of lean meat, poultry, or fish each day. A 3-oz " serving of meat is about the same size as a deck of cards. One egg equals 1 oz.  ? 2 servings of low-fat dairy each day.  ? A serving of nuts, seeds, or beans 5 times each week.  ? Heart-healthy fats. Healthy fats called Omega-3 fatty acids are found in foods such as flaxseeds and coldwater fish, like sardines, salmon, and mackerel.  · Limit how much you eat of the following:  ? Canned or prepackaged foods.  ? Food that is high in trans fat, such as fried foods.  ? Food that is high in saturated fat, such as fatty meat.  ? Sweets, desserts, sugary drinks, and other foods with added sugar.  ? Full-fat dairy products.  · Do not salt foods before eating.  · Try to eat at least 2 vegetarian meals each week.  · Eat more home-cooked food and less restaurant, buffet, and fast food.  · When eating at a restaurant, ask that your food be prepared with less salt or no salt, if possible.  What foods are recommended?  The items listed may not be a complete list. Talk with your dietitian about what dietary choices are best for you.  Grains  Whole-grain or whole-wheat bread. Whole-grain or whole-wheat pasta. Brown rice. Oatmeal. Quinoa. Bulgur. Whole-grain and low-sodium cereals. Conchis bread. Low-fat, low-sodium crackers. Whole-wheat flour tortillas.  Vegetables  Fresh or frozen vegetables (raw, steamed, roasted, or grilled). Low-sodium or reduced-sodium tomato and vegetable juice. Low-sodium or reduced-sodium tomato sauce and tomato paste. Low-sodium or reduced-sodium canned vegetables.  Fruits  All fresh, dried, or frozen fruit. Canned fruit in natural juice (without added sugar).  Meat and other protein foods  Skinless chicken or turkey. Ground chicken or turkey. Pork with fat trimmed off. Fish and seafood. Egg whites. Dried beans, peas, or lentils. Unsalted nuts, nut butters, and seeds. Unsalted canned beans. Lean cuts of beef with fat trimmed off. Low-sodium, lean deli meat.  Dairy  Low-fat (1%) or fat-free (skim) milk.  Fat-free, low-fat, or reduced-fat cheeses. Nonfat, low-sodium ricotta or cottage cheese. Low-fat or nonfat yogurt. Low-fat, low-sodium cheese.  Fats and oils  Soft margarine without trans fats. Vegetable oil. Low-fat, reduced-fat, or light mayonnaise and salad dressings (reduced-sodium). Canola, safflower, olive, soybean, and sunflower oils. Avocado.  Seasoning and other foods  Herbs. Spices. Seasoning mixes without salt. Unsalted popcorn and pretzels. Fat-free sweets.  What foods are not recommended?  The items listed may not be a complete list. Talk with your dietitian about what dietary choices are best for you.  Grains  Baked goods made with fat, such as croissants, muffins, or some breads. Dry pasta or rice meal packs.  Vegetables  Creamed or fried vegetables. Vegetables in a cheese sauce. Regular canned vegetables (not low-sodium or reduced-sodium). Regular canned tomato sauce and paste (not low-sodium or reduced-sodium). Regular tomato and vegetable juice (not low-sodium or reduced-sodium). Pickles. Olives.  Fruits  Canned fruit in a light or heavy syrup. Fried fruit. Fruit in cream or butter sauce.  Meat and other protein foods  Fatty cuts of meat. Ribs. Fried meat. May. Sausage. Bologna and other processed lunch meats. Salami. Fatback. Hotdogs. Bratwurst. Salted nuts and seeds. Canned beans with added salt. Canned or smoked fish. Whole eggs or egg yolks. Chicken or turkey with skin.  Dairy  Whole or 2% milk, cream, and half-and-half. Whole or full-fat cream cheese. Whole-fat or sweetened yogurt. Full-fat cheese. Nondairy creamers. Whipped toppings. Processed cheese and cheese spreads.  Fats and oils  Butter. Stick margarine. Lard. Shortening. Ghee. May fat. Tropical oils, such as coconut, palm kernel, or palm oil.  Seasoning and other foods  Salted popcorn and pretzels. Onion salt, garlic salt, seasoned salt, table salt, and sea salt. Worcestershire sauce. Tartar sauce. Barbecue sauce. Teriyaki sauce.  Soy sauce, including reduced-sodium. Steak sauce. Canned and packaged gravies. Fish sauce. Oyster sauce. Cocktail sauce. Horseradish that you find on the shelf. Ketchup. Mustard. Meat flavorings and tenderizers. Bouillon cubes. Hot sauce and Tabasco sauce. Premade or packaged marinades. Premade or packaged taco seasonings. Relishes. Regular salad dressings.  Where to find more information:  · National Heart, Lung, and Blood [place]: www.nhlbi.nih.gov  · American Heart Association: www.heart.org  Summary  · The DASH eating plan is a healthy eating plan that has been shown to reduce high blood pressure (hypertension). It may also reduce your risk for type 2 diabetes, heart disease, and stroke.  · With the DASH eating plan, you should limit salt (sodium) intake to 2,300 mg a day. If you have hypertension, you may need to reduce your sodium intake to 1,500 mg a day.  · When on the DASH eating plan, aim to eat more fresh fruits and vegetables, whole grains, lean proteins, low-fat dairy, and heart-healthy fats.  · Work with your health care provider or diet and nutrition specialist (dietitian) to adjust your eating plan to your individual calorie needs.  This information is not intended to replace advice given to you by your health care provider. Make sure you discuss any questions you have with your health care provider.  Document Released: 12/06/2012 Document Revised: 12/11/2017 Document Reviewed: 12/11/2017  [email or web address] Interactive Patient Education © 2019 [email or web address]

## 2019-05-29 NOTE — PROGRESS NOTES
"Subjective   Lizette Keith is a 53 y.o. female.     Chief Complaint   Patient presents with   • Hypertension   • Anxiety   • Depression     She states she noticed after increasing the cymbalta she noticed small circular raised lesions. Only on bilateral arms, denies itching or pain.    • Foot Pain     wanted to discuss pain management. plantar fibromas       Visit Vitals  /84 (Cuff Size: Adult)   Pulse 87   Temp 97.4 °F (36.3 °C)   Ht 165 cm (64.96\")   Wt 92 kg (202 lb 12.8 oz)   SpO2 97%   BMI 33.79 kg/m²         Depression   Visit Type: follow-up  Patient presents with the following symptoms: fatigue and nervousness/anxiety (better).  Patient is not experiencing: anhedonia, chest pain, choking sensation, compulsions, confusion, decreased concentration, depressed mood, dizziness, dry mouth, excessive worry, feelings of hopelessness, feelings of worthlessness, hypersomnia, hyperventilation, insomnia, irritability, malaise, memory impairment, muscle tension, nausea, obsessions, palpitations, panic, psychomotor agitation, psychomotor retardation, restlessness, shortness of breath, suicidal ideas, suicidal planning, thoughts of death, weight gain and weight loss.  Frequency of symptoms: rarely   Severity: mild   Sleep quality: good  Nighttime awakenings: occasional  Compliance with medications:  %        Anxiety   Presents for follow-up visit. Symptoms include nervous/anxious behavior (better). Patient reports no chest pain, compulsions, confusion, decreased concentration, depressed mood, dizziness, dry mouth, excessive worry, feeling of choking, hyperventilation, insomnia, irritability, malaise, muscle tension, nausea, obsessions, palpitations, panic, restlessness, shortness of breath or suicidal ideas. Symptoms occur rarely. The severity of symptoms is mild. The quality of sleep is good. Nighttime awakenings: occasional.     Her past medical history is significant for depression. Compliance with " medications is %.   Hypertension   This is a chronic problem. The current episode started more than 1 year ago. The problem is unchanged. Condition status: borderline. Associated symptoms include anxiety and malaise/fatigue. Pertinent negatives include no blurred vision, chest pain, headaches, neck pain, orthopnea, palpitations, peripheral edema, PND, shortness of breath or sweats. There are no associated agents to hypertension. Risk factors for coronary artery disease include dyslipidemia, obesity and smoking/tobacco exposure. Past treatments include ACE inhibitors, calcium channel blockers and diuretics. Current antihypertension treatment includes ACE inhibitors, calcium channel blockers and diuretics. The current treatment provides moderate improvement. Compliance problems include psychosocial issues.  There is no history of angina, kidney disease, CAD/MI, CVA, heart failure, left ventricular hypertrophy, PVD or retinopathy. Identifiable causes of hypertension include sleep apnea (due for testing soon). There is no history of a thyroid problem.      Pt has red domed lesions when in the sun that resolve in a few hours after getting out of the sun. Pt noticed increase in skin lesions with increase in cymbalta.     Pt has not taken any medication today.   Pt has to work standing and has fiborma nodules in both feet in each arch and at base of right great toe. Pt has plantar fasciitis. Pt has tried powerstep and gel inserts.     Walking hurts back as pt has to be careful of feet.  Pt works 3rd shift at [employer].  Pt states that ASA helps at 2 tabs Q4 hours. Tylenol and otc naprosyn and ibuprofen do not help.  Pt's  is threatening job.     Pt checking blood pressure at home and /80- 140/90- at end of day or if stressed.     Anxiety is much better on cymbalta.     Pt has cough that is possibly due to her lisinopril.     Pt had atypical lymphs on last CBC. Will check monospot. Pt advised  usually due to viral infection.   The following portions of the patient's history were reviewed and updated as appropriate: allergies, current medications, past family history, past medical history, past social history, past surgical history and problem list.    Past Medical History:   Diagnosis Date   • Allergic    • Anemia     during pregnancy   • Anxiety    • Arthritis    • Blood in urine    • Chronic fatigue    • Colon polyp 02/2018    9 at last colonoscopy   • Depression    • Elevated cholesterol    • Endometriosis    • Fibromyalgia    • Fibromyalgia, primary    • Fracture    • GERD (gastroesophageal reflux disease)    • H/O mammogram 02/2018   • HL (hearing loss)    • Hypertension    • Irritable bowel syndrome    • Pap smear for cervical cancer screening 2018?      Past Surgical History:   Procedure Laterality Date   • CHOLECYSTECTOMY     • COLONOSCOPY  02/06/2018    3 year f/u polypectomy Dr MARTA Waller   • GALLBLADDER SURGERY     • HYSTERECTOMY     • INNER EAR SURGERY     • OOPHORECTOMY     • TEMPOROMANDIBULAR JOINT ARTHROPLASTY  1984      Family History   Problem Relation Age of Onset   • Melanoma Mother    • Diabetes Mother    • Heart disease Mother    • Arthritis Mother    • Depression Mother    • Colon polyps Mother    • Thyroid disease Mother    • Hyperlipidemia Mother    • Cancer Mother    • Heart attack Mother    • Migraines Mother    • Cancer Father         pancreatic and liver   • Diabetes Father    • Depression Father    • Colon polyps Father    • ADD / ADHD Son    • Diabetes Maternal Aunt    • Diabetes Maternal Uncle    • Diabetes Paternal Aunt    • Diabetes Paternal Uncle    • Cancer Maternal Grandmother         uterine/cervical   • Heart disease Maternal Grandfather    • Heart disease Paternal Grandfather    • Migraines Sister    • Breast cancer Neg Hx    • Ovarian cancer Neg Hx       Social History     Socioeconomic History   • Marital status:      Spouse name: Not on file   • Number of  children: Not on file   • Years of education: Not on file   • Highest education level: Not on file   Tobacco Use   • Smoking status: Current Every Day Smoker     Packs/day: 0.75     Years: 34.00     Pack years: 25.50     Types: Cigarettes   • Smokeless tobacco: Never Used   Substance and Sexual Activity   • Alcohol use: No   • Drug use: No   • Sexual activity: Not Currently     Birth control/protection: None      Allergies   Allergen Reactions   • Erythromycin GI Intolerance     Tolerates zpak   • Penicillins Hives   • Statins Myalgia       Review of Systems   Constitutional: Positive for diaphoresis, fatigue and malaise/fatigue. Negative for chills, fever, irritability, weight gain and weight loss.   HENT: Negative.  Negative for ear pain, nosebleeds, postnasal drip, rhinorrhea, sinus pressure, sneezing and sore throat.    Eyes: Negative.  Negative for blurred vision, redness and itching.   Respiratory: Positive for cough (dry). Negative for choking, shortness of breath and wheezing.    Cardiovascular: Negative.  Negative for chest pain, palpitations, orthopnea and PND.   Gastrointestinal: Negative.  Negative for abdominal pain, constipation, diarrhea, nausea and vomiting.   Endocrine: Positive for heat intolerance. Negative for cold intolerance.   Genitourinary: Negative.  Negative for dysuria, frequency, hematuria and urgency.   Musculoskeletal: Positive for back pain and myalgias (spasms). Negative for arthralgias and neck pain.        Feet pain   Skin: Positive for color change and rash (sun exposed arms).   Allergic/Immunologic: Negative.  Negative for environmental allergies.   Neurological: Negative.  Negative for dizziness, syncope, light-headedness and headaches.   Hematological: Negative.  Negative for adenopathy. Does not bruise/bleed easily.   Psychiatric/Behavioral: Positive for dysphoric mood. Negative for confusion, decreased concentration and suicidal ideas. The patient is nervous/anxious (better).  The patient does not have insomnia.        Objective   Physical Exam   Constitutional: She is oriented to person, place, and time. She appears well-developed.   HENT:   Head: Normocephalic.       Right Ear: External ear normal.   Left Ear: External ear normal.   Nose: Nose normal.   Eyes: Conjunctivae, EOM and lids are normal. Pupils are equal, round, and reactive to light.   Neck: Trachea normal and normal range of motion. Neck supple. Carotid bruit is not present. No thyroid mass and no thyromegaly present.   Cardiovascular: Normal rate and regular rhythm.   No murmur heard.  Pulmonary/Chest: Effort normal and breath sounds normal. No respiratory distress. She has no decreased breath sounds. She has no wheezes. She has no rhonchi. She has no rales. She exhibits no tenderness.   Abdominal: Soft. Bowel sounds are normal. There is no tenderness.   Musculoskeletal: Normal range of motion.        Neurological: She is alert and oriented to person, place, and time.   Skin: Skin is warm and dry.   Psychiatric: She has a normal mood and affect. Her behavior is normal.   Nursing note and vitals reviewed.      Assessment/Plan   Lizette was seen today for hypertension, anxiety, depression and foot pain.    Diagnoses and all orders for this visit:    Plantar fascial fibromatosis of both feet  -     Ambulatory Referral to Orthopedic Surgery    Plantar fasciitis, bilateral  -     Ambulatory Referral to Orthopedic Surgery    Skin lesion  -     Ambulatory Referral to Dermatology    Abnormal white blood cell (WBC) count  -     Mononucleosis Screen    Essential hypertension  -     losartan-hydrochlorothiazide (HYZAAR) 50-12.5 MG per tablet; Take 1 tablet by mouth Daily.        Trial of sunscreen   D/c lisinopril HCTZ start losartan HCTZ     Call with BP in the next 2 weeks    Current Outpatient Medications:   •  amLODIPine (NORVASC) 5 MG tablet, Take 1 tablet by mouth Daily., Disp: 30 tablet, Rfl: 2  •  aspirin 325 MG tablet, Take  325 mg by mouth Daily., Disp: , Rfl:   •  cetirizine (ZyrTEC) 10 MG tablet, Take 10 mg by mouth daily., Disp: , Rfl:   •  dicyclomine (BENTYL) 20 MG tablet, Take 1 tablet by mouth 3 (Three) Times a Day As Needed (IBS- diarrhea)., Disp: 90 tablet, Rfl: 0  •  DULoxetine (CYMBALTA) 60 MG capsule, Take 1 capsule by mouth Daily., Disp: 30 capsule, Rfl: 2  •  estradiol (ESTRACE) 2 MG tablet, Take 1 tablet by mouth Daily., Disp: 30 tablet, Rfl: 12  •  naproxen (NAPROSYN) 500 MG tablet, Take 500 mg by mouth 2 (Two) Times a Day With Meals., Disp: , Rfl:   •  potassium chloride (KLOR-CON) 10 MEQ CR tablet, Take 1 tablet by mouth Daily. For potassium, Disp: 30 tablet, Rfl: 2  •  tiZANidine (ZANAFLEX) 4 MG tablet, Take 1 tablet by mouth Every 8 (Eight) Hours As Needed for Muscle Spasms., Disp: 90 tablet, Rfl: 2  •  losartan-hydrochlorothiazide (HYZAAR) 50-12.5 MG per tablet, Take 1 tablet by mouth Daily., Disp: 30 tablet, Rfl: 2    Return in about 4 weeks (around 6/26/2019), or if symptoms worsen or fail to improve, for Recheck.

## 2019-06-01 LAB — HETEROPH AB SER QL LA: NEGATIVE

## 2019-06-03 ENCOUNTER — TELEPHONE (OUTPATIENT)
Dept: INTERNAL MEDICINE | Facility: CLINIC | Age: 54
End: 2019-06-03

## 2019-06-13 ENCOUNTER — TELEPHONE (OUTPATIENT)
Dept: ORTHOPEDIC SURGERY | Facility: CLINIC | Age: 54
End: 2019-06-13

## 2019-06-27 ENCOUNTER — OFFICE VISIT (OUTPATIENT)
Dept: ORTHOPEDIC SURGERY | Facility: CLINIC | Age: 54
End: 2019-06-27

## 2019-06-27 VITALS — OXYGEN SATURATION: 98 % | BODY MASS INDEX: 33.57 KG/M2 | HEART RATE: 122 BPM | WEIGHT: 201.5 LBS | HEIGHT: 65 IN

## 2019-06-27 DIAGNOSIS — M79.671 BILATERAL FOOT PAIN: Primary | ICD-10-CM

## 2019-06-27 DIAGNOSIS — M72.2 PLANTAR FASCIITIS, BILATERAL: ICD-10-CM

## 2019-06-27 DIAGNOSIS — M77.41 METATARSALGIA OF BOTH FEET: ICD-10-CM

## 2019-06-27 DIAGNOSIS — M79.672 BILATERAL FOOT PAIN: Primary | ICD-10-CM

## 2019-06-27 DIAGNOSIS — M77.42 METATARSALGIA OF BOTH FEET: ICD-10-CM

## 2019-06-27 PROCEDURE — 99214 OFFICE O/P EST MOD 30 MIN: CPT | Performed by: PHYSICIAN ASSISTANT

## 2019-06-27 RX ORDER — HEPATITIS A VACCINE, INACTIVATED 50 [IU]/ML
INJECTION, SUSPENSION INTRAMUSCULAR
Refills: 0 | COMMUNITY
Start: 2019-06-12 | End: 2019-07-29

## 2019-06-27 RX ORDER — NAPROXEN 500 MG/1
500 TABLET ORAL 2 TIMES DAILY WITH MEALS
Qty: 60 TABLET | Refills: 1 | Status: SHIPPED | OUTPATIENT
Start: 2019-06-27 | End: 2019-07-29 | Stop reason: SDUPTHER

## 2019-06-27 NOTE — PROGRESS NOTES
[facility] [place] [facility]opaedic Surgery Clinic Note    Subjective     Patient: Lizette Keith  : 1965    Primary Care Provider: Denise Hanks MD    Requesting Provider: As above    Pain of the Left Foot (Bilateral Foot Plantar Fibromas and Plantar Fasciitis ) and Pain of the Right Foot (Bilateral Foot Plantar Fibromas and Plantar Fasciitis )      History    Chief Complaint: Bilateral foot pain    History of Present Illness: This is a very pleasant 54-year-old female presenting today to discuss her bilateral foot pain.  She has pain in the arch the heel and the forefoot.  She has pain 4/10 in the heel 7/10 over plantar fibromas and 6/10 in the forefoot.  She denies any trauma or injury.  She has no rest pain or night pain.  She works as a cook at [employer] and has significantly increased pain the more she is on her feet.  She is treated with steroid injections for plantar fasciitis, meloxicam, aspirin.  She denies any warmth swelling or erythema.  She reports that the forefoot pain is her biggest complaint today.  She is here for further evaluation and treatment recommendations.    Current Outpatient Medications on File Prior to Visit   Medication Sig Dispense Refill   • amLODIPine (NORVASC) 5 MG tablet Take 1 tablet by mouth Daily. 30 tablet 2   • aspirin 325 MG tablet Take 325 mg by mouth Daily.     • cetirizine (ZyrTEC) 10 MG tablet Take 10 mg by mouth daily.     • dicyclomine (BENTYL) 20 MG tablet Take 1 tablet by mouth 3 (Three) Times a Day As Needed (IBS- diarrhea). 90 tablet 0   • DULoxetine (CYMBALTA) 60 MG capsule Take 1 capsule by mouth Daily. 30 capsule 2   • estradiol (ESTRACE) 2 MG tablet Take 1 tablet by mouth Daily. 30 tablet 12   • losartan-hydrochlorothiazide (HYZAAR) 50-12.5 MG per tablet Take 1 tablet by mouth Daily. 30 tablet 2   • potassium chloride (KLOR-CON) 10 MEQ CR tablet Take 1 tablet by mouth Daily. For potassium 30 tablet 2   • tiZANidine (ZANAFLEX) 4 MG tablet Take 1 tablet by  mouth Every 8 (Eight) Hours As Needed for Muscle Spasms. 90 tablet 2   • VAQTA 50 UNIT/ML injection PHARMACIST ADMINISTERED IMMUNIZATION ADMINISTERED AT TIME OF DISPENSING  0     No current facility-administered medications on file prior to visit.       Allergies   Allergen Reactions   • Erythromycin GI Intolerance     Tolerates zpak   • Penicillins Hives   • Statins Myalgia      Past Medical History:   Diagnosis Date   • Allergic    • Anemia     during pregnancy   • Anxiety    • Arthritis    • Blood in urine    • Chronic fatigue    • Colon polyp 02/2018    7 at last colonoscopy   • Depression    • Elevated cholesterol    • Endometriosis    • Fibromyalgia    • Fibromyalgia, primary    • Fracture    • GERD (gastroesophageal reflux disease)    • H/O mammogram 02/2018   • HL (hearing loss)    • Hypertension    • Irritable bowel syndrome    • Pap smear for cervical cancer screening 2018?     Past Surgical History:   Procedure Laterality Date   • CHOLECYSTECTOMY     • COLONOSCOPY  02/06/2018    3 year f/u polypectomy Dr MARTA Waller   • GALLBLADDER SURGERY     • HYSTERECTOMY     • INNER EAR SURGERY     • OOPHORECTOMY     • TEMPOROMANDIBULAR JOINT ARTHROPLASTY  1984     Family History   Problem Relation Age of Onset   • Melanoma Mother    • Diabetes Mother    • Heart disease Mother    • Arthritis Mother    • Depression Mother    • Colon polyps Mother    • Thyroid disease Mother    • Hyperlipidemia Mother    • Cancer Mother    • Heart attack Mother    • Migraines Mother    • Cancer Father         pancreatic and liver   • Diabetes Father    • Depression Father    • Colon polyps Father    • ADD / ADHD Son    • Diabetes Maternal Aunt    • Diabetes Maternal Uncle    • Diabetes Paternal Aunt    • Diabetes Paternal Uncle    • Cancer Maternal Grandmother         uterine/cervical   • Heart disease Maternal Grandfather    • Heart disease Paternal Grandfather    • Migraines Sister    • Breast cancer Neg Hx    • Ovarian cancer Neg Hx      "  Social History     Socioeconomic History   • Marital status:      Spouse name: Not on file   • Number of children: Not on file   • Years of education: Not on file   • Highest education level: Not on file   Tobacco Use   • Smoking status: Current Every Day Smoker     Packs/day: 0.75     Years: 34.00     Pack years: 25.50     Types: Cigarettes   • Smokeless tobacco: Never Used   Substance and Sexual Activity   • Alcohol use: No   • Drug use: No   • Sexual activity: Not Currently     Birth control/protection: None        Review of Systems   Constitutional: Positive for fatigue.   HENT: Positive for sinus pressure and tinnitus.    Eyes: Negative.    Respiratory: Negative.    Cardiovascular: Negative.    Gastrointestinal: Positive for diarrhea.   Endocrine: Negative.    Genitourinary: Positive for pelvic pain.   Musculoskeletal: Positive for back pain and joint swelling.   Skin: Negative.    Allergic/Immunologic: Positive for environmental allergies.   Neurological: Positive for headaches.   Hematological: Negative.    Psychiatric/Behavioral: Negative.        The following portions of the patient's history were reviewed and updated as appropriate: allergies, current medications, past family history, past medical history, past social history, past surgical history and problem list.      Objective      Physical Exam  Pulse (!) 122   Ht 165 cm (64.96\")   Wt 91.4 kg (201 lb 8 oz)   SpO2 98%   Breastfeeding? No   BMI 33.57 kg/m²     Body mass index is 33.57 kg/m².    GENERAL: Body habitus: obese    Lower extremity edema: Left: 1+ pitting; Right: 1+ pitting    Varicose veins:  Left: mild; Right: mild    Gait: normal     Mental Status:  awake and alert; oriented to person, place, and time    Voice:  clear  SKIN:  Normal    Hair Growth:  Right:normal; Left:  normal  HEENT: Head: Normocephalic, atraumatic,  without obvious abnormality.  eye: normal external eye, no icterus   PULM:  Repiratory effort normal    Ortho " Exam  V:  Dorsalis Pedis:  Right: 2+; Left:2+    Posterior Tibial: Right:2+; Left:2+    Capillary Refill:  Brisk  MSK:        Tibia:  Right:  non tender; Left:  non tender      Ankle:  Right: non tender, ROM  normal and motor function  normal; Left:  non tender, ROM  normal and motor function  normal      Foot:  Right:  tender Mild over the origin the plantar fascia with exquisite tenderness over plantar fibromas mid arch as well as mild under the second and third metatarsal heads., ROM  normal and motor function  normal; Left:  tender Mild over the origin the plantar fascia exquisitely tender over plantar fibromas in the arch mild tenderness under the second and third metatarsal heads., ROM  normal and motor function  normal      NEURO:     Darlington-Edd 5.07 monofilament test: not evaluated    Lower extremity sensation: intact     Calf Atrophy:none    Motor Function: all 5/5          Medical Decision Making    Data Review:   ordered and reviewed x-rays today    Assessment:  1. Bilateral foot pain    2. Plantar fasciitis, bilateral    3. Metatarsalgia of both feet        Plan:  1.  Bilateral plantar fasciitis with plantar fibromas.  I reviewed today's x-rays and clinical findings with the patient.  She is exquisitely tender over the plantar fibromas bilaterally and arches as well as mild tenderness over the origin the plantar fascia.   I explained plantar fasciitis in detail to the patient.  I explained to the bow-string mechanism of the plantar fascia.  I went over the current research of the [employer] with them.  I explained the treatments that were not statistically significant in improving the problem including: injection of steroids, special orthotics, special shoes, surgery, medication, ice, not working, etc.    I explained the treatments that are statistically significant at improving the problem.  These include stretching/night splinting, casting, PRP, OssaTron.    I  "explained the most important treatment: Stretching and night splinting.  I went over the patient information sheet with them, I showed them the stretches and they were able to reproduce them.  I emphasized the \"toe pull\" as the most important stretch.  They need to do the stretches 5 repetitions each, 6-8 times per day.  I explained how to do them at work, at home, before getting out of bed, before getting out of the car, and before getting up from a chair.    We provided them with a night splint.    If they do not improve after 4 months of aggressive stretching and night splinting, the next step will be a short leg fiberglass walking cast worn for 6 weeks.    · Preprinted education was provided to the patient.    Regarding the plantar fibromas, I explained that this is a benign tumor most common in those of northern  descent.  Her mother who is with her today has Dupuytren's.  I explained that these are best treated left alone.  She can get some inserts for her shoes and cut out the area to take pressure off of the fibroma.  She will begin aggressive stretching and night splinting return for casting if needed.    2.  Bilateral metatarsalgia.  X-rays today show no significant degenerative changes or acute bony findings.  Patient reports that the forefoot pain is her biggest complaint at this time.  I explained to her that often people with plantar fasciitis develop forefoot pain secondary to altered gait.  I suspect that once her heel pain resolves, the forefoot pain will as well.  We did tried metatarsal pads in her shoes, however, they press on the  plantar fibromas and cause increased pain.  Plan is patient will continue naproxen.  I have given her a refill.  She will return to see us as needed.        .        Pily Ramon PA-C  06/28/19  10:31 AM  "

## 2019-07-29 ENCOUNTER — OFFICE VISIT (OUTPATIENT)
Dept: INTERNAL MEDICINE | Facility: CLINIC | Age: 54
End: 2019-07-29

## 2019-07-29 VITALS
WEIGHT: 200.6 LBS | HEART RATE: 106 BPM | HEIGHT: 65 IN | TEMPERATURE: 97.9 F | DIASTOLIC BLOOD PRESSURE: 74 MMHG | OXYGEN SATURATION: 99 % | SYSTOLIC BLOOD PRESSURE: 112 MMHG | BODY MASS INDEX: 33.42 KG/M2

## 2019-07-29 DIAGNOSIS — F32.A ANXIETY AND DEPRESSION: ICD-10-CM

## 2019-07-29 DIAGNOSIS — F41.9 ANXIETY AND DEPRESSION: ICD-10-CM

## 2019-07-29 DIAGNOSIS — I10 ESSENTIAL HYPERTENSION: ICD-10-CM

## 2019-07-29 DIAGNOSIS — R05.9 COUGH: ICD-10-CM

## 2019-07-29 DIAGNOSIS — J01.00 ACUTE MAXILLARY SINUSITIS, RECURRENCE NOT SPECIFIED: Primary | ICD-10-CM

## 2019-07-29 PROCEDURE — 99214 OFFICE O/P EST MOD 30 MIN: CPT | Performed by: FAMILY MEDICINE

## 2019-07-29 RX ORDER — DULOXETIN HYDROCHLORIDE 60 MG/1
60 CAPSULE, DELAYED RELEASE ORAL DAILY
Qty: 30 CAPSULE | Refills: 2 | Status: SHIPPED | OUTPATIENT
Start: 2019-07-29 | End: 2019-10-02 | Stop reason: DRUGHIGH

## 2019-07-29 RX ORDER — NAPROXEN 500 MG/1
500 TABLET ORAL 2 TIMES DAILY WITH MEALS
Qty: 60 TABLET | Refills: 2 | Status: SHIPPED | OUTPATIENT
Start: 2019-07-29 | End: 2019-10-02 | Stop reason: SDUPTHER

## 2019-07-29 RX ORDER — POTASSIUM CHLORIDE 750 MG/1
10 TABLET, FILM COATED, EXTENDED RELEASE ORAL DAILY
Qty: 30 TABLET | Refills: 2 | Status: SHIPPED | OUTPATIENT
Start: 2019-07-29 | End: 2019-10-21 | Stop reason: SDUPTHER

## 2019-07-29 RX ORDER — LOSARTAN POTASSIUM AND HYDROCHLOROTHIAZIDE 12.5; 5 MG/1; MG/1
1 TABLET ORAL DAILY
Qty: 30 TABLET | Refills: 2 | Status: SHIPPED | OUTPATIENT
Start: 2019-07-29 | End: 2019-10-02 | Stop reason: SINTOL

## 2019-07-29 RX ORDER — AMLODIPINE BESYLATE 5 MG/1
5 TABLET ORAL DAILY
Qty: 30 TABLET | Refills: 2 | Status: SHIPPED | OUTPATIENT
Start: 2019-07-29 | End: 2019-10-02

## 2019-07-29 RX ORDER — AZITHROMYCIN 250 MG/1
TABLET, FILM COATED ORAL
Qty: 6 TABLET | Refills: 0 | Status: SHIPPED | OUTPATIENT
Start: 2019-07-29 | End: 2019-10-02

## 2019-10-02 ENCOUNTER — OFFICE VISIT (OUTPATIENT)
Dept: INTERNAL MEDICINE | Facility: CLINIC | Age: 54
End: 2019-10-02

## 2019-10-02 VITALS
TEMPERATURE: 97 F | OXYGEN SATURATION: 99 % | SYSTOLIC BLOOD PRESSURE: 122 MMHG | BODY MASS INDEX: 34.16 KG/M2 | WEIGHT: 205 LBS | HEART RATE: 103 BPM | DIASTOLIC BLOOD PRESSURE: 78 MMHG | HEIGHT: 65 IN

## 2019-10-02 DIAGNOSIS — F32.A ANXIETY AND DEPRESSION: ICD-10-CM

## 2019-10-02 DIAGNOSIS — I10 ESSENTIAL HYPERTENSION: Primary | ICD-10-CM

## 2019-10-02 DIAGNOSIS — N95.1 MENOPAUSAL SYMPTOM: ICD-10-CM

## 2019-10-02 DIAGNOSIS — M25.50 ARTHRALGIA, UNSPECIFIED JOINT: ICD-10-CM

## 2019-10-02 DIAGNOSIS — Z91.09 SENSITIVITY TO SUNLIGHT: ICD-10-CM

## 2019-10-02 DIAGNOSIS — F41.9 ANXIETY AND DEPRESSION: ICD-10-CM

## 2019-10-02 DIAGNOSIS — E78.2 MIXED HYPERLIPIDEMIA: ICD-10-CM

## 2019-10-02 DIAGNOSIS — Z13.820 SCREENING FOR OSTEOPOROSIS: ICD-10-CM

## 2019-10-02 DIAGNOSIS — M79.10 MYALGIA: ICD-10-CM

## 2019-10-02 DIAGNOSIS — Z12.31 ENCOUNTER FOR SCREENING MAMMOGRAM FOR MALIGNANT NEOPLASM OF BREAST: ICD-10-CM

## 2019-10-02 PROCEDURE — 99214 OFFICE O/P EST MOD 30 MIN: CPT | Performed by: FAMILY MEDICINE

## 2019-10-02 RX ORDER — TIZANIDINE 4 MG/1
4 TABLET ORAL EVERY 8 HOURS PRN
Qty: 90 TABLET | Refills: 2 | Status: SHIPPED | OUTPATIENT
Start: 2019-10-02 | End: 2019-12-10 | Stop reason: SDUPTHER

## 2019-10-02 RX ORDER — ESTRADIOL 2 MG/1
2 TABLET ORAL DAILY
Qty: 30 TABLET | Refills: 12 | Status: SHIPPED | OUTPATIENT
Start: 2019-10-02 | End: 2019-10-02 | Stop reason: SDUPTHER

## 2019-10-02 RX ORDER — LISINOPRIL AND HYDROCHLOROTHIAZIDE 25; 20 MG/1; MG/1
1 TABLET ORAL DAILY
Qty: 90 TABLET | Refills: 1 | Status: SHIPPED | OUTPATIENT
Start: 2019-10-02 | End: 2019-10-02 | Stop reason: SDUPTHER

## 2019-10-02 RX ORDER — DICYCLOMINE HCL 20 MG
20 TABLET ORAL 3 TIMES DAILY PRN
Qty: 90 TABLET | Refills: 0 | Status: SHIPPED | OUTPATIENT
Start: 2019-10-02 | End: 2020-10-26

## 2019-10-02 RX ORDER — DULOXETIN HYDROCHLORIDE 30 MG/1
30 CAPSULE, DELAYED RELEASE ORAL DAILY
Qty: 30 CAPSULE | Refills: 1 | Status: SHIPPED | OUTPATIENT
Start: 2019-10-02 | End: 2019-10-02 | Stop reason: SDUPTHER

## 2019-10-02 RX ORDER — ESTRADIOL 2 MG/1
2 TABLET ORAL DAILY
Qty: 30 TABLET | Refills: 12 | Status: SHIPPED | OUTPATIENT
Start: 2019-10-02 | End: 2020-10-23 | Stop reason: SDUPTHER

## 2019-10-02 RX ORDER — LISINOPRIL AND HYDROCHLOROTHIAZIDE 25; 20 MG/1; MG/1
1 TABLET ORAL DAILY
Qty: 90 TABLET | Refills: 1 | Status: SHIPPED | OUTPATIENT
Start: 2019-10-02 | End: 2020-05-27 | Stop reason: SDUPTHER

## 2019-10-02 RX ORDER — ATORVASTATIN CALCIUM 10 MG/1
10 TABLET, FILM COATED ORAL DAILY
Qty: 30 TABLET | Refills: 3 | Status: SHIPPED | OUTPATIENT
Start: 2019-10-02 | End: 2020-02-25 | Stop reason: SINTOL

## 2019-10-02 RX ORDER — DULOXETIN HYDROCHLORIDE 30 MG/1
30 CAPSULE, DELAYED RELEASE ORAL DAILY
Qty: 30 CAPSULE | Refills: 1 | Status: SHIPPED | OUTPATIENT
Start: 2019-10-02 | End: 2019-10-21 | Stop reason: SDDI

## 2019-10-02 RX ORDER — NAPROXEN 500 MG/1
500 TABLET ORAL 2 TIMES DAILY WITH MEALS
Qty: 60 TABLET | Refills: 2 | Status: SHIPPED | OUTPATIENT
Start: 2019-10-02 | End: 2019-12-10 | Stop reason: SDUPTHER

## 2019-10-02 RX ORDER — TIZANIDINE 4 MG/1
4 TABLET ORAL EVERY 8 HOURS PRN
Qty: 90 TABLET | Refills: 2 | Status: SHIPPED | OUTPATIENT
Start: 2019-10-02 | End: 2019-10-02 | Stop reason: SDUPTHER

## 2019-10-02 NOTE — PROGRESS NOTES
"Subjective   Lziette Keith is a 54 y.o. female.     Chief Complaint   Patient presents with   • Hypertension     can't take the losartan-hctz gave her a lot of chest chava   • Excessive Sweating     always sweating a lot even when it's cold at work.   • Rash     when she gets hot or even in the sun she gets hives on her arms only, denies itching       Visit Vitals  /78 (BP Location: Right arm, Cuff Size: Large Adult)   Pulse 103   Temp 97 °F (36.1 °C)   Ht 165 cm (64.96\")   Wt 93 kg (205 lb)   SpO2 99%   BMI 34.16 kg/m²         History of Present Illness   She needs refills of blood pressure medication.  Patient has stopped the losartan HCTZ.  Pt changed back to lisinopril/hctz when she had severe congestion in sinuses.   Has good control of blood pressure with lisinopril HCTZ    Pt feels that she has severe sun sensitivity and sweating since being on cymbalta. Pt is drenching clothes. Pt gets rash on her arms. Pt does not tolerate the heat. Pt gets nausea. Pt has been on cymbalta for over a yr.   Anxiety and depression are currently stable.  Pt has no social life because of the sweats.     Pt has nodules on her feet and feels that the naprosyn helping that more than the cymbalta.  He was originally started on Cymbalta by her podiatrist Dr. Rahman.    Pt has not been getting enough sleep recently. Pt works night shift.  Pt has fibromyalgia and she gets flare and nerve pain without sufficient sleep.   Pt needs refill of tizanidine for her fibromyalgia.    Mother has melanoma eye and pt has to take mom to her appts.     Discussed restarting lipid meds. Pt does not think that she has tried lipitor.  Pt did have muscle aches from pravastatin.   The following portions of the patient's history were reviewed and updated as appropriate: allergies, current medications, past family history, past medical history, past social history, past surgical history and problem list.    Past Medical History:   Diagnosis Date   • " Allergic    • Anemia     during pregnancy   • Anxiety    • Arthritis    • Blood in urine    • Chronic fatigue    • Colon polyp 02/2018    7 at last colonoscopy   • Depression    • Elevated cholesterol    • Endometriosis    • Fibromyalgia    • Fibromyalgia, primary    • Fracture    • GERD (gastroesophageal reflux disease)    • H/O mammogram 02/2018   • HL (hearing loss)    • Hypertension    • Irritable bowel syndrome    • Pap smear for cervical cancer screening 2018?      Past Surgical History:   Procedure Laterality Date   • CHOLECYSTECTOMY     • COLONOSCOPY  02/06/2018    3 year f/u polypectomy Dr MARTA Waller   • GALLBLADDER SURGERY     • HYSTERECTOMY     • INNER EAR SURGERY     • OOPHORECTOMY     • TEMPOROMANDIBULAR JOINT ARTHROPLASTY  1984      Family History   Problem Relation Age of Onset   • Melanoma Mother         eye   • Diabetes Mother    • Heart disease Mother    • Arthritis Mother    • Depression Mother    • Colon polyps Mother    • Thyroid disease Mother    • Hyperlipidemia Mother    • Cancer Mother    • Heart attack Mother    • Migraines Mother    • Cancer Father         pancreatic and liver   • Diabetes Father    • Depression Father    • Colon polyps Father    • ADD / ADHD Son    • Diabetes Maternal Aunt    • Diabetes Maternal Uncle    • Diabetes Paternal Aunt    • Diabetes Paternal Uncle    • Cancer Maternal Grandmother         uterine/cervical   • Heart disease Maternal Grandfather    • Heart disease Paternal Grandfather    • Migraines Sister    • Breast cancer Neg Hx    • Ovarian cancer Neg Hx       Social History     Socioeconomic History   • Marital status:      Spouse name: Not on file   • Number of children: Not on file   • Years of education: Not on file   • Highest education level: Not on file   Tobacco Use   • Smoking status: Current Every Day Smoker     Packs/day: 0.75     Years: 34.00     Pack years: 25.50     Types: Cigarettes   • Smokeless tobacco: Never Used   Substance and Sexual  Activity   • Alcohol use: No   • Drug use: No   • Sexual activity: Not Currently     Birth control/protection: None      Allergies   Allergen Reactions   • Erythromycin GI Intolerance     Tolerates zpak   • Penicillins Hives   • Statins Myalgia       Review of Systems   Constitutional: Positive for fatigue. Negative for chills, diaphoresis and fever.   HENT: Positive for sinus pressure. Negative for ear pain, nosebleeds, postnasal drip, rhinorrhea, sinus pain, sneezing and sore throat.    Eyes: Negative.  Negative for redness and itching.   Respiratory: Negative.  Negative for cough, shortness of breath and wheezing.    Cardiovascular: Negative.  Negative for chest pain and palpitations.   Gastrointestinal: Negative.  Negative for abdominal pain, constipation, diarrhea, nausea and vomiting.   Endocrine: Positive for heat intolerance. Negative for cold intolerance.   Genitourinary: Negative.  Negative for dysuria, frequency, hematuria and urgency.   Musculoskeletal: Positive for myalgias. Negative for arthralgias, back pain and neck pain.   Skin: Positive for rash. Negative for color change.   Allergic/Immunologic: Positive for environmental allergies.   Neurological: Negative.  Negative for dizziness, syncope, light-headedness and headaches.   Hematological: Negative.  Negative for adenopathy. Does not bruise/bleed easily.   Psychiatric/Behavioral: Negative.  Negative for dysphoric mood. The patient is not nervous/anxious.         Anxiety and depression are stable       Objective   Physical Exam   Constitutional: She is oriented to person, place, and time. She appears well-developed.   HENT:   Head: Normocephalic.   Right Ear: External ear normal.   Left Ear: External ear normal.   Nose: Nose normal.   Eyes: Conjunctivae, EOM and lids are normal. Pupils are equal, round, and reactive to light.   Neck: Trachea normal and normal range of motion. Neck supple. Carotid bruit is not present. No thyroid mass and no  thyromegaly present.   Cardiovascular: Normal rate and regular rhythm.   No murmur heard.  Pulmonary/Chest: Effort normal and breath sounds normal. No respiratory distress. She has no decreased breath sounds. She has no wheezes. She has no rhonchi. She has no rales. She exhibits no tenderness.   Abdominal: Soft. Bowel sounds are normal. There is no tenderness.   Musculoskeletal: Normal range of motion.   Neurological: She is alert and oriented to person, place, and time.   Skin: Skin is warm and dry. There is erythema.        Psychiatric: She has a normal mood and affect. Her behavior is normal.   Nursing note and vitals reviewed.      Assessment/Plan   Lizette was seen today for hypertension, excessive sweating and rash.    Diagnoses and all orders for this visit:    Essential hypertension  -     Discontinue: lisinopril-hydrochlorothiazide (PRINZIDE,ZESTORETIC) 20-25 MG per tablet; Take 1 tablet by mouth Daily.  -     Comprehensive Metabolic Panel  -     Lipid Panel  -     lisinopril-hydrochlorothiazide (PRINZIDE,ZESTORETIC) 20-25 MG per tablet; Take 1 tablet by mouth Daily.    Mixed hyperlipidemia  -     Comprehensive Metabolic Panel  -     Lipid Panel  -     atorvastatin (LIPITOR) 10 MG tablet; Take 1 tablet by mouth Daily.    Sensitivity to sunlight  -     Nuclear Antigen Antibody, IFA  -     Sedimentation Rate  -     C-reactive Protein    Anxiety and depression  -     Discontinue: DULoxetine (CYMBALTA) 30 MG capsule; Take 1 capsule by mouth Daily.  -     DULoxetine (CYMBALTA) 30 MG capsule; Take 1 capsule by mouth Daily.    Arthralgia, unspecified joint  -     Discontinue: tiZANidine (ZANAFLEX) 4 MG tablet; Take 1 tablet by mouth Every 8 (Eight) Hours As Needed for Muscle Spasms.  -     tiZANidine (ZANAFLEX) 4 MG tablet; Take 1 tablet by mouth Every 8 (Eight) Hours As Needed for Muscle Spasms.    Myalgia  -     Discontinue: tiZANidine (ZANAFLEX) 4 MG tablet; Take 1 tablet by mouth Every 8 (Eight) Hours As  Needed for Muscle Spasms.  -     tiZANidine (ZANAFLEX) 4 MG tablet; Take 1 tablet by mouth Every 8 (Eight) Hours As Needed for Muscle Spasms.  -     CK    Menopausal symptom  -     Discontinue: estradiol (ESTRACE) 2 MG tablet; Take 1 tablet by mouth Daily.  -     estradiol (ESTRACE) 2 MG tablet; Take 1 tablet by mouth Daily.    Encounter for screening mammogram for malignant neoplasm of breast  -     Mammo Screening Digital Tomosynthesis Bilateral With CAD; Future    Screening for osteoporosis  -     DEXA Bone Density Axial; Future    Other orders  -     naproxen (NAPROSYN) 500 MG tablet; Take 1 tablet by mouth 2 (Two) Times a Day With Meals.  -     dicyclomine (BENTYL) 20 MG tablet; Take 1 tablet by mouth 3 (Three) Times a Day As Needed (IBS- diarrhea).        Will check with [facility] for vaccine dates           Current Outpatient Medications:   •  aspirin 325 MG tablet, Take 325 mg by mouth Daily., Disp: , Rfl:   •  dicyclomine (BENTYL) 20 MG tablet, Take 1 tablet by mouth 3 (Three) Times a Day As Needed (IBS- diarrhea)., Disp: 90 tablet, Rfl: 0  •  estradiol (ESTRACE) 2 MG tablet, Take 1 tablet by mouth Daily., Disp: 30 tablet, Rfl: 12  •  naproxen (NAPROSYN) 500 MG tablet, Take 1 tablet by mouth 2 (Two) Times a Day With Meals., Disp: 60 tablet, Rfl: 2  •  potassium chloride (KLOR-CON) 10 MEQ CR tablet, Take 1 tablet by mouth Daily. For potassium, Disp: 30 tablet, Rfl: 2  •  tiZANidine (ZANAFLEX) 4 MG tablet, Take 1 tablet by mouth Every 8 (Eight) Hours As Needed for Muscle Spasms., Disp: 90 tablet, Rfl: 2  •  atorvastatin (LIPITOR) 10 MG tablet, Take 1 tablet by mouth Daily., Disp: 30 tablet, Rfl: 3  •  cetirizine (ZyrTEC) 10 MG tablet, Take 10 mg by mouth daily., Disp: , Rfl:   •  DULoxetine (CYMBALTA) 30 MG capsule, Take 1 capsule by mouth Daily., Disp: 30 capsule, Rfl: 1  •  lisinopril-hydrochlorothiazide (PRINZIDE,ZESTORETIC) 20-25 MG per tablet, Take 1 tablet by mouth Daily., Disp: 90 tablet, Rfl:  1    Return in about 3 weeks (around 10/23/2019), or if symptoms worsen or fail to improve, for Recheck.

## 2019-10-03 LAB
ALBUMIN SERPL-MCNC: 4.4 G/DL (ref 3.5–5.2)
ALBUMIN/GLOB SERPL: 1.7 G/DL
ALP SERPL-CCNC: 121 U/L (ref 39–117)
ALT SERPL-CCNC: 10 U/L (ref 1–33)
AST SERPL-CCNC: 11 U/L (ref 1–32)
BILIRUB SERPL-MCNC: 0.3 MG/DL (ref 0.2–1.2)
BUN SERPL-MCNC: 12 MG/DL (ref 6–20)
BUN/CREAT SERPL: 20.7 (ref 7–25)
CALCIUM SERPL-MCNC: 9.6 MG/DL (ref 8.6–10.5)
CHLORIDE SERPL-SCNC: 98 MMOL/L (ref 98–107)
CHOLEST SERPL-MCNC: 251 MG/DL (ref 0–200)
CK SERPL-CCNC: 38 U/L (ref 20–180)
CO2 SERPL-SCNC: 26.9 MMOL/L (ref 22–29)
CREAT SERPL-MCNC: 0.58 MG/DL (ref 0.57–1)
CRP SERPL-MCNC: 1.64 MG/DL (ref 0–0.5)
ERYTHROCYTE [SEDIMENTATION RATE] IN BLOOD BY WESTERGREN METHOD: 17 MM/HR (ref 0–30)
GLOBULIN SER CALC-MCNC: 2.6 GM/DL
GLUCOSE SERPL-MCNC: 106 MG/DL (ref 65–99)
HDLC SERPL-MCNC: 46 MG/DL (ref 40–60)
LDLC SERPL CALC-MCNC: 157 MG/DL (ref 0–100)
POTASSIUM SERPL-SCNC: 3.9 MMOL/L (ref 3.5–5.2)
PROT SERPL-MCNC: 7 G/DL (ref 6–8.5)
SODIUM SERPL-SCNC: 143 MMOL/L (ref 136–145)
TRIGL SERPL-MCNC: 241 MG/DL (ref 0–150)
VLDLC SERPL CALC-MCNC: 48.2 MG/DL

## 2019-10-21 ENCOUNTER — OFFICE VISIT (OUTPATIENT)
Dept: INTERNAL MEDICINE | Facility: CLINIC | Age: 54
End: 2019-10-21

## 2019-10-21 VITALS
BODY MASS INDEX: 34.22 KG/M2 | OXYGEN SATURATION: 99 % | HEART RATE: 103 BPM | HEIGHT: 65 IN | DIASTOLIC BLOOD PRESSURE: 92 MMHG | WEIGHT: 205.4 LBS | TEMPERATURE: 98.6 F | SYSTOLIC BLOOD PRESSURE: 138 MMHG

## 2019-10-21 DIAGNOSIS — J01.00 SUBACUTE MAXILLARY SINUSITIS: ICD-10-CM

## 2019-10-21 DIAGNOSIS — R05.9 COUGH: ICD-10-CM

## 2019-10-21 DIAGNOSIS — E78.2 MIXED HYPERLIPIDEMIA: ICD-10-CM

## 2019-10-21 DIAGNOSIS — J30.2 SEASONAL ALLERGIES: Primary | ICD-10-CM

## 2019-10-21 PROCEDURE — 99213 OFFICE O/P EST LOW 20 MIN: CPT | Performed by: FAMILY MEDICINE

## 2019-10-21 RX ORDER — POTASSIUM CHLORIDE 750 MG/1
10 TABLET, FILM COATED, EXTENDED RELEASE ORAL DAILY
Qty: 30 TABLET | Refills: 2 | Status: SHIPPED | OUTPATIENT
Start: 2019-10-21 | End: 2019-12-10 | Stop reason: SDUPTHER

## 2019-10-21 RX ORDER — MONTELUKAST SODIUM 10 MG/1
10 TABLET ORAL NIGHTLY
Qty: 30 TABLET | Refills: 6 | Status: SHIPPED | OUTPATIENT
Start: 2019-10-21 | End: 2020-05-27 | Stop reason: SDUPTHER

## 2019-10-21 RX ORDER — AZITHROMYCIN 250 MG/1
TABLET, FILM COATED ORAL
Qty: 6 TABLET | Refills: 0 | Status: SHIPPED | OUTPATIENT
Start: 2019-10-21 | End: 2019-12-03

## 2019-10-21 RX ORDER — FLUTICASONE PROPIONATE 50 MCG
2 SPRAY, SUSPENSION (ML) NASAL DAILY
Qty: 16 G | Refills: 5 | Status: SHIPPED | OUTPATIENT
Start: 2019-10-21 | End: 2020-05-27

## 2019-10-21 NOTE — PROGRESS NOTES
"Subjective   Lizette Keith is a 54 y.o. female.     Chief Complaint   Patient presents with   • Rash     f/u. After stopping the cymbalta her rash has completely clear.        Visit Vitals  /92 (BP Location: Left arm, Cuff Size: Adult)   Pulse 103   Temp 98.6 °F (37 °C)   Ht 165 cm (64.96\")   Wt 93.2 kg (205 lb 6.4 oz)   SpO2 99%   BMI 34.22 kg/m²         URI    This is a chronic problem. The current episode started more than 1 month ago. The problem has been unchanged. There has been no fever. Associated symptoms include congestion, coughing (pt attributes to postnasal drip), rhinorrhea, sinus pain and sneezing. Pertinent negatives include no abdominal pain, chest pain, diarrhea, dysuria, ear pain, headaches, joint pain, joint swelling, nausea, neck pain, plugged ear sensation, rash, sore throat, swollen glands, vomiting or wheezing. She has tried decongestant (cough syrup) for the symptoms. The treatment provided no relief.      Pt has cough for 2.5 moths. Cough is nonproductive. Pt has some wheezing with cough.   Pt is smoking 3/4 ppd. Pt attributes elevated BP to cough medication because of sudafed. Cough started before lisinopril.     Rash has resolved since off of cymbalta. Sweating has resolved since off the cymbalta.  Pt still has occasional hot flashes from menopause.     Pt has more pain in her feet from tumors in feet since off of cybalta. Pt cannot tolerate gabapentin because of stomach pain and diarrhea.     Pt's last dose of lisinopril was 7 pm yesterday.     Pt not tolerating statins. Pt felt like she had the flu.     The following portions of the patient's history were reviewed and updated as appropriate: allergies, current medications, past family history, past medical history, past social history, past surgical history and problem list.    Past Medical History:   Diagnosis Date   • Allergic    • Anemia     during pregnancy   • Anxiety    • Arthritis    • Blood in urine    • Chronic fatigue "    • Colon polyp 02/2018    7 at last colonoscopy   • Depression    • Elevated cholesterol    • Endometriosis    • Fibromyalgia    • Fibromyalgia, primary    • Fracture    • GERD (gastroesophageal reflux disease)    • H/O mammogram 02/2018   • HL (hearing loss)    • Hypertension    • Irritable bowel syndrome    • Pap smear for cervical cancer screening 2018?      Past Surgical History:   Procedure Laterality Date   • CHOLECYSTECTOMY     • COLONOSCOPY  02/06/2018    3 year f/u polypectomy Dr MARTA Waller   • GALLBLADDER SURGERY     • HYSTERECTOMY     • INNER EAR SURGERY     • OOPHORECTOMY     • TEMPOROMANDIBULAR JOINT ARTHROPLASTY  1984      Family History   Problem Relation Age of Onset   • Melanoma Mother         eye   • Diabetes Mother    • Heart disease Mother    • Arthritis Mother    • Depression Mother    • Colon polyps Mother    • Thyroid disease Mother    • Hyperlipidemia Mother    • Cancer Mother    • Heart attack Mother    • Migraines Mother    • Cancer Father         pancreatic and liver   • Diabetes Father    • Depression Father    • Colon polyps Father    • ADD / ADHD Son    • Diabetes Maternal Aunt    • Diabetes Maternal Uncle    • Diabetes Paternal Aunt    • Diabetes Paternal Uncle    • Cancer Maternal Grandmother         uterine/cervical   • Heart disease Maternal Grandfather    • Heart disease Paternal Grandfather    • Migraines Sister    • Breast cancer Neg Hx    • Ovarian cancer Neg Hx       Social History     Socioeconomic History   • Marital status:      Spouse name: Not on file   • Number of children: Not on file   • Years of education: Not on file   • Highest education level: Not on file   Tobacco Use   • Smoking status: Current Every Day Smoker     Packs/day: 0.75     Years: 34.00     Pack years: 25.50     Types: Cigarettes   • Smokeless tobacco: Never Used   Substance and Sexual Activity   • Alcohol use: No   • Drug use: No   • Sexual activity: Not Currently     Birth control/protection:  None      Allergies   Allergen Reactions   • Statins Myalgia   • Gabapentin Diarrhea and GI Intolerance   • Erythromycin GI Intolerance     Tolerates zpak   • Penicillins Hives   • Cymbalta [Duloxetine Hcl] Rash       Review of Systems   Constitutional: Negative.  Negative for chills, diaphoresis, fatigue and fever.   HENT: Positive for congestion, postnasal drip, rhinorrhea, sinus pressure, sinus pain and sneezing. Negative for ear pain, nosebleeds and sore throat.    Eyes: Negative.  Negative for redness and itching.   Respiratory: Positive for cough (pt attributes to postnasal drip). Negative for shortness of breath and wheezing.    Cardiovascular: Negative.  Negative for chest pain and palpitations.   Gastrointestinal: Negative.  Negative for abdominal pain, constipation, diarrhea, nausea and vomiting.   Endocrine: Negative.  Negative for cold intolerance and heat intolerance.   Genitourinary: Negative.  Negative for dysuria, frequency, hematuria and urgency.   Musculoskeletal: Negative.  Negative for arthralgias, back pain, joint pain and neck pain.   Skin: Negative.  Negative for color change and rash.   Allergic/Immunologic: Positive for environmental allergies.   Neurological: Negative.  Negative for dizziness, syncope, light-headedness and headaches.   Hematological: Negative.  Negative for adenopathy. Does not bruise/bleed easily.   Psychiatric/Behavioral: Negative.  Negative for dysphoric mood. The patient is not nervous/anxious.        Objective   Physical Exam   Constitutional: She is oriented to person, place, and time. She appears well-developed.   HENT:   Head: Normocephalic.   Right Ear: Tympanic membrane, external ear and ear canal normal.   Left Ear: Tympanic membrane, external ear and ear canal normal.   Nose: Rhinorrhea present. Right sinus exhibits maxillary sinus tenderness and frontal sinus tenderness. Left sinus exhibits maxillary sinus tenderness and frontal sinus tenderness.    Mouth/Throat: Uvula is midline, oropharynx is clear and moist and mucous membranes are normal. Normal dentition. No oropharyngeal exudate, posterior oropharyngeal edema or posterior oropharyngeal erythema.   Eyes: Conjunctivae, EOM and lids are normal. Pupils are equal, round, and reactive to light.   Neck: Trachea normal and normal range of motion. Neck supple. Carotid bruit is not present. No thyroid mass and no thyromegaly present.   Cardiovascular: Normal rate and regular rhythm.   No murmur heard.  Pulmonary/Chest: Effort normal and breath sounds normal. No respiratory distress. She has no decreased breath sounds. She has no wheezes. She has no rhonchi. She has no rales. She exhibits no tenderness.   Abdominal: Soft. Bowel sounds are normal. There is no tenderness.   Musculoskeletal: Normal range of motion.   Neurological: She is alert and oriented to person, place, and time.   Skin: Skin is warm and dry.   Psychiatric: She has a normal mood and affect. Her behavior is normal.   Nursing note and vitals reviewed.      Assessment/Plan   Lizette was seen today for rash.    Diagnoses and all orders for this visit:    Seasonal allergies  -     montelukast (SINGULAIR) 10 MG tablet; Take 1 tablet by mouth Every Night.  -     fluticasone (FLONASE) 50 MCG/ACT nasal spray; 2 sprays into the nostril(s) as directed by provider Daily.    Cough  -     XR Chest PA & Lateral    Subacute maxillary sinusitis    Mixed hyperlipidemia  -     Homocysteine    Other orders  -     azithromycin (ZITHROMAX Z-DEBRA) 250 MG tablet; Take 2 tablets the first day, then 1 tablet daily for 4 days.  -     potassium chloride (KLOR-CON) 10 MEQ CR tablet; Take 1 tablet by mouth Daily. For potassium                   Current Outpatient Medications:   •  aspirin 325 MG tablet, Take 325 mg by mouth Daily., Disp: , Rfl:   •  cetirizine (ZyrTEC) 10 MG tablet, Take 10 mg by mouth daily., Disp: , Rfl:   •  dicyclomine (BENTYL) 20 MG tablet, Take 1 tablet  by mouth 3 (Three) Times a Day As Needed (IBS- diarrhea)., Disp: 90 tablet, Rfl: 0  •  estradiol (ESTRACE) 2 MG tablet, Take 1 tablet by mouth Daily., Disp: 30 tablet, Rfl: 12  •  lisinopril-hydrochlorothiazide (PRINZIDE,ZESTORETIC) 20-25 MG per tablet, Take 1 tablet by mouth Daily., Disp: 90 tablet, Rfl: 1  •  naproxen (NAPROSYN) 500 MG tablet, Take 1 tablet by mouth 2 (Two) Times a Day With Meals., Disp: 60 tablet, Rfl: 2  •  potassium chloride (KLOR-CON) 10 MEQ CR tablet, Take 1 tablet by mouth Daily. For potassium, Disp: 30 tablet, Rfl: 2  •  tiZANidine (ZANAFLEX) 4 MG tablet, Take 1 tablet by mouth Every 8 (Eight) Hours As Needed for Muscle Spasms., Disp: 90 tablet, Rfl: 2  •  atorvastatin (LIPITOR) 10 MG tablet, Take 1 tablet by mouth Daily., Disp: 30 tablet, Rfl: 3  •  azithromycin (ZITHROMAX Z-DEBRA) 250 MG tablet, Take 2 tablets the first day, then 1 tablet daily for 4 days., Disp: 6 tablet, Rfl: 0  •  fluticasone (FLONASE) 50 MCG/ACT nasal spray, 2 sprays into the nostril(s) as directed by provider Daily., Disp: 16 g, Rfl: 5  •  montelukast (SINGULAIR) 10 MG tablet, Take 1 tablet by mouth Every Night., Disp: 30 tablet, Rfl: 6    Return in about 3 months (around 1/21/2020), or if symptoms worsen or fail to improve, for Recheck BP 2 weeks with nurse.

## 2019-10-21 NOTE — PATIENT INSTRUCTIONS
Homocysteine Test  Why am I having this test?  The homocysteine test may be done for several reasons. You may have this test if:  · You are found to have hardening of your coronary arteries but have no other known risk factors for cardiovascular disease.  · You have a strong family history of heart or blood vessel disease at a very young age.  · You have a deficiency of vitamins B6, B12, or folate. In people with these vitamin deficiencies, the homocysteine blood test may be performed to help check for malnutrition.  The test may also be done for an infant or child if there is concern that he or she was born with a condition that causes elevated levels of homocysteine.  What is being tested?  This test measures how much homocysteine is in your blood. Homocysteine is an amino acid that is made when certain proteins are broken down (metabolized). A high level of homocysteine may put you at risk for heart disease and blood vessel disease throughout your body. The test may involve repeated blood tests over 2-24 hours to determine homocysteine levels over time. It may also involve having your blood tested before and after drinking juice that is mixed with another amino acid called methionine.  What kind of sample is taken?    A blood sample is required for this test. It is usually collected by inserting a needle into a blood vessel.  How do I prepare for this test?  Do not eat or drink anything for 10-12 hours before the test, or as directed by your health care provider.  Tell a health care provider about:  · Any medical conditions you have, especially if you have kidney disease. If you have kidney disease, ask if it is safe for you to have this test.  · Whether you have a low intake of B vitamins.  · Whether you smoke.  How are the results reported?  Your test results will be reported as a value that indicates how much homocysteine is in your blood. This will be given as micromoles of homocysteine per liter of blood  (?mol/L). Your health care provider will compare your results to normal ranges that were established after testing a large group of people (reference ranges). Reference ranges may vary among labs and hospitals. For this test, common reference ranges are:  · Normal: less than 12 ?mol/L.  · Borderline high: 12-15 ?mol/L.  Homocysteine levels normally increase with age. Men often have higher homocysteine levels than women.  What do the results mean?  A homocysteine level that is higher than 15 ?mol/L may indicate that you have an increased risk for cardiovascular disease, cerebrovascular disease, and peripheral vascular disease. This can increase your risk for:  · Heart attack.  · Stroke.  · Problems with blood flow to the feet and legs. This may cause ulcers or sores that take longer to heal.  High levels of homocysteine may also indicate:  · Vitamin B6 or B12 deficiency.  · Folate deficiency.  · Malnutrition.  Talk with your health care provider about what your results mean.  Questions to ask your health care provider  Ask your health care provider, or the department that is doing the test:  · When will my results be ready?  · How will I get my results?  · What are my treatment options?  · What other tests do I need?  · What are my next steps?  Summary  · A homocysteine test may be done if you are found to have hardening arteries or have symptoms of coronary disease at a very young age. It may also be done to monitor for malnutrition if you have a vitamin B6 or B12 deficiency.  · A high level of homocysteine may put you at increased risk for cardiovascular disease, cerebrovascular disease, and peripheral vascular disease.  · Talk with your health care provider about what your results mean.  This information is not intended to replace advice given to you by your health care provider. Make sure you discuss any questions you have with your health care provider.  Document Released: 01/20/2006 Document Revised: 09/11/2018  Document Reviewed: 09/11/2018  [employer] Interactive Patient Education © 2019 [employer]

## 2019-10-22 LAB — HCYS SERPL-SCNC: 8.1 UMOL/L (ref 0–15)

## 2019-12-03 ENCOUNTER — OFFICE VISIT (OUTPATIENT)
Dept: INTERNAL MEDICINE | Facility: CLINIC | Age: 54
End: 2019-12-03

## 2019-12-03 VITALS
SYSTOLIC BLOOD PRESSURE: 106 MMHG | HEART RATE: 103 BPM | WEIGHT: 202.8 LBS | TEMPERATURE: 97.5 F | HEIGHT: 65 IN | OXYGEN SATURATION: 99 % | DIASTOLIC BLOOD PRESSURE: 74 MMHG | BODY MASS INDEX: 33.79 KG/M2

## 2019-12-03 DIAGNOSIS — M54.41 ACUTE MIDLINE LOW BACK PAIN WITH RIGHT-SIDED SCIATICA: Primary | ICD-10-CM

## 2019-12-03 DIAGNOSIS — Z72.0 TOBACCO USE: ICD-10-CM

## 2019-12-03 DIAGNOSIS — E66.09 CLASS 1 OBESITY DUE TO EXCESS CALORIES WITH SERIOUS COMORBIDITY AND BODY MASS INDEX (BMI) OF 33.0 TO 33.9 IN ADULT: ICD-10-CM

## 2019-12-03 PROCEDURE — 96372 THER/PROPH/DIAG INJ SC/IM: CPT | Performed by: NURSE PRACTITIONER

## 2019-12-03 PROCEDURE — 99214 OFFICE O/P EST MOD 30 MIN: CPT | Performed by: NURSE PRACTITIONER

## 2019-12-03 RX ORDER — KETOROLAC TROMETHAMINE 30 MG/ML
60 INJECTION, SOLUTION INTRAMUSCULAR; INTRAVENOUS ONCE
Status: COMPLETED | OUTPATIENT
Start: 2019-12-03 | End: 2019-12-03

## 2019-12-03 RX ORDER — METHYLPREDNISOLONE 4 MG/1
TABLET ORAL
Qty: 21 TABLET | Refills: 0 | Status: SHIPPED | OUTPATIENT
Start: 2019-12-03 | End: 2019-12-08

## 2019-12-03 RX ADMIN — KETOROLAC TROMETHAMINE 60 MG: 30 INJECTION, SOLUTION INTRAMUSCULAR; INTRAVENOUS at 09:47

## 2019-12-03 NOTE — PATIENT INSTRUCTIONS
For more information:    Quit Now [place]  1-800-QUIT-NOW  https://[place].quitlogix.org/en-[place]/  Steps to Quit Smoking  Smoking tobacco can be harmful to your health and can affect almost every organ in your body. Smoking puts you, and those around you, at risk for developing many serious chronic diseases. Quitting smoking is difficult, but it is one of the best things that you can do for your health. It is never too late to quit.  What are the benefits of quitting smoking?  When you quit smoking, you lower your risk of developing serious diseases and conditions, such as:  · Lung cancer or lung disease, such as COPD.  · Heart disease.  · Stroke.  · Heart attack.  · Infertility.  · Osteoporosis and bone fractures.  Additionally, symptoms such as coughing, wheezing, and shortness of breath may get better when you quit. You may also find that you get sick less often because your body is stronger at fighting off colds and infections. If you are pregnant, quitting smoking can help to reduce your chances of having a baby of low birth weight.  How do I get ready to quit?  When you decide to quit smoking, create a plan to make sure that you are successful. Before you quit:  · Pick a date to quit. Set a date within the next two weeks to give you time to prepare.  · Write down the reasons why you are quitting. Keep this list in places where you will see it often, such as on your bathroom mirror or in your car or wallet.  · Identify the people, places, things, and activities that make you want to smoke (triggers) and avoid them. Make sure to take these actions:  ¨ Throw away all cigarettes at home, at work, and in your car.  ¨ Throw away smoking accessories, such as ashtrays and lighters.  ¨ Clean your car and make sure to empty the ashtray.  ¨ Clean your home, including curtains and carpets.  · Tell your family, friends, and coworkers that you are quitting. Support from your loved ones can make quitting easier.  · Talk with  your health care provider about your options for quitting smoking.  · Find out what treatment options are covered by your health insurance.  What strategies can I use to quit smoking?  Talk with your healthcare provider about different strategies to quit smoking. Some strategies include:  · Quitting smoking altogether instead of gradually lessening how much you smoke over a period of time. Research shows that quitting “cold turkey” is more successful than gradually quitting.  · Attending in-person counseling to help you build problem-solving skills. You are more likely to have success in quitting if you attend several counseling sessions. Even short sessions of 10 minutes can be effective.  · Finding resources and support systems that can help you to quit smoking and remain smoke-free after you quit. These resources are most helpful when you use them often. They can include:  ¨ Online chats with a counselor.  ¨ Telephone quitlines.  ¨ Printed self-help materials.  ¨ Support groups or group counseling.  ¨ Text messaging programs.  ¨ Mobile phone applications.  · Taking medicines to help you quit smoking. (If you are pregnant or breastfeeding, talk with your health care provider first.) Some medicines contain nicotine and some do not. Both types of medicines help with cravings, but the medicines that include nicotine help to relieve withdrawal symptoms. Your health care provider may recommend:  ¨ Nicotine patches, gum, or lozenges.  ¨ Nicotine inhalers or sprays.  ¨ Non-nicotine medicine that is taken by mouth.  Talk with your health care provider about combining strategies, such as taking medicines while you are also receiving in-person counseling. Using these two strategies together makes you more likely to succeed in quitting than if you used either strategy on its own.  If you are pregnant or breastfeeding, talk with your health care provider about finding counseling or other support strategies to quit smoking. Do  not take medicine to help you quit smoking unless told to do so by your health care provider.  What things can I do to make it easier to quit?  Quitting smoking might feel overwhelming at first, but there is a lot that you can do to make it easier. Take these important actions:  · Reach out to your family and friends and ask that they support and encourage you during this time. Call telephone quitlines, reach out to support groups, or work with a counselor for support.  · Ask people who smoke to avoid smoking around you.  · Avoid places that trigger you to smoke, such as bars, parties, or smoke-break areas at work.  · Spend time around people who do not smoke.  · Lessen stress in your life, because stress can be a smoking trigger for some people. To lessen stress, try:  ¨ Exercising regularly.  ¨ Deep-breathing exercises.  ¨ Yoga.  ¨ Meditating.  ¨ Performing a body scan. This involves closing your eyes, scanning your body from head to toe, and noticing which parts of your body are particularly tense. Purposefully relax the muscles in those areas.  · Download or purchase mobile phone or tablet apps (applications) that can help you stick to your quit plan by providing reminders, tips, and encouragement. There are many free apps, such as QuitGuide from the CDC (Centers for Disease Control and Prevention). You can find other support for quitting smoking (smoking cessation) through smokefree.gov and other websites.  How will I feel when I quit smoking?  Within the first 24 hours of quitting smoking, you may start to feel some withdrawal symptoms. These symptoms are usually most noticeable 2-3 days after quitting, but they usually do not last beyond 2-3 weeks. Changes or symptoms that you might experience include:  · Mood swings.  · Restlessness, anxiety, or irritation.  · Difficulty concentrating.  · Dizziness.  · Strong cravings for sugary foods in addition to nicotine.  · Mild weight  gain.  · Constipation.  · Nausea.  · Coughing or a sore throat.  · Changes in how your medicines work in your body.  · A depressed mood.  · Difficulty sleeping (insomnia).  After the first 2-3 weeks of quitting, you may start to notice more positive results, such as:  · Improved sense of smell and taste.  · Decreased coughing and sore throat.  · Slower heart rate.  · Lower blood pressure.  · Clearer skin.  · The ability to breathe more easily.  · Fewer sick days.  Quitting smoking is very challenging for most people. Do not get discouraged if you are not successful the first time. Some people need to make many attempts to quit before they achieve long-term success. Do your best to stick to your quit plan, and talk with your health care provider if you have any questions or concerns.  This information is not intended to replace advice given to you by your health care provider. Make sure you discuss any questions you have with your health care provider.  Document Released: 12/12/2002 Document Revised: 08/15/2017 Document Reviewed: 05/03/2016  [employer] Interactive Patient Education © 2017 [employer]      Acute Back Pain, Adult  Acute back pain is sudden and usually short-lived. It is often caused by an injury to the muscles and tissues in the back. The injury may result from:  · A muscle or ligament getting overstretched or torn (strained). Ligaments are tissues that connect bones to each other. Lifting something improperly can cause a back strain.  · Wear and tear (degeneration) of the spinal disks. Spinal disks are circular tissue that provides cushioning between the bones of the spine (vertebrae).  · Twisting motions, such as while playing sports or doing yard work.  · A hit to the back.  · Arthritis.  You may have a physical exam, lab tests, and imaging tests to find the cause of your pain. Acute back pain usually goes away with rest and home care.  Follow these instructions at home:  Managing pain, stiffness,  "and swelling  · Take over-the-counter and prescription medicines only as told by your health care provider.  · Your health care provider may recommend applying ice during the first 24-48 hours after your pain starts. To do this:  ? Put ice in a plastic bag.  ? Place a towel between your skin and the bag.  ? Leave the ice on for 20 minutes, 2-3 times a day.  · If directed, apply heat to the affected area as often as told by your health care provider. Use the heat source that your health care provider recommends, such as a moist heat pack or a heating pad.  ? Place a towel between your skin and the heat source.  ? Leave the heat on for 20-30 minutes.  ? Remove the heat if your skin turns bright red. This is especially important if you are unable to feel pain, heat, or cold. You have a greater risk of getting burned.  Activity    · Do not stay in bed. Staying in bed for more than 1-2 days can delay your recovery.  · Sit up and stand up straight. Avoid leaning forward when you sit, or hunching over when you stand.  ? If you work at a desk, sit close to it so you do not need to lean over. Keep your chin tucked in. Keep your neck drawn back, and keep your elbows bent at a right angle. Your arms should look like the letter \"L.\"  ? Sit high and close to the steering wheel when you drive. Add lower back (lumbar) support to your car seat, if needed.  · Take short walks on even surfaces as soon as you are able. Try to increase the length of time you walk each day.  · Do not sit, drive, or  one place for more than 30 minutes at a time. Sitting or standing for long periods of time can put stress on your back.  · Do not drive or use heavy machinery while taking prescription pain medicine.  · Use proper lifting techniques. When you bend and lift, use positions that put less stress on your back:  ? Bend your knees.  ? Keep the load close to your body.  ? Avoid twisting.  · Exercise regularly as told by your health care " provider. Exercising helps your back heal faster and helps prevent back injuries by keeping muscles strong and flexible.  · Work with a physical therapist to make a safe exercise program, as recommended by your health care provider. Do any exercises as told by your physical therapist.  Lifestyle  · Maintain a healthy weight. Extra weight puts stress on your back and makes it difficult to have good posture.  · Avoid activities or situations that make you feel anxious or stressed. Stress and anxiety increase muscle tension and can make back pain worse. Learn ways to manage anxiety and stress, such as through exercise.  General instructions  · Sleep on a firm mattress in a comfortable position. Try lying on your side with your knees slightly bent. If you lie on your back, put a pillow under your knees.  · Follow your treatment plan as told by your health care provider. This may include:  ? Cognitive or behavioral therapy.  ? Acupuncture or massage therapy.  ? Meditation or yoga.  Contact a health care provider if:  · You have pain that is not relieved with rest or medicine.  · You have increasing pain going down into your legs or buttocks.  · Your pain does not improve after 2 weeks.  · You have pain at night.  · You lose weight without trying.  · You have a fever or chills.  Get help right away if:  · You develop new bowel or bladder control problems.  · You have unusual weakness or numbness in your arms or legs.  · You develop nausea or vomiting.  · You develop abdominal pain.  · You feel faint.  Summary  · Acute back pain is sudden and usually short-lived.  · Use proper lifting techniques. When you bend and lift, use positions that put less stress on your back.  · Take over-the-counter and prescription medicines and apply heat or ice as directed by your health care provider.  This information is not intended to replace advice given to you by your health care provider. Make sure you discuss any questions you have  with your health care provider.  Document Released: 12/18/2006 Document Revised: 07/25/2019 Document Reviewed: 08/01/2018  [employer] Interactive Patient Education © 2019 [employer]      Healthy Eating  Following a healthy eating pattern may help you to achieve and maintain a healthy body weight, reduce the risk of chronic disease, and live a long and productive life. It is important to follow a healthy eating pattern at an appropriate calorie level for your body. Your nutritional needs should be met primarily through food by choosing a variety of nutrient-rich foods.  What are tips for following this plan?  Reading food labels  · Read labels and choose the following:  ? Reduced or low sodium.  ? Juices with 100% fruit juice.  ? Foods with low saturated fats and high polyunsaturated and monounsaturated fats.  ? Foods with whole grains, such as whole wheat, cracked wheat, brown rice, and wild rice.  ? Whole grains that are fortified with folic acid. This is recommended for women who are pregnant or who want to become pregnant.  · Read labels and avoid the following:  ? Foods with a lot of added sugars. These include foods that contain brown sugar, corn sweetener, corn syrup, dextrose, fructose, glucose, high-fructose corn syrup, honey, invert sugar, lactose, malt syrup, maltose, molasses, raw sugar, sucrose, trehalose, or turbinado sugar.  § Do not eat more than the following amounts of added sugar per day:  § 6 teaspoons (25 g) for women.  § 9 teaspoons (38 g) for men.  ? Foods that contain processed or refined starches and grains.  ? Refined grain products, such as white flour, degermed cornmeal, white bread, and white rice.  Shopping  · Choose nutrient-rich snacks, such as vegetables, whole fruits, and nuts. Avoid high-calorie and high-sugar snacks, such as potato chips, fruit snacks, and candy.  · Use oil-based dressings and spreads on foods instead of solid fats such as butter, stick margarine, or cream  "cheese.  · Limit pre-made sauces, mixes, and \"instant\" products such as flavored rice, instant noodles, and ready-made pasta.  · Try more plant-protein sources, such as tofu, tempeh, black beans, edamame, lentils, nuts, and seeds.  · Explore eating plans such as the Mediterranean diet or vegetarian diet.  Cooking  · Use oil to sauté or stir-perry foods instead of solid fats such as butter, stick margarine, or lard.  · Try baking, boiling, grilling, or broiling instead of frying.  · Remove the fatty part of meats before cooking.  · Steam vegetables in water or broth.  Meal planning    · At meals, imagine dividing your plate into fourths:  ? One-half of your plate is fruits and vegetables.  ? One-fourth of your plate is whole grains.  ? One-fourth of your plate is protein, especially lean meats, poultry, eggs, tofu, beans, or nuts.  · Include low-fat dairy as part of your daily diet.  Lifestyle  · Choose healthy options in all settings, including home, work, school, restaurants, or stores.  · Prepare your food safely:  ? Wash your hands after handling raw meats.  ? Keep food preparation surfaces clean by regularly washing with hot, soapy water.  ? Keep raw meats separate from ready-to-eat foods, such as fruits and vegetables.  ? , meat, poultry, and eggs to the recommended internal temperature.  ? Store foods at safe temperatures. In general:  § Keep cold foods at 40°F (4.4°C) or below.  § Keep hot foods at 140°F (60°C) or above.  § Keep your freezer at 0°F (-17.8°C) or below.  § Foods are no longer safe to eat when they have been between the temperatures of 40°-140°F (4.4-60°C) for more than 2 hours.  What foods should I eat?  Fruits  Aim to eat 2 cup-equivalents of fresh, canned (in natural juice), or frozen fruits each day. Examples of 1 cup-equivalent of fruit include 1 small apple, 8 large strawberries, 1 cup canned fruit, ½ cup dried fruit, or 1 cup 100% juice.  Vegetables  Aim to eat 2½-3 " cup-equivalents of fresh and frozen vegetables each day, including different varieties and colors. Examples of 1 cup-equivalent of vegetables include 2 medium carrots, 2 cups raw, leafy greens, 1 cup chopped vegetable (raw or cooked), or 1 medium baked potato.  Grains  Aim to eat 6 ounce-equivalents of whole grains each day. Examples of 1 ounce-equivalent of grains include 1 slice of bread, 1 cup ready-to-eat cereal, 3 cups popcorn, or ½ cup cooked rice, pasta, or cereal.  Meats and other proteins  Aim to eat 5-6 ounce-equivalents of protein each day. Examples of 1 ounce-equivalent of protein include 1 egg, 1/2 cup nuts or seeds, or 1 tablespoon (16 g) peanut butter. A cut of meat or fish that is the size of a deck of cards is about 3-4 ounce-equivalents.  · Of the protein you eat each week, try to have at least 8 ounces come from seafood. This includes salmon, trout, herring, and anchovies.  Dairy  Aim to eat 3 cup-equivalents of fat-free or low-fat dairy each day. Examples of 1 cup-equivalent of dairy include 1 cup (240 mL) milk, 8 ounces (250 g) yogurt, 1½ ounces (44 g) natural cheese, or 1 cup (240 mL) fortified soy milk.  Fats and oils  · Aim for about 5 teaspoons (21 g) per day. Choose monounsaturated fats, such as canola and olive oils, avocados, peanut butter, and most nuts, or polyunsaturated fats, such as sunflower, corn, and soybean oils, walnuts, pine nuts, sesame seeds, sunflower seeds, and flaxseed.  Beverages  · Aim for six 8-oz glasses of water per day. Limit coffee to three to five 8-oz cups per day.  · Limit caffeinated beverages that have added calories, such as soda and energy drinks.  · Limit alcohol intake to no more than 1 drink a day for nonpregnant women and 2 drinks a day for men. One drink equals 12 oz of beer (355 mL), 5 oz of wine (148 mL), or 1½ oz of hard liquor (44 mL).  Seasoning and other foods  · Avoid adding excess amounts of salt to your foods. Try flavoring foods with herbs and  spices instead of salt.  · Avoid adding sugar to foods.  · Try using oil-based dressings, sauces, and spreads instead of solid fats.  This information is based on general [place] nutrition guidelines. For more information, visit choosemyplate.gov. Exact amounts may vary based on your nutrition needs.  Summary  · A healthy eating plan may help you to maintain a healthy weight, reduce the risk of chronic diseases, and stay active throughout your life.  · Plan your meals. Make sure you eat the right portions of a variety of nutrient-rich foods.  · Try baking, boiling, grilling, or broiling instead of frying.  · Choose healthy options in all settings, including home, work, school, restaurants, or stores.  This information is not intended to replace advice given to you by your health care provider. Make sure you discuss any questions you have with your health care provider.  Document Released: 04/01/2019 Document Revised: 04/01/2019 Document Reviewed: 04/01/2019  [employer] Interactive Patient Education © 2019 [employer]

## 2019-12-03 NOTE — PROGRESS NOTES
CHIEF COMPLAINT:  Back Pain     Back Pain   This is a new problem. The current episode started yesterday. The problem occurs constantly. The problem is unchanged. The pain is present in the lumbar spine and gluteal. The pain radiates to the right thigh. The pain is at a severity of 10/10. The pain is severe. The pain is the same all the time. The symptoms are aggravated by bending, lying down, sitting, standing, twisting and position. Stiffness is present all day. Pertinent negatives include no abdominal pain, bladder incontinence, bowel incontinence, chest pain, dysuria, fever, headaches, leg pain, numbness, paresis, paresthesias, pelvic pain, perianal numbness, tingling, weakness or weight loss. Risk factors include lack of exercise, obesity and sedentary lifestyle. She has tried muscle relaxant, NSAIDs, bed rest and walking for the symptoms. The treatment provided no relief.     Review of Systems   Constitutional: Negative for activity change, chills, diaphoresis, fatigue, fever and unexpected weight loss.   Respiratory: Negative for cough, chest tightness and shortness of breath.    Cardiovascular: Negative for chest pain, palpitations and leg swelling.   Gastrointestinal: Negative for abdominal pain, bowel incontinence, constipation, diarrhea, nausea and vomiting.   Genitourinary: Negative for dysuria, flank pain, frequency, pelvic pain, urgency and urinary incontinence.   Musculoskeletal: Positive for back pain. Negative for gait problem and neck pain.   Skin: Negative for rash.   Neurological: Negative for dizziness, tingling, weakness, numbness, headache and paresthesias.   Psychiatric/Behavioral: Negative for sleep disturbance, depressed mood and stress. The patient is not nervous/anxious.       The following portions of the patient's history were reviewed and updated as appropriate: allergies, current medications, past family history, past medical history, past social history, past surgical history and  "problem list.    Visit Vitals  /74 (BP Location: Left arm)   Pulse 103   Temp 97.5 °F (36.4 °C) (Temporal)   Ht 165 cm (64.96\")   Wt 92 kg (202 lb 12.8 oz)   SpO2 99%   BMI 33.79 kg/m²     Wt Readings from Last 3 Encounters:   12/03/19 92 kg (202 lb 12.8 oz)   10/21/19 93.2 kg (205 lb 6.4 oz)   10/02/19 93 kg (205 lb)          Physical Exam   Constitutional: She is oriented to person, place, and time. She appears well-developed and well-nourished. She is cooperative.  Non-toxic appearance. She does not have a sickly appearance. She does not appear ill. No distress. She is obese.  HENT:   Head: Normocephalic.   Right Ear: Hearing normal.   Left Ear: Hearing normal.   Eyes: Conjunctivae are normal. Pupils are equal, round, and reactive to light.   Neck: Normal range of motion. Neck supple.   Cardiovascular: Regular rhythm and normal heart sounds. Tachycardia present.   Pulses:       Dorsalis pedis pulses are 2+ on the right side, and 2+ on the left side.        Posterior tibial pulses are 2+ on the right side, and 2+ on the left side.   Pulmonary/Chest: Effort normal and breath sounds normal. No respiratory distress. She has no decreased breath sounds. She has no wheezes. She has no rhonchi.   Abdominal: Soft. Normal appearance and bowel sounds are normal. She exhibits no mass. There is no tenderness. There is no CVA tenderness.   Musculoskeletal:        Lumbar back: She exhibits decreased range of motion, tenderness, pain and spasm. She exhibits no bony tenderness, no swelling, no edema, no deformity and no laceration.   Straight leg raise: +pain  Crossed leg raise: negative  Heel walking: +pain  Toe walking: +pain  Squatting: +pain  Sitting to standing: +pain   Neurological: She is alert and oriented to person, place, and time. She has normal strength. No sensory deficit. Gait normal.   Reflex Scores:       Patellar reflexes are 2+ on the right side and 2+ on the left side.       Achilles reflexes are 2+ on " the right side and 2+ on the left side.  Skin: Skin is warm, dry and intact. No rash noted. She is not diaphoretic.   Psychiatric: She has a normal mood and affect. Her speech is normal and behavior is normal. Judgment and thought content normal.     ASSESSMENT/PLAN  Diagnoses and all orders for this visit:    1. Acute midline low back pain with right-sided sciatica (Primary)  -     XR Spine Lumbar Complete 4+VW; Future  -     methylPREDNISolone (MEDROL, DEBRA,) 4 MG tablet; Take as directed on package instructions.  Dispense: 21 tablet; Refill: 0  -     ketorolac (TORADOL) injection 60 mg  New onset  Will treat with: Medrol dose debra and Toradol injection.  May continune using zanaflex at home.  Hold other NSAIDs today. Increase water intake.   Imaging ordered: x-ray lumbar   Use cold packs 20-30 minutes several times per day for the first 24 hours after injury.  Heat packs are recommended after the initial 24 hours of injury.  After intense pain abates and imaging is resulted as negative for acute concerns, patient may try back stretches, yoga, relaxation techniques, and daily walking.  If obese or overweight, weight loss may help alleviate back pain.  Pt educated on red flags signs to be aware of and to notify us if she develops any of those symptoms. Work excuse provided for patient for two days.     2. Class 1 obesity due to excess calories with serious comorbidity and body mass index (BMI) of 33.0 to 33.9 in adult  Persistent. Maintain healthy diet.  Increase water intake.     3. Tobacco use  Persistent. F/U with our office when ready to quite smoking for additional resources.     Return if symptoms worsen or fail to improve, for Next scheduled follow up.    Patient instructed to follow up with our office for results on any labs/imaging ordered during this visit.  Patient verbalizes understanding and agrees to treatment plan.

## 2019-12-10 ENCOUNTER — TELEPHONE (OUTPATIENT)
Dept: INTERNAL MEDICINE | Facility: CLINIC | Age: 54
End: 2019-12-10

## 2019-12-10 ENCOUNTER — OFFICE VISIT (OUTPATIENT)
Dept: INTERNAL MEDICINE | Facility: CLINIC | Age: 54
End: 2019-12-10

## 2019-12-10 VITALS
DIASTOLIC BLOOD PRESSURE: 80 MMHG | TEMPERATURE: 96.9 F | HEART RATE: 95 BPM | OXYGEN SATURATION: 99 % | SYSTOLIC BLOOD PRESSURE: 130 MMHG | HEIGHT: 65 IN | BODY MASS INDEX: 33.82 KG/M2 | WEIGHT: 203 LBS

## 2019-12-10 DIAGNOSIS — F41.9 ANXIETY: ICD-10-CM

## 2019-12-10 DIAGNOSIS — M25.50 ARTHRALGIA, UNSPECIFIED JOINT: ICD-10-CM

## 2019-12-10 DIAGNOSIS — M54.31 SCIATICA OF RIGHT SIDE: ICD-10-CM

## 2019-12-10 DIAGNOSIS — M51.36 DEGENERATION OF L4-L5 INTERVERTEBRAL DISC: Primary | ICD-10-CM

## 2019-12-10 DIAGNOSIS — Z79.899 ENCOUNTER FOR LONG-TERM (CURRENT) USE OF MEDICATIONS: ICD-10-CM

## 2019-12-10 DIAGNOSIS — E87.6 HYPOKALEMIA: ICD-10-CM

## 2019-12-10 DIAGNOSIS — M79.10 MYALGIA: ICD-10-CM

## 2019-12-10 PROCEDURE — 99214 OFFICE O/P EST MOD 30 MIN: CPT | Performed by: FAMILY MEDICINE

## 2019-12-10 RX ORDER — POTASSIUM CHLORIDE 750 MG/1
10 TABLET, FILM COATED, EXTENDED RELEASE ORAL DAILY
Qty: 30 TABLET | Refills: 5 | Status: SHIPPED | OUTPATIENT
Start: 2019-12-10 | End: 2020-05-27 | Stop reason: SDUPTHER

## 2019-12-10 RX ORDER — PREDNISONE 20 MG/1
20 TABLET ORAL 2 TIMES DAILY
Qty: 30 TABLET | Refills: 0 | Status: SHIPPED | OUTPATIENT
Start: 2019-12-10 | End: 2020-01-28

## 2019-12-10 RX ORDER — PREDNISONE 20 MG/1
TABLET ORAL
Refills: 0 | COMMUNITY
Start: 2019-12-05 | End: 2019-12-10 | Stop reason: SDUPTHER

## 2019-12-10 RX ORDER — OXYCODONE AND ACETAMINOPHEN 7.5; 325 MG/1; MG/1
1 TABLET ORAL EVERY 6 HOURS PRN
Qty: 30 TABLET | Refills: 0 | Status: SHIPPED | OUTPATIENT
Start: 2019-12-10 | End: 2020-01-28

## 2019-12-10 RX ORDER — NAPROXEN 500 MG/1
500 TABLET ORAL 2 TIMES DAILY WITH MEALS
Qty: 60 TABLET | Refills: 2 | Status: SHIPPED | OUTPATIENT
Start: 2019-12-10 | End: 2020-01-28

## 2019-12-10 RX ORDER — BUSPIRONE HYDROCHLORIDE 10 MG/1
10 TABLET ORAL 2 TIMES DAILY
Qty: 60 TABLET | Refills: 1 | Status: SHIPPED | OUTPATIENT
Start: 2019-12-10 | End: 2020-05-27 | Stop reason: SINTOL

## 2019-12-10 RX ORDER — TIZANIDINE 4 MG/1
4 TABLET ORAL EVERY 8 HOURS PRN
Qty: 90 TABLET | Refills: 2 | Status: SHIPPED | OUTPATIENT
Start: 2019-12-10 | End: 2020-02-25 | Stop reason: SDUPTHER

## 2019-12-10 NOTE — PROGRESS NOTES
"Subjective   Lizette Keith is a 54 y.o. female.     Chief Complaint   Patient presents with   • Anxiety     after off of cymbalta and has anger issues easy, denies depression   • Back Pain     has ruptered disc low back       Visit Vitals  /80 (BP Location: Left arm, Cuff Size: Large Adult)   Pulse 95   Temp 96.9 °F (36.1 °C)   Ht 165 cm (64.96\")   Wt 92.1 kg (203 lb)   SpO2 99%   BMI 33.82 kg/m²         Anxiety   Presents for follow-up visit. Symptoms include irritability and nervous/anxious behavior. Patient reports no chest pain, compulsions, confusion, decreased concentration, depressed mood, dizziness, dry mouth, excessive worry, feeling of choking, hyperventilation, insomnia, malaise, muscle tension, nausea, obsessions, palpitations, panic, restlessness, shortness of breath or suicidal ideas. Symptoms occur most days. The severity of symptoms is moderate. The quality of sleep is poor. Nighttime awakenings: several.          Pt had right sciatic pain that started 12/3/19. Pt has intermittent numbness/pain in right leg. Pt had toradol and steroid pack here. Pt went to ER [facility] on 12/5/19. Pt had steroid shot and percocet and tizanidine and naprosyn 500.   Pt has not had good control of pain symptoms. Pt has tried heat and ice.   Pt had CT that showed right L4-5 disc bulge and was referred by ER to Dr Brennan.   Pt has pain right buttocks, radiating down right leg, when she sits to go to bathroom.       Pt had side effects from cymbalta and has anxiety and anger issues.  Pt denies depression.   The following portions of the patient's history were reviewed and updated as appropriate: allergies, current medications, past family history, past medical history, past social history, past surgical history and problem list.    Past Medical History:   Diagnosis Date   • Allergic    • Anemia     during pregnancy   • Anxiety    • Arthritis    • Blood in urine    • Chronic fatigue    • Colon polyp 02/2018    7 " at last colonoscopy   • Degeneration of L4-L5 intervertebral disc 12/10/2019   • Depression    • Elevated cholesterol    • Endometriosis    • Fibromyalgia    • Fibromyalgia, primary    • Fracture    • GERD (gastroesophageal reflux disease)    • H/O mammogram 02/2018   • HL (hearing loss)    • Hypertension    • Irritable bowel syndrome    • Pap smear for cervical cancer screening 2018?      Past Surgical History:   Procedure Laterality Date   • CHOLECYSTECTOMY     • COLONOSCOPY  02/06/2018    3 year f/u polypectomy Dr MARTA Waller   • GALLBLADDER SURGERY     • HYSTERECTOMY     • INNER EAR SURGERY     • OOPHORECTOMY     • TEMPOROMANDIBULAR JOINT ARTHROPLASTY  1984      Family History   Problem Relation Age of Onset   • Melanoma Mother         eye   • Diabetes Mother    • Heart disease Mother    • Arthritis Mother    • Depression Mother    • Colon polyps Mother    • Thyroid disease Mother    • Hyperlipidemia Mother    • Cancer Mother    • Heart attack Mother    • Migraines Mother    • Cancer Father         pancreatic and liver   • Diabetes Father    • Depression Father    • Colon polyps Father    • ADD / ADHD Son    • Diabetes Maternal Aunt    • Diabetes Maternal Uncle    • Diabetes Paternal Aunt    • Diabetes Paternal Uncle    • Cancer Maternal Grandmother         uterine/cervical   • Heart disease Maternal Grandfather    • Heart disease Paternal Grandfather    • Migraines Sister    • Breast cancer Neg Hx    • Ovarian cancer Neg Hx       Social History     Socioeconomic History   • Marital status:      Spouse name: Not on file   • Number of children: Not on file   • Years of education: Not on file   • Highest education level: Not on file   Tobacco Use   • Smoking status: Current Every Day Smoker     Packs/day: 0.75     Years: 34.00     Pack years: 25.50     Types: Cigarettes   • Smokeless tobacco: Never Used   Substance and Sexual Activity   • Alcohol use: No   • Drug use: No   • Sexual activity: Not Currently      Birth control/protection: None      Allergies   Allergen Reactions   • Statins Myalgia   • Gabapentin Diarrhea and GI Intolerance   • Erythromycin GI Intolerance     Tolerates zpak   • Penicillins Hives   • Cymbalta [Duloxetine Hcl] Rash       Review of Systems   Constitutional: Positive for irritability. Negative for chills, diaphoresis, fatigue and fever.   HENT: Negative.  Negative for ear pain, nosebleeds, postnasal drip, rhinorrhea, sinus pressure, sneezing and sore throat.    Eyes: Negative.  Negative for redness and itching.   Respiratory: Negative.  Negative for cough, shortness of breath and wheezing.    Cardiovascular: Negative.  Negative for chest pain and palpitations.   Gastrointestinal: Negative.  Negative for abdominal pain, constipation, diarrhea, nausea and vomiting.   Endocrine: Negative.  Negative for cold intolerance and heat intolerance.   Genitourinary: Negative.  Negative for dysuria, frequency, hematuria and urgency.   Musculoskeletal: Positive for gait problem. Negative for arthralgias, back pain and neck pain.        Right buttocks pain and shooting pain right lateral calf, and occasional groin pain.    Skin: Negative.  Negative for color change and rash.   Allergic/Immunologic: Negative.  Negative for environmental allergies.   Neurological: Negative for dizziness, syncope, light-headedness and headaches.   Hematological: Negative.  Negative for adenopathy. Does not bruise/bleed easily.   Psychiatric/Behavioral: Negative for confusion, decreased concentration, dysphoric mood and suicidal ideas. The patient is nervous/anxious. The patient does not have insomnia.        Objective   Physical Exam   Constitutional: She is oriented to person, place, and time. She appears well-developed.   HENT:   Head: Normocephalic.   Right Ear: External ear normal.   Left Ear: External ear normal.   Nose: Nose normal.   Eyes: Pupils are equal, round, and reactive to light. Conjunctivae, EOM and lids are  normal.   Neck: Trachea normal and normal range of motion. Neck supple. Carotid bruit is not present. No thyroid mass and no thyromegaly present.   Cardiovascular: Normal rate and regular rhythm.   No murmur heard.  Pulmonary/Chest: Effort normal and breath sounds normal. No respiratory distress. She has no decreased breath sounds. She has no wheezes. She has no rhonchi. She has no rales. She exhibits no tenderness.   Abdominal: Soft. Bowel sounds are normal. There is no tenderness.   Musculoskeletal: Normal range of motion.        Lumbar back: She exhibits no bony tenderness and no spasm.        Back:    Neurological: She is alert and oriented to person, place, and time.   Skin: Skin is warm and dry.   Psychiatric: She has a normal mood and affect. Her behavior is normal.   Nursing note and vitals reviewed.      Assessment/Plan   Lizette was seen today for anxiety and back pain.    Diagnoses and all orders for this visit:    Degeneration of L4-L5 intervertebral disc  -     Ambulatory Referral to Physical Therapy Evaluate and treat  -     predniSONE (DELTASONE) 20 MG tablet; Take 1 tablet by mouth 2 (Two) Times a Day.  -     oxyCODONE-acetaminophen (PERCOCET) 7.5-325 MG per tablet; Take 1 tablet by mouth Every 6 (Six) Hours As Needed for Moderate Pain .    Arthralgia, unspecified joint  -     tiZANidine (ZANAFLEX) 4 MG tablet; Take 1 tablet by mouth Every 8 (Eight) Hours As Needed for Muscle Spasms.    Myalgia  -     tiZANidine (ZANAFLEX) 4 MG tablet; Take 1 tablet by mouth Every 8 (Eight) Hours As Needed for Muscle Spasms.    Sciatica of right side  -     Ambulatory Referral to Physical Therapy Evaluate and treat  -     oxyCODONE-acetaminophen (PERCOCET) 7.5-325 MG per tablet; Take 1 tablet by mouth Every 6 (Six) Hours As Needed for Moderate Pain .    Hypokalemia  -     potassium chloride (KLOR-CON) 10 MEQ CR tablet; Take 1 tablet by mouth Daily. For potassium    Anxiety  -     busPIRone (BUSPAR) 10 MG tablet; Take  1 tablet by mouth 2 (Two) Times a Day.    Encounter for long-term (current) use of medications  -     Urine Drug Screen - Urine, Clean Catch    Other orders  -     naproxen (NAPROSYN) 500 MG tablet; Take 1 tablet by mouth 2 (Two) Times a Day With Meals.        Reviewed ER records  Jose M requested CT results           Current Outpatient Medications:   •  aspirin 325 MG tablet, Take 325 mg by mouth Daily., Disp: , Rfl:   •  atorvastatin (LIPITOR) 10 MG tablet, Take 1 tablet by mouth Daily., Disp: 30 tablet, Rfl: 3  •  cetirizine (ZyrTEC) 10 MG tablet, Take 10 mg by mouth daily., Disp: , Rfl:   •  dicyclomine (BENTYL) 20 MG tablet, Take 1 tablet by mouth 3 (Three) Times a Day As Needed (IBS- diarrhea)., Disp: 90 tablet, Rfl: 0  •  estradiol (ESTRACE) 2 MG tablet, Take 1 tablet by mouth Daily., Disp: 30 tablet, Rfl: 12  •  fluticasone (FLONASE) 50 MCG/ACT nasal spray, 2 sprays into the nostril(s) as directed by provider Daily., Disp: 16 g, Rfl: 5  •  lisinopril-hydrochlorothiazide (PRINZIDE,ZESTORETIC) 20-25 MG per tablet, Take 1 tablet by mouth Daily., Disp: 90 tablet, Rfl: 1  •  montelukast (SINGULAIR) 10 MG tablet, Take 1 tablet by mouth Every Night., Disp: 30 tablet, Rfl: 6  •  naproxen (NAPROSYN) 500 MG tablet, Take 1 tablet by mouth 2 (Two) Times a Day With Meals., Disp: 60 tablet, Rfl: 2  •  potassium chloride (KLOR-CON) 10 MEQ CR tablet, Take 1 tablet by mouth Daily. For potassium, Disp: 30 tablet, Rfl: 5  •  predniSONE (DELTASONE) 20 MG tablet, Take 1 tablet by mouth 2 (Two) Times a Day., Disp: 30 tablet, Rfl: 0  •  tiZANidine (ZANAFLEX) 4 MG tablet, Take 1 tablet by mouth Every 8 (Eight) Hours As Needed for Muscle Spasms., Disp: 90 tablet, Rfl: 2  •  busPIRone (BUSPAR) 10 MG tablet, Take 1 tablet by mouth 2 (Two) Times a Day., Disp: 60 tablet, Rfl: 1  •  oxyCODONE-acetaminophen (PERCOCET) 7.5-325 MG per tablet, Take 1 tablet by mouth Every 6 (Six) Hours As Needed for Moderate Pain ., Disp: 30 tablet, Rfl:  0    Return in about 1 week (around 12/17/2019), or if symptoms worsen or fail to improve, for Recheck.

## 2019-12-11 LAB
AMPHETAMINES UR QL SCN: NEGATIVE NG/ML
BARBITURATES UR QL SCN: NEGATIVE NG/ML
BENZODIAZ UR QL SCN: NEGATIVE NG/ML
BUPRENORPHINE UR QL: NEGATIVE NG/ML
BZE UR QL SCN: NEGATIVE NG/ML
CANNABINOIDS UR QL SCN: NEGATIVE NG/ML
CREAT UR-MCNC: 264.2 MG/DL (ref 20–300)
FENTANYL+NORFENTANYL UR QL SCN: NEGATIVE PG/ML
LABORATORY COMMENT REPORT: ABNORMAL
MEPERIDINE UR QL: NEGATIVE NG/ML
METHADONE UR QL SCN: NEGATIVE NG/ML
OPIATES UR QL SCN: POSITIVE NG/ML
OXYCODONE+OXYMORPHONE UR QL SCN: POSITIVE NG/ML
PCP UR QL: NEGATIVE NG/ML
PH UR: 6.1 [PH] (ref 4.5–8.9)
PROPOXYPH UR QL SCN: NEGATIVE NG/ML
TRAMADOL UR QL SCN: NEGATIVE NG/ML

## 2019-12-12 ENCOUNTER — TELEPHONE (OUTPATIENT)
Dept: INTERNAL MEDICINE | Facility: CLINIC | Age: 54
End: 2019-12-12

## 2019-12-17 ENCOUNTER — OFFICE VISIT (OUTPATIENT)
Dept: INTERNAL MEDICINE | Facility: CLINIC | Age: 54
End: 2019-12-17

## 2019-12-17 ENCOUNTER — TELEPHONE (OUTPATIENT)
Dept: INTERNAL MEDICINE | Facility: CLINIC | Age: 54
End: 2019-12-17

## 2019-12-17 VITALS
WEIGHT: 204.6 LBS | TEMPERATURE: 98 F | SYSTOLIC BLOOD PRESSURE: 140 MMHG | HEIGHT: 65 IN | DIASTOLIC BLOOD PRESSURE: 86 MMHG | OXYGEN SATURATION: 100 % | BODY MASS INDEX: 34.09 KG/M2 | HEART RATE: 94 BPM

## 2019-12-17 DIAGNOSIS — M51.36 DEGENERATION OF L4-L5 INTERVERTEBRAL DISC: Primary | ICD-10-CM

## 2019-12-17 DIAGNOSIS — M54.41 ACUTE MIDLINE LOW BACK PAIN WITH RIGHT-SIDED SCIATICA: ICD-10-CM

## 2019-12-17 DIAGNOSIS — Z79.899 ENCOUNTER FOR LONG-TERM (CURRENT) USE OF MEDICATIONS: ICD-10-CM

## 2019-12-17 PROCEDURE — 99213 OFFICE O/P EST LOW 20 MIN: CPT | Performed by: FAMILY MEDICINE

## 2019-12-17 RX ORDER — PREGABALIN 25 MG/1
25 CAPSULE ORAL 2 TIMES DAILY
Qty: 60 CAPSULE | Refills: 1 | Status: SHIPPED | OUTPATIENT
Start: 2019-12-17 | End: 2020-02-25 | Stop reason: DRUGHIGH

## 2019-12-17 NOTE — PROGRESS NOTES
"Subjective   Lizette Keith is a 54 y.o. female.     Chief Complaint   Patient presents with   • Back Pain     right side lower back and buttock. nerve pain. She states she is doing better but still having numbness and now is starting in her feet. She wanted to know if there was medication that would help with the pain, gabapentin upsets her stomach severely       Visit Vitals  /86 (BP Location: Left arm, Cuff Size: Large Adult)   Pulse 94   Temp 98 °F (36.7 °C)   Ht 165 cm (64.96\")   Wt 92.8 kg (204 lb 9.6 oz)   SpO2 100%   BMI 34.09 kg/m²         Back Pain   This is a new problem. The current episode started 1 to 4 weeks ago. The problem occurs constantly. The problem has been gradually improving since onset. The pain is present in the lumbar spine. The quality of the pain is described as aching and shooting. The pain radiates to the right thigh, right knee and right foot. The pain is severe. The pain is worse during the night. The symptoms are aggravated by sitting, position, bending and twisting. Associated symptoms include leg pain, numbness (right foot), paresthesias, pelvic pain (right groin) and tingling. Pertinent negatives include no abdominal pain, bladder incontinence, bowel incontinence, chest pain, dysuria, fever, headaches, paresis, perianal numbness, weakness or weight loss. Risk factors include lack of exercise, menopause, obesity and sedentary lifestyle.      Pt is planning to go to work on Thursday. Pt has been off of work for the past 3 weeks. Pt has back pain.  Pt is waiting on MRI.  Pt cannot tolerate gabapentin. Pt's insurance will not cover Lyrica.   Pt's right foot is staying half numb.   Back pain is better, but still hurts.     Pt can only sit for so long.   Pt had rash with cymbalta, pt had severe anxiety with elavil. Pt had sweat and severe gastric distress.   Pt did not have trauma or fall before back pain  The following portions of the patient's history were reviewed and " updated as appropriate: allergies, current medications, past family history, past medical history, past social history, past surgical history and problem list.    Past Medical History:   Diagnosis Date   • Allergic    • Anemia     during pregnancy   • Anxiety    • Arthritis    • Blood in urine    • Chronic fatigue    • Colon polyp 02/2018    7 at last colonoscopy   • Degeneration of L4-L5 intervertebral disc 12/10/2019   • Depression    • Elevated cholesterol    • Endometriosis    • Fibromyalgia    • Fibromyalgia, primary    • Fracture    • GERD (gastroesophageal reflux disease)    • H/O mammogram 02/2018   • HL (hearing loss)    • Hypertension    • Irritable bowel syndrome    • Pap smear for cervical cancer screening 2018?      Past Surgical History:   Procedure Laterality Date   • CHOLECYSTECTOMY     • COLONOSCOPY  02/06/2018    3 year f/u polypectomy Dr MARTA Waller   • GALLBLADDER SURGERY     • HYSTERECTOMY     • INNER EAR SURGERY     • OOPHORECTOMY     • TEMPOROMANDIBULAR JOINT ARTHROPLASTY  1984      Family History   Problem Relation Age of Onset   • Melanoma Mother         eye   • Diabetes Mother    • Heart disease Mother    • Arthritis Mother    • Depression Mother    • Colon polyps Mother    • Thyroid disease Mother    • Hyperlipidemia Mother    • Cancer Mother    • Heart attack Mother    • Migraines Mother    • Cancer Father         pancreatic and liver   • Diabetes Father    • Depression Father    • Colon polyps Father    • ADD / ADHD Son    • Diabetes Maternal Aunt    • Diabetes Maternal Uncle    • Diabetes Paternal Aunt    • Diabetes Paternal Uncle    • Cancer Maternal Grandmother         uterine/cervical   • Heart disease Maternal Grandfather    • Heart disease Paternal Grandfather    • Migraines Sister    • Breast cancer Neg Hx    • Ovarian cancer Neg Hx       Social History     Socioeconomic History   • Marital status:      Spouse name: Not on file   • Number of children: Not on file   • Years of  education: Not on file   • Highest education level: Not on file   Tobacco Use   • Smoking status: Current Every Day Smoker     Packs/day: 0.75     Years: 34.00     Pack years: 25.50     Types: Cigarettes   • Smokeless tobacco: Never Used   Substance and Sexual Activity   • Alcohol use: No   • Drug use: No   • Sexual activity: Not Currently     Birth control/protection: None      Allergies   Allergen Reactions   • Statins Myalgia   • Gabapentin Diarrhea and GI Intolerance   • Erythromycin GI Intolerance     Tolerates zpak   • Penicillins Hives   • Cymbalta [Duloxetine Hcl] Rash       Review of Systems   Constitutional: Negative.  Negative for chills, diaphoresis, fatigue, fever and weight loss.   HENT: Positive for rhinorrhea. Negative for ear pain, nosebleeds, postnasal drip, sinus pressure, sneezing and sore throat.    Eyes: Negative.  Negative for redness and itching.   Respiratory: Negative.  Negative for cough, shortness of breath and wheezing.    Cardiovascular: Negative.  Negative for chest pain and palpitations.   Gastrointestinal: Negative.  Negative for abdominal pain, bowel incontinence, constipation, diarrhea, nausea and vomiting.   Endocrine: Negative.  Negative for cold intolerance and heat intolerance.   Genitourinary: Positive for pelvic pain (right groin). Negative for bladder incontinence, dysuria, frequency, hematuria and urgency.   Musculoskeletal: Positive for arthralgias (right) and back pain. Negative for gait problem and neck pain.   Skin: Negative.  Negative for color change and rash.   Allergic/Immunologic: Positive for environmental allergies.   Neurological: Positive for tingling, numbness (right foot) and paresthesias. Negative for dizziness, syncope, weakness, light-headedness and headaches.   Hematological: Negative.  Negative for adenopathy. Does not bruise/bleed easily.   Psychiatric/Behavioral: Negative for dysphoric mood. The patient is nervous/anxious.        Objective   Physical  Exam   Constitutional: She is oriented to person, place, and time. She appears well-developed.   HENT:   Head: Normocephalic.   Right Ear: External ear normal.   Left Ear: External ear normal.   Nose: Nose normal.   Eyes: Pupils are equal, round, and reactive to light. Conjunctivae, EOM and lids are normal.   Neck: Trachea normal and normal range of motion. Neck supple. Carotid bruit is not present. No thyroid mass and no thyromegaly present.   Cardiovascular: Normal rate and regular rhythm.   No murmur heard.  Pulmonary/Chest: Effort normal and breath sounds normal. No respiratory distress. She has no decreased breath sounds. She has no wheezes. She has no rhonchi. She has no rales. She exhibits no tenderness.   Abdominal: Soft. Bowel sounds are normal. There is no tenderness.   Musculoskeletal: Normal range of motion.        Lumbar back: She exhibits tenderness, bony tenderness and spasm.        Back:    Neurological: She is alert and oriented to person, place, and time.   Skin: Skin is warm and dry.   Psychiatric: She has a normal mood and affect. Her behavior is normal.   Nursing note and vitals reviewed.      Assessment/Plan   Lizette was seen today for back pain.    Diagnoses and all orders for this visit:    Degeneration of L4-L5 intervertebral disc  -     MRI Lumbar Spine Without Contrast; Future  -     pregabalin (LYRICA) 25 MG capsule; Take 1 capsule by mouth 2 (Two) Times a Day.    Acute midline low back pain with right-sided sciatica  -     MRI Lumbar Spine Without Contrast; Future  -     pregabalin (LYRICA) 25 MG capsule; Take 1 capsule by mouth 2 (Two) Times a Day.    Encounter for long-term (current) use of medications  -     Urine Drug Screen - Urine, Clean Catch                   Current Outpatient Medications:   •  aspirin 325 MG tablet, Take 325 mg by mouth Daily., Disp: , Rfl:   •  atorvastatin (LIPITOR) 10 MG tablet, Take 1 tablet by mouth Daily., Disp: 30 tablet, Rfl: 3  •  busPIRone (BUSPAR)  10 MG tablet, Take 1 tablet by mouth 2 (Two) Times a Day., Disp: 60 tablet, Rfl: 1  •  cetirizine (ZyrTEC) 10 MG tablet, Take 10 mg by mouth daily., Disp: , Rfl:   •  dicyclomine (BENTYL) 20 MG tablet, Take 1 tablet by mouth 3 (Three) Times a Day As Needed (IBS- diarrhea)., Disp: 90 tablet, Rfl: 0  •  estradiol (ESTRACE) 2 MG tablet, Take 1 tablet by mouth Daily., Disp: 30 tablet, Rfl: 12  •  fluticasone (FLONASE) 50 MCG/ACT nasal spray, 2 sprays into the nostril(s) as directed by provider Daily., Disp: 16 g, Rfl: 5  •  lisinopril-hydrochlorothiazide (PRINZIDE,ZESTORETIC) 20-25 MG per tablet, Take 1 tablet by mouth Daily., Disp: 90 tablet, Rfl: 1  •  montelukast (SINGULAIR) 10 MG tablet, Take 1 tablet by mouth Every Night., Disp: 30 tablet, Rfl: 6  •  naproxen (NAPROSYN) 500 MG tablet, Take 1 tablet by mouth 2 (Two) Times a Day With Meals., Disp: 60 tablet, Rfl: 2  •  oxyCODONE-acetaminophen (PERCOCET) 7.5-325 MG per tablet, Take 1 tablet by mouth Every 6 (Six) Hours As Needed for Moderate Pain ., Disp: 30 tablet, Rfl: 0  •  potassium chloride (KLOR-CON) 10 MEQ CR tablet, Take 1 tablet by mouth Daily. For potassium, Disp: 30 tablet, Rfl: 5  •  predniSONE (DELTASONE) 20 MG tablet, Take 1 tablet by mouth 2 (Two) Times a Day., Disp: 30 tablet, Rfl: 0  •  tiZANidine (ZANAFLEX) 4 MG tablet, Take 1 tablet by mouth Every 8 (Eight) Hours As Needed for Muscle Spasms., Disp: 90 tablet, Rfl: 2  •  pregabalin (LYRICA) 25 MG capsule, Take 1 capsule by mouth 2 (Two) Times a Day., Disp: 60 capsule, Rfl: 1    Return in about 4 weeks (around 1/14/2020), or if symptoms worsen or fail to improve, for Recheck.    Yo report reviewed and is consistent #[id number]

## 2019-12-18 LAB
AMPHETAMINES UR QL SCN: NEGATIVE NG/ML
BARBITURATES UR QL SCN: NEGATIVE NG/ML
BENZODIAZ UR QL SCN: NEGATIVE NG/ML
BZE UR QL SCN: NEGATIVE NG/ML
CANNABINOIDS UR QL SCN: NEGATIVE NG/ML
CREAT UR-MCNC: 135 MG/DL (ref 20–300)
LABORATORY COMMENT REPORT: NORMAL
METHADONE UR QL SCN: NEGATIVE NG/ML
OPIATES UR QL SCN: NEGATIVE NG/ML
OXYCODONE+OXYMORPHONE UR QL SCN: NEGATIVE NG/ML
PCP UR QL: NEGATIVE NG/ML
PH UR: 6.4 [PH] (ref 4.5–8.9)
PROPOXYPH UR QL SCN: NEGATIVE NG/ML

## 2019-12-19 ENCOUNTER — PRIOR AUTHORIZATION (OUTPATIENT)
Dept: INTERNAL MEDICINE | Facility: CLINIC | Age: 54
End: 2019-12-19

## 2019-12-20 ENCOUNTER — TREATMENT (OUTPATIENT)
Dept: PHYSICAL THERAPY | Facility: CLINIC | Age: 54
End: 2019-12-20

## 2019-12-20 DIAGNOSIS — M51.36 DEGENERATION OF L4-L5 INTERVERTEBRAL DISC: Primary | ICD-10-CM

## 2019-12-20 PROCEDURE — 97110 THERAPEUTIC EXERCISES: CPT | Performed by: PHYSICAL THERAPIST

## 2019-12-20 PROCEDURE — 97162 PT EVAL MOD COMPLEX 30 MIN: CPT | Performed by: PHYSICAL THERAPIST

## 2019-12-20 NOTE — PROGRESS NOTES
Physical Therapy Initial Evaluation and Plan of Care        Patient: Lizette Keith   : 1965  Diagnosis/ICD-10 Code:  Degeneration of L4-L5 intervertebral disc [M51.36]  Referring practitioner: Denise LOZANO MD  Date of Initial Visit: 2019  Today's Date: 2019  Patient seen for 1 sessions           Subjective Questionnaire: Oswestry: 62%      Subjective Evaluation    History of Present Illness  Date of onset: 2019 (about a month)  Mechanism of injury: Patient has right buttock pain with pain radiating down the lateral leg to the knee and calf.  She occasional groin pain and pubic symphasis pain.  She notes that most of the pain is along the lateral hip and buttock.  She notes that she is noting increased difficulty flexing her toes on the right and all of her symptoms are on the right.  She has been on steroids for about 3 weeks with mild relief.      Patient Occupation: [employer]   Precautions and Work Restrictions: just returned to work last night.Quality of life: good    Pain  Current pain ratin  At best pain rating: 3  At worst pain ratin  Quality: burning, dull ache, throbbing and pressure  Relieving factors: change in position, medications and rest  Aggravating factors: squatting, ambulation, stairs, lifting, standing and prolonged positioning  Progression: no change    Diagnostic Tests  X-ray: abnormal  MRI studies: abnormal    Treatments  Previous treatment: medication  Patient Goals  Patient goals for therapy: decreased pain, increased motion, increased strength and return to work             Objective       Postural Observations    Additional Postural Observation Details  Mildly kpyphotic and hyperlodotic.    Tenderness     Additional Tenderness Details  Tender along the right piriformis, SIJ, guarding and tenderness of the bilateral paraspinals.    Neurological Testing     Sensation     Lumbar   Left   Intact: light touch    Right   Intact: light  touch    Reflexes   Left   Patellar (L4): normal (2+)  Achilles (S1): normal (2+)    Right   Patellar (L4): normal (2+)  Achilles (S1): trace (1+)    Active Range of Motion     Lumbar   Flexion: 80 degrees with pain  Extension: 10 degrees with pain    Passive Range of Motion   Left Hip   External rotation (90/90): WFL  Internal rotation (90/90): WFL    Right Hip   External rotation (90/90): WFL  Internal rotation (90/90): WFL    Strength/Myotome Testing     Left Hip   Planes of Motion   Flexion: 4+    Right Hip   Planes of Motion   Flexion: 4+    Left Knee   Flexion: 5  Extension: 5    Right Knee   Flexion: 5  Extension: 5    Left Ankle/Foot   Great toe extension: 4    Right Ankle/Foot   Great toe extension: 3+    Tests     Lumbar     Right   Positive passive SLR (about 45 degrees).     Left Pelvic Girdle/Sacrum   Positive: sacral spring.     Right Pelvic Girdle/Sacrum   Positive: sacral spring.     Left Hip   Negative SARBJIT, piriformis and scour.     Right Hip   Positive SARBJIT and piriformis.   Negative scour.     Ambulation     Comments   RLE antalgia, no AD.         Assessment & Plan     Assessment  Impairments: abnormal gait, abnormal or restricted ROM, activity intolerance, impaired balance, impaired physical strength, lacks appropriate home exercise program, pain with function and weight-bearing intolerance  Assessment details: Patient presents with 1 month duration lower back pain with RLE symptoms in the buttock and down the lateral leg with loss of strength in great toe extension and Achilles reflex consistent with L5 nerve root impingement.  She does not respond favorably to extension on evaluation but is exquisitely tender along the right piriformis as a possible cause of her symptoms.  She is awaiting MRI which I think is warranted in this case so that neurosurgery can be consulted if needed.  Prognosis: good  Functional Limitations: lifting, walking, uncomfortable because of pain, sitting, standing and  stooping  Goals  Plan Goals: Short Term Goals (2 weeks)  1. Patient to be compliant with initial HEP  2.  Patient to report pain less than or equal to 4/10  3.  Patient to report decreased pain with extension.    Long Term Goals (4 weeks)  1. Patient to demonstrate lumbar extension ROM to at least 20 degrees.  2. Patient to report reduced LE symptom frequency  3. Patient to demonstrate independence with home exercises.  4. *Patient to demonstrate at least 4/5 right great toe extension.  5. Patient to improve Modified Oswestry score to at least 40%.    Plan  Therapy options: will be seen for skilled physical therapy services  Planned modality interventions: electrical stimulation/Gambian stimulation, ultrasound, traction and TENS  Planned therapy interventions: manual therapy, therapeutic activities, postural training, body mechanics training, functional ROM exercises, flexibility, gait training, stretching, strengthening, joint mobilization, neuromuscular re-education, soft tissue mobilization and spinal/joint mobilization  Frequency: 2x week  Duration in visits: 10  Treatment plan discussed with: patient  Plan details: Flexion based mobility, neuraldynamics, hip and core stabilization, manual and modalities as indicated based on pain.        Timed:  Manual Therapy:    0     mins  58077;  Therapeutic Exercise:    12     mins  79036;     Neuromuscular Arielle:    0    mins  87580;    Therapeutic Activity:     0     mins  55833;     Gait Trainin     mins  09145;     Ultrasound:     0     mins  33593;    Electrical Stimulation:    0     mins  14591 ( );    Untimed:  Electrical Stimulation:    0     mins  40717 ( );  Mechanical Traction:    0     mins  72398;     Timed Treatment:   12   mins   Total Treatment:     42   mins    PT SIGNATURE: Sanjeev Paul, PT   DATE TREATMENT INITIATED: 2019    Initial Certification  Certification Period: 3/19/2020  I certify that the therapy services are  furnished while this patient is under my care.  The services outlined above are required by this patient, and will be reviewed every 90 days.     PHYSICIAN: Denise Hanks MD      DATE:     Please sign and return via fax to [phone].. Thank you, [facility].

## 2020-01-06 ENCOUNTER — TREATMENT (OUTPATIENT)
Dept: PHYSICAL THERAPY | Facility: CLINIC | Age: 55
End: 2020-01-06

## 2020-01-06 DIAGNOSIS — M51.36 DEGENERATION OF L4-L5 INTERVERTEBRAL DISC: Primary | ICD-10-CM

## 2020-01-06 PROCEDURE — 97110 THERAPEUTIC EXERCISES: CPT | Performed by: PHYSICAL THERAPIST

## 2020-01-06 PROCEDURE — 97012 MECHANICAL TRACTION THERAPY: CPT | Performed by: PHYSICAL THERAPIST

## 2020-01-06 NOTE — PROGRESS NOTES
Physical Therapy Daily Progress Note        Patient: Lizette Keith   : 1965  Diagnosis/ICD-10 Code:  Degeneration of L4-L5 intervertebral disc [M51.36]  Referring practitioner: Denise LOZANO MD  Date of Initial Visit: Type: THERAPY  Noted: 2019  Today's Date: 2020  Patient seen for 2 sessions         Patient reports that her pain has been elevated a little more since returning to work and finishing oral steroids.  She notes that she isn't sure regarding her ability to extend the toes any further.  She is scheduled for neurosurgery consult tomorrow. Her pain level is 6/10 upon arrival.          Objective   Positive slump RLE.    See Exercise, Manual, and Modality Logs for complete treatment.       Assessment & Plan     Assessment  Assessment details: Patient reports an improvement in her overall irritability with traction.  She reports that stretching following traction was easier.  She still demonstrates radicular signs and a loss of great toe extension strength.    Plan  Plan details: Continue traction, follow up on neurosurgical findings.  Add DKTC and bridges.            Timed:  Manual Therapy:    5     mins  21312;  Therapeutic Exercise:    20     mins  01607;     Neuromuscular Arielle:    0    mins  89636;    Therapeutic Activity:     0     mins  16528;     Gait Trainin     mins  71694;     Ultrasound:     0     mins  59562;    Electrical Stimulation:    0     mins  98617 ( );    Untimed:  Electrical Stimulation:    0     mins  35900 ( );  Mechanical Traction:    15     mins  67609;     Timed Treatment:   25   mins   Total Treatment:     40   mins  Sanjeev Paul, PT  Physical Therapist

## 2020-01-07 ENCOUNTER — OFFICE VISIT (OUTPATIENT)
Dept: NEUROSURGERY | Facility: CLINIC | Age: 55
End: 2020-01-07

## 2020-01-07 VITALS
TEMPERATURE: 97.6 F | SYSTOLIC BLOOD PRESSURE: 132 MMHG | BODY MASS INDEX: 35.16 KG/M2 | DIASTOLIC BLOOD PRESSURE: 84 MMHG | HEIGHT: 65 IN | WEIGHT: 211 LBS

## 2020-01-07 DIAGNOSIS — M51.36 DEGENERATION OF L4-L5 INTERVERTEBRAL DISC: Primary | ICD-10-CM

## 2020-01-07 PROCEDURE — 99204 OFFICE O/P NEW MOD 45 MIN: CPT | Performed by: PHYSICIAN ASSISTANT

## 2020-01-07 RX ORDER — BACLOFEN 10 MG/1
10 TABLET ORAL 3 TIMES DAILY
Qty: 60 TABLET | Refills: 1 | Status: SHIPPED | OUTPATIENT
Start: 2020-01-07 | End: 2020-01-28

## 2020-01-07 RX ORDER — NABUMETONE 750 MG/1
750 TABLET, FILM COATED ORAL 2 TIMES DAILY
Qty: 60 TABLET | Refills: 0 | Status: SHIPPED | OUTPATIENT
Start: 2020-01-07 | End: 2020-01-28 | Stop reason: SDUPTHER

## 2020-01-07 NOTE — PROGRESS NOTES
Patient: Lizette Keith  : 1965  Gender: female    Primary Care Provider: Denise Hanks MD    Requesting Provider: As above    Chief Complaint:   Chief Complaint   Patient presents with   • Back Pain       History of Present Illness:  Lizette Keith is a 54-year-old woman who works as a  at [employer].  She reports a 6-7-week history of back and right lower extremity pain.  She reports a pretty sudden onset of pain with no fall or trauma as a preceding event.  She states that the pain begins in her right sided lumbar region and radiates to the anterior right groin as well as most of the right leg to the foot.  She reports associated numbness and paresthesias of most of the toes on the right foot.  She denies weakness of either lower extremity.  She denies saddle distribution numbness or bowel or bladder dysfunction. The pain is constant.  She has tried narcotic pain medicine, prednisone, muscle relaxers and Lyrica- all with minimal relief.  She has completed around 2-3 weeks of physical therapy which ultimately made symptoms worse.  She has had difficulty for fulfilling her duties as a  and states that she has recently had to leave her shifts early secondary to pain.  She denies a history of back pain or surgical intervention in the past.                                                                      Past Medical and Surgical History:  Past Medical History:   Diagnosis Date   • Allergic    • Anemia     during pregnancy   • Anxiety    • Arthritis    • Blood in urine    • Chronic fatigue    • Colon polyp 2018    7 at last colonoscopy   • Degeneration of L4-L5 intervertebral disc 12/10/2019   • Depression    • Elevated cholesterol    • Endometriosis    • Fibromyalgia    • Fibromyalgia, primary    • Fracture    • GERD (gastroesophageal reflux disease)    • H/O mammogram 2018   • HL (hearing loss)    • Hypertension    • Irritable bowel syndrome    • Pap smear for cervical cancer  screening 2018?     Past Surgical History:   Procedure Laterality Date   • CHOLECYSTECTOMY     • COLONOSCOPY  02/06/2018    3 year f/u polypectomy Dr MARTA Waller   • GALLBLADDER SURGERY     • HYSTERECTOMY     • INNER EAR SURGERY     • OOPHORECTOMY     • TEMPOROMANDIBULAR JOINT ARTHROPLASTY  1984       Current Medications:    Current Outpatient Medications:   •  aspirin 325 MG tablet, Take 325 mg by mouth Daily., Disp: , Rfl:   •  atorvastatin (LIPITOR) 10 MG tablet, Take 1 tablet by mouth Daily., Disp: 30 tablet, Rfl: 3  •  baclofen (LIORESAL) 10 MG tablet, Take 1 tablet by mouth 3 (Three) Times a Day., Disp: 60 tablet, Rfl: 1  •  busPIRone (BUSPAR) 10 MG tablet, Take 1 tablet by mouth 2 (Two) Times a Day., Disp: 60 tablet, Rfl: 1  •  cetirizine (ZyrTEC) 10 MG tablet, Take 10 mg by mouth daily., Disp: , Rfl:   •  dicyclomine (BENTYL) 20 MG tablet, Take 1 tablet by mouth 3 (Three) Times a Day As Needed (IBS- diarrhea)., Disp: 90 tablet, Rfl: 0  •  estradiol (ESTRACE) 2 MG tablet, Take 1 tablet by mouth Daily., Disp: 30 tablet, Rfl: 12  •  fluticasone (FLONASE) 50 MCG/ACT nasal spray, 2 sprays into the nostril(s) as directed by provider Daily., Disp: 16 g, Rfl: 5  •  lisinopril-hydrochlorothiazide (PRINZIDE,ZESTORETIC) 20-25 MG per tablet, Take 1 tablet by mouth Daily., Disp: 90 tablet, Rfl: 1  •  montelukast (SINGULAIR) 10 MG tablet, Take 1 tablet by mouth Every Night., Disp: 30 tablet, Rfl: 6  •  nabumetone (RELAFEN) 750 MG tablet, Take 1 tablet by mouth 2 (Two) Times a Day., Disp: 60 tablet, Rfl: 0  •  naproxen (NAPROSYN) 500 MG tablet, Take 1 tablet by mouth 2 (Two) Times a Day With Meals., Disp: 60 tablet, Rfl: 2  •  oxyCODONE-acetaminophen (PERCOCET) 7.5-325 MG per tablet, Take 1 tablet by mouth Every 6 (Six) Hours As Needed for Moderate Pain ., Disp: 30 tablet, Rfl: 0  •  potassium chloride (KLOR-CON) 10 MEQ CR tablet, Take 1 tablet by mouth Daily. For potassium, Disp: 30 tablet, Rfl: 5  •  predniSONE (DELTASONE)  "20 MG tablet, Take 1 tablet by mouth 2 (Two) Times a Day., Disp: 30 tablet, Rfl: 0  •  pregabalin (LYRICA) 25 MG capsule, Take 1 capsule by mouth 2 (Two) Times a Day., Disp: 60 capsule, Rfl: 1  •  tiZANidine (ZANAFLEX) 4 MG tablet, Take 1 tablet by mouth Every 8 (Eight) Hours As Needed for Muscle Spasms., Disp: 90 tablet, Rfl: 2    Allergies:  Allergies   Allergen Reactions   • Statins Myalgia   • Gabapentin Diarrhea and GI Intolerance   • Erythromycin GI Intolerance     Tolerates zpak   • Penicillins Hives   • Cymbalta [Duloxetine Hcl] Rash       Review of Systems:  Review of Systems   Constitutional: Positive for fatigue.   Endocrine: Positive for cold intolerance and heat intolerance.   Genitourinary: Positive for frequency.   Musculoskeletal: Positive for back pain, gait problem and myalgias.   Neurological: Positive for numbness and headache.   All other systems reviewed and are negative.        Physical Examination:  Vitals:    01/07/20 1437   BP: 132/84   Temp: 97.6 °F (36.4 °C)   Weight: 95.7 kg (211 lb)   Height: 165 cm (64.96\")       Physical Exam   Constitutional: She is oriented to person, place, and time. She appears well-developed and well-nourished. No distress.   HENT:   Head: Normocephalic and atraumatic.   Neck: Normal range of motion. Neck supple.   Musculoskeletal: Normal range of motion. She exhibits no edema, tenderness or deformity.   Neurological: She is alert and oriented to person, place, and time. She has normal strength and normal reflexes. No sensory deficit.   Gait is antalgic  SLR positive at 45 degrees on the right   Skin: Skin is warm and dry. She is not diaphoretic.   Psychiatric: She has a normal mood and affect.       Imaging Review:  CT scan from outside facility was available for my review demonstrates a right paracentral disc extrusion extending inferiorly resulting in compromise of the right lateral recess at L4-5.    Assessment:  Low back pain  Lumbar " radiculopathy    Plan:  Ms. Keith is seen today for evaluation of back and right lower extremity pain.  She reports a sudden onset around 7 weeks ago with no preceding event.  She appears to have radicular pain symptoms which extend to the majority of the right leg, terminating at the foot.  She also reports numbness of the lateral right foot.  I have ordered a lumbar MRI for further evaluation.  She will continue physical therapy at this juncture.  Additionally I referred her to pain management for consideration of epidural steroid injections.  She feels as if her current medications are of no help, therefore I have recommended she begin taking baclofen and Relafen.   She will follow up in 2-3 weeks with MRI. She was instructed to contact our office if she develops lower extremity weakness, numbness, or changes in bowel/bladder function.         Jacinta Arellano PA-C

## 2020-01-13 ENCOUNTER — TREATMENT (OUTPATIENT)
Dept: PHYSICAL THERAPY | Facility: CLINIC | Age: 55
End: 2020-01-13

## 2020-01-13 ENCOUNTER — APPOINTMENT (OUTPATIENT)
Dept: BONE DENSITY | Facility: HOSPITAL | Age: 55
End: 2020-01-13

## 2020-01-13 ENCOUNTER — APPOINTMENT (OUTPATIENT)
Dept: MAMMOGRAPHY | Facility: HOSPITAL | Age: 55
End: 2020-01-13

## 2020-01-13 DIAGNOSIS — M51.36 DEGENERATION OF L4-L5 INTERVERTEBRAL DISC: Primary | ICD-10-CM

## 2020-01-13 PROCEDURE — 97012 MECHANICAL TRACTION THERAPY: CPT | Performed by: PHYSICAL THERAPIST

## 2020-01-13 PROCEDURE — 97110 THERAPEUTIC EXERCISES: CPT | Performed by: PHYSICAL THERAPIST

## 2020-01-13 NOTE — PROGRESS NOTES
Physical Therapy Daily Progress Note        Patient: Lizette Keith   : 1965  Diagnosis/ICD-10 Code:  Degeneration of L4-L5 intervertebral disc [M51.36]  Referring practitioner: Denise LOZANO MD  Date of Initial Visit: Type: THERAPY  Noted: 2019  Today's Date: 2020  Patient seen for 3 sessions         Patient reports that she has lost her job since the last session for unrelated reasons.  She notes that she was really sore after her previous session from the right low back to the right leg to the ankle.  She notes that she followed up with neuro and they're trying to order and MRI before , she is to continue PT until then. Her pain level is 6/10 upon arrival.          Objective   Mild RLE antalgia with lateral bend during stance phase of gait.    See Exercise, Manual, and Modality Logs for complete treatment.       Assessment & Plan     Assessment  Assessment details: Patient tolerates treatment well and her pain is reduced with starting her session with traction and proceeding to stretching and neuraldynamics.  The weight was dropped for her traction and she reports reduced symptoms with treatment.    Plan  Plan details: Check for tolerance to exercise and add core stabilization.              Timed:  Manual Therapy:    0     mins  96418;  Therapeutic Exercise:    28     mins  31403;     Neuromuscular Arielle:    0    mins  97786;    Therapeutic Activity:     0     mins  01164;     Gait Trainin     mins  83249;     Ultrasound:     0     mins  64213;    Electrical Stimulation:    0     mins  04421 ( );    Untimed:  Electrical Stimulation:    0     mins  39438 ( );  Mechanical Traction:    13     mins  04560;     Timed Treatment:   28   mins   Total Treatment:     41   mins  Sanjeev Paul, PT  Physical Therapist

## 2020-01-20 ENCOUNTER — TREATMENT (OUTPATIENT)
Dept: PHYSICAL THERAPY | Facility: CLINIC | Age: 55
End: 2020-01-20

## 2020-01-20 DIAGNOSIS — M51.36 DEGENERATION OF L4-L5 INTERVERTEBRAL DISC: Primary | ICD-10-CM

## 2020-01-20 PROCEDURE — 97110 THERAPEUTIC EXERCISES: CPT | Performed by: PHYSICAL THERAPIST

## 2020-01-20 PROCEDURE — 97012 MECHANICAL TRACTION THERAPY: CPT | Performed by: PHYSICAL THERAPIST

## 2020-01-20 NOTE — PROGRESS NOTES
Physical Therapy Daily Progress Note        Patient: Lizette Keith   : 1965  Diagnosis/ICD-10 Code:  Degeneration of L4-L5 intervertebral disc [M51.36]  Referring practitioner: Denise LOZANO MD  Date of Initial Visit: Type: THERAPY  Noted: 2019  Today's Date: 2020  Patient seen for 4 sessions         Patient reports that she is feeling better somewhat.  She notes that she felt less pain following traction but she continues to have right hip and right lower leg pain intermittently that is worse when laying flat supine. Her pain level is 4/10 upon arrival.          Objective   See Exercise, Manual, and Modality Logs for complete treatment.       Assessment & Plan     Assessment  Assessment details: Patient reports a general increase in her lower back and leg pain follow bridges exercise.  She seems to be less painful but she also isn't working right now which could be the reason for her pain reduction.  She follows up next week with neuro, and is difficult to say how much improved she is from PT thus far.    Plan  Plan details: Reassessment next visit with MD note.            Timed:  Manual Therapy:    0     mins  91631;  Therapeutic Exercise:    30     mins  51059;     Neuromuscular Arielle:    0    mins  35893;    Therapeutic Activity:     0     mins  48231;     Gait Trainin     mins  25497;     Ultrasound:     0     mins  03650;    Electrical Stimulation:    0     mins  89684 ( );    Untimed:  Electrical Stimulation:    0     mins  68455 ( );  Mechanical Traction:    15     mins  93929;     Timed Treatment:   30   mins   Total Treatment:     45   mins  Sanjeev Paul, PT  Physical Therapist

## 2020-01-28 ENCOUNTER — OFFICE VISIT (OUTPATIENT)
Dept: NEUROSURGERY | Facility: CLINIC | Age: 55
End: 2020-01-28

## 2020-01-28 ENCOUNTER — HOSPITAL ENCOUNTER (OUTPATIENT)
Dept: MRI IMAGING | Facility: HOSPITAL | Age: 55
Discharge: HOME | End: 2020-01-28
Admitting: PHYSICIAN ASSISTANT

## 2020-01-28 VITALS
HEIGHT: 65 IN | DIASTOLIC BLOOD PRESSURE: 90 MMHG | WEIGHT: 206 LBS | TEMPERATURE: 97.8 F | SYSTOLIC BLOOD PRESSURE: 148 MMHG | BODY MASS INDEX: 34.32 KG/M2

## 2020-01-28 DIAGNOSIS — M51.36 DEGENERATION OF L4-L5 INTERVERTEBRAL DISC: Primary | ICD-10-CM

## 2020-01-28 DIAGNOSIS — M51.36 DEGENERATION OF L4-L5 INTERVERTEBRAL DISC: ICD-10-CM

## 2020-01-28 PROCEDURE — 99213 OFFICE O/P EST LOW 20 MIN: CPT | Performed by: NEUROLOGICAL SURGERY

## 2020-01-28 PROCEDURE — 72148 MRI LUMBAR SPINE W/O DYE: CPT

## 2020-01-28 RX ORDER — TRAMADOL HYDROCHLORIDE 50 MG/1
50 TABLET, COATED ORAL EVERY 6 HOURS PRN
Qty: 45 TABLET | Refills: 2 | Status: SHIPPED | OUTPATIENT
Start: 2020-01-28 | End: 2020-09-08 | Stop reason: SDUPTHER

## 2020-01-28 RX ORDER — NABUMETONE 750 MG/1
750 TABLET, FILM COATED ORAL 2 TIMES DAILY
Qty: 60 TABLET | Refills: 1 | Status: SHIPPED | OUTPATIENT
Start: 2020-01-28 | End: 2020-02-25 | Stop reason: SINTOL

## 2020-01-28 NOTE — PROGRESS NOTES
Lizette Keith  1965  [id number]                        CHIEF COMPLAINT: Back and right hip pain         MEDICAL HISTORY SINCE LAST ENCOUNTER: This 54-year-old female reports today for follow-up subsequent to her initial encounter.  She has been to physical therapy which has not helped considerably.  She still has radicular component.  He has a genetic predisposition for degenerative disc disease.  Her mother had PLIF L4-L5.  A lumbar MRI has been performed and she is referred for neurosurgical consultation.           Past Medical History:   Diagnosis Date   • Allergic    • Anemia     during pregnancy   • Anxiety    • Arthritis    • Blood in urine    • Chronic fatigue    • Colon polyp 02/2018    9 at last colonoscopy   • Degeneration of L4-L5 intervertebral disc 12/10/2019   • Depression    • Elevated cholesterol    • Endometriosis    • Fibromyalgia    • Fibromyalgia, primary    • Fracture    • GERD (gastroesophageal reflux disease)    • H/O mammogram 02/2018   • HL (hearing loss)    • Hypertension    • Irritable bowel syndrome    • Pap smear for cervical cancer screening 2018?              Past Surgical History:   Procedure Laterality Date   • CHOLECYSTECTOMY     • COLONOSCOPY  02/06/2018    3 year f/u polypectomy Dr MARTA Waller   • GALLBLADDER SURGERY     • HYSTERECTOMY     • INNER EAR SURGERY     • OOPHORECTOMY     • TEMPOROMANDIBULAR JOINT ARTHROPLASTY  1984              Family History   Problem Relation Age of Onset   • Melanoma Mother         eye   • Diabetes Mother    • Heart disease Mother    • Arthritis Mother    • Depression Mother    • Colon polyps Mother    • Thyroid disease Mother    • Hyperlipidemia Mother    • Cancer Mother    • Heart attack Mother    • Migraines Mother    • Cancer Father         pancreatic and liver   • Diabetes Father    • Depression Father    • Colon polyps Father    • ADD / ADHD Son    • Diabetes Maternal Aunt    • Diabetes Maternal Uncle    • Diabetes Paternal Aunt    •  Diabetes Paternal Uncle    • Cancer Maternal Grandmother         uterine/cervical   • Heart disease Maternal Grandfather    • Heart disease Paternal Grandfather    • Migraines Sister    • Breast cancer Neg Hx    • Ovarian cancer Neg Hx               Social History     Socioeconomic History   • Marital status:      Spouse name: Not on file   • Number of children: Not on file   • Years of education: Not on file   • Highest education level: Not on file   Tobacco Use   • Smoking status: Current Every Day Smoker     Packs/day: 0.75     Years: 34.00     Pack years: 25.50     Types: Cigarettes   • Smokeless tobacco: Never Used   Substance and Sexual Activity   • Alcohol use: No   • Drug use: No   • Sexual activity: Not Currently     Birth control/protection: None              Allergies   Allergen Reactions   • Statins Myalgia   • Gabapentin Diarrhea and GI Intolerance   • Erythromycin GI Intolerance     Tolerates zpak   • Penicillins Hives   • Cymbalta [Duloxetine Hcl] Rash              Current Outpatient Medications:   •  aspirin 325 MG tablet, Take 325 mg by mouth Daily., Disp: , Rfl:   •  atorvastatin (LIPITOR) 10 MG tablet, Take 1 tablet by mouth Daily., Disp: 30 tablet, Rfl: 3  •  baclofen (LIORESAL) 10 MG tablet, Take 1 tablet by mouth 3 (Three) Times a Day., Disp: 60 tablet, Rfl: 1  •  busPIRone (BUSPAR) 10 MG tablet, Take 1 tablet by mouth 2 (Two) Times a Day., Disp: 60 tablet, Rfl: 1  •  cetirizine (ZyrTEC) 10 MG tablet, Take 10 mg by mouth daily., Disp: , Rfl:   •  dicyclomine (BENTYL) 20 MG tablet, Take 1 tablet by mouth 3 (Three) Times a Day As Needed (IBS- diarrhea)., Disp: 90 tablet, Rfl: 0  •  estradiol (ESTRACE) 2 MG tablet, Take 1 tablet by mouth Daily., Disp: 30 tablet, Rfl: 12  •  fluticasone (FLONASE) 50 MCG/ACT nasal spray, 2 sprays into the nostril(s) as directed by provider Daily., Disp: 16 g, Rfl: 5  •  lisinopril-hydrochlorothiazide (PRINZIDE,ZESTORETIC) 20-25 MG per tablet, Take 1 tablet  by mouth Daily., Disp: 90 tablet, Rfl: 1  •  montelukast (SINGULAIR) 10 MG tablet, Take 1 tablet by mouth Every Night., Disp: 30 tablet, Rfl: 6  •  nabumetone (RELAFEN) 750 MG tablet, Take 1 tablet by mouth 2 (Two) Times a Day., Disp: 60 tablet, Rfl: 0  •  naproxen (NAPROSYN) 500 MG tablet, Take 1 tablet by mouth 2 (Two) Times a Day With Meals., Disp: 60 tablet, Rfl: 2  •  oxyCODONE-acetaminophen (PERCOCET) 7.5-325 MG per tablet, Take 1 tablet by mouth Every 6 (Six) Hours As Needed for Moderate Pain ., Disp: 30 tablet, Rfl: 0  •  potassium chloride (KLOR-CON) 10 MEQ CR tablet, Take 1 tablet by mouth Daily. For potassium, Disp: 30 tablet, Rfl: 5  •  predniSONE (DELTASONE) 20 MG tablet, Take 1 tablet by mouth 2 (Two) Times a Day., Disp: 30 tablet, Rfl: 0  •  pregabalin (LYRICA) 25 MG capsule, Take 1 capsule by mouth 2 (Two) Times a Day., Disp: 60 capsule, Rfl: 1  •  tiZANidine (ZANAFLEX) 4 MG tablet, Take 1 tablet by mouth Every 8 (Eight) Hours As Needed for Muscle Spasms., Disp: 90 tablet, Rfl: 2         Review of Systems   Constitutional: Negative for activity change, appetite change, chills, diaphoresis, fatigue, fever and unexpected weight change.   HENT: Negative for congestion, dental problem, drooling, ear discharge, ear pain, facial swelling, hearing loss, mouth sores, nosebleeds, postnasal drip, rhinorrhea, sinus pressure, sinus pain, sneezing, sore throat, tinnitus, trouble swallowing and voice change.    Eyes: Negative for photophobia, pain, discharge, redness, itching and visual disturbance.   Respiratory: Positive for cough. Negative for apnea, choking, chest tightness, shortness of breath, wheezing and stridor.    Cardiovascular: Negative for chest pain, palpitations and leg swelling.   Gastrointestinal: Positive for abdominal distention, abdominal pain, diarrhea and nausea. Negative for anal bleeding, blood in stool, constipation, rectal pain and vomiting.   Endocrine: Negative for cold intolerance,  "heat intolerance, polydipsia, polyphagia and polyuria.   Genitourinary: Positive for decreased urine volume, difficulty urinating and pelvic pain. Negative for dyspareunia, dysuria, enuresis, flank pain, frequency, genital sores, hematuria, menstrual problem, urgency, vaginal bleeding, vaginal discharge and vaginal pain.   Musculoskeletal: Positive for back pain and myalgias. Negative for arthralgias, gait problem, joint swelling, neck pain and neck stiffness.   Skin: Negative for color change, pallor, rash and wound.   Allergic/Immunologic: Positive for environmental allergies. Negative for food allergies and immunocompromised state.   Neurological: Positive for headaches. Negative for dizziness, tremors, seizures, syncope, facial asymmetry, speech difficulty, weakness, light-headedness and numbness.   Hematological: Negative for adenopathy. Does not bruise/bleed easily.   Psychiatric/Behavioral: Positive for agitation, decreased concentration and dysphoric mood. Negative for behavioral problems, confusion, hallucinations, self-injury, sleep disturbance and suicidal ideas. The patient is nervous/anxious. The patient is not hyperactive.                Vitals:    01/28/20 1411   BP: 148/90   BP Location: Left arm   Patient Position: Sitting   Cuff Size: Adult   Temp: 97.8 °F (36.6 °C)   Weight: 93.4 kg (206 lb)   Height: 165.1 cm (65\")               EXAMINATION: She has limitation range of motion.  Straight leg raising is mildly positive on the right.  There is no weakness, sensory loss or reflex asymmetry however.  Her gait is normal.            MEDICAL DECISION MAKING: The lumbar MRI shows what appears to be disc herniation L4-L5 deviating toward the right side providing anatomical/clinical correlation.           ASSESSMENT/DISPOSITION: The studies confirm the presence of a disc herniation.  I reviewed her therapeutic options which include surgical intervention.  She wishes to avoid that if all possible given the " experience of her mother.  Therefore, I have asked her to see Dr. Tobar for an epidural steroid injection or facet block.  She will call me afterward.  If she shows no improvement in her symptoms are sufficiently severe discectomy L4-L5, right would be warranted and justified.  Thank you for allow me to see her.              I APPRECIATE THE OPPORTUNITY OF THIS REFERRAL. PLEASE CALL IF ANY       QUESTIONS [phone]    Scribed for Qasim Kelsey MD by Luciana Mitchell CMA. 1/28/2020  2:29 PM    I have read and concur with the information provided by the scribe.  Qasim Kelsey MD

## 2020-01-28 NOTE — PATIENT INSTRUCTIONS
Call Dr. Kelsey on a Monday or Tuesday with an update.      Ask for Heidi () and leave a message for  Dr. Kelsey.     He will call you back at the end of the day as soon as he can.     [phone]    Call after seen by Dr. Tobar

## 2020-01-29 ENCOUNTER — TREATMENT (OUTPATIENT)
Dept: PHYSICAL THERAPY | Facility: CLINIC | Age: 55
End: 2020-01-29

## 2020-01-29 DIAGNOSIS — M51.36 DEGENERATION OF L4-L5 INTERVERTEBRAL DISC: Primary | ICD-10-CM

## 2020-01-29 PROCEDURE — 97110 THERAPEUTIC EXERCISES: CPT | Performed by: PHYSICAL THERAPIST

## 2020-01-29 PROCEDURE — 97014 ELECTRIC STIMULATION THERAPY: CPT | Performed by: PHYSICAL THERAPIST

## 2020-01-29 NOTE — PROGRESS NOTES
Physical Therapy Daily Progress Note        Patient: Lizette Keith   : 1965  Diagnosis/ICD-10 Code:  Degeneration of L4-L5 intervertebral disc [M51.36]  Referring practitioner: Denise LOZANO MD  Date of Initial Visit: Type: THERAPY  Noted: 2019  Today's Date: 2020  Patient seen for 5 sessions         Patient reports that she saw neurosurgery and had an MRI.  She had a right disc herniation on MRI and surgery was discussed, they elected to try ROGE instead. Her pain level is 6/10 upon arrival.          Objective   See Exercise, Manual, and Modality Logs for complete treatment.       Assessment & Plan     Assessment  Assessment details: Patient is to have ROGE.  She felt mild relief with heat and stim today but has not found relief with any form of exercise including Aiyana techniques.  Her potential for improvement with PT is likely guarded at this point.    Plan  Plan details: Hold PT until pain management is initiated.            Timed:  Manual Therapy:    0     mins  78266;  Therapeutic Exercise:    30     mins  26625;     Neuromuscular Arielle:    0    mins  65387;    Therapeutic Activity:     0     mins  29035;     Gait Trainin     mins  69998;     Ultrasound:     0     mins  15257;    Electrical Stimulation:    0     mins  90247 ( );    Untimed:  Electrical Stimulation:    10     mins  83290 ( );  Mechanical Traction:    0     mins  42293;     Timed Treatment:   30   mins   Total Treatment:     40   mins  Sanjeev Paul PT  Physical Therapist

## 2020-02-25 ENCOUNTER — OFFICE VISIT (OUTPATIENT)
Dept: INTERNAL MEDICINE | Facility: CLINIC | Age: 55
End: 2020-02-25

## 2020-02-25 VITALS
SYSTOLIC BLOOD PRESSURE: 106 MMHG | HEIGHT: 65 IN | BODY MASS INDEX: 33.99 KG/M2 | HEART RATE: 108 BPM | TEMPERATURE: 96.9 F | WEIGHT: 204 LBS | OXYGEN SATURATION: 99 % | DIASTOLIC BLOOD PRESSURE: 70 MMHG

## 2020-02-25 DIAGNOSIS — M51.36 DEGENERATION OF L4-L5 INTERVERTEBRAL DISC: ICD-10-CM

## 2020-02-25 DIAGNOSIS — M25.50 ARTHRALGIA, UNSPECIFIED JOINT: ICD-10-CM

## 2020-02-25 DIAGNOSIS — M54.41 CHRONIC RIGHT-SIDED LOW BACK PAIN WITH RIGHT-SIDED SCIATICA: ICD-10-CM

## 2020-02-25 DIAGNOSIS — M79.7 FIBROMYALGIA: Primary | ICD-10-CM

## 2020-02-25 DIAGNOSIS — G89.29 CHRONIC RIGHT-SIDED LOW BACK PAIN WITH RIGHT-SIDED SCIATICA: ICD-10-CM

## 2020-02-25 DIAGNOSIS — R39.198 DIFFICULTY URINATING: ICD-10-CM

## 2020-02-25 DIAGNOSIS — M79.10 MYALGIA: ICD-10-CM

## 2020-02-25 LAB
BILIRUB BLD-MCNC: NEGATIVE MG/DL
CLARITY, POC: ABNORMAL
COLOR UR: ABNORMAL
GLUCOSE UR STRIP-MCNC: NEGATIVE MG/DL
KETONES UR QL: NEGATIVE
LEUKOCYTE EST, POC: NEGATIVE
NITRITE UR-MCNC: NEGATIVE MG/ML
PH UR: 6 [PH] (ref 5–8)
PROT UR STRIP-MCNC: NEGATIVE MG/DL
RBC # UR STRIP: ABNORMAL /UL
SP GR UR: 1.03 (ref 1–1.03)
UROBILINOGEN UR QL: NORMAL

## 2020-02-25 PROCEDURE — 99214 OFFICE O/P EST MOD 30 MIN: CPT | Performed by: FAMILY MEDICINE

## 2020-02-25 RX ORDER — NAPROXEN 500 MG/1
500 TABLET ORAL 2 TIMES DAILY WITH MEALS
Qty: 60 TABLET | Refills: 3 | Status: SHIPPED | OUTPATIENT
Start: 2020-02-25 | End: 2020-05-27 | Stop reason: SDUPTHER

## 2020-02-25 RX ORDER — TIZANIDINE 4 MG/1
4 TABLET ORAL EVERY 8 HOURS PRN
Qty: 90 TABLET | Refills: 2 | Status: SHIPPED | OUTPATIENT
Start: 2020-02-25 | End: 2020-05-27 | Stop reason: SDUPTHER

## 2020-02-25 RX ORDER — PREGABALIN 50 MG/1
50 CAPSULE ORAL 2 TIMES DAILY
Qty: 60 CAPSULE | Refills: 2 | Status: SHIPPED | OUTPATIENT
Start: 2020-02-25 | End: 2020-05-27 | Stop reason: SDUPTHER

## 2020-02-25 NOTE — PROGRESS NOTES
"Subjective   Lizette Keith is a 54 y.o. female.     Chief Complaint   Patient presents with   • Fibromyalgia     f/u states the lyrica isn't working as well as it should, wanted to discuss increasing the medication   • Depression   • Anxiety   • Hyperlipidemia       Visit Vitals  /70 (BP Location: Left arm, Cuff Size: Large Adult)   Pulse 108   Temp 96.9 °F (36.1 °C)   Ht 165 cm (64.96\")   Wt 92.5 kg (204 lb)   SpO2 99%   BMI 33.99 kg/m²         Back Pain   This is a chronic problem. The current episode started more than 1 year ago. The problem occurs constantly. The problem is unchanged. The pain is present in the sacro-iliac. The quality of the pain is described as aching, burning, cramping, shooting and stabbing. The pain radiates to the right thigh, right knee and right foot. Pain scale: 4-10. The pain is severe. The pain is the same all the time. The symptoms are aggravated by standing. Associated symptoms include leg pain, numbness and paresthesias. Pertinent negatives include no abdominal pain, bladder incontinence, bowel incontinence, chest pain, dysuria, fever, headaches, paresis, pelvic pain, perianal numbness, tingling, weakness or weight loss. Risk factors include obesity and menopause. She has tried analgesics (lyrica, tramadol) for the symptoms. The treatment provided moderate relief.   Difficulty Urinating   This is a chronic problem. The current episode started more than 1 month ago. The problem occurs constantly. The problem has been waxing and waning. Associated symptoms include myalgias, numbness and urinary symptoms. Pertinent negatives include no abdominal pain, anorexia, arthralgias, change in bowel habit, chest pain, chills, congestion, coughing, diaphoresis, fatigue, fever, headaches, joint swelling, nausea, neck pain, rash, sore throat, swollen glands, vertigo, visual change, vomiting or weakness. Nothing aggravates the symptoms. She has tried nothing for the symptoms. The treatment " provided no relief.   Fibromyalgia   This is a chronic problem. The current episode started more than 1 year ago. The problem occurs constantly. The problem has been unchanged. Associated symptoms include myalgias, numbness and urinary symptoms. Pertinent negatives include no abdominal pain, anorexia, arthralgias, change in bowel habit, chest pain, chills, congestion, coughing, diaphoresis, fatigue, fever, headaches, joint swelling, nausea, neck pain, rash, sore throat, swollen glands, vertigo, visual change, vomiting or weakness. Nothing aggravates the symptoms. Treatments tried: lyrica, NSAID. The treatment provided moderate relief.      Pt states that Dr Kelsey has sent her to Dr Tobar, pt is waiting on that appt, for back injection. Pt had to take the PT notes and get records to Dr Tobar's office.   Pt is getting nausea with relafen. Naprosyn did not help the pain.  Pt has DDD L4-5.     Pt asks about increasing the lyrica. Pt has sciatic pain in right side.    Pt has episodes of decrease amount of urine and difficulty starting stream for 2-3 days at a time and then pt has increased flow of urine.    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   The following portions of the patient's history were reviewed and updated as appropriate: allergies, current medications, past family history, past medical history, past social history, past surgical history and problem list.    Past Medical History:   Diagnosis Date   • Allergic    • Anemia     during pregnancy   • Anxiety    • Arthritis    • Blood in urine    • Chronic fatigue    • Colon polyp 02/2018    9 at last colonoscopy   • Degeneration of L4-L5 intervertebral disc 12/10/2019   • Depression    • Elevated cholesterol    • Endometriosis    • Fibromyalgia    • Fibromyalgia, primary    • Fracture    • GERD (gastroesophageal reflux disease)    • H/O mammogram 02/2018   • HL (hearing loss)    •  Hypertension    • Irritable bowel syndrome    • Pap smear for cervical cancer screening 2018?      Past Surgical History:   Procedure Laterality Date   • CHOLECYSTECTOMY     • COLONOSCOPY  02/06/2018    3 year f/u polypectomy Dr MARTA Waller   • GALLBLADDER SURGERY     • HYSTERECTOMY     • INNER EAR SURGERY     • OOPHORECTOMY     • TEMPOROMANDIBULAR JOINT ARTHROPLASTY  1984      Family History   Problem Relation Age of Onset   • Melanoma Mother         eye   • Diabetes Mother    • Heart disease Mother    • Arthritis Mother    • Depression Mother    • Colon polyps Mother    • Thyroid disease Mother    • Hyperlipidemia Mother    • Cancer Mother    • Heart attack Mother    • Migraines Mother    • Cancer Father         pancreatic and liver   • Diabetes Father    • Depression Father    • Colon polyps Father    • ADD / ADHD Son    • Diabetes Maternal Aunt    • Diabetes Maternal Uncle    • Diabetes Paternal Aunt    • Diabetes Paternal Uncle    • Cancer Maternal Grandmother         uterine/cervical   • Heart disease Maternal Grandfather    • Heart disease Paternal Grandfather    • Migraines Sister    • Breast cancer Neg Hx    • Ovarian cancer Neg Hx       Social History     Socioeconomic History   • Marital status:      Spouse name: Not on file   • Number of children: Not on file   • Years of education: Not on file   • Highest education level: Not on file   Tobacco Use   • Smoking status: Current Every Day Smoker     Packs/day: 0.75     Years: 34.00     Pack years: 25.50     Types: Cigarettes   • Smokeless tobacco: Never Used   Substance and Sexual Activity   • Alcohol use: No   • Drug use: No   • Sexual activity: Not Currently     Birth control/protection: None      Allergies   Allergen Reactions   • Statins Myalgia   • Gabapentin Diarrhea and GI Intolerance   • Lipitor [Atorvastatin Calcium] Myalgia   • Relafen [Nabumetone] GI Intolerance   • Erythromycin GI Intolerance     Tolerates zpak   • Penicillins Hives   •  Cymbalta [Duloxetine Hcl] Rash       Review of Systems   Constitutional: Negative.  Negative for chills, diaphoresis, fatigue, fever and weight loss.   HENT: Negative.  Negative for congestion, ear pain, nosebleeds, postnasal drip, rhinorrhea, sinus pressure, sneezing and sore throat.    Eyes: Negative.  Negative for redness and itching.   Respiratory: Negative.  Negative for cough, shortness of breath and wheezing.    Cardiovascular: Negative.  Negative for chest pain and palpitations.   Gastrointestinal: Negative.  Negative for abdominal pain, anorexia, bowel incontinence, change in bowel habit, constipation, diarrhea, nausea and vomiting.   Endocrine: Negative.  Negative for cold intolerance and heat intolerance.   Genitourinary: Positive for difficulty urinating (pt will get urgency and then dribbles urine for 2-3 days at a time. ), frequency and urgency. Negative for bladder incontinence, dysuria, hematuria and pelvic pain.   Musculoskeletal: Positive for back pain, gait problem and myalgias. Negative for arthralgias, joint swelling, neck pain and neck stiffness.   Skin: Negative.  Negative for color change and rash.   Allergic/Immunologic: Negative.  Negative for environmental allergies.   Neurological: Positive for numbness and paresthesias. Negative for dizziness, vertigo, tingling, syncope, weakness, light-headedness and headaches.   Hematological: Negative.  Negative for adenopathy. Does not bruise/bleed easily.   Psychiatric/Behavioral: Negative.  Negative for dysphoric mood. The patient is not nervous/anxious.        Objective   Physical Exam   Constitutional: She is oriented to person, place, and time. She appears well-developed.   HENT:   Head: Normocephalic.   Right Ear: External ear normal.   Left Ear: External ear normal.   Nose: Nose normal.   Eyes: Pupils are equal, round, and reactive to light. Conjunctivae, EOM and lids are normal.   Neck: Trachea normal and normal range of motion. Neck supple.  Carotid bruit is not present. No thyroid mass and no thyromegaly present.   Cardiovascular: Normal rate and regular rhythm.   No murmur heard.  Pulmonary/Chest: Effort normal and breath sounds normal. No respiratory distress. She has no decreased breath sounds. She has no wheezes. She has no rhonchi. She has no rales. She exhibits no tenderness.   Abdominal: Soft. Bowel sounds are normal. There is no tenderness.   Musculoskeletal: Normal range of motion.        Lumbar back: She exhibits no bony tenderness.        Back:    Neurological: She is alert and oriented to person, place, and time.   Skin: Skin is warm and dry.   Psychiatric: She has a normal mood and affect. Her behavior is normal.   Nursing note and vitals reviewed.      Assessment/Plan   Lizette was seen today for fibromyalgia, depression, anxiety and hyperlipidemia.    Diagnoses and all orders for this visit:    Fibromyalgia  -     pregabalin (LYRICA) 50 MG capsule; Take 1 capsule by mouth 2 (Two) Times a Day.  -     naproxen (NAPROSYN) 500 MG tablet; Take 1 tablet by mouth 2 (Two) Times a Day With Meals.    Degeneration of L4-L5 intervertebral disc  -     pregabalin (LYRICA) 50 MG capsule; Take 1 capsule by mouth 2 (Two) Times a Day.    Chronic right-sided low back pain with right-sided sciatica  -     pregabalin (LYRICA) 50 MG capsule; Take 1 capsule by mouth 2 (Two) Times a Day.  -     naproxen (NAPROSYN) 500 MG tablet; Take 1 tablet by mouth 2 (Two) Times a Day With Meals.    Difficulty urinating  -     POC Urinalysis Dipstick, Automated  -     Urine Culture - Urine, Urine, Clean Catch  -     Ambulatory Referral to Urology    Arthralgia, unspecified joint  -     tiZANidine (ZANAFLEX) 4 MG tablet; Take 1 tablet by mouth Every 8 (Eight) Hours As Needed for Muscle Spasms.    Myalgia  -     tiZANidine (ZANAFLEX) 4 MG tablet; Take 1 tablet by mouth Every 8 (Eight) Hours As Needed for Muscle Spasms.        Increase lyrica to 50 mg bid  Pt stopped the  lipitor for muscle pain     Handout on smoking cessation    Current Outpatient Medications:   •  aspirin 325 MG tablet, Take 325 mg by mouth Daily., Disp: , Rfl:   •  busPIRone (BUSPAR) 10 MG tablet, Take 1 tablet by mouth 2 (Two) Times a Day., Disp: 60 tablet, Rfl: 1  •  cetirizine (ZyrTEC) 10 MG tablet, Take 10 mg by mouth daily., Disp: , Rfl:   •  dicyclomine (BENTYL) 20 MG tablet, Take 1 tablet by mouth 3 (Three) Times a Day As Needed (IBS- diarrhea)., Disp: 90 tablet, Rfl: 0  •  estradiol (ESTRACE) 2 MG tablet, Take 1 tablet by mouth Daily., Disp: 30 tablet, Rfl: 12  •  fluticasone (FLONASE) 50 MCG/ACT nasal spray, 2 sprays into the nostril(s) as directed by provider Daily., Disp: 16 g, Rfl: 5  •  lisinopril-hydrochlorothiazide (PRINZIDE,ZESTORETIC) 20-25 MG per tablet, Take 1 tablet by mouth Daily., Disp: 90 tablet, Rfl: 1  •  montelukast (SINGULAIR) 10 MG tablet, Take 1 tablet by mouth Every Night., Disp: 30 tablet, Rfl: 6  •  potassium chloride (KLOR-CON) 10 MEQ CR tablet, Take 1 tablet by mouth Daily. For potassium, Disp: 30 tablet, Rfl: 5  •  tiZANidine (ZANAFLEX) 4 MG tablet, Take 1 tablet by mouth Every 8 (Eight) Hours As Needed for Muscle Spasms., Disp: 90 tablet, Rfl: 2  •  ULTRAM 50 MG tablet, Take 1 tablet by mouth Every 6 (Six) Hours As Needed for Moderate Pain ., Disp: 45 tablet, Rfl: 2  •  naproxen (NAPROSYN) 500 MG tablet, Take 1 tablet by mouth 2 (Two) Times a Day With Meals., Disp: 60 tablet, Rfl: 3  •  pregabalin (LYRICA) 50 MG capsule, Take 1 capsule by mouth 2 (Two) Times a Day., Disp: 60 capsule, Rfl: 2    Return in about 4 weeks (around 3/24/2020), or if symptoms worsen or fail to improve, for Recheck.    EXAMINATION: MRI LUMBAR SPINE WO CONTRAST- 01/28/2020     INDICATION: Back pain, right leg pain and numbness; M51.36-Other  intervertebral disc degeneration, lumbar region      TECHNIQUE: Routine multiplanar imaging was obtained of the lumbar spine  without the administration of  gadolinium contrast.     COMPARISON: NONE     FINDINGS: There is normal signal intensity seen within the vertebral  body levels with small vertebral body hemangioma seen within the L3 and  L5 levels. There is normal signal intensity seen within the conus. The  spinal cord terminates at the T12/L1 disc space. The facets are well  aligned. Pedicles are intact. Tiny cysts seen in both kidneys. No  abnormal mass or fluid collection seen within the paraspinal muscles.     Axial imaging reveals at the L2/L3 level no significant central spinal  canal stenosis or nerve root compromise. No neuroforaminal narrowing or  thickening of the posterior ligamentum flavum.     At the L3/L4 level there is a broad-based disc bulge creating some  narrowing of the neuroforamina bilaterally. Mild degenerative changes  seen within the posterior facets with mild to moderate narrowing of the  central spinal canal. No definite nerve root contact or compromise.     At the L4/L5 level there is a right paracentral disc protrusion with  lateralization to the right creating narrowing of the right  neuroforamina and contact of the right nerve root. Moderate to severe  central spinal canal stenosis is identified. There are mild degenerative  changes seen within the posterior facets.     IMPRESSION:  Right paracentral disc protrusion at the L4/L5 level  creating mass effect on the nerve root and narrowing of the right  neuroforamina. There is compromise identified of the right nerve root  with moderate to severe central spinal canal narrowing.      D:  01/28/2020  E:  01/28/2020     This report was finalized on 1/29/2020 9:46 AM by Dr. Celsa Caldwell MD.        Yo report reviewed and is consistent #[id number]    Recent Results (from the past 168 hour(s))   POC Urinalysis Dipstick, Automated    Collection Time: 02/25/20 10:44 AM   Result Value Ref Range    Color Elizabeth Yellow, Straw, Dark Yellow, Elizabeth    Clarity, UA Cloudy (A) Clear    Specific  Gravity  1.030 1.005 - 1.030    pH, Urine 6.0 5.0 - 8.0    Leukocytes Negative Negative    Nitrite, UA Negative Negative    Protein, POC Negative Negative mg/dL    Glucose, UA Negative Negative, 1000 mg/dL (3+) mg/dL    Ketones, UA Negative Negative    Urobilinogen, UA Normal Normal    Bilirubin Negative Negative    Blood, UA 1+ (A) Negative

## 2020-02-27 LAB
BACTERIA UR CULT: NORMAL
BACTERIA UR CULT: NORMAL

## 2020-03-16 ENCOUNTER — TELEPHONE (OUTPATIENT)
Dept: NEUROSURGERY | Facility: CLINIC | Age: 55
End: 2020-03-16

## 2020-03-16 NOTE — TELEPHONE ENCOUNTER
If she is doing better no follow up necessary at this time. We can call in a short refill of tramadol, if she still needs this, when she runs out

## 2020-03-20 ENCOUNTER — TELEPHONE (OUTPATIENT)
Dept: INTERNAL MEDICINE | Facility: CLINIC | Age: 55
End: 2020-03-20

## 2020-03-20 NOTE — TELEPHONE ENCOUNTER
LVM to see is she wanted to keep appt or move out a month? HUB: PLEASE ASK PT IF SHE WOULD LIKE TO MOVE APPT OUT A MONTH FOR RECHECK. DR. STEVEN WOULD BE OK WITH THIS UNLESS PT REALLY NEEDS TO BE SEEN. WITH EVERYTHING GOING ON WOULD LIKE TO MOVE OUT A MONTH.

## 2020-03-30 ENCOUNTER — DOCUMENTATION (OUTPATIENT)
Dept: PHYSICAL THERAPY | Facility: CLINIC | Age: 55
End: 2020-03-30

## 2020-03-30 DIAGNOSIS — M51.36 DEGENERATION OF L4-L5 INTERVERTEBRAL DISC: Primary | ICD-10-CM

## 2020-03-30 NOTE — PROGRESS NOTES
Discharge Summary  Discharge Summary from Physical Therapy Report      Dates  PT visit: 12/20/2019-1/30/2020  Number of Visits: 5     Discharge Status of Patient: Patient continued to have significant pain that was a barrier to progress towards goals.  Recommended referral to pain management.    Goals: Not Met    Discharge Plan: Patient to return to referring/providing physician      Date of Discharge 1/30/2020        Sanjeev Paul, PT  Physical Therapist

## 2020-04-03 ENCOUNTER — TELEPHONE (OUTPATIENT)
Dept: INTERNAL MEDICINE | Facility: CLINIC | Age: 55
End: 2020-04-03

## 2020-04-03 NOTE — TELEPHONE ENCOUNTER
If it is bright red bleeding only when she wipes, then try anusol HC suppositories, they are over the counter.  If a lot of bleeding, need to see

## 2020-04-03 NOTE — TELEPHONE ENCOUNTER
Patient is having rectal bleeding she thinks it is caused from hemm. She is wondering if she should come in for a visit.

## 2020-05-12 DIAGNOSIS — M79.10 MYALGIA: ICD-10-CM

## 2020-05-12 DIAGNOSIS — J30.2 SEASONAL ALLERGIES: ICD-10-CM

## 2020-05-12 DIAGNOSIS — G89.29 CHRONIC RIGHT-SIDED LOW BACK PAIN WITH RIGHT-SIDED SCIATICA: ICD-10-CM

## 2020-05-12 DIAGNOSIS — M54.41 CHRONIC RIGHT-SIDED LOW BACK PAIN WITH RIGHT-SIDED SCIATICA: ICD-10-CM

## 2020-05-12 DIAGNOSIS — M51.36 DEGENERATION OF L4-L5 INTERVERTEBRAL DISC: ICD-10-CM

## 2020-05-12 DIAGNOSIS — M79.7 FIBROMYALGIA: ICD-10-CM

## 2020-05-12 DIAGNOSIS — M25.50 ARTHRALGIA, UNSPECIFIED JOINT: ICD-10-CM

## 2020-05-12 NOTE — TELEPHONE ENCOUNTER
PT IS REQUESTING REFILLS FOR    tiZANidine (ZANAFLEX) 4 MG tablet    montelukast (SINGULAIR) 10 MG tablet    pregabalin (LYRICA) 50 MG capsule  PT WANTS TO SEE IF THE LYRICA CAN BE CHANGED TO 3 TIMES A DAY AT 50 MG       PT CALL BACK [phone]  [facility]  PT IS WILLING TO COME IN FOR AN OFFICE VISIT IF NEEDED

## 2020-05-13 RX ORDER — MONTELUKAST SODIUM 10 MG/1
10 TABLET ORAL NIGHTLY
Qty: 30 TABLET | Refills: 6 | OUTPATIENT
Start: 2020-05-13

## 2020-05-13 RX ORDER — TIZANIDINE 4 MG/1
4 TABLET ORAL EVERY 8 HOURS PRN
Qty: 90 TABLET | Refills: 2 | OUTPATIENT
Start: 2020-05-13

## 2020-05-13 RX ORDER — PREGABALIN 50 MG/1
50 CAPSULE ORAL 2 TIMES DAILY
Qty: 60 CAPSULE | Refills: 2 | OUTPATIENT
Start: 2020-05-13

## 2020-05-22 DIAGNOSIS — M54.41 CHRONIC RIGHT-SIDED LOW BACK PAIN WITH RIGHT-SIDED SCIATICA: ICD-10-CM

## 2020-05-22 DIAGNOSIS — M79.7 FIBROMYALGIA: ICD-10-CM

## 2020-05-22 DIAGNOSIS — M51.36 DEGENERATION OF L4-L5 INTERVERTEBRAL DISC: ICD-10-CM

## 2020-05-22 DIAGNOSIS — G89.29 CHRONIC RIGHT-SIDED LOW BACK PAIN WITH RIGHT-SIDED SCIATICA: ICD-10-CM

## 2020-05-22 RX ORDER — PREGABALIN 50 MG/1
CAPSULE ORAL
Qty: 60 CAPSULE | Refills: 0 | OUTPATIENT
Start: 2020-05-22

## 2020-05-27 ENCOUNTER — OFFICE VISIT (OUTPATIENT)
Dept: INTERNAL MEDICINE | Facility: CLINIC | Age: 55
End: 2020-05-27

## 2020-05-27 VITALS
BODY MASS INDEX: 34.69 KG/M2 | DIASTOLIC BLOOD PRESSURE: 88 MMHG | SYSTOLIC BLOOD PRESSURE: 135 MMHG | HEART RATE: 112 BPM | TEMPERATURE: 97.3 F | WEIGHT: 208.2 LBS | OXYGEN SATURATION: 99 % | HEIGHT: 65 IN

## 2020-05-27 DIAGNOSIS — F17.210 CIGARETTE SMOKER: ICD-10-CM

## 2020-05-27 DIAGNOSIS — E78.2 MIXED HYPERLIPIDEMIA: ICD-10-CM

## 2020-05-27 DIAGNOSIS — M79.7 FIBROMYALGIA: Primary | ICD-10-CM

## 2020-05-27 DIAGNOSIS — M54.41 CHRONIC RIGHT-SIDED LOW BACK PAIN WITH RIGHT-SIDED SCIATICA: ICD-10-CM

## 2020-05-27 DIAGNOSIS — E87.6 HYPOKALEMIA: ICD-10-CM

## 2020-05-27 DIAGNOSIS — J30.2 SEASONAL ALLERGIES: ICD-10-CM

## 2020-05-27 DIAGNOSIS — M51.36 DEGENERATION OF L4-L5 INTERVERTEBRAL DISC: ICD-10-CM

## 2020-05-27 DIAGNOSIS — E66.9 CLASS 1 OBESITY WITH SERIOUS COMORBIDITY AND BODY MASS INDEX (BMI) OF 34.0 TO 34.9 IN ADULT, UNSPECIFIED OBESITY TYPE: ICD-10-CM

## 2020-05-27 DIAGNOSIS — M79.10 MYALGIA: ICD-10-CM

## 2020-05-27 DIAGNOSIS — I10 ESSENTIAL HYPERTENSION: ICD-10-CM

## 2020-05-27 DIAGNOSIS — G89.29 CHRONIC RIGHT-SIDED LOW BACK PAIN WITH RIGHT-SIDED SCIATICA: ICD-10-CM

## 2020-05-27 DIAGNOSIS — M25.50 ARTHRALGIA, UNSPECIFIED JOINT: ICD-10-CM

## 2020-05-27 PROCEDURE — 99214 OFFICE O/P EST MOD 30 MIN: CPT | Performed by: FAMILY MEDICINE

## 2020-05-27 RX ORDER — PREGABALIN 50 MG/1
50 CAPSULE ORAL 3 TIMES DAILY
Qty: 90 CAPSULE | Refills: 2 | Status: SHIPPED | OUTPATIENT
Start: 2020-05-27 | End: 2020-08-19 | Stop reason: SDUPTHER

## 2020-05-27 RX ORDER — NAPROXEN 500 MG/1
500 TABLET ORAL 2 TIMES DAILY WITH MEALS
Qty: 60 TABLET | Refills: 5 | Status: ON HOLD | OUTPATIENT
Start: 2020-05-27 | End: 2020-11-25

## 2020-05-27 RX ORDER — TIZANIDINE 4 MG/1
4 TABLET ORAL EVERY 8 HOURS PRN
Qty: 90 TABLET | Refills: 2 | Status: SHIPPED | OUTPATIENT
Start: 2020-05-27 | End: 2020-09-14 | Stop reason: SDUPTHER

## 2020-05-27 RX ORDER — MONTELUKAST SODIUM 10 MG/1
10 TABLET ORAL NIGHTLY
Qty: 30 TABLET | Refills: 6 | Status: SHIPPED | OUTPATIENT
Start: 2020-05-27 | End: 2020-10-28 | Stop reason: SDUPTHER

## 2020-05-27 RX ORDER — POTASSIUM CHLORIDE 750 MG/1
10 TABLET, FILM COATED, EXTENDED RELEASE ORAL DAILY
Qty: 90 TABLET | Refills: 1 | Status: SHIPPED | OUTPATIENT
Start: 2020-05-27 | End: 2020-10-28 | Stop reason: SDUPTHER

## 2020-05-27 RX ORDER — LISINOPRIL AND HYDROCHLOROTHIAZIDE 25; 20 MG/1; MG/1
1 TABLET ORAL DAILY
Qty: 90 TABLET | Refills: 1 | Status: SHIPPED | OUTPATIENT
Start: 2020-05-27 | End: 2020-08-27 | Stop reason: DRUGHIGH

## 2020-05-27 NOTE — PROGRESS NOTES
"Dulce Keith is a 54 y.o. female.     Chief Complaint   Patient presents with   • Fibromyalgia     f/u refills   • Hypertension   • Hyperlipidemia       Visit Vitals  /88 (BP Location: Left arm, Patient Position: Sitting, Cuff Size: Adult)   Pulse 112   Temp 97.3 °F (36.3 °C)   Ht 165.1 cm (65\")   Wt 94.4 kg (208 lb 3.2 oz)   SpO2 99%   BMI 34.65 kg/m²       Wt Readings from Last 3 Encounters:   05/27/20 94.4 kg (208 lb 3.2 oz)   02/25/20 92.5 kg (204 lb)   01/28/20 93.4 kg (206 lb)         History of Present Illness   Pt has fibromyalgia and low back pain with neuralgia that is improved with Lyrica.   Pt has nerve pain in her back that goes to her feet. The 50 mg lyrica does not hold the full 12 hours. Pt's back is better since not working 3rd shift. Pt is working now at [employer].     Pt is trying to quit smoking.    Pt needs refill of her lostartan HCTZ for htn. Mild elevation of the bp is persisting.    Pt needs the tizanidine for muscle aches.  Pt needs refill of singulair for nasal allergies.     Pt has hx of hyperlipidemia and has not tolerated statins in the past.   The following portions of the patient's history were reviewed and updated as appropriate: allergies, current medications, past family history, past medical history, past social history, past surgical history and problem list.    Past Medical History:   Diagnosis Date   • Allergic    • Anemia     during pregnancy   • Anxiety    • Arthritis    • Blood in urine    • Chronic fatigue    • Colon polyp 02/2018    9 at last colonoscopy   • Degeneration of L4-L5 intervertebral disc 12/10/2019   • Depression    • Elevated cholesterol    • Endometriosis    • Fibromyalgia    • Fibromyalgia, primary    • Fracture    • GERD (gastroesophageal reflux disease)    • H/O mammogram 02/2018   • HL (hearing loss)    • Hypertension    • Irritable bowel syndrome    • Pap smear for cervical cancer screening 2018?      Past Surgical History: "   Procedure Laterality Date   • CHOLECYSTECTOMY     • COLONOSCOPY  02/06/2018    3 year f/u polypectomy Dr MARTA Waller   • GALLBLADDER SURGERY     • HYSTERECTOMY     • INNER EAR SURGERY     • OOPHORECTOMY     • TEMPOROMANDIBULAR JOINT ARTHROPLASTY  1984      Family History   Problem Relation Age of Onset   • Melanoma Mother         eye   • Diabetes Mother    • Heart disease Mother    • Arthritis Mother    • Depression Mother    • Colon polyps Mother    • Thyroid disease Mother    • Hyperlipidemia Mother    • Cancer Mother    • Heart attack Mother    • Migraines Mother    • Cancer Father         pancreatic and liver   • Diabetes Father    • Depression Father    • Colon polyps Father    • ADD / ADHD Son    • Diabetes Maternal Aunt    • Diabetes Maternal Uncle    • Diabetes Paternal Aunt    • Diabetes Paternal Uncle    • Cancer Maternal Grandmother         uterine/cervical   • Heart disease Maternal Grandfather    • Heart disease Paternal Grandfather    • Migraines Sister    • Breast cancer Neg Hx    • Ovarian cancer Neg Hx       Social History     Socioeconomic History   • Marital status:      Spouse name: Not on file   • Number of children: Not on file   • Years of education: Not on file   • Highest education level: Not on file   Tobacco Use   • Smoking status: Current Every Day Smoker     Packs/day: 0.75     Years: 34.00     Pack years: 25.50     Types: Cigarettes   • Smokeless tobacco: Never Used   Substance and Sexual Activity   • Alcohol use: No   • Drug use: No   • Sexual activity: Not Currently     Birth control/protection: None      Allergies   Allergen Reactions   • Statins Myalgia   • Gabapentin Diarrhea and GI Intolerance   • Lipitor [Atorvastatin Calcium] Myalgia   • Relafen [Nabumetone] GI Intolerance   • Erythromycin GI Intolerance     Tolerates zpak   • Penicillins Hives   • Cymbalta [Duloxetine Hcl] Rash       Review of Systems   Constitutional: Negative.  Negative for chills, diaphoresis, fatigue  and fever.   HENT: Negative.  Negative for ear pain, nosebleeds, postnasal drip, rhinorrhea, sinus pressure, sneezing and sore throat.    Eyes: Negative.  Negative for redness and itching.   Respiratory: Negative.  Negative for cough, shortness of breath and wheezing.    Cardiovascular: Negative.  Negative for chest pain and palpitations.   Gastrointestinal: Negative.  Negative for abdominal pain, constipation, diarrhea, nausea and vomiting.   Endocrine: Negative.  Negative for cold intolerance and heat intolerance.   Genitourinary: Negative.  Negative for dysuria, frequency, hematuria and urgency.   Musculoskeletal: Positive for back pain and myalgias. Negative for arthralgias and neck pain.   Skin: Negative.  Negative for color change and rash.   Allergic/Immunologic: Positive for environmental allergies.   Neurological: Negative.  Negative for dizziness, tremors, syncope, weakness, light-headedness and headaches.   Hematological: Negative.  Negative for adenopathy. Does not bruise/bleed easily.   Psychiatric/Behavioral: Negative.  Negative for dysphoric mood. The patient is not nervous/anxious.        Objective   Physical Exam   Constitutional: She is oriented to person, place, and time. She appears well-developed.   HENT:   Head: Normocephalic.   Right Ear: External ear normal.   Left Ear: External ear normal.   Nose: Nose normal.   Eyes: Pupils are equal, round, and reactive to light. Conjunctivae, EOM and lids are normal.   Neck: Trachea normal and normal range of motion. Neck supple. Carotid bruit is not present. No thyroid mass and no thyromegaly present.   Cardiovascular: Normal rate and regular rhythm.   No murmur heard.  Pulmonary/Chest: Effort normal and breath sounds normal. No respiratory distress. She has no decreased breath sounds. She has no wheezes. She has no rhonchi. She has no rales. She exhibits no tenderness.   Abdominal: Soft. Bowel sounds are normal. There is no tenderness.    Musculoskeletal: Normal range of motion.        Lumbar back: She exhibits tenderness.   Neurological: She is alert and oriented to person, place, and time.   Skin: Skin is warm and dry.   Psychiatric: She has a normal mood and affect. Her behavior is normal.   Nursing note and vitals reviewed.      Assessment/Plan   Lizette was seen today for fibromyalgia, hypertension and hyperlipidemia.    Diagnoses and all orders for this visit:    Fibromyalgia  -     pregabalin (Lyrica) 50 MG capsule; Take 1 capsule by mouth 3 (Three) Times a Day.  -     naproxen (Naprosyn) 500 MG tablet; Take 1 tablet by mouth 2 (Two) Times a Day With Meals.    Degeneration of L4-L5 intervertebral disc  -     pregabalin (Lyrica) 50 MG capsule; Take 1 capsule by mouth 3 (Three) Times a Day.    Chronic right-sided low back pain with right-sided sciatica  -     pregabalin (Lyrica) 50 MG capsule; Take 1 capsule by mouth 3 (Three) Times a Day.  -     naproxen (Naprosyn) 500 MG tablet; Take 1 tablet by mouth 2 (Two) Times a Day With Meals.    Essential hypertension  -     lisinopril-hydrochlorothiazide (PRINZIDE,ZESTORETIC) 20-25 MG per tablet; Take 1 tablet by mouth Daily.  -     Comprehensive Metabolic Panel; Future  -     Lipid Panel; Future    Hypokalemia  -     potassium chloride (KLOR-CON) 10 MEQ CR tablet; Take 1 tablet by mouth Daily. For potassium  -     Comprehensive Metabolic Panel; Future    Seasonal allergies  -     montelukast (SINGULAIR) 10 MG tablet; Take 1 tablet by mouth Every Night.    Arthralgia, unspecified joint  -     tiZANidine (ZANAFLEX) 4 MG tablet; Take 1 tablet by mouth Every 8 (Eight) Hours As Needed for Muscle Spasms.    Myalgia  -     tiZANidine (ZANAFLEX) 4 MG tablet; Take 1 tablet by mouth Every 8 (Eight) Hours As Needed for Muscle Spasms.    Mixed hyperlipidemia  -     Comprehensive Metabolic Panel; Future  -     Lipid Panel; Future    Cigarette smoker    Class 1 obesity with serious comorbidity and body mass index  (BMI) of 34.0 to 34.9 in adult, unspecified obesity type    handout on smoking cessation.                Current Outpatient Medications:   •  aspirin 325 MG tablet, Take 325 mg by mouth Daily., Disp: , Rfl:   •  cetirizine (ZyrTEC) 10 MG tablet, Take 10 mg by mouth daily., Disp: , Rfl:   •  dicyclomine (BENTYL) 20 MG tablet, Take 1 tablet by mouth 3 (Three) Times a Day As Needed (IBS- diarrhea)., Disp: 90 tablet, Rfl: 0  •  estradiol (ESTRACE) 2 MG tablet, Take 1 tablet by mouth Daily., Disp: 30 tablet, Rfl: 12  •  lisinopril-hydrochlorothiazide (PRINZIDE,ZESTORETIC) 20-25 MG per tablet, Take 1 tablet by mouth Daily., Disp: 90 tablet, Rfl: 1  •  montelukast (SINGULAIR) 10 MG tablet, Take 1 tablet by mouth Every Night., Disp: 30 tablet, Rfl: 6  •  naproxen (Naprosyn) 500 MG tablet, Take 1 tablet by mouth 2 (Two) Times a Day With Meals., Disp: 60 tablet, Rfl: 5  •  potassium chloride (KLOR-CON) 10 MEQ CR tablet, Take 1 tablet by mouth Daily. For potassium, Disp: 90 tablet, Rfl: 1  •  pregabalin (Lyrica) 50 MG capsule, Take 1 capsule by mouth 3 (Three) Times a Day., Disp: 90 capsule, Rfl: 2  •  tiZANidine (ZANAFLEX) 4 MG tablet, Take 1 tablet by mouth Every 8 (Eight) Hours As Needed for Muscle Spasms., Disp: 90 tablet, Rfl: 2  •  ULTRAM 50 MG tablet, Take 1 tablet by mouth Every 6 (Six) Hours As Needed for Moderate Pain ., Disp: 45 tablet, Rfl: 2    Return in about 3 months (around 8/27/2020), or if symptoms worsen or fail to improve, for Recheck controls-fibromyalgia, bp check with nurse in 2 weeks.     Yo report reviewed and is consistent #[id number]

## 2020-07-02 ENCOUNTER — OFFICE VISIT (OUTPATIENT)
Dept: INTERNAL MEDICINE | Facility: CLINIC | Age: 55
End: 2020-07-02

## 2020-07-02 ENCOUNTER — LAB REQUISITION (OUTPATIENT)
Dept: LAB | Facility: HOSPITAL | Age: 55
End: 2020-07-02

## 2020-07-02 VITALS
DIASTOLIC BLOOD PRESSURE: 70 MMHG | BODY MASS INDEX: 34.52 KG/M2 | WEIGHT: 207.2 LBS | HEART RATE: 99 BPM | OXYGEN SATURATION: 97 % | SYSTOLIC BLOOD PRESSURE: 110 MMHG | TEMPERATURE: 97.8 F | HEIGHT: 65 IN

## 2020-07-02 DIAGNOSIS — E87.6 HYPOKALEMIA: ICD-10-CM

## 2020-07-02 DIAGNOSIS — I10 ESSENTIAL HYPERTENSION: ICD-10-CM

## 2020-07-02 DIAGNOSIS — E78.2 MIXED HYPERLIPIDEMIA: ICD-10-CM

## 2020-07-02 DIAGNOSIS — M54.50 ACUTE BILATERAL LOW BACK PAIN WITHOUT SCIATICA: Primary | ICD-10-CM

## 2020-07-02 DIAGNOSIS — Z72.0 TOBACCO USE: ICD-10-CM

## 2020-07-02 DIAGNOSIS — E66.9 OBESITY (BMI 30.0-34.9): ICD-10-CM

## 2020-07-02 DIAGNOSIS — Z00.00 ROUTINE GENERAL MEDICAL EXAMINATION AT A HEALTH CARE FACILITY: ICD-10-CM

## 2020-07-02 LAB
BILIRUB BLD-MCNC: ABNORMAL MG/DL
CLARITY, POC: ABNORMAL
COLOR UR: ABNORMAL
GLUCOSE UR STRIP-MCNC: NEGATIVE MG/DL
KETONES UR QL: NEGATIVE
LEUKOCYTE EST, POC: NEGATIVE
NITRITE UR-MCNC: NEGATIVE MG/ML
PH UR: 6 [PH] (ref 5–8)
PROT UR STRIP-MCNC: ABNORMAL MG/DL
RBC # UR STRIP: NEGATIVE /UL
SP GR UR: 1.03 (ref 1–1.03)
UROBILINOGEN UR QL: NORMAL

## 2020-07-02 PROCEDURE — 99214 OFFICE O/P EST MOD 30 MIN: CPT | Performed by: NURSE PRACTITIONER

## 2020-07-02 PROCEDURE — 99406 BEHAV CHNG SMOKING 3-10 MIN: CPT | Performed by: NURSE PRACTITIONER

## 2020-07-02 PROCEDURE — 36415 COLL VENOUS BLD VENIPUNCTURE: CPT | Performed by: FAMILY MEDICINE

## 2020-07-02 RX ORDER — METHYLPREDNISOLONE 4 MG/1
TABLET ORAL
Qty: 21 TABLET | Refills: 0 | Status: SHIPPED | OUTPATIENT
Start: 2020-07-02 | End: 2020-07-07

## 2020-07-02 RX ORDER — METHOCARBAMOL 750 MG/1
750 TABLET, FILM COATED ORAL AS NEEDED
Status: ON HOLD | COMMUNITY
Start: 2020-06-16 | End: 2020-11-25

## 2020-07-02 NOTE — PROGRESS NOTES
"CHIEF COMPLAINT:  Back Pain     Back Pain   This is a new problem. The current episode started in the past 7 days (2 days ago). The problem occurs constantly. The problem is unchanged. The pain is present in the lumbar spine. The quality of the pain is described as cramping. Radiates to: comes around the pelvis. The pain is at a severity of 6/10. The pain is moderate. The symptoms are aggravated by standing. Associated symptoms include headaches (chronic). Pertinent negatives include no abdominal pain, bladder incontinence, bowel incontinence, chest pain, dysuria, fever, leg pain, numbness, paresis, paresthesias, pelvic pain, perianal numbness, tingling, weakness or weight loss. Risk factors include obesity, lack of exercise, poor posture and sedentary lifestyle. Treatments tried: tramodol, naproxen, tizanidine, robaxin, ice. The treatment provided no relief.    Follows with Dr. Tobar with pain and spine - tried calling him yesterday, but could not get through to him.   Had steroid injections back in April.     Review of Systems   Constitutional: Negative for fever and unexpected weight loss.   Cardiovascular: Negative for chest pain.   Gastrointestinal: Negative for abdominal pain and bowel incontinence.   Genitourinary: Negative for dysuria, pelvic pain and urinary incontinence.   Musculoskeletal: Positive for back pain.   Neurological: Negative for tingling, weakness, numbness and paresthesias.      The following portions of the patient's history were reviewed and updated as appropriate: allergies, current medications, past family history, past medical history, past social history, past surgical history and problem list.    Visit Vitals  /70 (BP Location: Left arm, Cuff Size: Large Adult)   Pulse 99   Temp 97.8 °F (36.6 °C)   Ht 165 cm (64.96\")   Wt 94 kg (207 lb 3.2 oz)   SpO2 97%   BMI 34.52 kg/m²      Physical Exam   Constitutional: She is oriented to person, place, and time. She appears well-developed " and well-nourished. She is cooperative.  Non-toxic appearance. She does not have a sickly appearance. She does not appear ill. No distress. She appears overweight. She is obese.  HENT:   Head: Normocephalic.   Right Ear: Hearing normal.   Left Ear: Hearing normal.   Eyes: Pupils are equal, round, and reactive to light. Conjunctivae are normal.   Neck: Normal range of motion. Neck supple.   Cardiovascular: Normal rate, regular rhythm and normal heart sounds.   Pulses:       Dorsalis pedis pulses are 2+ on the right side, and 2+ on the left side.        Posterior tibial pulses are 2+ on the right side, and 2+ on the left side.   Pulmonary/Chest: Effort normal and breath sounds normal. No respiratory distress. She has no decreased breath sounds. She has no wheezes. She has no rhonchi.   Abdominal: Soft. Normal appearance and bowel sounds are normal. She exhibits no mass. There is no tenderness. There is no CVA tenderness.   Musculoskeletal: Normal range of motion.        Lumbar back: She exhibits tenderness, bony tenderness, pain and spasm. She exhibits normal range of motion, no swelling, no edema, no deformity and no laceration.   Sitting to standing: PAIN   Neurological: She is alert and oriented to person, place, and time. She has normal strength. No sensory deficit. Gait normal.   Reflex Scores:       Patellar reflexes are 2+ on the right side and 2+ on the left side.       Achilles reflexes are 2+ on the right side and 2+ on the left side.  Skin: Skin is warm, dry and intact. No rash noted. She is not diaphoretic.   Psychiatric: She has a normal mood and affect. Her speech is normal and behavior is normal. Judgment and thought content normal.     Results for orders placed or performed in visit on 07/02/20   POCT urinalysis dipstick, automated   Result Value Ref Range    Color Dark Yellow Yellow, Straw, Dark Yellow, Elizabeth    Clarity, UA Cloudy (A) Clear    Specific Gravity  1.030 1.005 - 1.030    pH, Urine 6.0 5.0  - 8.0    Leukocytes Negative Negative    Nitrite, UA Negative Negative    Protein, POC 1+ (A) Negative mg/dL    Glucose, UA Negative Negative, 1000 mg/dL (3+) mg/dL    Ketones, UA Negative Negative    Urobilinogen, UA Normal Normal    Bilirubin Small (1+) (A) Negative    Blood, UA Negative Negative     ASSESSMENT/PLAN  Diagnoses and all orders for this visit:    1. Acute bilateral low back pain without sciatica (Primary)  -     POCT urinalysis dipstick, automated  -     methylPREDNISolone (MEDROL, DEBRA,) 4 MG tablet; Take as directed on package instructions.  Dispense: 21 tablet; Refill: 0  Recurrent.  Will treat with: Medrol dose debra.  If obese or overweight, weight loss may help alleviate back pain.  Notify Dr. Tobar office of current back pain episode.     2. Tobacco use  Smoking cessation education provided to patient.  Patient was educated about the potential risk to patients health associated with smoking.  During this visit, I spent 4 mintues counseling the patient regarding smoking cessation. Patient will work on weaning down cigarette use with a goal quit date of 1 month.     3. Obesity (BMI 30.0-34.9)  Maintain healthy diet and exercise.     Return if symptoms worsen or fail to improve.  Discussed the nature of the medical condition(s) risks, complications, management, safe and proper use of medications. Encouraged medication compliance and the importance of keeping scheduled follow up appointments with me and any other providers.  Patient instructed to follow up with our office for results on any labs/imaging ordered during this visit.  Patient verbalizes understanding and agrees to treatment plan.

## 2020-07-02 NOTE — PATIENT INSTRUCTIONS
For more information:    Quit Now [place]  1-800-QUIT-NOW  https://[place].quitlogix.org/en-[place]/  Steps to Quit Smoking  Smoking tobacco can be harmful to your health and can affect almost every organ in your body. Smoking puts you, and those around you, at risk for developing many serious chronic diseases. Quitting smoking is difficult, but it is one of the best things that you can do for your health. It is never too late to quit.  What are the benefits of quitting smoking?  When you quit smoking, you lower your risk of developing serious diseases and conditions, such as:  · Lung cancer or lung disease, such as COPD.  · Heart disease.  · Stroke.  · Heart attack.  · Infertility.  · Osteoporosis and bone fractures.  Additionally, symptoms such as coughing, wheezing, and shortness of breath may get better when you quit. You may also find that you get sick less often because your body is stronger at fighting off colds and infections. If you are pregnant, quitting smoking can help to reduce your chances of having a baby of low birth weight.  How do I get ready to quit?  When you decide to quit smoking, create a plan to make sure that you are successful. Before you quit:  · Pick a date to quit. Set a date within the next two weeks to give you time to prepare.  · Write down the reasons why you are quitting. Keep this list in places where you will see it often, such as on your bathroom mirror or in your car or wallet.  · Identify the people, places, things, and activities that make you want to smoke (triggers) and avoid them. Make sure to take these actions:  ¨ Throw away all cigarettes at home, at work, and in your car.  ¨ Throw away smoking accessories, such as ashtrays and lighters.  ¨ Clean your car and make sure to empty the ashtray.  ¨ Clean your home, including curtains and carpets.  · Tell your family, friends, and coworkers that you are quitting. Support from your loved ones can make quitting easier.  · Talk with  your health care provider about your options for quitting smoking.  · Find out what treatment options are covered by your health insurance.  What strategies can I use to quit smoking?  Talk with your healthcare provider about different strategies to quit smoking. Some strategies include:  · Quitting smoking altogether instead of gradually lessening how much you smoke over a period of time. Research shows that quitting “cold turkey” is more successful than gradually quitting.  · Attending in-person counseling to help you build problem-solving skills. You are more likely to have success in quitting if you attend several counseling sessions. Even short sessions of 10 minutes can be effective.  · Finding resources and support systems that can help you to quit smoking and remain smoke-free after you quit. These resources are most helpful when you use them often. They can include:  ¨ Online chats with a counselor.  ¨ Telephone quitlines.  ¨ Printed self-help materials.  ¨ Support groups or group counseling.  ¨ Text messaging programs.  ¨ Mobile phone applications.  · Taking medicines to help you quit smoking. (If you are pregnant or breastfeeding, talk with your health care provider first.) Some medicines contain nicotine and some do not. Both types of medicines help with cravings, but the medicines that include nicotine help to relieve withdrawal symptoms. Your health care provider may recommend:  ¨ Nicotine patches, gum, or lozenges.  ¨ Nicotine inhalers or sprays.  ¨ Non-nicotine medicine that is taken by mouth.  Talk with your health care provider about combining strategies, such as taking medicines while you are also receiving in-person counseling. Using these two strategies together makes you more likely to succeed in quitting than if you used either strategy on its own.  If you are pregnant or breastfeeding, talk with your health care provider about finding counseling or other support strategies to quit smoking. Do  not take medicine to help you quit smoking unless told to do so by your health care provider.  What things can I do to make it easier to quit?  Quitting smoking might feel overwhelming at first, but there is a lot that you can do to make it easier. Take these important actions:  · Reach out to your family and friends and ask that they support and encourage you during this time. Call telephone quitlines, reach out to support groups, or work with a counselor for support.  · Ask people who smoke to avoid smoking around you.  · Avoid places that trigger you to smoke, such as bars, parties, or smoke-break areas at work.  · Spend time around people who do not smoke.  · Lessen stress in your life, because stress can be a smoking trigger for some people. To lessen stress, try:  ¨ Exercising regularly.  ¨ Deep-breathing exercises.  ¨ Yoga.  ¨ Meditating.  ¨ Performing a body scan. This involves closing your eyes, scanning your body from head to toe, and noticing which parts of your body are particularly tense. Purposefully relax the muscles in those areas.  · Download or purchase mobile phone or tablet apps (applications) that can help you stick to your quit plan by providing reminders, tips, and encouragement. There are many free apps, such as QuitGuide from the CDC (Centers for Disease Control and Prevention). You can find other support for quitting smoking (smoking cessation) through smokefree.gov and other websites.  How will I feel when I quit smoking?  Within the first 24 hours of quitting smoking, you may start to feel some withdrawal symptoms. These symptoms are usually most noticeable 2-3 days after quitting, but they usually do not last beyond 2-3 weeks. Changes or symptoms that you might experience include:  · Mood swings.  · Restlessness, anxiety, or irritation.  · Difficulty concentrating.  · Dizziness.  · Strong cravings for sugary foods in addition to nicotine.  · Mild weight  gain.  · Constipation.  · Nausea.  · Coughing or a sore throat.  · Changes in how your medicines work in your body.  · A depressed mood.  · Difficulty sleeping (insomnia).  After the first 2-3 weeks of quitting, you may start to notice more positive results, such as:  · Improved sense of smell and taste.  · Decreased coughing and sore throat.  · Slower heart rate.  · Lower blood pressure.  · Clearer skin.  · The ability to breathe more easily.  · Fewer sick days.  Quitting smoking is very challenging for most people. Do not get discouraged if you are not successful the first time. Some people need to make many attempts to quit before they achieve long-term success. Do your best to stick to your quit plan, and talk with your health care provider if you have any questions or concerns.  This information is not intended to replace advice given to you by your health care provider. Make sure you discuss any questions you have with your health care provider.  Document Released: 12/12/2002 Document Revised: 08/15/2017 Document Reviewed: 05/03/2016  12Return Interactive Patient Education © 2017 [employer]    Methylprednisolone tablets  What is this medicine?  METHYLPREDNISOLONE (meth ill pred NISS oh lone) is a corticosteroid. It is commonly used to treat inflammation of the skin, joints, lungs, and other organs. Common conditions treated include asthma, allergies, and arthritis. It is also used for other conditions, such as blood disorders and diseases of the adrenal glands.  This medicine may be used for other purposes; ask your health care provider or pharmacist if you have questions.  COMMON BRAND NAME(S): Medrol, Medrol Dosepak  What should I tell my health care provider before I take this medicine?  They need to know if you have any of these conditions:  · Cushing's syndrome  · eye disease, vision problems  · diabetes  · glaucoma  · heart disease  · high blood pressure  · infection (especially a virus infection such  as chickenpox, cold sores, or herpes)  · liver disease  · mental illness  · myasthenia gravis  · osteoporosis  · recently received or scheduled to receive a vaccine  · seizures  · stomach or intestine problems  · thyroid disease  · an unusual or allergic reaction to lactose, methylprednisolone, other medicines, foods, dyes, or preservatives  · pregnant or trying to get pregnant  · breast-feeding  How should I use this medicine?  Take this medicine by mouth with a glass of water. Follow the directions on the prescription label. Take this medicine with food. If you are taking this medicine once a day, take it in the morning. Do not take it more often than directed. Do not suddenly stop taking your medicine because you may develop a severe reaction. Your doctor will tell you how much medicine to take. If your doctor wants you to stop the medicine, the dose may be slowly lowered over time to avoid any side effects.  Talk to your pediatrician regarding the use of this medicine in children. Special care may be needed.  Overdosage: If you think you have taken too much of this medicine contact a poison control center or emergency room at once.  NOTE: This medicine is only for you. Do not share this medicine with others.  What if I miss a dose?  If you miss a dose, take it as soon as you can. If it is almost time for your next dose, talk to your doctor or health care professional. You may need to miss a dose or take an extra dose. Do not take double or extra doses without advice.  What may interact with this medicine?  Do not take this medicine with any of the following medications:  · alefacept  · echinacea  · live virus vaccines  · metyrapone  · mifepristone  This medicine may also interact with the following medications:  · amphotericin B  · aspirin and aspirin-like medicines  · certain antibiotics like erythromycin, clarithromycin, troleandomycin  · certain medicines for diabetes  · certain medicines for fungal  infections like ketoconazole  · certain medicines for seizures like carbamazepine, phenobarbital, phenytoin  · certain medicines that treat or prevent blood clots like warfarin  · cholestyramine  · cyclosporine  · digoxin  · diuretics  · female hormones, like estrogens and birth control pills  · isoniazid  · NSAIDs, medicines for pain inflammation, like ibuprofen or naproxen  · other medicines for myasthenia gravis  · rifampin  · vaccines  This list may not describe all possible interactions. Give your health care provider a list of all the medicines, herbs, non-prescription drugs, or dietary supplements you use. Also tell them if you smoke, drink alcohol, or use illegal drugs. Some items may interact with your medicine.  What should I watch for while using this medicine?  Tell your doctor or healthcare professional if your symptoms do not start to get better or if they get worse. Do not stop taking except on your doctor's advice. You may develop a severe reaction. Your doctor will tell you how much medicine to take.  This medicine may increase your risk of getting an infection. Tell your doctor or health care professional if you are around anyone with measles or chickenpox, or if you develop sores or blisters that do not heal properly.  This medicine may increase blood sugar levels. Ask your healthcare provider if changes in diet or medicines are needed if you have diabetes.  Tell your doctor or health care professional right away if you have any change in your eyesight.  Using this medicine for a long time may increase your risk of low bone mass. Talk to your doctor about bone health.  What side effects may I notice from receiving this medicine?  Side effects that you should report to your doctor or health care professional as soon as possible:  · allergic reactions like skin rash, itching or hives, swelling of the face, lips, or tongue  · bloody or tarry stools  · hallucination, loss of contact with  reality  · muscle cramps  · muscle pain  · palpitations  · signs and symptoms of high blood sugar such as being more thirsty or hungry or having to urinate more than normal. You may also feel very tired or have blurry vision.  · signs and symptoms of infection like fever or chills; cough; sore throat; pain or trouble passing urine  Side effects that usually do not require medical attention (report to your doctor or health care professional if they continue or are bothersome):  · changes in emotions or mood  · constipation  · diarrhea  · excessive hair growth on the face or body  · headache  · nausea, vomiting  · trouble sleeping  · weight gain  This list may not describe all possible side effects. Call your doctor for medical advice about side effects. You may report side effects to FDA at 5-246-CAS-2451.  Where should I keep my medicine?  Keep out of the reach of children.  Store at room temperature between 20 and 25 degrees C (68 and 77 degrees F). Throw away any unused medicine after the expiration date.  NOTE: This sheet is a summary. It may not cover all possible information. If you have questions about this medicine, talk to your doctor, pharmacist, or health care provider.  © 2020 [employer]vier/Gold Standard (2019-09-19 09:19:36)

## 2020-08-07 ENCOUNTER — LAB REQUISITION (OUTPATIENT)
Dept: LAB | Facility: HOSPITAL | Age: 55
End: 2020-08-07

## 2020-08-07 ENCOUNTER — HOSPITAL ENCOUNTER (EMERGENCY)
Facility: HOSPITAL | Age: 55
Discharge: HOME | End: 2020-08-07
Attending: EMERGENCY MEDICINE | Admitting: EMERGENCY MEDICINE

## 2020-08-07 ENCOUNTER — OFFICE VISIT (OUTPATIENT)
Dept: INTERNAL MEDICINE | Facility: CLINIC | Age: 55
End: 2020-08-07

## 2020-08-07 VITALS
BODY MASS INDEX: 33.75 KG/M2 | TEMPERATURE: 98.1 F | SYSTOLIC BLOOD PRESSURE: 130 MMHG | RESPIRATION RATE: 18 BRPM | WEIGHT: 210 LBS | DIASTOLIC BLOOD PRESSURE: 84 MMHG | HEART RATE: 94 BPM | HEIGHT: 66 IN | OXYGEN SATURATION: 99 %

## 2020-08-07 VITALS
OXYGEN SATURATION: 97 % | RESPIRATION RATE: 16 BRPM | TEMPERATURE: 98.1 F | DIASTOLIC BLOOD PRESSURE: 101 MMHG | HEIGHT: 65 IN | BODY MASS INDEX: 34.99 KG/M2 | SYSTOLIC BLOOD PRESSURE: 165 MMHG | WEIGHT: 210 LBS

## 2020-08-07 DIAGNOSIS — M79.661 PAIN AND SWELLING OF RIGHT LOWER LEG: Primary | ICD-10-CM

## 2020-08-07 DIAGNOSIS — M79.89 PAIN AND SWELLING OF RIGHT LOWER LEG: Primary | ICD-10-CM

## 2020-08-07 DIAGNOSIS — Z00.00 ROUTINE GENERAL MEDICAL EXAMINATION AT A HEALTH CARE FACILITY: ICD-10-CM

## 2020-08-07 DIAGNOSIS — M79.89 LEG SWELLING: Primary | ICD-10-CM

## 2020-08-07 DIAGNOSIS — M54.41 CHRONIC RIGHT-SIDED LOW BACK PAIN WITH RIGHT-SIDED SCIATICA: ICD-10-CM

## 2020-08-07 DIAGNOSIS — G89.29 CHRONIC RIGHT-SIDED LOW BACK PAIN WITH RIGHT-SIDED SCIATICA: ICD-10-CM

## 2020-08-07 LAB
ALBUMIN SERPL-MCNC: 4.2 G/DL (ref 3.5–5.2)
ALBUMIN/GLOB SERPL: 1.8 G/DL
ALP SERPL-CCNC: 113 U/L (ref 39–117)
ALT SERPL W P-5'-P-CCNC: 17 U/L (ref 1–33)
ANION GAP SERPL CALCULATED.3IONS-SCNC: 11 MMOL/L (ref 5–15)
ANION GAP SERPL CALCULATED.3IONS-SCNC: 12 MMOL/L (ref 5–15)
AST SERPL-CCNC: 16 U/L (ref 1–32)
BILIRUB SERPL-MCNC: 0.2 MG/DL (ref 0–1.2)
BUN SERPL-MCNC: 12 MG/DL (ref 6–20)
BUN SERPL-MCNC: 14 MG/DL (ref 6–20)
BUN/CREAT SERPL: 17.9 (ref 7–25)
BUN/CREAT SERPL: 21.9 (ref 7–25)
CALCIUM SPEC-SCNC: 9.3 MG/DL (ref 8.6–10.5)
CALCIUM SPEC-SCNC: 9.3 MG/DL (ref 8.6–10.5)
CHLORIDE SERPL-SCNC: 102 MMOL/L (ref 98–107)
CHLORIDE SERPL-SCNC: 102 MMOL/L (ref 98–107)
CO2 SERPL-SCNC: 23 MMOL/L (ref 22–29)
CO2 SERPL-SCNC: 23 MMOL/L (ref 22–29)
CREAT SERPL-MCNC: 0.64 MG/DL (ref 0.57–1)
CREAT SERPL-MCNC: 0.67 MG/DL (ref 0.57–1)
D DIMER PPP FEU-MCNC: 0.62 MCGFEU/ML (ref 0–0.56)
D DIMER PPP FEU-MCNC: 0.66 MCGFEU/ML (ref 0–0.56)
DEPRECATED RDW RBC AUTO: 47.5 FL (ref 37–54)
ERYTHROCYTE [DISTWIDTH] IN BLOOD BY AUTOMATED COUNT: 13.8 % (ref 12.3–15.4)
GFR SERPL CREATININE-BSD FRML MDRD: 92 ML/MIN/1.73
GFR SERPL CREATININE-BSD FRML MDRD: 97 ML/MIN/1.73
GLOBULIN UR ELPH-MCNC: 2.3 GM/DL
GLUCOSE SERPL-MCNC: 144 MG/DL (ref 65–99)
GLUCOSE SERPL-MCNC: 146 MG/DL (ref 65–99)
HCT VFR BLD AUTO: 42.5 % (ref 34–46.6)
HGB BLD-MCNC: 14 G/DL (ref 12–15.9)
HOLD SPECIMEN: NORMAL
HOLD SPECIMEN: NORMAL
MCH RBC QN AUTO: 31.1 PG (ref 26.6–33)
MCHC RBC AUTO-ENTMCNC: 32.9 G/DL (ref 31.5–35.7)
MCV RBC AUTO: 94.4 FL (ref 79–97)
PLATELET # BLD AUTO: 262 10*3/MM3 (ref 140–450)
PMV BLD AUTO: 9.7 FL (ref 6–12)
POTASSIUM SERPL-SCNC: 3.7 MMOL/L (ref 3.5–5.2)
POTASSIUM SERPL-SCNC: 3.9 MMOL/L (ref 3.5–5.2)
PROT SERPL-MCNC: 6.5 G/DL (ref 6–8.5)
RBC # BLD AUTO: 4.5 10*6/MM3 (ref 3.77–5.28)
SODIUM SERPL-SCNC: 136 MMOL/L (ref 136–145)
SODIUM SERPL-SCNC: 137 MMOL/L (ref 136–145)
WBC # BLD AUTO: 12.27 10*3/MM3 (ref 3.4–10.8)
WHOLE BLOOD HOLD SPECIMEN: NORMAL
WHOLE BLOOD HOLD SPECIMEN: NORMAL

## 2020-08-07 PROCEDURE — 96372 THER/PROPH/DIAG INJ SC/IM: CPT

## 2020-08-07 PROCEDURE — 99283 EMERGENCY DEPT VISIT LOW MDM: CPT

## 2020-08-07 PROCEDURE — 85379 FIBRIN DEGRADATION QUANT: CPT | Performed by: NURSE PRACTITIONER

## 2020-08-07 PROCEDURE — 99213 OFFICE O/P EST LOW 20 MIN: CPT | Performed by: NURSE PRACTITIONER

## 2020-08-07 PROCEDURE — 85379 FIBRIN DEGRADATION QUANT: CPT

## 2020-08-07 PROCEDURE — 85027 COMPLETE CBC AUTOMATED: CPT | Performed by: NURSE PRACTITIONER

## 2020-08-07 PROCEDURE — 25010000002 ENOXAPARIN PER 10 MG: Performed by: EMERGENCY MEDICINE

## 2020-08-07 PROCEDURE — 80048 BASIC METABOLIC PNL TOTAL CA: CPT | Performed by: EMERGENCY MEDICINE

## 2020-08-07 PROCEDURE — 80053 COMPREHEN METABOLIC PANEL: CPT | Performed by: NURSE PRACTITIONER

## 2020-08-07 RX ORDER — HYDROCODONE BITARTRATE AND ACETAMINOPHEN 5; 325 MG/1; MG/1
TABLET ORAL
Status: DISCONTINUED
Start: 2020-08-07 | End: 2020-08-08 | Stop reason: HOSPADM

## 2020-08-07 RX ORDER — HYDROCODONE BITARTRATE AND ACETAMINOPHEN 5; 325 MG/1; MG/1
1 TABLET ORAL ONCE
Status: COMPLETED | OUTPATIENT
Start: 2020-08-07 | End: 2020-08-07

## 2020-08-07 RX ADMIN — HYDROCODONE BITARTRATE AND ACETAMINOPHEN 1 TABLET: 5; 325 TABLET ORAL at 22:24

## 2020-08-07 RX ADMIN — ENOXAPARIN SODIUM 100 MG: 100 INJECTION SUBCUTANEOUS at 22:27

## 2020-08-07 NOTE — PROGRESS NOTES
Subjective   Lizette Keith is a 55 y.o. female.     Chief Complaint   Patient presents with   • Edema     bilateral ankle swelling-right is worse.  Has pinched nerve affecting right side       Leg Swelling   This is a new problem. The current episode started in the past 7 days. The problem occurs constantly (constantly for the last 3-4 days. ). The problem has been waxing and waning (worse through the day. ). Associated symptoms include arthralgias. Pertinent negatives include no abdominal pain, anorexia, change in bowel habit, chest pain, chills, congestion, coughing, diaphoresis, fatigue, fever, headaches, joint swelling, myalgias, nausea, neck pain, numbness, rash, sore throat, swollen glands, urinary symptoms, vertigo, visual change, vomiting or weakness. The symptoms are aggravated by standing. She has tried nothing for the symptoms. The treatment provided no relief.    bilateral ankles. Right worse than left.   Does not eat a high sodium containing diet.  She is on lisinopril with hydrochlorothiazide.  Is also taking NSAIDs routinely  She does have a family history of DVT in her grandmother.  Patient is on estrogen containing medications and is obese.  She is not as active as she normally is currently but is not been on bed rest      She also has chronic right-sided low back pain with right-sided sciatica.  She is not having pain in her back at this time but is having pain down her right thigh and right calf into her foot.  She is followed by Dr. Tobar with pain management who is doing cortisone injection regimen with her currently.  She is not currently undergoing physical therapy.  She reports she has been previously evaluated by neurosurgery who recommended surgery when she is no longer able to handle the pain.  She reports the pain in the calf is a bit worse at this time.  She is currently on pain medications and muscle relaxants.  Reports some minimal relief with these.    Swelling is in bilateral lower  extremities but the right is somewhat worse.        The following portions of the patient's history were reviewed and updated as appropriate: allergies, current medications, past family history, past medical history, past social history, past surgical history and problem list.        Review of Systems   Constitutional: Negative for chills, diaphoresis, fatigue and fever.   HENT: Negative for congestion, sore throat and swollen glands.    Respiratory: Negative for cough, chest tightness and shortness of breath.    Cardiovascular: Positive for leg swelling. Negative for chest pain and palpitations.   Gastrointestinal: Negative for abdominal pain, anorexia, change in bowel habit, nausea and vomiting.   Musculoskeletal: Positive for arthralgias and gait problem. Negative for back pain, joint swelling, myalgias, neck pain and neck stiffness.   Skin: Negative for color change, rash and bruise.   Neurological: Negative for vertigo, weakness and numbness.   Hematological: Does not bruise/bleed easily.       Outpatient Medications Marked as Taking for the 8/7/20 encounter (Office Visit) with Breonna Paul APRN   Medication Sig Dispense Refill   • aspirin 325 MG tablet Take 325 mg by mouth Daily.     • cetirizine (ZyrTEC) 10 MG tablet Take 10 mg by mouth daily.     • dicyclomine (BENTYL) 20 MG tablet Take 1 tablet by mouth 3 (Three) Times a Day As Needed (IBS- diarrhea). 90 tablet 0   • estradiol (ESTRACE) 2 MG tablet Take 1 tablet by mouth Daily. 30 tablet 12   • lisinopril-hydrochlorothiazide (PRINZIDE,ZESTORETIC) 20-25 MG per tablet Take 1 tablet by mouth Daily. 90 tablet 1   • methocarbamol (ROBAXIN) 750 MG tablet TAKE 1 TABLET BY MOUTH THREE TIMES DAILY AS NEEDED FOR 30 DAYS     • montelukast (SINGULAIR) 10 MG tablet Take 1 tablet by mouth Every Night. 30 tablet 6   • naproxen (Naprosyn) 500 MG tablet Take 1 tablet by mouth 2 (Two) Times a Day With Meals. 60 tablet 5   • potassium chloride (KLOR-CON) 10 MEQ CR tablet  "Take 1 tablet by mouth Daily. For potassium 90 tablet 1   • pregabalin (Lyrica) 50 MG capsule Take 1 capsule by mouth 3 (Three) Times a Day. 90 capsule 2   • tiZANidine (ZANAFLEX) 4 MG tablet Take 1 tablet by mouth Every 8 (Eight) Hours As Needed for Muscle Spasms. 90 tablet 2   • ULTRAM 50 MG tablet Take 1 tablet by mouth Every 6 (Six) Hours As Needed for Moderate Pain . 45 tablet 2         Objective   Physical Exam   Constitutional: She is oriented to person, place, and time. She appears well-developed and well-nourished.   HENT:   Head: Normocephalic and atraumatic.   Eyes: Pupils are equal, round, and reactive to light. Conjunctivae are normal.   Neck: Normal range of motion.   Cardiovascular: Normal rate, regular rhythm and normal heart sounds.   Pulmonary/Chest: Effort normal and breath sounds normal.   Abdominal: Soft. Normal appearance and bowel sounds are normal. There is no tenderness.   Musculoskeletal: Normal range of motion. Edema: Trace non-pitting edema to bilateral lower extremities, right slightly worse than left.  Tenderness to the right lateral leg including the calf.  Negative Homans bilaterally.  No palpable cords or localized tenderness         Lumbar back: She exhibits tenderness and pain.   Neurological: She is alert and oriented to person, place, and time. She has normal strength and normal reflexes. No cranial nerve deficit or sensory deficit.   Skin: Skin is warm and dry.   Psychiatric: She has a normal mood and affect. Her behavior is normal. Judgment and thought content normal.   Nursing note and vitals reviewed.      Vitals:    08/07/20 0754   BP: 130/84   Pulse: 94   Resp: 18   Temp: 98.1 °F (36.7 °C)   SpO2: 99%   Weight: 95.3 kg (210 lb)   Height: 167.6 cm (66\")     Body mass index is 33.89 kg/m².        Assessment/Plan       Diagnoses and all orders for this visit:    1. Leg swelling (Primary)  -     D-dimer, Quantitative; Future  -     Comprehensive Metabolic Panel; Future  -     " CBC (No Diff); Future  We will check labs as above to evaluate for source of swelling.  Encourage low-sodium diet and routine physical activity.  She can wear OTC compression stockings daily as well.  We will check a d-dimer for safe measure although I do have a low suspicion of DVT based on symptomology.  However she is on estrogen and is obese with a family history of DVT.    2. Chronic right-sided low back pain with right-sided sciatica  -     Ambulatory Referral to Physical Therapy Evaluate and treat  -     CBC No Differential; Future  -     D-dimer, Quantitative; Future  -     Comprehensive metabolic panel; Future  -     D-dimer, Quantitative  -     CBC No Differential  -     Comprehensive metabolic panel    Continue to follow with pain management for her chronic right-sided sciatica.  Also encouraged use of heat as needed.- Warm compress or heating pad to back, prn x 20 minutes with barrier between heat source and skin to avoid burns.  Encouraged her to follow-up with Dr. Tobar and discuss alternative pain management measures as well as possibly consider going back to the neurosurgeon for evaluation of surgery since she is not getting pain relief.  We will also go ahead and try to get her started with a physical therapy regimen.  Encouraged gentle back stretches.      Return if symptoms worsen or fail to improve, for Labs today, will call with results.    I discussed my findings,recommendations, and plan of care was with the patient. They verbalized understanding and agreement.  Patient was encouraged to keep me informed of any acute changes, lack of improvement, or any new concerning symptoms.       * Please note that portions of this note were completed with a voice recognition program. Efforts were made to edit the dictation but occasionally words are erroneously transcribed.

## 2020-08-07 NOTE — PATIENT INSTRUCTIONS
Sciatica    Sciatica is pain, weakness, tingling, or loss of feeling (numbness) along the sciatic nerve. The sciatic nerve starts in the lower back and goes down the back of each leg. Sciatica usually goes away on its own or with treatment. Sometimes, sciatica may come back (recur).  What are the causes?  This condition happens when the sciatic nerve is pinched or has pressure put on it. This may be the result of:  · A disk in between the bones of the spine bulging out too far (herniated disk).  · Changes in the spinal disks that occur with aging.  · A condition that affects a muscle in the butt.  · Extra bone growth near the sciatic nerve.  · A break (fracture) of the area between your hip bones (pelvis).  · Pregnancy.  · Tumor. This is rare.  What increases the risk?  You are more likely to develop this condition if you:  · Play sports that put pressure or stress on the spine.  · Have poor strength and ease of movement (flexibility).  · Have had a back injury in the past.  · Have had back surgery.  · Sit for long periods of time.  · Do activities that involve bending or lifting over and over again.  · Are very overweight (obese).  What are the signs or symptoms?  Symptoms can vary from mild to very bad. They may include:  · Any of these problems in the lower back, leg, hip, or butt:  ? Mild tingling, loss of feeling, or dull aches.  ? Burning sensations.  ? Sharp pains.  · Loss of feeling in the back of the calf or the sole of the foot.  · Leg weakness.  · Very bad back pain that makes it hard to move.  These symptoms may get worse when you cough, sneeze, or laugh. They may also get worse when you sit or stand for long periods of time.  How is this treated?  This condition often gets better without any treatment. However, treatment may include:  · Changing or cutting back on physical activity when you have pain.  · Doing exercises and stretching.  · Putting ice or heat on the affected area.  · Medicines that  help:  ? To relieve pain and swelling.  ? To relax your muscles.  · Shots (injections) of medicines that help to relieve pain, irritation, and swelling.  · Surgery.  Follow these instructions at home:  Medicines  · Take over-the-counter and prescription medicines only as told by your doctor.  · Ask your doctor if the medicine prescribed to you:  ? Requires you to avoid driving or using heavy machinery.  ? Can cause trouble pooping (constipation). You may need to take these steps to prevent or treat trouble pooping:  § Drink enough fluids to keep your pee (urine) pale yellow.  § Take over-the-counter or prescription medicines.  § Eat foods that are high in fiber. These include beans, whole grains, and fresh fruits and vegetables.  § Limit foods that are high in fat and sugar. These include fried or sweet foods.  Managing pain         · If told, put ice on the affected area.  ? Put ice in a plastic bag.  ? Place a towel between your skin and the bag.  ? Leave the ice on for 20 minutes, 2-3 times a day.  · If told, put heat on the affected area. Use the heat source that your doctor tells you to use, such as a moist heat pack or a heating pad.  ? Place a towel between your skin and the heat source.  ? Leave the heat on for 20-30 minutes.  ? Remove the heat if your skin turns bright red. This is very important if you are unable to feel pain, heat, or cold. You may have a greater risk of getting burned.  Activity    · Return to your normal activities as told by your doctor. Ask your doctor what activities are safe for you.  · Avoid activities that make your symptoms worse.  · Take short rests during the day.  ? When you rest for a long time, do some physical activity or stretching between periods of rest.  ? Avoid sitting for a long time without moving. Get up and move around at least one time each hour.  · Exercise and stretch regularly, as told by your doctor.  · Do not lift anything that is heavier than 10 lb (4.5 kg)  while you have symptoms of sciatica.  ? Avoid lifting heavy things even when you do not have symptoms.  ? Avoid lifting heavy things over and over.  · When you lift objects, always lift in a way that is safe for your body. To do this, you should:  ? Bend your knees.  ? Keep the object close to your body.  ? Avoid twisting.  General instructions  · Stay at a healthy weight.  · Wear comfortable shoes that support your feet. Avoid wearing high heels.  · Avoid sleeping on a mattress that is too soft or too hard. You might have less pain if you sleep on a mattress that is firm enough to support your back.  · Keep all follow-up visits as told by your doctor. This is important.  Contact a doctor if:  · You have pain that:  ? Wakes you up when you are sleeping.  ? Gets worse when you lie down.  ? Is worse than the pain you have had in the past.  ? Lasts longer than 4 weeks.  · You lose weight without trying.  Get help right away if:  · You cannot control when you pee (urinate) or poop (have a bowel movement).  · You have weakness in any of these areas and it gets worse:  ? Lower back.  ? The area between your hip bones.  ? Butt.  ? Legs.  · You have redness or swelling of your back.  · You have a burning feeling when you pee.  Summary  · Sciatica is pain, weakness, tingling, or loss of feeling (numbness) along the sciatic nerve.  · This condition happens when the sciatic nerve is pinched or has pressure put on it.  · Sciatica can cause pain, tingling, or loss of feeling (numbness) in the lower back, legs, hips, and butt.  · Treatment often includes rest, exercise, medicines, and putting ice or heat on the affected area.  This information is not intended to replace advice given to you by your health care provider. Make sure you discuss any questions you have with your health care provider.  Document Released: 09/26/2009 Document Revised: 01/06/2020 Document Reviewed: 01/06/2020  [employer] Patient Education © 2020 [employer]

## 2020-08-08 ENCOUNTER — HOSPITAL ENCOUNTER (OUTPATIENT)
Dept: CARDIOLOGY | Facility: HOSPITAL | Age: 55
Discharge: HOME | End: 2020-08-08
Admitting: EMERGENCY MEDICINE

## 2020-08-08 VITALS — HEIGHT: 65 IN | BODY MASS INDEX: 34.99 KG/M2 | WEIGHT: 210 LBS

## 2020-08-08 DIAGNOSIS — M79.89 PAIN AND SWELLING OF RIGHT LOWER LEG: ICD-10-CM

## 2020-08-08 DIAGNOSIS — M79.661 PAIN AND SWELLING OF RIGHT LOWER LEG: ICD-10-CM

## 2020-08-08 LAB
BH CV ECHO MEAS - BSA(HAYCOCK): 2.1 M^2
BH CV ECHO MEAS - BSA: 2 M^2
BH CV ECHO MEAS - BZI_BMI: 34.9 KILOGRAMS/M^2
BH CV ECHO MEAS - BZI_METRIC_HEIGHT: 165.1 CM
BH CV ECHO MEAS - BZI_METRIC_WEIGHT: 95.3 KG
BH CV LOWER VASCULAR LEFT COMMON FEMORAL AUGMENT: NORMAL
BH CV LOWER VASCULAR LEFT COMMON FEMORAL COMPRESS: NORMAL
BH CV LOWER VASCULAR LEFT COMMON FEMORAL PHASIC: NORMAL
BH CV LOWER VASCULAR LEFT COMMON FEMORAL SPONT: NORMAL
BH CV LOWER VASCULAR RIGHT COMMON FEMORAL AUGMENT: NORMAL
BH CV LOWER VASCULAR RIGHT COMMON FEMORAL COMPRESS: NORMAL
BH CV LOWER VASCULAR RIGHT COMMON FEMORAL PHASIC: NORMAL
BH CV LOWER VASCULAR RIGHT COMMON FEMORAL SPONT: NORMAL
BH CV LOWER VASCULAR RIGHT DISTAL FEMORAL COMPRESS: NORMAL
BH CV LOWER VASCULAR RIGHT GASTRONEMIUS COMPRESS: NORMAL
BH CV LOWER VASCULAR RIGHT GREATER SAPH AK COMPRESS: NORMAL
BH CV LOWER VASCULAR RIGHT GREATER SAPH BK COMPRESS: NORMAL
BH CV LOWER VASCULAR RIGHT MID FEMORAL AUGMENT: NORMAL
BH CV LOWER VASCULAR RIGHT MID FEMORAL COMPRESS: NORMAL
BH CV LOWER VASCULAR RIGHT MID FEMORAL PHASIC: NORMAL
BH CV LOWER VASCULAR RIGHT MID FEMORAL SPONT: NORMAL
BH CV LOWER VASCULAR RIGHT PERONEAL COMPRESS: NORMAL
BH CV LOWER VASCULAR RIGHT POPLITEAL AUGMENT: NORMAL
BH CV LOWER VASCULAR RIGHT POPLITEAL COMPRESS: NORMAL
BH CV LOWER VASCULAR RIGHT POPLITEAL PHASIC: NORMAL
BH CV LOWER VASCULAR RIGHT POPLITEAL SPONT: NORMAL
BH CV LOWER VASCULAR RIGHT POSTERIOR TIBIAL COMPRESS: NORMAL
BH CV LOWER VASCULAR RIGHT PROXIMAL FEMORAL COMPRESS: NORMAL
BH CV LOWER VASCULAR RIGHT SAPHENOFEMORAL JUNCTION COMPRESS: NORMAL

## 2020-08-08 PROCEDURE — 93971 EXTREMITY STUDY: CPT

## 2020-08-08 PROCEDURE — 93971 EXTREMITY STUDY: CPT | Performed by: INTERNAL MEDICINE

## 2020-08-08 NOTE — ED PROVIDER NOTES
Subjective   55-year-old female sent by her primary care physician to be evaluated for potential DVT of her right lower extremity.  Of note, the patient has a history of longstanding chronic pain and sciatica.  She states that over the past few days she has been experiencing increased pain and swelling in her right lower extremity when compared to baseline.  She is unsure as to what may have triggered her symptoms and denies any injury, trauma, or fall.  She denies any recent foreign travel or long trips.  No fevers or systemic symptoms.  No known exposures to anyone with a novel coronavirus.  No cough or fever.  She went to her primary care physician regarding her right lower extremity swelling today and had a d-dimer that was elevated.  She was subsequently advised to come to the emergency department to be evaluated.  Pain is currently 4 out of 10 in severity.          Review of Systems   Musculoskeletal:        Right lower leg pain/swelling   All other systems reviewed and are negative.      Past Medical History:   Diagnosis Date   • Allergic    • Anemia     during pregnancy   • Anxiety    • Arthritis    • Blood in urine    • Chronic fatigue    • Colon polyp 02/2018    7 at last colonoscopy   • Degeneration of L4-L5 intervertebral disc 12/10/2019   • Depression    • Elevated cholesterol    • Endometriosis    • Fibromyalgia    • Fibromyalgia, primary    • Fracture    • GERD (gastroesophageal reflux disease)    • H/O mammogram 02/2018   • HL (hearing loss)    • Hypertension    • Irritable bowel syndrome    • Pap smear for cervical cancer screening 2018?       Allergies   Allergen Reactions   • Statins Myalgia   • Gabapentin Diarrhea and GI Intolerance   • Lipitor [Atorvastatin Calcium] Myalgia   • Relafen [Nabumetone] GI Intolerance   • Erythromycin GI Intolerance     Tolerates zpak   • Penicillins Hives   • Cymbalta [Duloxetine Hcl] Rash       Past Surgical History:   Procedure Laterality Date   • CHOLECYSTECTOMY      • COLONOSCOPY  02/06/2018    3 year f/u polypectomy Dr MARTA Waller   • GALLBLADDER SURGERY     • HYSTERECTOMY     • INNER EAR SURGERY     • OOPHORECTOMY     • TEMPOROMANDIBULAR JOINT ARTHROPLASTY  1984       Family History   Problem Relation Age of Onset   • Melanoma Mother         eye   • Diabetes Mother    • Heart disease Mother    • Arthritis Mother    • Depression Mother    • Colon polyps Mother    • Thyroid disease Mother    • Hyperlipidemia Mother    • Cancer Mother    • Heart attack Mother    • Migraines Mother    • Cancer Father         pancreatic and liver   • Diabetes Father    • Depression Father    • Colon polyps Father    • ADD / ADHD Son    • Diabetes Maternal Aunt    • Diabetes Maternal Uncle    • Diabetes Paternal Aunt    • Diabetes Paternal Uncle    • Cancer Maternal Grandmother         uterine/cervical   • Heart disease Maternal Grandfather    • Heart disease Paternal Grandfather    • Migraines Sister    • Breast cancer Neg Hx    • Ovarian cancer Neg Hx        Social History     Socioeconomic History   • Marital status:      Spouse name: Not on file   • Number of children: Not on file   • Years of education: Not on file   • Highest education level: Not on file   Tobacco Use   • Smoking status: Current Every Day Smoker     Packs/day: 0.75     Years: 34.00     Pack years: 25.50     Types: Cigarettes   • Smokeless tobacco: Never Used   Substance and Sexual Activity   • Alcohol use: No   • Drug use: No   • Sexual activity: Not Currently     Birth control/protection: None           Objective   Physical Exam   Constitutional: She is oriented to person, place, and time. She appears well-developed and well-nourished. No distress.   Nontoxic-appearing female   HENT:   Head: Normocephalic and atraumatic.   Mouth/Throat: Oropharynx is clear and moist.   Cardiovascular: Normal rate, regular rhythm, normal heart sounds and intact distal pulses. Exam reveals no gallop and no friction rub.   No murmur  heard.  Pulmonary/Chest: Effort normal and breath sounds normal. No respiratory distress. She has no wheezes. She has no rales.   Musculoskeletal:   Mild edema noted to lateral aspect of right lower leg/ankle region, negative Homans sign, no palpable cords   Neurological: She is alert and oriented to person, place, and time.   Normal gait, right lower extremity is neurovascularly intact distally with bounding distal pulses and normal sensation   Skin: Skin is warm and dry. No rash noted. She is not diaphoretic. No erythema.   Psychiatric: She has a normal mood and affect. Judgment and thought content normal.   Nursing note and vitals reviewed.      Procedures           ED Course  ED Course as of Aug 07 2230   Fri Aug 07, 2020   2229 55-year-old female sent by her primary care physician to be evaluated for potential DVT of her right lower extremity.  The patient states that she has longstanding chronic pain and sciatica, most notably in her right lower extremity.  However, for the past few days she has had pain and swelling to her right lower leg and ankle region.  She had a d-dimer performed in her primary care physician's office earlier today that was noted to be elevated and she was sent to the ED to be evaluated for potential DVT.  On arrival, the patient is nontoxic-appearing.  She is ambulatory.  Right lower extremity is neurovascularly intact.  She has mild edema in right lower extremity distally when compared to her left on visual inspection.  No clinical signs/symptoms to suggest infectious etiology.  Unfortunately, at this time of night we do not have Doppler ultrasound capabilities.  Lovenox given.  I have ordered a Doppler ultrasound for tomorrow and the patient will return tomorrow morning for a formal ultrasound to rule out DVT.  Agreeable with plan and given appropriate strict return precautions.    [DD]      ED Course User Index  [DD] Michael Molina MD                                   Recent  Results (from the past 24 hour(s))   CBC (No Diff)    Collection Time: 08/07/20  9:36 AM   Result Value Ref Range    WBC 12.27 (H) 3.40 - 10.80 10*3/mm3    RBC 4.50 3.77 - 5.28 10*6/mm3    Hemoglobin 14.0 12.0 - 15.9 g/dL    Hematocrit 42.5 34.0 - 46.6 %    MCV 94.4 79.0 - 97.0 fL    MCH 31.1 26.6 - 33.0 pg    MCHC 32.9 31.5 - 35.7 g/dL    RDW 13.8 12.3 - 15.4 %    RDW-SD 47.5 37.0 - 54.0 fl    MPV 9.7 6.0 - 12.0 fL    Platelets 262 140 - 450 10*3/mm3   D-dimer, Quantitative    Collection Time: 08/07/20  9:36 AM   Result Value Ref Range    D-Dimer, Quantitative 0.66 (H) 0.00 - 0.56 MCGFEU/mL   Comprehensive Metabolic Panel    Collection Time: 08/07/20  9:36 AM   Result Value Ref Range    Glucose 146 (H) 65 - 99 mg/dL    BUN 12 6 - 20 mg/dL    Creatinine 0.67 0.57 - 1.00 mg/dL    Sodium 136 136 - 145 mmol/L    Potassium 3.9 3.5 - 5.2 mmol/L    Chloride 102 98 - 107 mmol/L    CO2 23.0 22.0 - 29.0 mmol/L    Calcium 9.3 8.6 - 10.5 mg/dL    Total Protein 6.5 6.0 - 8.5 g/dL    Albumin 4.20 3.50 - 5.20 g/dL    ALT (SGPT) 17 1 - 33 U/L    AST (SGOT) 16 1 - 32 U/L    Alkaline Phosphatase 113 39 - 117 U/L    Total Bilirubin 0.2 0.0 - 1.2 mg/dL    eGFR Non African Amer 92 >60 mL/min/1.73    Globulin 2.3 gm/dL    A/G Ratio 1.8 g/dL    BUN/Creatinine Ratio 17.9 7.0 - 25.0    Anion Gap 11.0 5.0 - 15.0 mmol/L   D-dimer, Quantitative    Collection Time: 08/07/20  7:34 PM   Result Value Ref Range    D-Dimer, Quantitative 0.62 (H) 0.00 - 0.56 MCGFEU/mL   Light Blue Top    Collection Time: 08/07/20  7:34 PM   Result Value Ref Range    Extra Tube hold for add-on    Green Top (Gel)    Collection Time: 08/07/20  7:34 PM   Result Value Ref Range    Extra Tube Hold for add-ons.    Lavender Top    Collection Time: 08/07/20  7:34 PM   Result Value Ref Range    Extra Tube hold for add-on    Gold Top - SST    Collection Time: 08/07/20  7:34 PM   Result Value Ref Range    Extra Tube Hold for add-ons.    Basic Metabolic Panel    Collection Time:  "08/07/20  7:34 PM   Result Value Ref Range    Glucose 144 (H) 65 - 99 mg/dL    BUN 14 6 - 20 mg/dL    Creatinine 0.64 0.57 - 1.00 mg/dL    Sodium 137 136 - 145 mmol/L    Potassium 3.7 3.5 - 5.2 mmol/L    Chloride 102 98 - 107 mmol/L    CO2 23.0 22.0 - 29.0 mmol/L    Calcium 9.3 8.6 - 10.5 mg/dL    eGFR Non African Amer 97 >60 mL/min/1.73    BUN/Creatinine Ratio 21.9 7.0 - 25.0    Anion Gap 12.0 5.0 - 15.0 mmol/L     Note: In addition to lab results from this visit, the labs listed above may include labs taken at another facility or during a different encounter within the last 24 hours. Please correlate lab times with ED admission and discharge times for further clarification of the services performed during this visit.    No orders to display     Vitals:    08/07/20 1923   BP: (!) 156/105   BP Location: Left arm   Patient Position: Sitting   Resp: 16   Temp: 98.1 °F (36.7 °C)   TempSrc: Temporal   SpO2: 97%   Weight: 95.3 kg (210 lb)   Height: 165.1 cm (65\")     Medications   enoxaparin (LOVENOX) syringe 100 mg (100 mg Subcutaneous Given 8/7/20 2227)   HYDROcodone-acetaminophen (NORCO) 5-325 MG per tablet 1 tablet (1 tablet Oral Given 8/7/20 2224)     ECG/EMG Results (last 24 hours)     ** No results found for the last 24 hours. **        No orders to display               MDM    Final diagnoses:   Pain and swelling of right lower leg            Michael Molina MD  08/07/20 2232    "

## 2020-08-08 NOTE — DISCHARGE INSTRUCTIONS
If you have a Duplex Venous study ordered and have not received a phone call to schedule this test by 10:00 am tomorrow, please call.    [phone] (Monday - Friday)    [phone] (Weekends)

## 2020-08-12 ENCOUNTER — TELEPHONE (OUTPATIENT)
Dept: NEUROSURGERY | Facility: CLINIC | Age: 55
End: 2020-08-12

## 2020-08-12 DIAGNOSIS — M51.36 DEGENERATION OF L4-L5 INTERVERTEBRAL DISC: Primary | ICD-10-CM

## 2020-08-12 NOTE — TELEPHONE ENCOUNTER
Lizette Keith  [phone]  Called into office at 1208    Patient called stating that her pain has worsened since last being seen in our office in January.  She notes that she continues to have back pain that radiates into her right leg and into her lateral aspect of her lower leg.  She also notes she has new pain in her bilateral groin area.  She notes that the pain is the worst in the morning, that she has difficulty getting up in the morning.  Once she is moving,  however, she states that her pain is much improved. She denies any overt weakness, changes in her bowel/bladder function, or perineal anesthesia.  She has been seen by Dr. Tobar for cortisone shots, pain medication and has recently had a Medrol Dosepak.  She takes tramadol daily to help alleviate her pain.  Patient is currently on Lyrica 50 mg 3 times daily.    Due to the changes in pain, will have patient obtain a new MRI of the lower back to assess the changes in symptoms and for possible surgical planning to the last MRI being approximately 8 months prior.  In addition, patient currently has 50 mg Lyrica TID, therefore, after a long discussion with patient, we will increase patient to Lyrica 100 mg twice daily.  We will schedule patient on an semi-urgent basis with Low/Prisca.    Patient was instructed to call our office prior to the appointment if she has any changes in her symptoms that would maybe suggest neurogenic claudication.    Please go ahead and schedule patient for an MRI Lumbar.  And if possible, please schedule her early next week (patient states that she is off work on Monday).

## 2020-08-17 DIAGNOSIS — M79.7 FIBROMYALGIA: ICD-10-CM

## 2020-08-17 DIAGNOSIS — M51.36 DEGENERATION OF L4-L5 INTERVERTEBRAL DISC: ICD-10-CM

## 2020-08-17 DIAGNOSIS — M54.41 CHRONIC RIGHT-SIDED LOW BACK PAIN WITH RIGHT-SIDED SCIATICA: ICD-10-CM

## 2020-08-17 DIAGNOSIS — G89.29 CHRONIC RIGHT-SIDED LOW BACK PAIN WITH RIGHT-SIDED SCIATICA: ICD-10-CM

## 2020-08-17 NOTE — TELEPHONE ENCOUNTER
It is in Karen notes that she was going to increase the Lyrica to 100 mg twice daily and was ordering a lumbar MRI scan and have the patient be seen in the office.sandra

## 2020-08-17 NOTE — TELEPHONE ENCOUNTER
Provider:  Low  Caller: Patient  Time of call:   12:45pm, 3:53call back  Phone #:  [phone]  Surgery:  N/A  Surgery Date: N/A    Last visit:   01/28/2020  Next visit: ELIZABETH MESA:         Reason for call:   Patient called LVM stating she has been having severe pain, and was under the impression an MRI was being scheduled along with a follow up appointment, but has not heard anything back yet. Patient also stated in the voicemail that her Lyrica was supposed to be increased, but nothing has been received by her pharmacy. Patient wanted a returned call.     Attempted to call the patient back at 3:53pm, and did not receive an answer. I LVM for the patient to return my call.

## 2020-08-17 NOTE — TELEPHONE ENCOUNTER
What lyrica increase needs to be called in?  I don't see anything documented.  I will call pharmacy to ensure rx was updated and renewed.

## 2020-08-18 NOTE — TELEPHONE ENCOUNTER
MRI is scheduled for late on Thursday, 8-20-20, is follow up on Monday with Prisca ok or does she need to be seen on Friday by another PA? Or results called to her?

## 2020-08-19 RX ORDER — PREGABALIN 50 MG/1
100 CAPSULE ORAL 3 TIMES DAILY
Qty: 180 CAPSULE | Refills: 0 | OUTPATIENT
Start: 2020-08-19 | End: 2020-08-24

## 2020-08-20 ENCOUNTER — HOSPITAL ENCOUNTER (OUTPATIENT)
Dept: MRI IMAGING | Facility: HOSPITAL | Age: 55
Discharge: HOME | End: 2020-08-20
Admitting: PHYSICIAN ASSISTANT

## 2020-08-20 DIAGNOSIS — M51.36 DEGENERATION OF L4-L5 INTERVERTEBRAL DISC: ICD-10-CM

## 2020-08-20 PROCEDURE — 72148 MRI LUMBAR SPINE W/O DYE: CPT

## 2020-08-24 ENCOUNTER — DOCUMENTATION (OUTPATIENT)
Dept: NEUROSURGERY | Facility: CLINIC | Age: 55
End: 2020-08-24

## 2020-08-24 ENCOUNTER — OFFICE VISIT (OUTPATIENT)
Dept: NEUROSURGERY | Facility: CLINIC | Age: 55
End: 2020-08-24

## 2020-08-24 VITALS — BODY MASS INDEX: 36.25 KG/M2 | TEMPERATURE: 98.2 F | WEIGHT: 217.6 LBS | HEIGHT: 65 IN

## 2020-08-24 DIAGNOSIS — M51.26 HNP (HERNIATED NUCLEUS PULPOSUS), LUMBAR: ICD-10-CM

## 2020-08-24 DIAGNOSIS — M51.36 DEGENERATION OF L4-L5 INTERVERTEBRAL DISC: ICD-10-CM

## 2020-08-24 DIAGNOSIS — Z71.6 ENCOUNTER FOR TOBACCO USE CESSATION COUNSELING: ICD-10-CM

## 2020-08-24 DIAGNOSIS — M19.90 ARTHRITIS: ICD-10-CM

## 2020-08-24 DIAGNOSIS — F17.200 TOBACCO DEPENDENCE: ICD-10-CM

## 2020-08-24 DIAGNOSIS — M79.7 FIBROMYALGIA: Primary | ICD-10-CM

## 2020-08-24 DIAGNOSIS — Z72.0 TOBACCO ABUSE: ICD-10-CM

## 2020-08-24 PROCEDURE — 99407 BEHAV CHNG SMOKING > 10 MIN: CPT | Performed by: PHYSICIAN ASSISTANT

## 2020-08-24 PROCEDURE — 99214 OFFICE O/P EST MOD 30 MIN: CPT | Performed by: PHYSICIAN ASSISTANT

## 2020-08-24 RX ORDER — VARENICLINE TARTRATE 0.5 MG/1
0.5 TABLET, FILM COATED ORAL DAILY
Qty: 21 TABLET | Refills: 0 | Status: SHIPPED | OUTPATIENT
Start: 2020-08-24 | End: 2020-10-26

## 2020-08-24 NOTE — PROGRESS NOTES
Office F/U Visit  Subjective   Patient ID: Lizette Keith is a 55 y.o. female  [id number]    CC: back and right leg pain    History of Present Illness   55-year-old female with a herniated disc at L4-5 with mild back pain but mostly right leg pain that radiates down to her calf.  She saw Dr. Kelsey in January with an MRI scan that showed the herniated disc.  The patient has been doing conservative therapy with steroid injections x4 by Dr. Tobar, unfortunately she has gotten no more than 1 to 1-1/2 days of any pain relief until her symptoms completely return.  Her pain is worse first thing in the morning and worse with standing and walking.  She is able to empty her bladder, she does notice that she does some dribbling.  She has a history of irritable bowel as well as fibromyalgia.  The patient's current regimen is Ultram 50 mg, Naprosyn 500 mg, Zanaflex 4 mg and Lyrica 100 mg 3 times a day, the patient has noticed that she has bilateral lower extremity edema and has put on 20 pounds since starting Lyrica.  She has questions regarding gabapentin instead of the Lyrica.  She continues to work.    Past Medical History:   Diagnosis Date   • Allergic    • Anemia     during pregnancy   • Anxiety    • Arthritis    • Blood in urine    • Chronic fatigue    • Colon polyp 02/2018    7 at last colonoscopy   • Degeneration of L4-L5 intervertebral disc 12/10/2019   • Depression    • Elevated cholesterol    • Endometriosis    • Fibromyalgia    • Fibromyalgia, primary    • Fracture    • GERD (gastroesophageal reflux disease)    • H/O mammogram 02/2018   • HL (hearing loss)    • Hypertension    • Irritable bowel syndrome    • Pap smear for cervical cancer screening 2018?       Allergies   Allergen Reactions   • Statins Myalgia   • Gabapentin Diarrhea and GI Intolerance   • Lipitor [Atorvastatin Calcium] Myalgia   • Relafen [Nabumetone] GI Intolerance   • Erythromycin GI Intolerance     Tolerates zpak   • Penicillins Hives   •  Cymbalta [Duloxetine Hcl] Rash       Current Outpatient Medications:   •  aspirin 325 MG tablet, Take 325 mg by mouth Daily., Disp: , Rfl:   •  cetirizine (ZyrTEC) 10 MG tablet, Take 10 mg by mouth daily., Disp: , Rfl:   •  dicyclomine (BENTYL) 20 MG tablet, Take 1 tablet by mouth 3 (Three) Times a Day As Needed (IBS- diarrhea)., Disp: 90 tablet, Rfl: 0  •  estradiol (ESTRACE) 2 MG tablet, Take 1 tablet by mouth Daily., Disp: 30 tablet, Rfl: 12  •  lisinopril-hydrochlorothiazide (PRINZIDE,ZESTORETIC) 20-25 MG per tablet, Take 1 tablet by mouth Daily., Disp: 90 tablet, Rfl: 1  •  methocarbamol (ROBAXIN) 750 MG tablet, TAKE 1 TABLET BY MOUTH THREE TIMES DAILY AS NEEDED FOR 30 DAYS, Disp: , Rfl:   •  montelukast (SINGULAIR) 10 MG tablet, Take 1 tablet by mouth Every Night., Disp: 30 tablet, Rfl: 6  •  naproxen (Naprosyn) 500 MG tablet, Take 1 tablet by mouth 2 (Two) Times a Day With Meals., Disp: 60 tablet, Rfl: 5  •  potassium chloride (KLOR-CON) 10 MEQ CR tablet, Take 1 tablet by mouth Daily. For potassium, Disp: 90 tablet, Rfl: 1  •  tiZANidine (ZANAFLEX) 4 MG tablet, Take 1 tablet by mouth Every 8 (Eight) Hours As Needed for Muscle Spasms., Disp: 90 tablet, Rfl: 2  •  ULTRAM 50 MG tablet, Take 1 tablet by mouth Every 6 (Six) Hours As Needed for Moderate Pain ., Disp: 45 tablet, Rfl: 2  •  varenicline (Chantix) 0.5 MG tablet, Take 1 tablet by mouth Daily., Disp: 21 tablet, Rfl: 0  Social History     Tobacco Use   • Smoking status: Current Every Day Smoker     Packs/day: 0.75     Years: 34.00     Pack years: 25.50     Types: Cigarettes   • Smokeless tobacco: Never Used   Substance Use Topics   • Alcohol use: No   • Drug use: No     Review of Systems   Constitutional: Positive for activity change and unexpected weight change. Negative for appetite change, chills, diaphoresis, fatigue and fever.   HENT: Positive for sinus pain. Negative for congestion, dental problem, drooling, ear discharge, ear pain, facial swelling,  hearing loss, mouth sores, nosebleeds, postnasal drip, rhinorrhea, sinus pressure, sneezing, sore throat, tinnitus, trouble swallowing and voice change.    Eyes: Negative for photophobia, pain, discharge, redness, itching and visual disturbance.   Respiratory: Negative for apnea, cough, choking, chest tightness, shortness of breath, wheezing and stridor.    Cardiovascular: Positive for leg swelling. Negative for chest pain and palpitations.   Gastrointestinal: Positive for abdominal distention, abdominal pain and constipation. Negative for anal bleeding, blood in stool, diarrhea, nausea, rectal pain and vomiting.   Endocrine: Negative for cold intolerance, heat intolerance, polydipsia, polyphagia and polyuria.   Genitourinary: Positive for difficulty urinating, flank pain and pelvic pain. Negative for decreased urine volume, dysuria, enuresis, frequency, genital sores, hematuria and urgency.   Musculoskeletal: Positive for arthralgias, back pain, gait problem and myalgias. Negative for joint swelling, neck pain and neck stiffness.   Skin: Negative for color change, pallor, rash and wound.   Allergic/Immunologic: Negative for environmental allergies, food allergies and immunocompromised state.   Neurological: Positive for tremors and headaches. Negative for dizziness, seizures, syncope, facial asymmetry, speech difficulty, weakness, light-headedness and numbness.   Hematological: Negative for adenopathy. Does not bruise/bleed easily.   Psychiatric/Behavioral: Positive for decreased concentration, dysphoric mood and sleep disturbance. Negative for agitation, behavioral problems, confusion, hallucinations, self-injury and suicidal ideas. The patient is nervous/anxious. The patient is not hyperactive.    All other systems reviewed and are negative.  I have reviewed and confirmed the accuracy of the ROS as documented by the MA/LPN/RN Kassidy Camilo PA-C      Physical Exam  Temp 98.2 °F (36.8 °C) (Infrared)   Ht  "165.1 cm (65\")   Wt 98.7 kg (217 lb 9.6 oz)   BMI 36.21 kg/m²     PE:  Well developed well-nourished, in no apparent distress at time of examination.  Awake, alert, oriented, conversant.  Head-normocephalic, atraumatic  EENT-sclera clear, eyelids normal  Lungs-normal expansion, no wheezing  EXT-no edema    Neurologic Exam   Patient is awake alert oriented, good historian, conversant  The patient has an antalgic gait favoring the right leg.  There is no focal weakness to direct testing in the lower extremity.  Straight leg raising is positive on the right.    Independent Review of Radiographic Studies:   MRI of the lumbar spine was performed and reviewed by myself.  The patient has degenerative disc at L4-5 with a central and rightward herniated disc.  This study compared to the MRI from January shows worsening disc protrusion and foraminal narrowing causing L5 nerve root compression.    Medical Decision Making:   Impression:  1.  HNP, L4-5, right  2.  L5 radiculopathy-the patient is undergone for steroid injections without any improvement in her radicular symptoms.  3.  Mild low back pain  4.  Degenerative osteoarthritis  5.  Degenerative disc disease  6.  Fibromyalgia  7.  IBS  8.  Obesity  9.  Tobacco abuse  10.  Physical deconditioning-unfortunately the patient's symptoms worsened with physical therapy and this was put on hold.  11.  Lower extremity edema-most likely secondary to Lyrica.    Plan:  The patient has a herniated disc at L4-5 on the right providing anatomical and clinical correlation.  Her pain is worse first thing in the morning therefore I have instructed her that she can take 2 tramadol 50 mg tablets in the a.m. and when her pain is severe.  I am decreasing her Lyrica and I have given her a schedule to decrease this medication and instructions to start gabapentin.  I have provided a prescription for Chantix.  I do not feel that further steroid injections are going to be beneficial.  The patient " tells me that she is not financially able to be off work at this time to undergo surgery however she is working on financial arrangements.  We are going to try conservative therapies with medications in an attempt to provide enough pain control that she can continue to work up until we are going to have to proceed with surgery.  The patient is in agreement with this plan.  We are planning a telephone visit in 3 weeks.    Patient's Body mass index is 36.21 kg/m². BMI is above normal parameters. Recommendations include: exercise counseling.     Social History    Tobacco Use      Smoking status: Current Every Day Smoker        Packs/day: 0.75        Years: 34.00        Pack years: 25.5        Types: Cigarettes      Smokeless tobacco: Never Used  Lizette Keith  reports that she has been smoking cigarettes. She has a 25.50 pack-year smoking history. She has never used smokeless tobacco.. I have educated her on the risk of diseases from using tobacco products such as cancer, COPD, heart diease and Financial loss and worsening degenerative disc problems.     I advised her to quit and she is willing to quit. We have discussed the following method/s for tobacco cessation:  Prescription Medicaiton.  Together we have set a quit date for 1 week from today.  She will follow up with me in 3 weeks or sooner to check on her progress.  I have provided Chantix 0.5 mg and I have instructed the patient on increasing this dose on day 4 to twice daily and then on day 8 she will go to 1 mg daily for the next 12 weeks.  The patient is in agreement with this plan.    I spent 12 minutes counseling the patient on smoking cessation.       Prisca Camilo, PAC

## 2020-08-26 ENCOUNTER — TELEPHONE (OUTPATIENT)
Dept: INTERNAL MEDICINE | Facility: CLINIC | Age: 55
End: 2020-08-26

## 2020-08-26 NOTE — TELEPHONE ENCOUNTER
Had seen neurosurgeon and they think it may be from her lyrica. Has been going on for a week. Neuro surgeon decreased lyrica 100mg bid, then dropping 100mg daily, 50mg for 5 days. She wanted to know in the mean time what could she do to get the swelling down. States the neurosurgeon told her that the swelling could be from the lyrica. States her feet were so swollen she couldn't get her shoes on and go to work. Besides keeping them propped what can she do?

## 2020-08-26 NOTE — TELEPHONE ENCOUNTER
PATIENT CALLED AND STATED SHE IS HAVING REALLY BAD SWELLING OF HER LEGS AND FEET.     WOULD LIKE A CALL BACK FROM THE NURSE     PLEASE CALL AND ADVISE -[phone]

## 2020-08-27 ENCOUNTER — OFFICE VISIT (OUTPATIENT)
Dept: INTERNAL MEDICINE | Facility: CLINIC | Age: 55
End: 2020-08-27

## 2020-08-27 VITALS
OXYGEN SATURATION: 98 % | BODY MASS INDEX: 35.32 KG/M2 | WEIGHT: 212 LBS | HEART RATE: 102 BPM | SYSTOLIC BLOOD PRESSURE: 172 MMHG | TEMPERATURE: 98.2 F | HEIGHT: 65 IN | DIASTOLIC BLOOD PRESSURE: 102 MMHG

## 2020-08-27 DIAGNOSIS — E78.2 MIXED HYPERLIPIDEMIA: ICD-10-CM

## 2020-08-27 DIAGNOSIS — I10 ESSENTIAL HYPERTENSION: Primary | ICD-10-CM

## 2020-08-27 DIAGNOSIS — M51.36 DEGENERATION OF L4-L5 INTERVERTEBRAL DISC: ICD-10-CM

## 2020-08-27 DIAGNOSIS — M79.89 LEG SWELLING: ICD-10-CM

## 2020-08-27 DIAGNOSIS — R73.09 ABNORMAL GLUCOSE: ICD-10-CM

## 2020-08-27 LAB — HBA1C MFR BLD: 6.4 %

## 2020-08-27 PROCEDURE — 99214 OFFICE O/P EST MOD 30 MIN: CPT | Performed by: FAMILY MEDICINE

## 2020-08-27 PROCEDURE — 83036 HEMOGLOBIN GLYCOSYLATED A1C: CPT | Performed by: FAMILY MEDICINE

## 2020-08-27 RX ORDER — PREGABALIN 100 MG/1
100 CAPSULE ORAL 3 TIMES DAILY
COMMUNITY
Start: 2020-08-19 | End: 2020-09-15

## 2020-08-27 RX ORDER — LISINOPRIL AND HYDROCHLOROTHIAZIDE 20; 12.5 MG/1; MG/1
2 TABLET ORAL DAILY
Qty: 60 TABLET | Refills: 1 | Status: SHIPPED | OUTPATIENT
Start: 2020-08-27 | End: 2020-10-28 | Stop reason: SDUPTHER

## 2020-08-27 RX ORDER — FUROSEMIDE 20 MG/1
10 TABLET ORAL DAILY
Qty: 10 TABLET | Refills: 0 | Status: SHIPPED | OUTPATIENT
Start: 2020-08-27 | End: 2020-09-15

## 2020-08-27 NOTE — PROGRESS NOTES
"Subjective   Lizette Keith is a 55 y.o. female.     Chief Complaint   Patient presents with   • Edema     bilateral lower legs and feet. extremely swelling yesterday has been going on for about 7 days.        Visit Vitals  BP (!) 172/102 (BP Location: Right arm, Patient Position: Sitting, Cuff Size: Large Adult)   Pulse 102   Temp 98.2 °F (36.8 °C) (Infrared)   Ht 165.1 cm (65\")   Wt 96.2 kg (212 lb)   SpO2 98%   BMI 35.28 kg/m²         Hypertension   This is a chronic problem. The problem has been rapidly worsening since onset. The problem is uncontrolled. Associated symptoms include neck pain and peripheral edema. Pertinent negatives include no anxiety, blurred vision, chest pain, headaches, malaise/fatigue, orthopnea, palpitations, PND, shortness of breath or sweats. Agents associated with hypertension include steroids. Risk factors for coronary artery disease include dyslipidemia and obesity. Current antihypertension treatment includes ACE inhibitors and diuretics. The current treatment provides mild improvement. There is no history of angina, kidney disease, CVA, heart failure, left ventricular hypertrophy, PVD or retinopathy. There is no history of sleep apnea or a thyroid problem.   Leg Swelling   This is a recurrent problem. The current episode started more than 1 month ago. The problem occurs intermittently. The problem has been waxing and waning. Associated symptoms include a change in bowel habit and neck pain. Pertinent negatives include no abdominal pain, anorexia, arthralgias, chest pain, chills, congestion, coughing, diaphoresis, fatigue, fever, headaches, joint swelling, myalgias, nausea, numbness, rash, sore throat, swollen glands, urinary symptoms, vertigo, visual change, vomiting or weakness. Nothing aggravates the symptoms. She has tried nothing for the symptoms. The treatment provided no relief.      Pt is in severe pain, and having problems walking.  Pt had swelling of her ankles " yesterday.   Pt has been on tramadol since January and is on Lyrica which is aggravating leg swelling.  Pt did not have leg swelling with gabapentin, but did have IBS. Pt wants to go back on gabapentin and is working with Neurosurgery to change meds.  Pt having severe pain in her back. Pt's BP is elevated. Pt attributes BP elevation to pain.     Pt has not changed any medication in the past 7 days.     Pt's sister moved back in with pt to help her. Sister has COPD and was very short of breath today.     Pt is getting steroid injections in back with Dr Tobar. Last steroid injection was last month. Pt notes no improvement.    Last LDL was 181. Glucose was elevated.     Pt states that back feels better at work when moving around, but she cannot get her shoes on.   The following portions of the patient's history were reviewed and updated as appropriate: allergies, current medications, past family history, past medical history, past social history, past surgical history and problem list.    Past Medical History:   Diagnosis Date   • Allergic    • Anemia     during pregnancy   • Anxiety    • Arthritis    • Blood in urine    • Chronic fatigue    • Colon polyp 02/2018    7 at last colonoscopy   • Degeneration of L4-L5 intervertebral disc 12/10/2019   • Depression    • Elevated cholesterol    • Endometriosis    • Fibromyalgia    • Fibromyalgia, primary    • Fracture    • GERD (gastroesophageal reflux disease)    • H/O mammogram 02/2018   • HL (hearing loss)    • Hypertension    • Irritable bowel syndrome    • Pap smear for cervical cancer screening 2018?      Past Surgical History:   Procedure Laterality Date   • CHOLECYSTECTOMY     • COLONOSCOPY  02/06/2018    3 year f/u polypectomy Dr MARTA Waller   • GALLBLADDER SURGERY     • HYSTERECTOMY     • INNER EAR SURGERY     • OOPHORECTOMY     • TEMPOROMANDIBULAR JOINT ARTHROPLASTY  1984      Family History   Problem Relation Age of Onset   • Melanoma Mother         eye   • Diabetes  Mother    • Heart disease Mother    • Arthritis Mother    • Depression Mother    • Colon polyps Mother    • Thyroid disease Mother    • Hyperlipidemia Mother    • Cancer Mother    • Heart attack Mother    • Migraines Mother    • Cancer Father         pancreatic and liver   • Diabetes Father    • Depression Father    • Colon polyps Father    • ADD / ADHD Son    • Diabetes Maternal Aunt    • Diabetes Maternal Uncle    • Diabetes Paternal Aunt    • Diabetes Paternal Uncle    • Cancer Maternal Grandmother         uterine/cervical   • Heart disease Maternal Grandfather    • Heart disease Paternal Grandfather    • Migraines Sister    • Breast cancer Neg Hx    • Ovarian cancer Neg Hx       Social History     Socioeconomic History   • Marital status:      Spouse name: Not on file   • Number of children: Not on file   • Years of education: Not on file   • Highest education level: Not on file   Tobacco Use   • Smoking status: Current Every Day Smoker     Packs/day: 0.75     Years: 34.00     Pack years: 25.50     Types: Cigarettes   • Smokeless tobacco: Never Used   Substance and Sexual Activity   • Alcohol use: No   • Drug use: No   • Sexual activity: Not Currently     Birth control/protection: None      Allergies   Allergen Reactions   • Statins Myalgia   • Gabapentin Diarrhea and GI Intolerance   • Lipitor [Atorvastatin Calcium] Myalgia   • Relafen [Nabumetone] GI Intolerance   • Erythromycin GI Intolerance     Tolerates zpak   • Penicillins Hives   • Cymbalta [Duloxetine Hcl] Rash       Review of Systems   Constitutional: Negative.  Negative for chills, diaphoresis, fatigue, fever and malaise/fatigue.   HENT: Negative.  Negative for congestion, ear pain, nosebleeds, postnasal drip, rhinorrhea, sinus pressure, sneezing and sore throat.    Eyes: Negative.  Negative for blurred vision, redness and itching.   Respiratory: Negative.  Negative for cough, shortness of breath and wheezing.    Cardiovascular: Positive for  leg swelling. Negative for chest pain, palpitations, orthopnea and PND.   Gastrointestinal: Positive for change in bowel habit and constipation. Negative for abdominal pain, anorexia, diarrhea, nausea and vomiting.   Endocrine: Negative.  Negative for cold intolerance and heat intolerance.   Genitourinary: Negative.  Negative for dysuria, frequency, hematuria and urgency.   Musculoskeletal: Positive for back pain, gait problem (pain with walking) and neck pain. Negative for arthralgias, joint swelling and myalgias.   Skin: Negative.  Negative for color change and rash.   Allergic/Immunologic: Positive for environmental allergies.   Neurological: Negative for dizziness, vertigo, syncope, weakness, light-headedness, numbness and headaches.   Hematological: Negative.  Negative for adenopathy. Does not bruise/bleed easily.   Psychiatric/Behavioral: Positive for dysphoric mood (pt attributes to pain). The patient is not nervous/anxious.        Objective   Physical Exam   Constitutional: She is oriented to person, place, and time. She appears well-developed.   HENT:   Head: Normocephalic.   Right Ear: External ear normal.   Left Ear: External ear normal.   Nose: Nose normal.   Eyes: Pupils are equal, round, and reactive to light. Conjunctivae, EOM and lids are normal.   Neck: Trachea normal and normal range of motion. Neck supple. Carotid bruit is not present. No thyroid mass and no thyromegaly present.   Cardiovascular: Normal rate and regular rhythm.   No murmur heard.  Pulmonary/Chest: Effort normal and breath sounds normal. No respiratory distress. She has no decreased breath sounds. She has no wheezes. She has no rhonchi. She has no rales. She exhibits no tenderness.   Abdominal: Soft. Bowel sounds are normal. There is no tenderness.   Musculoskeletal: Normal range of motion. She exhibits edema (trace both ankles).        Lumbar back: She exhibits tenderness and spasm.   Neurological: She is alert and oriented to  person, place, and time.   Skin: Skin is warm and dry.   Psychiatric: She has a normal mood and affect. Her behavior is normal.   Nursing note and vitals reviewed.      Assessment/Plan   Lizette was seen today for edema.    Diagnoses and all orders for this visit:    Essential hypertension  -     lisinopril-hydrochlorothiazide (PRINZIDE,ZESTORETIC) 20-12.5 MG per tablet; Take 2 tablets by mouth Daily.    Mixed hyperlipidemia  -     Ambulatory Referral to Nutrition Services    Degeneration of L4-L5 intervertebral disc    Leg swelling  -     furosemide (Lasix) 20 MG tablet; Take 0.5 tablets by mouth Daily. Prn swelling    Abnormal glucose  -     POC Glycosylated Hemoglobin (Hb A1C)  -     Ambulatory Referral to Nutrition Services      Off of work until Monday 8/31/20    Change lisinopril/hctz to 20/12.5 -2 per day  Pt unable to tolerate statins.      Please follow a low animal fat diet that is also low in sugar, low in junk food, low in sweet drinks and low in alcohol.  Please increase the amount of fiber in your diet as well as increasing your daily exercise, such as walking.      Current Outpatient Medications:   •  aspirin 325 MG tablet, Take 325 mg by mouth Daily., Disp: , Rfl:   •  cetirizine (ZyrTEC) 10 MG tablet, Take 10 mg by mouth daily., Disp: , Rfl:   •  dicyclomine (BENTYL) 20 MG tablet, Take 1 tablet by mouth 3 (Three) Times a Day As Needed (IBS- diarrhea)., Disp: 90 tablet, Rfl: 0  •  estradiol (ESTRACE) 2 MG tablet, Take 1 tablet by mouth Daily., Disp: 30 tablet, Rfl: 12  •  methocarbamol (ROBAXIN) 750 MG tablet, TAKE 1 TABLET BY MOUTH THREE TIMES DAILY AS NEEDED FOR 30 DAYS, Disp: , Rfl:   •  montelukast (SINGULAIR) 10 MG tablet, Take 1 tablet by mouth Every Night., Disp: 30 tablet, Rfl: 6  •  naproxen (Naprosyn) 500 MG tablet, Take 1 tablet by mouth 2 (Two) Times a Day With Meals., Disp: 60 tablet, Rfl: 5  •  potassium chloride (KLOR-CON) 10 MEQ CR tablet, Take 1 tablet by mouth Daily. For potassium,  Disp: 90 tablet, Rfl: 1  •  tiZANidine (ZANAFLEX) 4 MG tablet, Take 1 tablet by mouth Every 8 (Eight) Hours As Needed for Muscle Spasms., Disp: 90 tablet, Rfl: 2  •  ULTRAM 50 MG tablet, Take 1 tablet by mouth Every 6 (Six) Hours As Needed for Moderate Pain ., Disp: 45 tablet, Rfl: 2  •  varenicline (Chantix) 0.5 MG tablet, Take 1 tablet by mouth Daily., Disp: 21 tablet, Rfl: 0  •  furosemide (Lasix) 20 MG tablet, Take 0.5 tablets by mouth Daily. Prn swelling, Disp: 10 tablet, Rfl: 0  •  lisinopril-hydrochlorothiazide (PRINZIDE,ZESTORETIC) 20-12.5 MG per tablet, Take 2 tablets by mouth Daily., Disp: 60 tablet, Rfl: 1  •  pregabalin (LYRICA) 100 MG capsule, Take 100 mg by mouth 3 (Three) Times a Day., Disp: , Rfl:     Return in about 4 days (around 8/31/2020), or if symptoms worsen or fail to improve, for Recheck bp, Next scheduled follow up.     EXAMINATION: MRI LUMBAR SPINE WO CONTRAST-     INDICATION: Low back with radiculopathy; M51.36-Other intervertebral  disc degeneration, lumbar region.      TECHNIQUE: Multiplanar MRI of the lumbar spine without intravenous  contrast.     COMPARISON: MRI lumbar spine dated 01/28/2020.     FINDINGS: Sagittal datasets reveal grossly normal and stable appearance  of lumbar lordotic curvature without focal subluxation or listhesis.  Vertebral body heights are preserved without compression deformity or  fracture. Multifocal areas of spinal hemangiomas as seen on prior  without aggressive bone marrow signal findings of development. Distal  spinal cord, conus and cauda equina are grossly unremarkable for caliber  and contour evaluation. Intervertebral disc height space loss and  degeneration appearing greatest at the L3-L4 and L4-L5 levels detailed  further below on a level by level basis. Axial datasets evaluation  without paraspinal hematoma or paraspinal soft tissue findings of  abnormality.     Level by level details for spinal canal or neuroforaminal patency as  below:      L1-L2: Normal.     L2-L3: Mild circumferential disc bulge along with mild to moderate  ligamentum flavum thickening and facet hypertrophy producing mild spinal  canal stenosis without neuroforaminal stenosis.     L3-L4: Moderate circumferential disc bulge with left lateralizing  features of predominance along with mild to moderate ligamentum flavum  thickening and moderate facet hypertrophy producing mild to moderate  left paracentral spinal canal stenosis with mild left neuroforaminal  stenosis and mild left subarticular recess narrowing similar to prior.     L4-L5: Moderate to large circumferential disc bulge with right  lateralizing and right far lateralizing features of predominance  encroaching upon and possibly abutting the exiting right L4 nerve root  combined with moderate to severe ligamentum flavum thickening and facet  hypertrophy producing moderate to severe right paracentral spinal canal  stenosis and right subarticular recess involvement with moderate right  and mild left neuroforaminal stenoses.     L5-S1: Mild circumferential disc bulge along with mild ligamentum flavum  thickening and facet hypertrophy producing mild anterior thecal sac  effacement with mild bilateral neuroforaminal stenoses.     IMPRESSION:  Multilevel spondylitic changes with dominant focus at the  right sided L4-L5 level where there is right lateralizing disc bulge  combined with ligamentum flavum thickening and facet hypertrophy to  produce moderate to severe right paracentral spinal canal stenosis and  right subarticular recess involvement with at least moderate right  neuroforaminal stenosis stable to slightly progressed in involvement and  appearance from 01/28/2020 comparison examination.      D:  08/20/2020  E:  08/21/2020     This report was finalized on 8/21/2020 5:31 PM by Dr. Adrian Vargas.        Recent Results (from the past 168 hour(s))   POC Glycosylated Hemoglobin (Hb A1C)    Collection Time: 08/27/20  4:20 PM    Result Value Ref Range    Hemoglobin A1C 6.4 %

## 2020-08-31 ENCOUNTER — CLINICAL SUPPORT (OUTPATIENT)
Dept: INTERNAL MEDICINE | Facility: CLINIC | Age: 55
End: 2020-08-31

## 2020-08-31 VITALS — SYSTOLIC BLOOD PRESSURE: 108 MMHG | DIASTOLIC BLOOD PRESSURE: 70 MMHG

## 2020-09-08 DIAGNOSIS — M51.36 DEGENERATION OF L4-L5 INTERVERTEBRAL DISC: ICD-10-CM

## 2020-09-08 RX ORDER — TRAMADOL HYDROCHLORIDE 50 MG/1
50 TABLET, COATED ORAL EVERY 6 HOURS PRN
Qty: 45 TABLET | Refills: 2 | Status: ON HOLD | OUTPATIENT
Start: 2020-09-08 | End: 2020-11-26 | Stop reason: SDUPTHER

## 2020-09-14 DIAGNOSIS — M79.10 MYALGIA: ICD-10-CM

## 2020-09-14 DIAGNOSIS — M25.50 ARTHRALGIA, UNSPECIFIED JOINT: ICD-10-CM

## 2020-09-14 RX ORDER — TIZANIDINE 4 MG/1
4 TABLET ORAL EVERY 8 HOURS PRN
Qty: 90 TABLET | Refills: 2 | Status: SHIPPED | OUTPATIENT
Start: 2020-09-14 | End: 2020-11-26 | Stop reason: HOSPADM

## 2020-09-15 ENCOUNTER — OFFICE VISIT (OUTPATIENT)
Dept: NEUROSURGERY | Facility: CLINIC | Age: 55
End: 2020-09-15

## 2020-09-15 VITALS — WEIGHT: 212 LBS | HEIGHT: 65 IN | BODY MASS INDEX: 35.32 KG/M2

## 2020-09-15 DIAGNOSIS — E66.01 CLASS 2 SEVERE OBESITY DUE TO EXCESS CALORIES WITH SERIOUS COMORBIDITY AND BODY MASS INDEX (BMI) OF 35.0 TO 35.9 IN ADULT (HCC): ICD-10-CM

## 2020-09-15 DIAGNOSIS — M51.36 DEGENERATION OF L4-L5 INTERVERTEBRAL DISC: ICD-10-CM

## 2020-09-15 DIAGNOSIS — M51.26 HNP (HERNIATED NUCLEUS PULPOSUS), LUMBAR: Primary | ICD-10-CM

## 2020-09-15 DIAGNOSIS — R53.81 PHYSICAL DECONDITIONING: ICD-10-CM

## 2020-09-15 DIAGNOSIS — M19.90 ARTHRITIS: ICD-10-CM

## 2020-09-15 PROCEDURE — 99442 PR PHYS/QHP TELEPHONE EVALUATION 11-20 MIN: CPT | Performed by: PHYSICIAN ASSISTANT

## 2020-09-15 RX ORDER — GABAPENTIN 100 MG/1
200 CAPSULE ORAL 3 TIMES DAILY
Status: ON HOLD | COMMUNITY
Start: 2020-08-24 | End: 2020-11-26 | Stop reason: SDUPTHER

## 2020-09-15 NOTE — PROGRESS NOTES
Office F/U Visit  Subjective   Patient ID: Lizette Keith is a 55 y.o. female  [id number]    You have chosen to receive care through a telephone visit. Do you consent to use a telephone visit for your medical care today? Yes    CC: ongoing back and right leg pain    History of Present Illness   55-year-old female with a herniated disc at L4-5 with mild back pain but mostly right leg pain that radiates down to her calf.  She saw Dr. Kelsey in January with an MRI scan that showed the herniated disc.  The patient has been doing conservative therapy with steroid injections x4 by Dr. Tobar, unfortunately she has gotten no more than 1 to 1-1/2 days of any pain relief until her symptoms completely return.  Her pain is worse first thing in the morning and worse with standing and walking.  She is able to empty her bladder, she does notice that she does some dribbling.  She has a history of irritable bowel as well as fibromyalgia.  The patient's current regimen is Ultram 50 mg, Naprosyn 500 mg, Zanaflex 4 mg and Lyrica 100 mg 3 times a day, the patient has noticed that she has bilateral lower extremity edema and has put on 20 pounds since starting Lyrica.  She has questions regarding gabapentin instead of the Lyrica.  She continues to work some but not able to maintain a full day. She feels she is ready to proceed with surgery.              Past Medical History:   Diagnosis Date   • Allergic    • Anemia     during pregnancy   • Anxiety    • Arthritis    • Blood in urine    • Chronic fatigue    • Colon polyp 02/2018    7 at last colonoscopy   • Degeneration of L4-L5 intervertebral disc 12/10/2019   • Depression    • Elevated cholesterol    • Endometriosis    • Fibromyalgia    • Fibromyalgia, primary    • Fracture    • GERD (gastroesophageal reflux disease)    • H/O mammogram 02/2018   • HL (hearing loss)    • Hypertension    • Irritable bowel syndrome    • Pap smear for cervical cancer screening 2018?       Allergies    Allergen Reactions   • Statins Myalgia   • Gabapentin Diarrhea and GI Intolerance   • Lipitor [Atorvastatin Calcium] Myalgia   • Relafen [Nabumetone] GI Intolerance   • Erythromycin GI Intolerance     Tolerates zpak   • Penicillins Hives   • Cymbalta [Duloxetine Hcl] Rash       Current Outpatient Medications:   •  aspirin 325 MG tablet, Take 325 mg by mouth Daily., Disp: , Rfl:   •  cetirizine (ZyrTEC) 10 MG tablet, Take 10 mg by mouth daily., Disp: , Rfl:   •  dicyclomine (BENTYL) 20 MG tablet, Take 1 tablet by mouth 3 (Three) Times a Day As Needed (IBS- diarrhea)., Disp: 90 tablet, Rfl: 0  •  estradiol (ESTRACE) 2 MG tablet, Take 1 tablet by mouth Daily., Disp: 30 tablet, Rfl: 12  •  gabapentin (NEURONTIN) 100 MG capsule, TAKE 1 CAPSULE BY MOUTH TWICE DAILY FOR 5 DAYS THEN 1 CAPSULE IN THE MORNING AND 2 CAPSULES AT BEDTIME FOR 3 DAYS THEN 2 CAPSULES IN THE MOR, Disp: , Rfl:   •  lisinopril-hydrochlorothiazide (PRINZIDE,ZESTORETIC) 20-12.5 MG per tablet, Take 2 tablets by mouth Daily., Disp: 60 tablet, Rfl: 1  •  methocarbamol (ROBAXIN) 750 MG tablet, TAKE 1 TABLET BY MOUTH THREE TIMES DAILY AS NEEDED FOR 30 DAYS, Disp: , Rfl:   •  montelukast (SINGULAIR) 10 MG tablet, Take 1 tablet by mouth Every Night., Disp: 30 tablet, Rfl: 6  •  naproxen (Naprosyn) 500 MG tablet, Take 1 tablet by mouth 2 (Two) Times a Day With Meals., Disp: 60 tablet, Rfl: 5  •  potassium chloride (KLOR-CON) 10 MEQ CR tablet, Take 1 tablet by mouth Daily. For potassium, Disp: 90 tablet, Rfl: 1  •  tiZANidine (ZANAFLEX) 4 MG tablet, Take 1 tablet by mouth Every 8 (Eight) Hours As Needed for Muscle Spasms., Disp: 90 tablet, Rfl: 2  •  ULTRAM 50 MG tablet, Take 1 tablet by mouth Every 6 (Six) Hours As Needed for Moderate Pain ., Disp: 45 tablet, Rfl: 2  •  varenicline (Chantix) 0.5 MG tablet, Take 1 tablet by mouth Daily., Disp: 21 tablet, Rfl: 0  Social History     Tobacco Use   • Smoking status: Current Every Day Smoker     Packs/day: 0.75      Years: 34.00     Pack years: 25.50     Types: Cigarettes   • Smokeless tobacco: Never Used   Substance Use Topics   • Alcohol use: No   • Drug use: No     Review of Systems   Constitutional: Positive for activity change and unexpected weight change. Negative for appetite change, chills, diaphoresis, fatigue and fever.   HENT: Positive for sinus pain. Negative for congestion, dental problem, drooling, ear discharge, ear pain, facial swelling, hearing loss, mouth sores, nosebleeds, postnasal drip, rhinorrhea, sinus pressure, sneezing, sore throat, tinnitus, trouble swallowing and voice change.    Eyes: Negative for photophobia, pain, discharge, redness, itching and visual disturbance.   Respiratory: Negative for apnea, cough, choking, chest tightness, shortness of breath, wheezing and stridor.    Cardiovascular: Positive for leg swelling. Negative for chest pain and palpitations.   Gastrointestinal: Positive for abdominal distention, abdominal pain and constipation. Negative for anal bleeding, blood in stool, diarrhea, nausea, rectal pain and vomiting.   Endocrine: Negative for cold intolerance, heat intolerance, polydipsia, polyphagia and polyuria.   Genitourinary: Positive for difficulty urinating, flank pain and pelvic pain. Negative for decreased urine volume, dysuria, enuresis, frequency, genital sores, hematuria and urgency.   Musculoskeletal: Positive for arthralgias, back pain, gait problem and myalgias. Negative for joint swelling, neck pain and neck stiffness.   Skin: Negative for color change, pallor, rash and wound.   Allergic/Immunologic: Negative for environmental allergies, food allergies and immunocompromised state.   Neurological: Positive for tremors, numbness and headaches. Negative for dizziness, seizures, syncope, facial asymmetry, speech difficulty, weakness and light-headedness.   Hematological: Negative for adenopathy. Does not bruise/bleed easily.   Psychiatric/Behavioral: Positive for  "decreased concentration, dysphoric mood and sleep disturbance. Negative for agitation, behavioral problems, confusion, hallucinations, self-injury and suicidal ideas. The patient is nervous/anxious. The patient is not hyperactive.    All other systems reviewed and are negative.  I have reviewed and confirmed the accuracy of the ROS as documented by the MA/LPN/RN Kassidy Camilo PA-C    Physical Exam  Ht 165.1 cm (65\")   Wt 96.2 kg (212 lb)   BMI 35.28 kg/m²   The patient does not exhibit signs of cough, wheezing or shortness of breath during our telephone conversation.  Neurologically she has having difficulties with her gait due to increased leg pain.    Independent Review of Radiographic Studies:   I reviewed the MRI scan which shows a herniated disc at L4-5 with rightward deviation.    Medical Decision Making:   Impression:  1.  HNP L4-5, right  2.  Symptomatic radiculopathy-failed conservative treatment.  3.  Obesity  4.  Tobacco abuse  5.  Hypertension      Plan:  Based upon the patient's MRI scan and diagnostic studies we will proceed with a lumbar discectomy at L4-5 on the right and an attempt to improve her right leg pain.  Risk and benefits and reasoning of the surgery were discussed with the patient and she is in full agreement and wishes to proceed.    Patient's Body mass index is 35.28 kg/m². BMI is above normal parameters. Recommendations include: educational material, exercise counseling and nutrition counseling.    Social History    Tobacco Use      Smoking status: Current Every Day Smoker        Packs/day: 0.75        Years: 34.00        Pack years: 25.5        Types: Cigarettes      Smokeless tobacco: Never Used  Chantix has been provided.    Telemedicine visit 20 minutes.     AVA oGrdon                  "

## 2020-09-21 ENCOUNTER — TELEPHONE (OUTPATIENT)
Dept: NEUROSURGERY | Facility: CLINIC | Age: 55
End: 2020-09-21

## 2020-09-21 PROBLEM — E66.09 CLASS 2 OBESITY DUE TO EXCESS CALORIES IN ADULT: Status: ACTIVE | Noted: 2020-09-21

## 2020-09-21 PROBLEM — R53.81 PHYSICAL DECONDITIONING: Status: ACTIVE | Noted: 2020-09-21

## 2020-09-21 RX ORDER — SODIUM CHLORIDE, SODIUM LACTATE, POTASSIUM CHLORIDE, CALCIUM CHLORIDE 600; 310; 30; 20 MG/100ML; MG/100ML; MG/100ML; MG/100ML
100 INJECTION, SOLUTION INTRAVENOUS CONTINUOUS
Status: CANCELLED | OUTPATIENT
Start: 2020-09-21

## 2020-09-21 RX ORDER — CHLORHEXIDINE GLUCONATE 4 G/100ML
SOLUTION TOPICAL
Qty: 120 ML | Refills: 0 | OUTPATIENT
Start: 2020-09-21 | End: 2020-10-13 | Stop reason: SDUPTHER

## 2020-09-21 NOTE — H&P
Patient ID: Lizette Keith is a 55 y.o. female  [id number]      CC: ongoing back and right leg pain    History of Present Illness   55-year-old female with a herniated disc at L4-5 with mild back pain but mostly right leg pain that radiates down to her calf.  She saw Dr. Kelsey in January with an MRI scan that showed the herniated disc.  The patient has been doing conservative therapy with steroid injections x4 by Dr. Tobar, unfortunately she has gotten no more than 1 to 1-1/2 days of any pain relief until her symptoms completely return.  Her pain is worse first thing in the morning and worse with standing and walking.  She is able to empty her bladder, she does notice that she does some dribbling.  She has a history of irritable bowel as well as fibromyalgia.  The patient's current regimen is Ultram 50 mg, Naprosyn 500 mg, Zanaflex 4 mg and Lyrica 100 mg 3 times a day, the patient has noticed that she has bilateral lower extremity edema and has put on 20 pounds since starting Lyrica.  She has questions regarding gabapentin instead of the Lyrica.  She continues to work some but not able to maintain a full day. She feels she is ready to proceed with surgery.              Past Medical History:   Diagnosis Date   • Allergic    • Anemia     during pregnancy   • Anxiety    • Arthritis    • Blood in urine    • Chronic fatigue    • Colon polyp 02/2018    7 at last colonoscopy   • Degeneration of L4-L5 intervertebral disc 12/10/2019   • Depression    • Elevated cholesterol    • Endometriosis    • Fibromyalgia    • Fibromyalgia, primary    • Fracture    • GERD (gastroesophageal reflux disease)    • H/O mammogram 02/2018   • HL (hearing loss)    • Hypertension    • Irritable bowel syndrome    • Pap smear for cervical cancer screening 2018?       Allergies   Allergen Reactions   • Statins Myalgia   • Gabapentin Diarrhea and GI Intolerance   • Lipitor [Atorvastatin Calcium] Myalgia   • Relafen [Nabumetone] GI Intolerance   •  Erythromycin GI Intolerance     Tolerates zpak   • Penicillins Hives   • Cymbalta [Duloxetine Hcl] Rash       Current Outpatient Medications:   •  aspirin 325 MG tablet, Take 325 mg by mouth Daily., Disp: , Rfl:   •  cetirizine (ZyrTEC) 10 MG tablet, Take 10 mg by mouth daily., Disp: , Rfl:   •  dicyclomine (BENTYL) 20 MG tablet, Take 1 tablet by mouth 3 (Three) Times a Day As Needed (IBS- diarrhea)., Disp: 90 tablet, Rfl: 0  •  estradiol (ESTRACE) 2 MG tablet, Take 1 tablet by mouth Daily., Disp: 30 tablet, Rfl: 12  •  gabapentin (NEURONTIN) 100 MG capsule, TAKE 1 CAPSULE BY MOUTH TWICE DAILY FOR 5 DAYS THEN 1 CAPSULE IN THE MORNING AND 2 CAPSULES AT BEDTIME FOR 3 DAYS THEN 2 CAPSULES IN THE MOR, Disp: , Rfl:   •  lisinopril-hydrochlorothiazide (PRINZIDE,ZESTORETIC) 20-12.5 MG per tablet, Take 2 tablets by mouth Daily., Disp: 60 tablet, Rfl: 1  •  methocarbamol (ROBAXIN) 750 MG tablet, TAKE 1 TABLET BY MOUTH THREE TIMES DAILY AS NEEDED FOR 30 DAYS, Disp: , Rfl:   •  montelukast (SINGULAIR) 10 MG tablet, Take 1 tablet by mouth Every Night., Disp: 30 tablet, Rfl: 6  •  naproxen (Naprosyn) 500 MG tablet, Take 1 tablet by mouth 2 (Two) Times a Day With Meals., Disp: 60 tablet, Rfl: 5  •  potassium chloride (KLOR-CON) 10 MEQ CR tablet, Take 1 tablet by mouth Daily. For potassium, Disp: 90 tablet, Rfl: 1  •  tiZANidine (ZANAFLEX) 4 MG tablet, Take 1 tablet by mouth Every 8 (Eight) Hours As Needed for Muscle Spasms., Disp: 90 tablet, Rfl: 2  •  ULTRAM 50 MG tablet, Take 1 tablet by mouth Every 6 (Six) Hours As Needed for Moderate Pain ., Disp: 45 tablet, Rfl: 2  •  varenicline (Chantix) 0.5 MG tablet, Take 1 tablet by mouth Daily., Disp: 21 tablet, Rfl: 0  Social History     Tobacco Use   • Smoking status: Current Every Day Smoker     Packs/day: 0.75     Years: 34.00     Pack years: 25.50     Types: Cigarettes   • Smokeless tobacco: Never Used   Substance Use Topics   • Alcohol use: No   • Drug use: No     Review of  Systems   Constitutional: Positive for activity change and unexpected weight change. Negative for appetite change, chills, diaphoresis, fatigue and fever.   HENT: Positive for sinus pain. Negative for congestion, dental problem, drooling, ear discharge, ear pain, facial swelling, hearing loss, mouth sores, nosebleeds, postnasal drip, rhinorrhea, sinus pressure, sneezing, sore throat, tinnitus, trouble swallowing and voice change.    Eyes: Negative for photophobia, pain, discharge, redness, itching and visual disturbance.   Respiratory: Negative for apnea, cough, choking, chest tightness, shortness of breath, wheezing and stridor.    Cardiovascular: Positive for leg swelling. Negative for chest pain and palpitations.   Gastrointestinal: Positive for abdominal distention, abdominal pain and constipation. Negative for anal bleeding, blood in stool, diarrhea, nausea, rectal pain and vomiting.   Endocrine: Negative for cold intolerance, heat intolerance, polydipsia, polyphagia and polyuria.   Genitourinary: Positive for difficulty urinating, flank pain and pelvic pain. Negative for decreased urine volume, dysuria, enuresis, frequency, genital sores, hematuria and urgency.   Musculoskeletal: Positive for arthralgias, back pain, gait problem and myalgias. Negative for joint swelling, neck pain and neck stiffness.   Skin: Negative for color change, pallor, rash and wound.   Allergic/Immunologic: Negative for environmental allergies, food allergies and immunocompromised state.   Neurological: Positive for tremors, numbness and headaches. Negative for dizziness, seizures, syncope, facial asymmetry, speech difficulty, weakness and light-headedness.   Hematological: Negative for adenopathy. Does not bruise/bleed easily.   Psychiatric/Behavioral: Positive for decreased concentration, dysphoric mood and sleep disturbance. Negative for agitation, behavioral problems, confusion, hallucinations, self-injury and suicidal ideas. The  "patient is nervous/anxious. The patient is not hyperactive.    All other systems reviewed and are negative.  I have reviewed and confirmed the accuracy of the ROS as documented by the MA/LPN/RN Kassidy Camilo PA-C    Physical Exam  Ht 165.1 cm (65\")   Wt 96.2 kg (212 lb)   BMI 35.28 kg/m²   The patient does not exhibit signs of cough, wheezing or shortness of breath during our telephone conversation.  Neurologically she has having difficulties with her gait due to increased leg pain.    Independent Review of Radiographic Studies:   I reviewed the MRI scan which shows a herniated disc at L4-5 with rightward deviation.    Medical Decision Making:   Impression:  1.  HNP L4-5, right  2.  Symptomatic radiculopathy-failed conservative treatment.  3.  Obesity  4.  Tobacco abuse  5.  Hypertension      Plan:  Based upon the patient's MRI scan and diagnostic studies we will proceed with a lumbar discectomy at L4-5 on the right and an attempt to improve her right leg pain.  Risk and benefits and reasoning of the surgery were discussed with the patient and she is in full agreement and wishes to proceed.    Patient's Body mass index is 35.28 kg/m². BMI is above normal parameters. Recommendations include: educational material, exercise counseling and nutrition counseling.    Social History    Tobacco Use      Smoking status: Current Every Day Smoker        Packs/day: 0.75        Years: 34.00        Pack years: 25.5        Types: Cigarettes      Smokeless tobacco: Never Used  Chantix has been provided.     AVA Gordon        "

## 2020-09-21 NOTE — TELEPHONE ENCOUNTER
PATIENT RECENTLY HAD A TELEPHONE VISIT WITH PROVIDER & CALLED TO CHECK THE STATUS OF MRI RESULTS & NEXT STEPS. PATIENT MENTIONS THAT PROVIDER WAS GOING TO DISCUSS FURTHER WITH DR. CELIS FOR HIS OPINION. UNABLE TO ASSIST DUE TO LAST NOTE ON 09/15 STILL OPEN & UNABLE TO VIEW NOTATION.    PATIENT CAN BE CONTACTED [phone] FOR FURTHER INSTRUCTION

## 2020-09-23 ENCOUNTER — TELEPHONE (OUTPATIENT)
Dept: INTERNAL MEDICINE | Facility: CLINIC | Age: 55
End: 2020-09-23

## 2020-09-23 NOTE — TELEPHONE ENCOUNTER
Annmarie from [facility] with [facility] Qasim Kelsey' office called asking for clearance from [facility] to stop her Asprin so that she be able to have surgery.

## 2020-09-24 ENCOUNTER — TELEPHONE (OUTPATIENT)
Dept: NEUROSURGERY | Facility: CLINIC | Age: 55
End: 2020-09-24

## 2020-09-24 PROBLEM — E66.01 CLASS 2 SEVERE OBESITY DUE TO EXCESS CALORIES WITH SERIOUS COMORBIDITY AND BODY MASS INDEX (BMI) OF 35.0 TO 35.9 IN ADULT: Status: ACTIVE | Noted: 2020-09-24

## 2020-09-24 NOTE — TELEPHONE ENCOUNTER
Left  informing Annmarie of this recommendation. Office number was provided in case of any questions or concerns.

## 2020-09-26 ENCOUNTER — RESULTS ENCOUNTER (OUTPATIENT)
Dept: NEUROSURGERY | Facility: CLINIC | Age: 55
End: 2020-09-26

## 2020-09-26 DIAGNOSIS — R53.81 PHYSICAL DECONDITIONING: ICD-10-CM

## 2020-09-26 DIAGNOSIS — E66.01 CLASS 2 SEVERE OBESITY DUE TO EXCESS CALORIES WITH SERIOUS COMORBIDITY AND BODY MASS INDEX (BMI) OF 35.0 TO 35.9 IN ADULT (HCC): ICD-10-CM

## 2020-09-26 DIAGNOSIS — M19.90 ARTHRITIS: ICD-10-CM

## 2020-09-26 DIAGNOSIS — M51.36 DEGENERATION OF L4-L5 INTERVERTEBRAL DISC: ICD-10-CM

## 2020-09-26 DIAGNOSIS — M51.26 HNP (HERNIATED NUCLEUS PULPOSUS), LUMBAR: ICD-10-CM

## 2020-09-30 ENCOUNTER — APPOINTMENT (OUTPATIENT)
Dept: PREADMISSION TESTING | Facility: HOSPITAL | Age: 55
End: 2020-09-30

## 2020-09-30 ENCOUNTER — HOSPITAL ENCOUNTER (OUTPATIENT)
Dept: GENERAL RADIOLOGY | Facility: HOSPITAL | Age: 55
Discharge: HOME | End: 2020-09-30

## 2020-09-30 VITALS — WEIGHT: 205.69 LBS | HEIGHT: 65 IN | BODY MASS INDEX: 34.27 KG/M2

## 2020-09-30 DIAGNOSIS — M51.36 DEGENERATION OF L4-L5 INTERVERTEBRAL DISC: ICD-10-CM

## 2020-09-30 DIAGNOSIS — E66.01 CLASS 2 SEVERE OBESITY DUE TO EXCESS CALORIES WITH SERIOUS COMORBIDITY AND BODY MASS INDEX (BMI) OF 35.0 TO 35.9 IN ADULT (HCC): ICD-10-CM

## 2020-09-30 DIAGNOSIS — M51.26 HNP (HERNIATED NUCLEUS PULPOSUS), LUMBAR: ICD-10-CM

## 2020-09-30 DIAGNOSIS — R53.81 PHYSICAL DECONDITIONING: ICD-10-CM

## 2020-09-30 DIAGNOSIS — M19.90 ARTHRITIS: ICD-10-CM

## 2020-09-30 LAB
ALBUMIN SERPL-MCNC: 4.9 G/DL (ref 3.5–5.2)
ALBUMIN/GLOB SERPL: 1.9 G/DL
ALP SERPL-CCNC: 139 U/L (ref 39–117)
ALT SERPL W P-5'-P-CCNC: 15 U/L (ref 1–33)
ANION GAP SERPL CALCULATED.3IONS-SCNC: 14 MMOL/L (ref 5–15)
AST SERPL-CCNC: 22 U/L (ref 1–32)
BILIRUB SERPL-MCNC: 0.3 MG/DL (ref 0–1.2)
BILIRUB UR QL STRIP: NEGATIVE
BUN SERPL-MCNC: 9 MG/DL (ref 6–20)
BUN/CREAT SERPL: 15.3 (ref 7–25)
CALCIUM SPEC-SCNC: 9.7 MG/DL (ref 8.6–10.5)
CHLORIDE SERPL-SCNC: 101 MMOL/L (ref 98–107)
CLARITY UR: CLEAR
CO2 SERPL-SCNC: 25 MMOL/L (ref 22–29)
COLOR UR: YELLOW
CREAT SERPL-MCNC: 0.59 MG/DL (ref 0.57–1)
DEPRECATED RDW RBC AUTO: 43.9 FL (ref 37–54)
ERYTHROCYTE [DISTWIDTH] IN BLOOD BY AUTOMATED COUNT: 13.3 % (ref 12.3–15.4)
GFR SERPL CREATININE-BSD FRML MDRD: 107 ML/MIN/1.73
GLOBULIN UR ELPH-MCNC: 2.6 GM/DL
GLUCOSE SERPL-MCNC: 116 MG/DL (ref 65–99)
GLUCOSE UR STRIP-MCNC: NEGATIVE MG/DL
HBA1C MFR BLD: 6.6 % (ref 4.8–5.6)
HCT VFR BLD AUTO: 46.5 % (ref 34–46.6)
HGB BLD-MCNC: 15.8 G/DL (ref 12–15.9)
HGB UR QL STRIP.AUTO: NEGATIVE
KETONES UR QL STRIP: NEGATIVE
LEUKOCYTE ESTERASE UR QL STRIP.AUTO: NEGATIVE
MCH RBC QN AUTO: 30.9 PG (ref 26.6–33)
MCHC RBC AUTO-ENTMCNC: 34 G/DL (ref 31.5–35.7)
MCV RBC AUTO: 90.8 FL (ref 79–97)
MRSA DNA SPEC QL NAA+PROBE: NEGATIVE
NITRITE UR QL STRIP: NEGATIVE
PH UR STRIP.AUTO: 5.5 [PH] (ref 5–8)
PLATELET # BLD AUTO: 290 10*3/MM3 (ref 140–450)
PMV BLD AUTO: 9.6 FL (ref 6–12)
POTASSIUM SERPL-SCNC: 3.6 MMOL/L (ref 3.5–5.2)
PROT SERPL-MCNC: 7.5 G/DL (ref 6–8.5)
PROT UR QL STRIP: NEGATIVE
RBC # BLD AUTO: 5.12 10*6/MM3 (ref 3.77–5.28)
SODIUM SERPL-SCNC: 140 MMOL/L (ref 136–145)
SP GR UR STRIP: 1.02 (ref 1–1.03)
UROBILINOGEN UR QL STRIP: NORMAL
WBC # BLD AUTO: 13.05 10*3/MM3 (ref 3.4–10.8)

## 2020-09-30 PROCEDURE — 36415 COLL VENOUS BLD VENIPUNCTURE: CPT

## 2020-09-30 PROCEDURE — 80053 COMPREHEN METABOLIC PANEL: CPT | Performed by: PHYSICIAN ASSISTANT

## 2020-09-30 PROCEDURE — C9803 HOPD COVID-19 SPEC COLLECT: HCPCS

## 2020-09-30 PROCEDURE — 71046 X-RAY EXAM CHEST 2 VIEWS: CPT

## 2020-09-30 PROCEDURE — 83036 HEMOGLOBIN GLYCOSYLATED A1C: CPT | Performed by: NEUROLOGICAL SURGERY

## 2020-09-30 PROCEDURE — U0004 COV-19 TEST NON-CDC HGH THRU: HCPCS

## 2020-09-30 PROCEDURE — 85027 COMPLETE CBC AUTOMATED: CPT | Performed by: PHYSICIAN ASSISTANT

## 2020-09-30 PROCEDURE — 93005 ELECTROCARDIOGRAM TRACING: CPT

## 2020-09-30 PROCEDURE — 93010 ELECTROCARDIOGRAM REPORT: CPT | Performed by: INTERNAL MEDICINE

## 2020-09-30 PROCEDURE — 87641 MR-STAPH DNA AMP PROBE: CPT | Performed by: NEUROLOGICAL SURGERY

## 2020-09-30 PROCEDURE — 81003 URINALYSIS AUTO W/O SCOPE: CPT | Performed by: PHYSICIAN ASSISTANT

## 2020-09-30 RX ORDER — DOCUSATE SODIUM 100 MG/1
100 CAPSULE, LIQUID FILLED ORAL AS NEEDED
COMMUNITY

## 2020-10-01 ENCOUNTER — TELEPHONE (OUTPATIENT)
Dept: NEUROSURGERY | Facility: CLINIC | Age: 55
End: 2020-10-01

## 2020-10-01 LAB — SARS-COV-2 RNA NOSE QL NAA+PROBE: DETECTED

## 2020-10-01 NOTE — TELEPHONE ENCOUNTER
Provider:  Low  Caller: Patient  Time of call:   3:38pm  Phone #:  [phone]  Surgery:    Surgery Date:    Last visit:   09/15/2020  Next visit:  ELIZABETH MESA:         Reason for call:   Patient called LVM stating she has been taking Gabapentin 200mg TID, but when this previous Rx was called in, it was changed to 200mg BID. Patient wanting to know why this was changed.

## 2020-10-02 NOTE — TELEPHONE ENCOUNTER
Called pharmacy and notified that pt will run out early and need to  her refill earlier than anticipated due to incorrect dosing instructions.      Called pt and left msg to call office.

## 2020-10-02 NOTE — TELEPHONE ENCOUNTER
Mary Ann from [facility] left message at 13:58 stating she would like to speak to someone regarding the Rx that was called in.   You can reach her at [phone] and ask for her specifically.

## 2020-10-05 ENCOUNTER — DOCUMENTATION (OUTPATIENT)
Dept: NEUROSURGERY | Facility: CLINIC | Age: 55
End: 2020-10-05

## 2020-10-05 ENCOUNTER — TELEPHONE (OUTPATIENT)
Dept: INTERNAL MEDICINE | Facility: CLINIC | Age: 55
End: 2020-10-05

## 2020-10-05 NOTE — TELEPHONE ENCOUNTER
Both should get tested at a rapid test site such as the [facility] in [place](store closed it it just a test site).  If either positive, need to quarantine

## 2020-10-05 NOTE — TELEPHONE ENCOUNTER
PATIENT WAS SUPPOSE TO HAVE BACK SURGERY LAST Friday BUT TESTED POSITIVE FOR COVID.  SHE HAS HAD NO SYMPTOMS AT ALL.  SHOULD SHE GET RETESTED.     CALL BACK [phone]

## 2020-10-05 NOTE — TELEPHONE ENCOUNTER
She is asking since because her significant other would really need to go back to work. She wanted to know if Natalia still needs to isolate for 14 days. States that she would really need to go back to work before then so she can pay her bills. Please advise.

## 2020-10-05 NOTE — TELEPHONE ENCOUNTER
Pt was advised of information. Pt verbalized understanding. Pt asked if it were negative for both what should she do? Per Dr. Hanks they are to contact the health dept. Gave the website [email or web address] to find where they could go for rapid or make an appt. Pt verbalized understanding and would proceed to one of the facilities

## 2020-10-07 ENCOUNTER — TELEPHONE (OUTPATIENT)
Dept: NEUROSURGERY | Facility: CLINIC | Age: 55
End: 2020-10-07

## 2020-10-07 DIAGNOSIS — R94.31 ABNORMAL EKG: Primary | ICD-10-CM

## 2020-10-07 NOTE — TELEPHONE ENCOUNTER
----- Message from Hetal Petty sent at 10/7/2020  9:19 AM EDT -----  Regarding: Cardiology Referral  Please put in referral for patient to see Cardiology. Per Prisca NUNEZ, pt had abnormal EKG at PAT and needs to be seen in 1-2 weeks with Dr. Leon or Dr. Pratt.  Thank you.

## 2020-10-13 ENCOUNTER — PREP FOR SURGERY (OUTPATIENT)
Dept: OTHER | Facility: HOSPITAL | Age: 55
End: 2020-10-13

## 2020-10-13 DIAGNOSIS — M51.26 HNP (HERNIATED NUCLEUS PULPOSUS), LUMBAR: Primary | ICD-10-CM

## 2020-10-21 DIAGNOSIS — N95.1 MENOPAUSAL SYMPTOM: ICD-10-CM

## 2020-10-22 RX ORDER — ESTRADIOL 2 MG/1
TABLET ORAL
Qty: 30 TABLET | Refills: 0 | OUTPATIENT
Start: 2020-10-22

## 2020-10-22 NOTE — TELEPHONE ENCOUNTER
Last Office Visit: 08/27/2020  Next Office Visit: None scheduled     Labs completed in past 6 months? yes  Labs completed in past year? yes    Last Refill Date: 10/02/2019  Quantity: 30   Refills:12    Pharmacy:  [facility] [address] [place] [address] [phone] [facility] [phone]

## 2020-10-23 RX ORDER — ESTRADIOL 2 MG/1
2 TABLET ORAL DAILY
Qty: 15 TABLET | Refills: 0 | Status: SHIPPED | OUTPATIENT
Start: 2020-10-23 | End: 2020-11-11

## 2020-10-23 NOTE — TELEPHONE ENCOUNTER
Caller: Lizette Keith    Relationship: Self    Best call back number: [phone]    Medication needed:   Requested Prescriptions     Pending Prescriptions Disp Refills   • estradiol (ESTRACE) 2 MG tablet 30 tablet 12     Sig: Take 1 tablet by mouth Daily.     Refused Prescriptions Disp Refills   • estradiol (ESTRACE) 2 MG tablet [Pharmacy Med Name: Estradiol 2 MG Oral Tablet] 30 tablet 0     Sig: Take 1 tablet by mouth once daily     Refused By: CRISTOPHER STEVEN     Reason for Refusal: Refill not appropriate       When do you need the refill by: 10/23/20    What details did the patient provide when requesting the medication: patient is out of medication    Does the patient have less than a 3 day supply:  [x] Yes  [] No    What is the patient's preferred pharmacy: [facility] [address] [place] [address] [address] [place] [address] [phone] [facility] [phone] FX

## 2020-10-26 ENCOUNTER — CONSULT (OUTPATIENT)
Dept: CARDIOLOGY | Facility: CLINIC | Age: 55
End: 2020-10-26

## 2020-10-26 VITALS
HEART RATE: 139 BPM | WEIGHT: 204.8 LBS | HEIGHT: 65 IN | DIASTOLIC BLOOD PRESSURE: 70 MMHG | OXYGEN SATURATION: 97 % | BODY MASS INDEX: 34.12 KG/M2 | SYSTOLIC BLOOD PRESSURE: 112 MMHG | TEMPERATURE: 97.8 F

## 2020-10-26 DIAGNOSIS — R94.31 ABNORMAL ECG: Primary | ICD-10-CM

## 2020-10-26 DIAGNOSIS — I20.8 ANGINAL EQUIVALENT (HCC): ICD-10-CM

## 2020-10-26 DIAGNOSIS — R00.0 SINUS TACHYCARDIA: ICD-10-CM

## 2020-10-26 DIAGNOSIS — Z01.810 PRE-OPERATIVE CARDIOVASCULAR EXAMINATION: ICD-10-CM

## 2020-10-26 PROCEDURE — 99244 OFF/OP CNSLTJ NEW/EST MOD 40: CPT | Performed by: INTERNAL MEDICINE

## 2020-10-26 PROCEDURE — 93000 ELECTROCARDIOGRAM COMPLETE: CPT | Performed by: INTERNAL MEDICINE

## 2020-10-26 NOTE — PROGRESS NOTES
[facility]  [address] [place] [address], [address]  [place], [place], [id number] [phone]  WWW.[facility]           OUTPATIENT CLINIC CONSULTATION NOTE    Patient care team:  Patient Care Team:  Denise Hanks MD as PCP - General (Family Medicine)  Celso Bueno PA-C (Physician Assistant)  Skyler Waller MD as Consulting Physician (Gastroenterology)  Darius Rahman DPM as Consulting Physician (Podiatry)  Denise Hanks MD as Referring Physician (Family Medicine)    Requesting Provider and Reason for consultation: The patient is being seen today at the request of Qasim Kelsey MD for preoperative cardiac risk assessment, abnormal ECG.     Subjective:   Chief complaint:   Chief Complaint   Patient presents with   • Abnormal ECG     Consult   • surgery clearance       HPI:    Lizette Keith is a 55 y.o. female.  Partial problem list, including cardiac problems:  1. Abnormal ECG at baseline  a. Flattened/borderline inverted T waves in the inferior leads, chronic, dating back at least to 12/2018  2. Hypertension  3. Hyperlipidemia  4. GERD  5. Fibromyalgia  6. Anemia  7. Anxiety/depression  8. Tobacco dependence    Today the patient presents for consultation.    The patient is anticipating surgery for L4-L5 disc herniation, planning for a lumbar discectomy with Dr. Kelsey.  On preoperative work-up, tested positive for Covid.  ECG also revealed inferior ST-T wave abnormalities.    The patient is not able to exert herself very far these days due to her right lower extremity and back pains.  Denies anna chest pains but again, is not very active.  Does have exertional fatigue.  This may be exacerbated by ongoing emotional stress, financial stress.    The patient smokes three quarters of a pack per day, more recently during the pandemic.  Takes aspirin 325 mg daily for her back and leg pains.  This seems to help.    States she tends to be tachycardic with  a resting heart rate in the 90s - low 100s    Mother had an MI in her 50s, brother had a bypass in his 50s    Review of Systems:  Positive for exertional back pain and leg pain, fatigue, emotional stress  Negative for exertional chest pain, dyspnea with exertion, orthopnea, PND, lower extremity edema, palpitations, lightheadedness, syncope.   All other systems reviewed and are negative.    PFSH:  Patient Active Problem List   Diagnosis   • Blood in urine   • Seasonal allergies   • Hypertension   • Hyperlipemia   • GERD (gastroesophageal reflux disease)   • IBS (irritable bowel syndrome)   • Fibromyalgia   • Hearing loss   • Chronic fatigue   • Depression   • Anxiety   • Insomnia   • Encounter for tobacco use cessation counseling   • Vasomotor flushing   • Tachycardia   • Libido, decreased   • Onychomycosis   • Prediabetes   • Tobacco dependence   • Anxiety and depression   • Tobacco abuse   • SIRS (systemic inflammatory response syndrome) (CMS/[facility])   • WINSTON (acute kidney injury) (CMS/[facility])   • Headache   • Menopausal symptom   • Degeneration of L4-L5 intervertebral disc   • Arthritis   • HNP (herniated nucleus pulposus), lumbar   • Class 2 obesity due to excess calories in adult   • Physical deconditioning   • Class 2 severe obesity due to excess calories with serious comorbidity and body mass index (BMI) of 35.0 to 35.9 in adult (CMS/[facility])         Current Outpatient Medications:   •  aspirin 325 MG tablet, Take 325 mg by mouth Daily., Disp: , Rfl:   •  docusate sodium (COLACE) 100 MG capsule, Take 100 mg by mouth Daily., Disp: , Rfl:   •  estradiol (ESTRACE) 2 MG tablet, Take 1 tablet by mouth Daily., Disp: 15 tablet, Rfl: 0  •  gabapentin (NEURONTIN) 100 MG capsule, Take 200 mg by mouth 3 (Three) Times a Day., Disp: , Rfl:   •  lisinopril-hydrochlorothiazide (PRINZIDE,ZESTORETIC) 20-12.5 MG per tablet, Take 2 tablets by mouth Daily. (Patient taking differently: Take 1 tablet by mouth 2 (two) times a day.), Disp: 60  tablet, Rfl: 1  •  methocarbamol (ROBAXIN) 750 MG tablet, Take 750 mg by mouth As Needed., Disp: , Rfl:   •  montelukast (SINGULAIR) 10 MG tablet, Take 1 tablet by mouth Every Night., Disp: 30 tablet, Rfl: 6  •  naproxen (Naprosyn) 500 MG tablet, Take 1 tablet by mouth 2 (Two) Times a Day With Meals., Disp: 60 tablet, Rfl: 5  •  potassium chloride (KLOR-CON) 10 MEQ CR tablet, Take 1 tablet by mouth Daily. For potassium, Disp: 90 tablet, Rfl: 1  •  tiZANidine (ZANAFLEX) 4 MG tablet, Take 1 tablet by mouth Every 8 (Eight) Hours As Needed for Muscle Spasms., Disp: 90 tablet, Rfl: 2  •  ULTRAM 50 MG tablet, Take 1 tablet by mouth Every 6 (Six) Hours As Needed for Moderate Pain ., Disp: 45 tablet, Rfl: 2    Allergies   Allergen Reactions   • Statins Myalgia   • Erythromycin GI Intolerance     Tolerates zpak   • Lipitor [Atorvastatin Calcium] Myalgia   • Penicillins Hives   • Relafen [Nabumetone] GI Intolerance   • Cymbalta [Duloxetine Hcl] Rash       Social History     Socioeconomic History   • Marital status:      Spouse name: Not on file   • Number of children: Not on file   • Years of education: Not on file   • Highest education level: Not on file   Tobacco Use   • Smoking status: Current Every Day Smoker     Years: 34.00     Types: Cigarettes   • Smokeless tobacco: Never Used   • Tobacco comment: 0.5-1ppd   Substance and Sexual Activity   • Alcohol use: No   • Drug use: No   • Sexual activity: Not Currently     Birth control/protection: None     Family History   Problem Relation Age of Onset   • Melanoma Mother         eye   • Diabetes Mother    • Heart disease Mother    • Arthritis Mother    • Depression Mother    • Colon polyps Mother    • Thyroid disease Mother    • Hyperlipidemia Mother    • Cancer Mother    • Heart attack Mother    • Migraines Mother    • Cancer Father         pancreatic and liver   • Diabetes Father    • Depression Father    • Colon polyps Father    • ADD / ADHD Son    • Diabetes  "Maternal Aunt    • Diabetes Maternal Uncle    • Diabetes Paternal Aunt    • Diabetes Paternal Uncle    • Cancer Maternal Grandmother         uterine/cervical   • Heart disease Maternal Grandfather    • Heart disease Paternal Grandfather    • Migraines Sister    • Breast cancer Neg Hx    • Ovarian cancer Neg Hx          Objective:   Physical Exam:  /70 (BP Location: Right arm, Patient Position: Sitting)   Pulse (!) 139   Temp 97.8 °F (36.6 °C)   Ht 165.1 cm (65\")   Wt 92.9 kg (204 lb 12.8 oz)   SpO2 97%   BMI 34.08 kg/m²   CONSTITUTIONAL: Well-nourished. In no acute distress.   SKIN: Warm and dry. No rashes noted  HEENT: Head is normocephalic and atraumatic. Pupils are equal and reactive to light bilaterally. Mucous membranes are pink and moist.   NECK: Supple without masses or thyromegaly. There is no jugular venous distention   LUNGS: Normal effort. Clear to auscultation bilaterally without wheezing, rhonchi, or rales noted.   CARDIOVASCULAR: Regular rhythm, tachycardic rate, with a normal S1 and S2. There is no murmur, gallop, rub, or click appreciated. Carotid upstrokes are 2+ and symmetrical without bruits. There is no peripheral edema.   ABDOMEN: Normal bowel sounds.  Soft and nondistended. No tenderness with palpitation.   MUSCULOSKELETAL:  No digital cyanosis  NEUROLOGICAL: Nonfocal.  PSYCHIATRIC: Alert, orientated x 3, appropriate affect     10/2020 exam: 2+ DP pulses bilaterally.      Labs:  BUN   Date Value Ref Range Status   09/30/2020 9 6 - 20 mg/dL Final     Creatinine   Date Value Ref Range Status   09/30/2020 0.59 0.57 - 1.00 mg/dL Final     Potassium   Date Value Ref Range Status   09/30/2020 3.6 3.5 - 5.2 mmol/L Final     ALT (SGPT)   Date Value Ref Range Status   09/30/2020 15 1 - 33 U/L Final     AST (SGOT)   Date Value Ref Range Status   09/30/2020 22 1 - 32 U/L Final     WBC   Date Value Ref Range Status   09/30/2020 13.05 (H) 3.40 - 10.80 10*3/mm3 Final   04/25/2019 12.29 (H) 3.40 - " 10.80 10*3/mm3 Final     Hemoglobin   Date Value Ref Range Status   09/30/2020 15.8 12.0 - 15.9 g/dL Final     Hematocrit   Date Value Ref Range Status   09/30/2020 46.5 34.0 - 46.6 % Final     Platelets   Date Value Ref Range Status   09/30/2020 290 140 - 450 10*3/mm3 Final       No results found for: CHOL  Lab Results   Component Value Date    TRIG 241 (H) 10/02/2019    TRIG 244 (H) 04/25/2019    TRIG 305 (H) 12/18/2018     Lab Results   Component Value Date    HDL 46 10/02/2019    HDL 44 04/25/2019    HDL 42 12/18/2018     Lab Results   Component Value Date     (H) 10/02/2019     No components found for: LDLDIRECTC    Diagnostic Data:      ECG 12 Lead    Date/Time: 10/26/2020 2:10 PM  Performed by: Sai Salas MD  Authorized by: Sai Salas MD   Comparison: compared with previous ECG from 9/30/2020  Comparison to previous ECG: Now tachycardic  Rhythm: sinus tachycardia  Comments: Heart rate 131, cannot rule out anterior infarct, nonspecific ST-T wave abnormality in the lateral and inferior leads                 Assessment and Plan:   Diagnoses and all orders for this visit:    Abnormal ECG (Primary)  Pre-operative cardiovascular examination  Sinus tachycardia  Essential hypertension  Tobacco dependence  -The patient is not very active currently. Unable to determine her functional status due to back and leg pains.  Has multiple risk factors for CAD including hypertension, long smoking history, family history of premature coronary artery disease.  Also has EKG abnormalities including nonspecific ST-T wave abnormalities and newly diagnosed resting tachycardia.  Has exertional fatigue which may be an anginal equivalent despite her inability to walk very far without back and leg pains  -Recommend a preoperative Lexiscan nuclear stress test.  Talk to the patient today that should her stress test be abnormal, this made further delay surgery.  Would consider a heart catheterization preoperatively.  -If her  stress test is negative, we will contact the patient and help her proceed toward having surgery.  Her tentative surgical date is 11/6.  May not be able to obtain her stress test prior to that.  We will call her stress lab to see if we can accommodate her sooner.    - Return in about 1 year (around 10/26/2021) for Next scheduled follow up with an EKG.    Sai Salas MD, MSc, [facility]  Interventional Cardiology  [facility]

## 2020-10-28 ENCOUNTER — OFFICE VISIT (OUTPATIENT)
Dept: INTERNAL MEDICINE | Facility: CLINIC | Age: 55
End: 2020-10-28

## 2020-10-28 VITALS
BODY MASS INDEX: 34.82 KG/M2 | DIASTOLIC BLOOD PRESSURE: 90 MMHG | HEART RATE: 116 BPM | SYSTOLIC BLOOD PRESSURE: 152 MMHG | TEMPERATURE: 97.3 F | HEIGHT: 65 IN | WEIGHT: 209 LBS | OXYGEN SATURATION: 98 %

## 2020-10-28 DIAGNOSIS — I10 ESSENTIAL HYPERTENSION: ICD-10-CM

## 2020-10-28 DIAGNOSIS — J30.2 SEASONAL ALLERGIES: ICD-10-CM

## 2020-10-28 DIAGNOSIS — Z12.31 ENCOUNTER FOR SCREENING MAMMOGRAM FOR MALIGNANT NEOPLASM OF BREAST: ICD-10-CM

## 2020-10-28 DIAGNOSIS — E87.6 HYPOKALEMIA: ICD-10-CM

## 2020-10-28 DIAGNOSIS — F33.1 MODERATE EPISODE OF RECURRENT MAJOR DEPRESSIVE DISORDER (HCC): Primary | ICD-10-CM

## 2020-10-28 DIAGNOSIS — R11.0 NAUSEA: ICD-10-CM

## 2020-10-28 DIAGNOSIS — E78.2 MIXED HYPERLIPIDEMIA: ICD-10-CM

## 2020-10-28 DIAGNOSIS — Z23 NEED FOR IMMUNIZATION AGAINST INFLUENZA: ICD-10-CM

## 2020-10-28 PROCEDURE — 90471 IMMUNIZATION ADMIN: CPT | Performed by: FAMILY MEDICINE

## 2020-10-28 PROCEDURE — 90686 IIV4 VACC NO PRSV 0.5 ML IM: CPT | Performed by: FAMILY MEDICINE

## 2020-10-28 PROCEDURE — 99214 OFFICE O/P EST MOD 30 MIN: CPT | Performed by: FAMILY MEDICINE

## 2020-10-28 RX ORDER — MONTELUKAST SODIUM 10 MG/1
10 TABLET ORAL NIGHTLY
Qty: 30 TABLET | Refills: 6 | Status: SHIPPED | OUTPATIENT
Start: 2020-10-28 | End: 2021-03-11 | Stop reason: SDUPTHER

## 2020-10-28 RX ORDER — ONDANSETRON 4 MG/1
4 TABLET, ORALLY DISINTEGRATING ORAL EVERY 8 HOURS PRN
Qty: 40 TABLET | Refills: 2 | Status: SHIPPED | OUTPATIENT
Start: 2020-10-28 | End: 2021-03-11 | Stop reason: SDUPTHER

## 2020-10-28 RX ORDER — ROSUVASTATIN CALCIUM 10 MG/1
10 TABLET, COATED ORAL DAILY
Qty: 30 TABLET | Refills: 3 | Status: SHIPPED | OUTPATIENT
Start: 2020-10-28 | End: 2020-11-23

## 2020-10-28 RX ORDER — POTASSIUM CHLORIDE 750 MG/1
10 TABLET, FILM COATED, EXTENDED RELEASE ORAL DAILY
Qty: 90 TABLET | Refills: 1 | Status: SHIPPED | OUTPATIENT
Start: 2020-10-28 | End: 2021-03-11 | Stop reason: SDUPTHER

## 2020-10-28 RX ORDER — LISINOPRIL AND HYDROCHLOROTHIAZIDE 20; 12.5 MG/1; MG/1
1 TABLET ORAL 2 TIMES DAILY
Qty: 180 TABLET | Refills: 1 | Status: SHIPPED | OUTPATIENT
Start: 2020-10-28 | End: 2021-03-11 | Stop reason: SDUPTHER
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
## 2020-10-30 ENCOUNTER — APPOINTMENT (OUTPATIENT)
Dept: PREADMISSION TESTING | Facility: HOSPITAL | Age: 55
End: 2020-10-30

## 2020-10-30 PROCEDURE — C9803 HOPD COVID-19 SPEC COLLECT: HCPCS

## 2020-10-30 PROCEDURE — U0004 COV-19 TEST NON-CDC HGH THRU: HCPCS

## 2020-10-31 LAB — SARS-COV-2 RNA RESP QL NAA+PROBE: NOT DETECTED

## 2020-11-02 ENCOUNTER — HOSPITAL ENCOUNTER (OUTPATIENT)
Dept: CARDIOLOGY | Facility: HOSPITAL | Age: 55
Discharge: HOME | End: 2020-11-02
Admitting: INTERNAL MEDICINE

## 2020-11-02 VITALS — HEIGHT: 65 IN | BODY MASS INDEX: 33.99 KG/M2 | WEIGHT: 204 LBS

## 2020-11-02 DIAGNOSIS — I20.8 ANGINAL EQUIVALENT (HCC): ICD-10-CM

## 2020-11-02 PROCEDURE — 93017 CV STRESS TEST TRACING ONLY: CPT

## 2020-11-02 PROCEDURE — 25010000002 REGADENOSON 0.4 MG/5ML SOLUTION: Performed by: INTERNAL MEDICINE

## 2020-11-02 PROCEDURE — 93018 CV STRESS TEST I&R ONLY: CPT | Performed by: INTERNAL MEDICINE

## 2020-11-02 PROCEDURE — 78452 HT MUSCLE IMAGE SPECT MULT: CPT | Performed by: INTERNAL MEDICINE

## 2020-11-02 PROCEDURE — A9500 TC99M SESTAMIBI: HCPCS | Performed by: INTERNAL MEDICINE

## 2020-11-02 PROCEDURE — 78452 HT MUSCLE IMAGE SPECT MULT: CPT

## 2020-11-02 PROCEDURE — 0 TECHNETIUM SESTAMIBI: Performed by: INTERNAL MEDICINE

## 2020-11-02 RX ADMIN — TECHNETIUM TC 99M SESTAMIBI 1 DOSE: 1 INJECTION INTRAVENOUS at 08:20

## 2020-11-02 RX ADMIN — TECHNETIUM TC 99M SESTAMIBI 1 DOSE: 1 INJECTION INTRAVENOUS at 10:12

## 2020-11-02 RX ADMIN — REGADENOSON 0.4 MG: 0.08 INJECTION, SOLUTION INTRAVENOUS at 09:59

## 2020-11-03 ENCOUNTER — TELEPHONE (OUTPATIENT)
Dept: CARDIOLOGY | Facility: CLINIC | Age: 55
End: 2020-11-03

## 2020-11-03 DIAGNOSIS — R94.39 ABNORMAL STRESS TEST: Primary | ICD-10-CM

## 2020-11-03 LAB
BH CV NUCLEAR PRIOR STUDY: 2
BH CV STRESS BP STAGE 2: NORMAL
BH CV STRESS BP STAGE 4: NORMAL
BH CV STRESS COMMENTS STAGE 1: NORMAL
BH CV STRESS COMMENTS STAGE 2: NORMAL
BH CV STRESS DOSE REGADENOSON STAGE 1: 0.4
BH CV STRESS DURATION MIN STAGE 1: 1
BH CV STRESS DURATION MIN STAGE 2: 1
BH CV STRESS DURATION MIN STAGE 3: 1
BH CV STRESS DURATION MIN STAGE 4: 1
BH CV STRESS DURATION SEC STAGE 1: 0
BH CV STRESS DURATION SEC STAGE 2: 0
BH CV STRESS DURATION SEC STAGE 3: 0
BH CV STRESS DURATION SEC STAGE 4: 0
BH CV STRESS HR STAGE 1: 99
BH CV STRESS HR STAGE 2: 107
BH CV STRESS HR STAGE 3: 101
BH CV STRESS HR STAGE 4: 101
BH CV STRESS PROTOCOL 1: NORMAL
BH CV STRESS RECOVERY BP: NORMAL MMHG
BH CV STRESS RECOVERY HR: 95 BPM
BH CV STRESS STAGE 1: 1
BH CV STRESS STAGE 2: 2
BH CV STRESS STAGE 3: 3
BH CV STRESS STAGE 4: 4
LV EF NUC BP: 88 %
MAXIMAL PREDICTED HEART RATE: 167 BPM
PERCENT MAX PREDICTED HR: 66.47 %
STRESS BASELINE BP: NORMAL MMHG
STRESS BASELINE HR: 71 BPM
STRESS PERCENT HR: 78 %
STRESS POST PEAK BP: NORMAL MMHG
STRESS POST PEAK HR: 111 BPM
STRESS TARGET HR: 142 BPM

## 2020-11-03 NOTE — TELEPHONE ENCOUNTER
-Attempted to call patient to discuss stress test results.  Left voicemail stating we will call her back to discuss the stress test findings  -Only with a small defect but it is reversible suggestive of ischemia.  -The patient has multiple risk factors for CAD including hypertension, dyslipidemia, tobacco dependence, and a strong family history of premature coronary artery disease including her mother who had an MI in her 50s and a brother who had multivessel bypass in his 50s.  -The patient has also noted recent tachycardia.  Her twelve-lead ECG shows new inferior ST-T wave abnormalities compared to 2019, which may represent ischemia  -As such, I would recommend the patient undergo a coronary angiogram with possible PCI.  -The patient will need to understand that pursuing a coronary angiogram would delay her back surgery, but I do not believe it would be wise to proceed with cardiac clearance without further diagnostic information and potentially treatment for underlying heart disease  -Spoke with Dr Kelsey. We both agree coronary angiography is the best next step. Okay to delay back surgery from his standpoint upward of 3-6 mos if needing DAPT. Hopefully the coronary angiogram is without obstructive CAD

## 2020-11-04 ENCOUNTER — APPOINTMENT (OUTPATIENT)
Dept: PREADMISSION TESTING | Facility: HOSPITAL | Age: 55
End: 2020-11-04

## 2020-11-10 ENCOUNTER — APPOINTMENT (OUTPATIENT)
Dept: PREADMISSION TESTING | Facility: HOSPITAL | Age: 55
End: 2020-11-10

## 2020-11-10 DIAGNOSIS — N95.1 MENOPAUSAL SYMPTOM: ICD-10-CM

## 2020-11-10 PROBLEM — R94.39 ABNORMAL STRESS TEST: Status: ACTIVE | Noted: 2020-11-10

## 2020-11-10 NOTE — TELEPHONE ENCOUNTER
PATIENT NEEDS REFILL ON ESTRADIOL 2MG TABLET. PATIENT WILL BE OUT BY TONIGHT.    PLEASE SEND TO [facility] [address]    CALL PATIENT BACK -[phone] WHEN SENT

## 2020-11-11 ENCOUNTER — PREP FOR SURGERY (OUTPATIENT)
Dept: OTHER | Facility: HOSPITAL | Age: 55
End: 2020-11-11

## 2020-11-11 DIAGNOSIS — R94.39 ABNORMAL MYOCARDIAL PERFUSION STUDY: Primary | ICD-10-CM

## 2020-11-11 RX ORDER — SODIUM CHLORIDE 0.9 % (FLUSH) 0.9 %
10 SYRINGE (ML) INJECTION AS NEEDED
Status: CANCELLED | OUTPATIENT
Start: 2020-11-11

## 2020-11-11 RX ORDER — ASPIRIN 81 MG/1
81 TABLET ORAL DAILY
Status: CANCELLED | OUTPATIENT
Start: 2020-11-12

## 2020-11-11 RX ORDER — SODIUM CHLORIDE 0.9 % (FLUSH) 0.9 %
3 SYRINGE (ML) INJECTION EVERY 12 HOURS SCHEDULED
Status: CANCELLED | OUTPATIENT
Start: 2020-11-11

## 2020-11-11 RX ORDER — ESTRADIOL 2 MG/1
TABLET ORAL
Qty: 30 TABLET | Refills: 2 | Status: SHIPPED | OUTPATIENT
Start: 2020-11-11 | End: 2021-06-21

## 2020-11-11 RX ORDER — ASPIRIN 81 MG/1
324 TABLET, CHEWABLE ORAL ONCE
Status: CANCELLED | OUTPATIENT
Start: 2020-11-11 | End: 2020-11-11

## 2020-11-13 ENCOUNTER — HOSPITAL ENCOUNTER (OUTPATIENT)
Facility: HOSPITAL | Age: 55
Discharge: HOME | End: 2020-11-13
Attending: INTERNAL MEDICINE | Admitting: INTERNAL MEDICINE

## 2020-11-13 VITALS
HEART RATE: 87 BPM | TEMPERATURE: 96.4 F | WEIGHT: 214.51 LBS | RESPIRATION RATE: 18 BRPM | DIASTOLIC BLOOD PRESSURE: 78 MMHG | BODY MASS INDEX: 35.74 KG/M2 | HEIGHT: 65 IN | OXYGEN SATURATION: 98 % | SYSTOLIC BLOOD PRESSURE: 130 MMHG

## 2020-11-13 DIAGNOSIS — R94.39 ABNORMAL STRESS TEST: ICD-10-CM

## 2020-11-13 DIAGNOSIS — R94.39 ABNORMAL MYOCARDIAL PERFUSION STUDY: ICD-10-CM

## 2020-11-13 LAB
ALBUMIN SERPL-MCNC: 4.2 G/DL (ref 3.5–5.2)
ALBUMIN/GLOB SERPL: 1.6 G/DL
ALP SERPL-CCNC: 122 U/L (ref 39–117)
ALT SERPL W P-5'-P-CCNC: 18 U/L (ref 1–33)
ANION GAP SERPL CALCULATED.3IONS-SCNC: 13 MMOL/L (ref 5–15)
AST SERPL-CCNC: 24 U/L (ref 1–32)
BILIRUB SERPL-MCNC: 0.3 MG/DL (ref 0–1.2)
BUN SERPL-MCNC: 17 MG/DL (ref 6–20)
BUN/CREAT SERPL: 25.4 (ref 7–25)
CALCIUM SPEC-SCNC: 9.9 MG/DL (ref 8.6–10.5)
CHLORIDE SERPL-SCNC: 102 MMOL/L (ref 98–107)
CHOLEST SERPL-MCNC: 196 MG/DL (ref 0–200)
CO2 SERPL-SCNC: 22 MMOL/L (ref 22–29)
CREAT SERPL-MCNC: 0.67 MG/DL (ref 0.57–1)
DEPRECATED RDW RBC AUTO: 46.4 FL (ref 37–54)
ERYTHROCYTE [DISTWIDTH] IN BLOOD BY AUTOMATED COUNT: 13.6 % (ref 12.3–15.4)
GFR SERPL CREATININE-BSD FRML MDRD: 92 ML/MIN/1.73
GLOBULIN UR ELPH-MCNC: 2.6 GM/DL
GLUCOSE SERPL-MCNC: 120 MG/DL (ref 65–99)
HBA1C MFR BLD: 6.5 % (ref 4.8–5.6)
HCT VFR BLD AUTO: 42.1 % (ref 34–46.6)
HDLC SERPL-MCNC: 39 MG/DL (ref 40–60)
HGB BLD-MCNC: 14 G/DL (ref 12–15.9)
LDLC SERPL CALC-MCNC: 112 MG/DL (ref 0–100)
LDLC/HDLC SERPL: 2.69 {RATIO}
MCH RBC QN AUTO: 30.9 PG (ref 26.6–33)
MCHC RBC AUTO-ENTMCNC: 33.3 G/DL (ref 31.5–35.7)
MCV RBC AUTO: 92.9 FL (ref 79–97)
PLATELET # BLD AUTO: 249 10*3/MM3 (ref 140–450)
PMV BLD AUTO: 9.3 FL (ref 6–12)
POTASSIUM SERPL-SCNC: 3.5 MMOL/L (ref 3.5–5.2)
PROT SERPL-MCNC: 6.8 G/DL (ref 6–8.5)
RBC # BLD AUTO: 4.53 10*6/MM3 (ref 3.77–5.28)
SODIUM SERPL-SCNC: 137 MMOL/L (ref 136–145)
TRIGL SERPL-MCNC: 260 MG/DL (ref 0–150)
VLDLC SERPL-MCNC: 45 MG/DL (ref 5–40)
WBC # BLD AUTO: 11.12 10*3/MM3 (ref 3.4–10.8)

## 2020-11-13 PROCEDURE — 25010000002 FENTANYL CITRATE (PF) 100 MCG/2ML SOLUTION: Performed by: INTERNAL MEDICINE

## 2020-11-13 PROCEDURE — 93458 L HRT ARTERY/VENTRICLE ANGIO: CPT | Performed by: INTERNAL MEDICINE

## 2020-11-13 PROCEDURE — 0 IOPAMIDOL PER 1 ML: Performed by: INTERNAL MEDICINE

## 2020-11-13 PROCEDURE — 85027 COMPLETE CBC AUTOMATED: CPT | Performed by: INTERNAL MEDICINE

## 2020-11-13 PROCEDURE — 83036 HEMOGLOBIN GLYCOSYLATED A1C: CPT | Performed by: INTERNAL MEDICINE

## 2020-11-13 PROCEDURE — C1894 INTRO/SHEATH, NON-LASER: HCPCS | Performed by: INTERNAL MEDICINE

## 2020-11-13 PROCEDURE — 80053 COMPREHEN METABOLIC PANEL: CPT | Performed by: INTERNAL MEDICINE

## 2020-11-13 PROCEDURE — C1769 GUIDE WIRE: HCPCS | Performed by: INTERNAL MEDICINE

## 2020-11-13 PROCEDURE — 80061 LIPID PANEL: CPT | Performed by: NURSE PRACTITIONER

## 2020-11-13 PROCEDURE — 25010000002 MIDAZOLAM PER 1 MG: Performed by: INTERNAL MEDICINE

## 2020-11-13 RX ORDER — ASPIRIN 81 MG/1
81 TABLET ORAL DAILY
Status: DISCONTINUED | OUTPATIENT
Start: 2020-11-14 | End: 2020-11-13 | Stop reason: HOSPADM

## 2020-11-13 RX ORDER — LIDOCAINE HYDROCHLORIDE 10 MG/ML
INJECTION, SOLUTION EPIDURAL; INFILTRATION; INTRACAUDAL; PERINEURAL AS NEEDED
Status: DISCONTINUED | OUTPATIENT
Start: 2020-11-13 | End: 2020-11-13 | Stop reason: HOSPADM

## 2020-11-13 RX ORDER — SODIUM CHLORIDE 0.9 % (FLUSH) 0.9 %
10 SYRINGE (ML) INJECTION AS NEEDED
Status: DISCONTINUED | OUTPATIENT
Start: 2020-11-13 | End: 2020-11-13 | Stop reason: HOSPADM

## 2020-11-13 RX ORDER — MIDAZOLAM HYDROCHLORIDE 1 MG/ML
INJECTION INTRAMUSCULAR; INTRAVENOUS AS NEEDED
Status: DISCONTINUED | OUTPATIENT
Start: 2020-11-13 | End: 2020-11-13 | Stop reason: HOSPADM

## 2020-11-13 RX ORDER — FENTANYL CITRATE 50 UG/ML
INJECTION, SOLUTION INTRAMUSCULAR; INTRAVENOUS AS NEEDED
Status: DISCONTINUED | OUTPATIENT
Start: 2020-11-13 | End: 2020-11-13 | Stop reason: HOSPADM

## 2020-11-13 RX ORDER — ASPIRIN 81 MG/1
324 TABLET, CHEWABLE ORAL ONCE
Status: COMPLETED | OUTPATIENT
Start: 2020-11-13 | End: 2020-11-13

## 2020-11-13 RX ORDER — SODIUM CHLORIDE 9 MG/ML
3 INJECTION, SOLUTION INTRAVENOUS CONTINUOUS
Status: ACTIVE | OUTPATIENT
Start: 2020-11-13 | End: 2020-11-13

## 2020-11-13 RX ORDER — SODIUM CHLORIDE 0.9 % (FLUSH) 0.9 %
3 SYRINGE (ML) INJECTION EVERY 12 HOURS SCHEDULED
Status: DISCONTINUED | OUTPATIENT
Start: 2020-11-13 | End: 2020-11-13 | Stop reason: HOSPADM

## 2020-11-13 RX ORDER — ACETAMINOPHEN 325 MG/1
650 TABLET ORAL EVERY 4 HOURS PRN
Status: DISCONTINUED | OUTPATIENT
Start: 2020-11-13 | End: 2020-11-13 | Stop reason: HOSPADM

## 2020-11-13 RX ADMIN — ASPIRIN 324 MG: 81 TABLET, CHEWABLE ORAL at 07:36

## 2020-11-13 RX ADMIN — SODIUM CHLORIDE 3 ML/KG/HR: 9 INJECTION, SOLUTION INTRAVENOUS at 07:37

## 2020-11-13 RX ADMIN — SODIUM CHLORIDE, PRESERVATIVE FREE 3 ML: 5 INJECTION INTRAVENOUS at 07:37

## 2020-11-13 NOTE — INTERVAL H&P NOTE
H&P reviewed. The patient was examined and there are no changes to the H&P.  Bilateral Barbeau type A. Today's labs pending will review when available. Plan for LHC +/- PCI today with Dr. Salas. The risks, benefits, and alternatives of the procedure have been reviewed and the patient wishes to proceed. \    Sharon Greenberg, APRN  11/13/20 08:14 EST

## 2020-11-16 ENCOUNTER — TELEPHONE (OUTPATIENT)
Dept: CARDIOLOGY | Facility: CLINIC | Age: 55
End: 2020-11-16

## 2020-11-23 ENCOUNTER — PRE-ADMISSION TESTING (OUTPATIENT)
Dept: PREADMISSION TESTING | Facility: HOSPITAL | Age: 55
End: 2020-11-23

## 2020-11-23 ENCOUNTER — APPOINTMENT (OUTPATIENT)
Dept: PREADMISSION TESTING | Facility: HOSPITAL | Age: 55
End: 2020-11-23

## 2020-11-23 VITALS — BODY MASS INDEX: 35.32 KG/M2 | WEIGHT: 212 LBS | HEIGHT: 65 IN

## 2020-11-23 DIAGNOSIS — E66.01 CLASS 2 SEVERE OBESITY DUE TO EXCESS CALORIES WITH SERIOUS COMORBIDITY AND BODY MASS INDEX (BMI) OF 35.0 TO 35.9 IN ADULT (HCC): ICD-10-CM

## 2020-11-23 DIAGNOSIS — R94.39 ABNORMAL MYOCARDIAL PERFUSION STUDY: ICD-10-CM

## 2020-11-23 DIAGNOSIS — M51.36 DEGENERATION OF L4-L5 INTERVERTEBRAL DISC: ICD-10-CM

## 2020-11-23 DIAGNOSIS — M51.26 HNP (HERNIATED NUCLEUS PULPOSUS), LUMBAR: ICD-10-CM

## 2020-11-23 DIAGNOSIS — R53.81 PHYSICAL DECONDITIONING: ICD-10-CM

## 2020-11-23 DIAGNOSIS — M19.90 ARTHRITIS: ICD-10-CM

## 2020-11-23 LAB
ALBUMIN SERPL-MCNC: 4.5 G/DL (ref 3.5–5.2)
ALBUMIN/GLOB SERPL: 2 G/DL
ALP SERPL-CCNC: 128 U/L (ref 39–117)
ALT SERPL W P-5'-P-CCNC: 16 U/L (ref 1–33)
ANION GAP SERPL CALCULATED.3IONS-SCNC: 14 MMOL/L (ref 5–15)
AST SERPL-CCNC: 18 U/L (ref 1–32)
BILIRUB SERPL-MCNC: 0.2 MG/DL (ref 0–1.2)
BUN SERPL-MCNC: 16 MG/DL (ref 6–20)
BUN/CREAT SERPL: 23.9 (ref 7–25)
CALCIUM SPEC-SCNC: 9.8 MG/DL (ref 8.6–10.5)
CHLORIDE SERPL-SCNC: 98 MMOL/L (ref 98–107)
CO2 SERPL-SCNC: 28 MMOL/L (ref 22–29)
CREAT SERPL-MCNC: 0.67 MG/DL (ref 0.57–1)
DEPRECATED RDW RBC AUTO: 45.2 FL (ref 37–54)
ERYTHROCYTE [DISTWIDTH] IN BLOOD BY AUTOMATED COUNT: 13.3 % (ref 12.3–15.4)
GFR SERPL CREATININE-BSD FRML MDRD: 92 ML/MIN/1.73
GLOBULIN UR ELPH-MCNC: 2.3 GM/DL
GLUCOSE SERPL-MCNC: 154 MG/DL (ref 65–99)
HBA1C MFR BLD: 6.6 % (ref 4.8–5.6)
HCT VFR BLD AUTO: 41.6 % (ref 34–46.6)
HGB BLD-MCNC: 14 G/DL (ref 12–15.9)
MCH RBC QN AUTO: 31.4 PG (ref 26.6–33)
MCHC RBC AUTO-ENTMCNC: 33.7 G/DL (ref 31.5–35.7)
MCV RBC AUTO: 93.3 FL (ref 79–97)
MRSA DNA SPEC QL NAA+PROBE: NEGATIVE
PLATELET # BLD AUTO: 245 10*3/MM3 (ref 140–450)
PMV BLD AUTO: 9.6 FL (ref 6–12)
POTASSIUM SERPL-SCNC: 3.7 MMOL/L (ref 3.5–5.2)
PROT SERPL-MCNC: 6.8 G/DL (ref 6–8.5)
RBC # BLD AUTO: 4.46 10*6/MM3 (ref 3.77–5.28)
SARS-COV-2 RNA RESP QL NAA+PROBE: NOT DETECTED
SODIUM SERPL-SCNC: 140 MMOL/L (ref 136–145)
WBC # BLD AUTO: 11.17 10*3/MM3 (ref 3.4–10.8)

## 2020-11-23 PROCEDURE — 85027 COMPLETE CBC AUTOMATED: CPT

## 2020-11-23 PROCEDURE — 83036 HEMOGLOBIN GLYCOSYLATED A1C: CPT

## 2020-11-23 PROCEDURE — 36415 COLL VENOUS BLD VENIPUNCTURE: CPT

## 2020-11-23 PROCEDURE — U0004 COV-19 TEST NON-CDC HGH THRU: HCPCS

## 2020-11-23 PROCEDURE — 87641 MR-STAPH DNA AMP PROBE: CPT

## 2020-11-23 PROCEDURE — C9803 HOPD COVID-19 SPEC COLLECT: HCPCS

## 2020-11-23 PROCEDURE — 80053 COMPREHEN METABOLIC PANEL: CPT

## 2020-11-23 NOTE — PAT
CARDIAC CLEARANCE ON CHART.      The following information and instructions were given:    Do not eat, drink, smoke or chew gum after midnight the night before surgery. This includes no mints.  Take all routine, prescribed medications including heart and blood pressure medicines with a sip of water unless otherwise instructed by your physician.   Do NOT take diabetic medication unless instructed by your physician.    DO NOT shave for two days before your procedure.  Do not wear makeup.      DO NOT wear fingernail polish (gel/regular) and/or acrylic/artificial nails on the day of surgery.   If you had a recent manicure and would rather not remove polish or artificial nails, the minimum requirement is that the polish/artificial nails must be removed from the middle finger on each hand.      If you are having surgery/procedure on an upper extremity, fingernail polish/artificial fingernails must be removed for surgery.  NO EXCEPTIONS.      If you are having surgery/procedure on a lower extremity, toenail polish on both extremities must be removed for surgery.  NO EXCEPTIONS.    Remove all jewelry (advise to go to jeweler if unable to remove).  Jewelry, especially rings, can no longer be taped for surgery.    Leave anything you consider valuable at home.    Leave your suitcase in the car until after your surgery.    Bring the following with you the day of your procedure (when applicable):       -Picture ID and insurance cards       -Co-pay/deductible required by insurance       -Medications in the original bottles (not a list) including all over-the-counter medications if not brought to PAT       -Copy of advance directive, living will or power of  documents if not brought to PAT       -CPAP or BIPAP mask and tubing (do not bring machine)       -Skin prep instruction(s) sheet       -PAT Pass    Education booklet, brochure, handout or binder related to procedure given to patient.  Education booklet also includes  Patient wife Maggi notified as directed below per Dr. D ePaz. Maggi verbalized understanding. She will call clinic next week to give MD update. Writer faxed MRI orders to [facility]       general information related to their recovery that mentions signs and symptoms of infection and when to call the doctor.    When applicable, an ERAS handout/booklet was given to patient.    Pain Control After Surgery handout given to patient.    Respirex use (handout given to patient) and pneumonia prevention education provided.    Signs and Symptoms of infection discussed with patient in Pre Admission Testing.  Patient instructed to call their doctor if any of the following symptoms are noted during recovery:  Fever of 100.4 F or higher, incision that is warm or has increasing bleeding, redness or drainage.    DVT Prevention instructions given verbally during Pre Admission Testing appointment that stress the importance of ambulation to improve blood circulation.  Also encouraged patient to perform foot exercises when in bed and application of a sequential device may be applied to lower extremities to improve circulation.      Please apply Chlorhexidine wipes to surgical area (if instructed) the night before procedure and the AM of procedure and document date/time of applications on skin prep instruction sheet.          Patient instructed to drink 20 ounces (or until full) of Gatorade and it needs to be completed 1 hour before given arrival time for procedure (NO RED Gatorade)    Patient verbalized understanding.    An arrival time for procedure was not given during PAT visit. If patient had any questions or concerns about their arrival time, they were instructed to contact their surgeon/physician.  Additionally, if the patient referred to an arrival time that was acquired from their my chart account, patient was encouraged to verify that time with their surgeon/physician.  NO arrival times given in Pre Admission Testing Department.    Per Anesthesia Request, patient instructed not to take their ACE/ARB medications on the AM of surgery.    Bactroban and Chlorhexidine Prescription given during PAT visit, as well as  written and verbal instructions given to patient during PAT visit.  Patient/family also instructed to complete skin prep checklist and return the checklist on the day of surgery to preoperative staff.  Patient/family verbalized understanding.

## 2020-11-24 ENCOUNTER — ANESTHESIA EVENT (OUTPATIENT)
Dept: PERIOP | Facility: HOSPITAL | Age: 55
End: 2020-11-24

## 2020-11-24 RX ORDER — FAMOTIDINE 10 MG/ML
20 INJECTION, SOLUTION INTRAVENOUS ONCE
Status: CANCELLED | OUTPATIENT
Start: 2020-11-24 | End: 2020-11-24

## 2020-11-25 ENCOUNTER — APPOINTMENT (OUTPATIENT)
Dept: GENERAL RADIOLOGY | Facility: HOSPITAL | Age: 55
End: 2020-11-25

## 2020-11-25 ENCOUNTER — HOSPITAL ENCOUNTER (OUTPATIENT)
Facility: HOSPITAL | Age: 55
Discharge: HOME | End: 2020-11-26
Attending: NEUROLOGICAL SURGERY | Admitting: NEUROLOGICAL SURGERY

## 2020-11-25 ENCOUNTER — ANESTHESIA (OUTPATIENT)
Dept: PERIOP | Facility: HOSPITAL | Age: 55
End: 2020-11-25

## 2020-11-25 DIAGNOSIS — M79.10 MYALGIA: ICD-10-CM

## 2020-11-25 DIAGNOSIS — M51.36 DEGENERATION OF L4-L5 INTERVERTEBRAL DISC: ICD-10-CM

## 2020-11-25 DIAGNOSIS — M25.50 ARTHRALGIA, UNSPECIFIED JOINT: ICD-10-CM

## 2020-11-25 DIAGNOSIS — M19.90 ARTHRITIS: ICD-10-CM

## 2020-11-25 DIAGNOSIS — M51.26 HNP (HERNIATED NUCLEUS PULPOSUS), LUMBAR: ICD-10-CM

## 2020-11-25 DIAGNOSIS — E66.01 CLASS 2 SEVERE OBESITY DUE TO EXCESS CALORIES WITH SERIOUS COMORBIDITY AND BODY MASS INDEX (BMI) OF 35.0 TO 35.9 IN ADULT (HCC): ICD-10-CM

## 2020-11-25 DIAGNOSIS — R53.81 PHYSICAL DECONDITIONING: ICD-10-CM

## 2020-11-25 PROCEDURE — 72020 X-RAY EXAM OF SPINE 1 VIEW: CPT

## 2020-11-25 PROCEDURE — 25010000002 PROPOFOL 10 MG/ML EMULSION: Performed by: NURSE ANESTHETIST, CERTIFIED REGISTERED

## 2020-11-25 PROCEDURE — 25010000003 CEFAZOLIN IN DEXTROSE 2-4 GM/100ML-% SOLUTION: Performed by: PHYSICIAN ASSISTANT

## 2020-11-25 PROCEDURE — 25010000003 BUPIVACAINE LIPOSOME 1.3 % SUSPENSION: Performed by: NEUROLOGICAL SURGERY

## 2020-11-25 PROCEDURE — 25010000002 FENTANYL CITRATE (PF) 100 MCG/2ML SOLUTION: Performed by: NURSE ANESTHETIST, CERTIFIED REGISTERED

## 2020-11-25 PROCEDURE — 25010000002 DEXAMETHASONE PER 1 MG: Performed by: PHYSICIAN ASSISTANT

## 2020-11-25 PROCEDURE — 63710000001 DEXAMETHASONE PER 0.25 MG: Performed by: NEUROLOGICAL SURGERY

## 2020-11-25 PROCEDURE — 25010000003 LIDOCAINE 1 % SOLUTION: Performed by: NURSE ANESTHETIST, CERTIFIED REGISTERED

## 2020-11-25 PROCEDURE — 63030 LAMOT DCMPRN NRV RT 1 LMBR: CPT | Performed by: NEUROLOGICAL SURGERY

## 2020-11-25 PROCEDURE — 25010000002 NEOSTIGMINE 10 MG/10ML SOLUTION: Performed by: NURSE ANESTHETIST, CERTIFIED REGISTERED

## 2020-11-25 PROCEDURE — 63030 LAMOT DCMPRN NRV RT 1 LMBR: CPT | Performed by: PHYSICIAN ASSISTANT

## 2020-11-25 PROCEDURE — 25010000002 PHENYLEPHRINE PER 1 ML: Performed by: NURSE ANESTHETIST, CERTIFIED REGISTERED

## 2020-11-25 PROCEDURE — 25010000002 HYDROMORPHONE PER 4 MG: Performed by: NURSE ANESTHETIST, CERTIFIED REGISTERED

## 2020-11-25 PROCEDURE — 25010000002 HYDROMORPHONE PER 4 MG: Performed by: NEUROLOGICAL SURGERY

## 2020-11-25 PROCEDURE — 25010000003 POTASSIUM CHLORIDE PER 2 MEQ: Performed by: NEUROLOGICAL SURGERY

## 2020-11-25 PROCEDURE — C9290 INJ, BUPIVACAINE LIPOSOME: HCPCS | Performed by: NEUROLOGICAL SURGERY

## 2020-11-25 PROCEDURE — 63710000001 PROMETHAZINE PER 25 MG: Performed by: NEUROLOGICAL SURGERY

## 2020-11-25 PROCEDURE — 94799 UNLISTED PULMONARY SVC/PX: CPT

## 2020-11-25 PROCEDURE — 25010000002 ONDANSETRON PER 1 MG: Performed by: NURSE ANESTHETIST, CERTIFIED REGISTERED

## 2020-11-25 DEVICE — FLOSEAL HEMOSTATIC MATRIX, 10ML
Type: IMPLANTABLE DEVICE | Site: SPINE LUMBAR | Status: FUNCTIONAL
Brand: FLOSEAL HEMOSTATIC MATRIX

## 2020-11-25 DEVICE — HEMOST ABS SURGIFOAM SZ100 8X12 10MM: Type: IMPLANTABLE DEVICE | Site: SPINE LUMBAR | Status: FUNCTIONAL

## 2020-11-25 RX ORDER — TRAMADOL HYDROCHLORIDE 50 MG/1
50 TABLET ORAL EVERY 4 HOURS PRN
Status: DISCONTINUED | OUTPATIENT
Start: 2020-11-25 | End: 2020-11-26 | Stop reason: HOSPADM

## 2020-11-25 RX ORDER — LIDOCAINE HYDROCHLORIDE 10 MG/ML
INJECTION, SOLUTION INFILTRATION; PERINEURAL AS NEEDED
Status: DISCONTINUED | OUTPATIENT
Start: 2020-11-25 | End: 2020-11-25 | Stop reason: SURG

## 2020-11-25 RX ORDER — NALOXONE HCL 0.4 MG/ML
0.4 VIAL (ML) INJECTION
Status: DISCONTINUED | OUTPATIENT
Start: 2020-11-25 | End: 2020-11-26 | Stop reason: HOSPADM

## 2020-11-25 RX ORDER — SODIUM CHLORIDE, SODIUM LACTATE, POTASSIUM CHLORIDE, CALCIUM CHLORIDE 600; 310; 30; 20 MG/100ML; MG/100ML; MG/100ML; MG/100ML
INJECTION, SOLUTION INTRAVENOUS CONTINUOUS PRN
Status: DISCONTINUED | OUTPATIENT
Start: 2020-11-25 | End: 2020-11-25 | Stop reason: SURG

## 2020-11-25 RX ORDER — HYDROMORPHONE HYDROCHLORIDE 1 MG/ML
0.5 INJECTION, SOLUTION INTRAMUSCULAR; INTRAVENOUS; SUBCUTANEOUS
Status: DISCONTINUED | OUTPATIENT
Start: 2020-11-25 | End: 2020-11-25 | Stop reason: HOSPADM

## 2020-11-25 RX ORDER — FENTANYL CITRATE 50 UG/ML
INJECTION, SOLUTION INTRAMUSCULAR; INTRAVENOUS AS NEEDED
Status: DISCONTINUED | OUTPATIENT
Start: 2020-11-25 | End: 2020-11-25 | Stop reason: SURG

## 2020-11-25 RX ORDER — SODIUM CHLORIDE, SODIUM LACTATE, POTASSIUM CHLORIDE, CALCIUM CHLORIDE 600; 310; 30; 20 MG/100ML; MG/100ML; MG/100ML; MG/100ML
9 INJECTION, SOLUTION INTRAVENOUS CONTINUOUS
Status: DISCONTINUED | OUTPATIENT
Start: 2020-11-25 | End: 2020-11-26 | Stop reason: HOSPADM

## 2020-11-25 RX ORDER — SODIUM CHLORIDE 0.9 % (FLUSH) 0.9 %
3 SYRINGE (ML) INJECTION EVERY 12 HOURS SCHEDULED
Status: DISCONTINUED | OUTPATIENT
Start: 2020-11-25 | End: 2020-11-26 | Stop reason: HOSPADM

## 2020-11-25 RX ORDER — SODIUM CHLORIDE 0.9 % (FLUSH) 0.9 %
10 SYRINGE (ML) INJECTION AS NEEDED
Status: DISCONTINUED | OUTPATIENT
Start: 2020-11-25 | End: 2020-11-25 | Stop reason: HOSPADM

## 2020-11-25 RX ORDER — FENTANYL CITRATE 50 UG/ML
50 INJECTION, SOLUTION INTRAMUSCULAR; INTRAVENOUS
Status: DISCONTINUED | OUTPATIENT
Start: 2020-11-25 | End: 2020-11-25 | Stop reason: HOSPADM

## 2020-11-25 RX ORDER — OXYCODONE AND ACETAMINOPHEN 7.5; 325 MG/1; MG/1
1 TABLET ORAL EVERY 4 HOURS PRN
Status: DISCONTINUED | OUTPATIENT
Start: 2020-11-25 | End: 2020-11-26 | Stop reason: HOSPADM

## 2020-11-25 RX ORDER — PROMETHAZINE HYDROCHLORIDE 25 MG/1
12.5 TABLET ORAL EVERY 6 HOURS PRN
Status: DISCONTINUED | OUTPATIENT
Start: 2020-11-25 | End: 2020-11-26 | Stop reason: HOSPADM

## 2020-11-25 RX ORDER — HYDROMORPHONE HYDROCHLORIDE 1 MG/ML
0.5 INJECTION, SOLUTION INTRAMUSCULAR; INTRAVENOUS; SUBCUTANEOUS
Status: DISCONTINUED | OUTPATIENT
Start: 2020-11-25 | End: 2020-11-26 | Stop reason: HOSPADM

## 2020-11-25 RX ORDER — ESTRADIOL 0.5 MG/1
2 TABLET ORAL DAILY
Status: DISCONTINUED | OUTPATIENT
Start: 2020-11-25 | End: 2020-11-26 | Stop reason: HOSPADM

## 2020-11-25 RX ORDER — PROMETHAZINE HYDROCHLORIDE 25 MG/1
12.5 SUPPOSITORY RECTAL EVERY 6 HOURS PRN
Status: DISCONTINUED | OUTPATIENT
Start: 2020-11-25 | End: 2020-11-26 | Stop reason: HOSPADM

## 2020-11-25 RX ORDER — NEOSTIGMINE METHYLSULFATE 1 MG/ML
INJECTION, SOLUTION INTRAVENOUS AS NEEDED
Status: DISCONTINUED | OUTPATIENT
Start: 2020-11-25 | End: 2020-11-25 | Stop reason: SURG

## 2020-11-25 RX ORDER — DOCUSATE SODIUM 100 MG/1
100 CAPSULE, LIQUID FILLED ORAL DAILY
Status: DISCONTINUED | OUTPATIENT
Start: 2020-11-25 | End: 2020-11-26 | Stop reason: HOSPADM

## 2020-11-25 RX ORDER — FAMOTIDINE 20 MG/1
20 TABLET, FILM COATED ORAL ONCE
Status: COMPLETED | OUTPATIENT
Start: 2020-11-25 | End: 2020-11-25

## 2020-11-25 RX ORDER — ROCURONIUM BROMIDE 10 MG/ML
INJECTION, SOLUTION INTRAVENOUS AS NEEDED
Status: DISCONTINUED | OUTPATIENT
Start: 2020-11-25 | End: 2020-11-25 | Stop reason: SURG

## 2020-11-25 RX ORDER — SODIUM CHLORIDE, SODIUM LACTATE, POTASSIUM CHLORIDE, CALCIUM CHLORIDE 600; 310; 30; 20 MG/100ML; MG/100ML; MG/100ML; MG/100ML
100 INJECTION, SOLUTION INTRAVENOUS CONTINUOUS
Status: DISCONTINUED | OUTPATIENT
Start: 2020-11-25 | End: 2020-11-26 | Stop reason: HOSPADM

## 2020-11-25 RX ORDER — ONDANSETRON 2 MG/ML
INJECTION INTRAMUSCULAR; INTRAVENOUS AS NEEDED
Status: DISCONTINUED | OUTPATIENT
Start: 2020-11-25 | End: 2020-11-25 | Stop reason: SURG

## 2020-11-25 RX ORDER — DEXAMETHASONE 4 MG/1
4 TABLET ORAL EVERY 6 HOURS SCHEDULED
Status: DISCONTINUED | OUTPATIENT
Start: 2020-11-25 | End: 2020-11-26

## 2020-11-25 RX ORDER — DEXAMETHASONE SODIUM PHOSPHATE 4 MG/ML
4 INJECTION, SOLUTION INTRA-ARTICULAR; INTRALESIONAL; INTRAMUSCULAR; INTRAVENOUS; SOFT TISSUE EVERY 6 HOURS SCHEDULED
Status: DISCONTINUED | OUTPATIENT
Start: 2020-11-25 | End: 2020-11-26

## 2020-11-25 RX ORDER — MONTELUKAST SODIUM 10 MG/1
10 TABLET ORAL NIGHTLY
Status: DISCONTINUED | OUTPATIENT
Start: 2020-11-25 | End: 2020-11-26 | Stop reason: HOSPADM

## 2020-11-25 RX ORDER — MAGNESIUM HYDROXIDE 1200 MG/15ML
LIQUID ORAL AS NEEDED
Status: DISCONTINUED | OUTPATIENT
Start: 2020-11-25 | End: 2020-11-25 | Stop reason: HOSPADM

## 2020-11-25 RX ORDER — SODIUM CHLORIDE AND POTASSIUM CHLORIDE 150; 450 MG/100ML; MG/100ML
75 INJECTION, SOLUTION INTRAVENOUS CONTINUOUS
Status: DISCONTINUED | OUTPATIENT
Start: 2020-11-25 | End: 2020-11-26 | Stop reason: HOSPADM

## 2020-11-25 RX ORDER — SODIUM CHLORIDE 0.9 % (FLUSH) 0.9 %
10 SYRINGE (ML) INJECTION AS NEEDED
Status: DISCONTINUED | OUTPATIENT
Start: 2020-11-25 | End: 2020-11-26 | Stop reason: HOSPADM

## 2020-11-25 RX ORDER — ONDANSETRON 2 MG/ML
4 INJECTION INTRAMUSCULAR; INTRAVENOUS ONCE AS NEEDED
Status: DISCONTINUED | OUTPATIENT
Start: 2020-11-25 | End: 2020-11-25 | Stop reason: HOSPADM

## 2020-11-25 RX ORDER — ALPRAZOLAM 0.5 MG/1
0.5 TABLET ORAL 3 TIMES DAILY PRN
Status: DISCONTINUED | OUTPATIENT
Start: 2020-11-25 | End: 2020-11-26 | Stop reason: HOSPADM

## 2020-11-25 RX ORDER — BACLOFEN 10 MG/1
10 TABLET ORAL EVERY 6 HOURS SCHEDULED
Status: DISCONTINUED | OUTPATIENT
Start: 2020-11-25 | End: 2020-11-26 | Stop reason: HOSPADM

## 2020-11-25 RX ORDER — SODIUM CHLORIDE 0.9 % (FLUSH) 0.9 %
10 SYRINGE (ML) INJECTION EVERY 12 HOURS SCHEDULED
Status: DISCONTINUED | OUTPATIENT
Start: 2020-11-25 | End: 2020-11-25 | Stop reason: HOSPADM

## 2020-11-25 RX ORDER — SODIUM CHLORIDE 9 MG/ML
INJECTION, SOLUTION INTRAVENOUS AS NEEDED
Status: DISCONTINUED | OUTPATIENT
Start: 2020-11-25 | End: 2020-11-25 | Stop reason: HOSPADM

## 2020-11-25 RX ORDER — ACETAMINOPHEN 500 MG
500 TABLET ORAL EVERY 6 HOURS SCHEDULED
Status: DISCONTINUED | OUTPATIENT
Start: 2020-11-25 | End: 2020-11-26 | Stop reason: HOSPADM

## 2020-11-25 RX ORDER — POTASSIUM CHLORIDE 750 MG/1
10 CAPSULE, EXTENDED RELEASE ORAL ONCE
Status: COMPLETED | OUTPATIENT
Start: 2020-11-25 | End: 2020-11-25

## 2020-11-25 RX ORDER — ASPIRIN 325 MG
325 TABLET ORAL DAILY
Status: DISCONTINUED | OUTPATIENT
Start: 2020-11-26 | End: 2020-11-26 | Stop reason: HOSPADM

## 2020-11-25 RX ORDER — PROPOFOL 10 MG/ML
VIAL (ML) INTRAVENOUS AS NEEDED
Status: DISCONTINUED | OUTPATIENT
Start: 2020-11-25 | End: 2020-11-25 | Stop reason: SURG

## 2020-11-25 RX ORDER — GABAPENTIN 100 MG/1
200 CAPSULE ORAL EVERY 8 HOURS SCHEDULED
Status: DISCONTINUED | OUTPATIENT
Start: 2020-11-25 | End: 2020-11-26 | Stop reason: HOSPADM

## 2020-11-25 RX ORDER — LIDOCAINE HYDROCHLORIDE 10 MG/ML
0.5 INJECTION, SOLUTION EPIDURAL; INFILTRATION; INTRACAUDAL; PERINEURAL ONCE AS NEEDED
Status: COMPLETED | OUTPATIENT
Start: 2020-11-25 | End: 2020-11-25

## 2020-11-25 RX ORDER — GLYCOPYRROLATE 0.2 MG/ML
INJECTION INTRAMUSCULAR; INTRAVENOUS AS NEEDED
Status: DISCONTINUED | OUTPATIENT
Start: 2020-11-25 | End: 2020-11-25 | Stop reason: SURG

## 2020-11-25 RX ORDER — CEFAZOLIN SODIUM 2 G/100ML
2 INJECTION, SOLUTION INTRAVENOUS ONCE
Status: COMPLETED | OUTPATIENT
Start: 2020-11-25 | End: 2020-11-25

## 2020-11-25 RX ORDER — TEMAZEPAM 15 MG/1
30 CAPSULE ORAL NIGHTLY PRN
Status: DISCONTINUED | OUTPATIENT
Start: 2020-11-25 | End: 2020-11-26 | Stop reason: HOSPADM

## 2020-11-25 RX ADMIN — POTASSIUM CHLORIDE 10 MEQ: 10 CAPSULE, COATED, EXTENDED RELEASE ORAL at 14:36

## 2020-11-25 RX ADMIN — PROMETHAZINE HYDROCHLORIDE 12.5 MG: 25 TABLET ORAL at 15:38

## 2020-11-25 RX ADMIN — OXYCODONE HYDROCHLORIDE AND ACETAMINOPHEN 1 TABLET: 7.5; 325 TABLET ORAL at 23:33

## 2020-11-25 RX ADMIN — BACLOFEN 10 MG: 10 TABLET ORAL at 18:07

## 2020-11-25 RX ADMIN — GLYCOPYRROLATE 0.4 MG: 0.2 INJECTION INTRAMUSCULAR; INTRAVENOUS at 12:45

## 2020-11-25 RX ADMIN — LIDOCAINE HYDROCHLORIDE 50 MG: 10 INJECTION, SOLUTION INFILTRATION; PERINEURAL at 11:21

## 2020-11-25 RX ADMIN — GABAPENTIN 200 MG: 100 CAPSULE ORAL at 21:23

## 2020-11-25 RX ADMIN — ONDANSETRON 4 MG: 2 INJECTION INTRAMUSCULAR; INTRAVENOUS at 12:31

## 2020-11-25 RX ADMIN — LIDOCAINE HYDROCHLORIDE 0.4 ML: 10 INJECTION, SOLUTION EPIDURAL; INFILTRATION; INTRACAUDAL; PERINEURAL at 10:30

## 2020-11-25 RX ADMIN — SODIUM CHLORIDE, POTASSIUM CHLORIDE, SODIUM LACTATE AND CALCIUM CHLORIDE: 600; 310; 30; 20 INJECTION, SOLUTION INTRAVENOUS at 12:41

## 2020-11-25 RX ADMIN — PHENYLEPHRINE HYDROCHLORIDE 80 MCG: 10 INJECTION INTRAVENOUS at 11:43

## 2020-11-25 RX ADMIN — MONTELUKAST SODIUM 10 MG: 10 TABLET, COATED ORAL at 21:24

## 2020-11-25 RX ADMIN — FENTANYL CITRATE 50 MCG: 0.05 INJECTION, SOLUTION INTRAMUSCULAR; INTRAVENOUS at 12:55

## 2020-11-25 RX ADMIN — SODIUM CHLORIDE, POTASSIUM CHLORIDE, SODIUM LACTATE AND CALCIUM CHLORIDE: 600; 310; 30; 20 INJECTION, SOLUTION INTRAVENOUS at 11:18

## 2020-11-25 RX ADMIN — ACETAMINOPHEN 500 MG: 500 TABLET, FILM COATED ORAL at 23:33

## 2020-11-25 RX ADMIN — DOCUSATE SODIUM 100 MG: 100 CAPSULE, LIQUID FILLED ORAL at 14:36

## 2020-11-25 RX ADMIN — HYDROMORPHONE HYDROCHLORIDE 0.5 MG: 1 INJECTION, SOLUTION INTRAMUSCULAR; INTRAVENOUS; SUBCUTANEOUS at 13:22

## 2020-11-25 RX ADMIN — HYDROMORPHONE HYDROCHLORIDE 0.5 MG: 1 INJECTION, SOLUTION INTRAMUSCULAR; INTRAVENOUS; SUBCUTANEOUS at 15:38

## 2020-11-25 RX ADMIN — FENTANYL CITRATE 50 MCG: 0.05 INJECTION, SOLUTION INTRAMUSCULAR; INTRAVENOUS at 13:06

## 2020-11-25 RX ADMIN — POTASSIUM CHLORIDE AND SODIUM CHLORIDE 75 ML/HR: 450; 150 INJECTION, SOLUTION INTRAVENOUS at 14:36

## 2020-11-25 RX ADMIN — ROCURONIUM BROMIDE 40 MG: 10 INJECTION INTRAVENOUS at 11:21

## 2020-11-25 RX ADMIN — GABAPENTIN 200 MG: 100 CAPSULE ORAL at 15:33

## 2020-11-25 RX ADMIN — FENTANYL CITRATE 100 MCG: 50 INJECTION, SOLUTION INTRAMUSCULAR; INTRAVENOUS at 11:18

## 2020-11-25 RX ADMIN — PHENYLEPHRINE HYDROCHLORIDE 80 MCG: 10 INJECTION INTRAVENOUS at 12:09

## 2020-11-25 RX ADMIN — PHENYLEPHRINE HYDROCHLORIDE 80 MCG: 10 INJECTION INTRAVENOUS at 12:12

## 2020-11-25 RX ADMIN — SODIUM CHLORIDE, PRESERVATIVE FREE 3 ML: 5 INJECTION INTRAVENOUS at 14:36

## 2020-11-25 RX ADMIN — NEOSTIGMINE 3 MG: 1 INJECTION INTRAVENOUS at 12:45

## 2020-11-25 RX ADMIN — DEXAMETHASONE 4 MG: 4 TABLET ORAL at 23:33

## 2020-11-25 RX ADMIN — HYDROMORPHONE HYDROCHLORIDE 0.5 MG: 1 INJECTION, SOLUTION INTRAMUSCULAR; INTRAVENOUS; SUBCUTANEOUS at 13:41

## 2020-11-25 RX ADMIN — OXYCODONE HYDROCHLORIDE AND ACETAMINOPHEN 1 TABLET: 7.5; 325 TABLET ORAL at 14:36

## 2020-11-25 RX ADMIN — FAMOTIDINE 20 MG: 20 TABLET, FILM COATED ORAL at 10:29

## 2020-11-25 RX ADMIN — PHENYLEPHRINE HYDROCHLORIDE 80 MCG: 10 INJECTION INTRAVENOUS at 11:44

## 2020-11-25 RX ADMIN — ESTRADIOL 2 MG: 0.5 TABLET ORAL at 14:36

## 2020-11-25 RX ADMIN — ACETAMINOPHEN 500 MG: 500 TABLET, FILM COATED ORAL at 18:07

## 2020-11-25 RX ADMIN — PROPOFOL 200 MG: 10 INJECTION, EMULSION INTRAVENOUS at 11:21

## 2020-11-25 RX ADMIN — DEXAMETHASONE 4 MG: 4 TABLET ORAL at 18:06

## 2020-11-25 RX ADMIN — DEXAMETHASONE SODIUM PHOSPHATE 10 MG: 4 INJECTION, SOLUTION INTRAMUSCULAR; INTRAVENOUS at 11:26

## 2020-11-25 RX ADMIN — CEFAZOLIN SODIUM 2 G: 2 INJECTION, SOLUTION INTRAVENOUS at 11:18

## 2020-11-25 RX ADMIN — BACLOFEN 10 MG: 10 TABLET ORAL at 23:34

## 2020-11-25 RX ADMIN — HYDROCHLOROTHIAZIDE: 12.5 CAPSULE ORAL at 21:23

## 2020-11-25 RX ADMIN — SODIUM CHLORIDE, POTASSIUM CHLORIDE, SODIUM LACTATE AND CALCIUM CHLORIDE 9 ML/HR: 600; 310; 30; 20 INJECTION, SOLUTION INTRAVENOUS at 10:30

## 2020-11-25 RX ADMIN — OXYCODONE HYDROCHLORIDE AND ACETAMINOPHEN 1 TABLET: 7.5; 325 TABLET ORAL at 18:59

## 2020-11-26 VITALS
BODY MASS INDEX: 35.32 KG/M2 | TEMPERATURE: 98.1 F | RESPIRATION RATE: 18 BRPM | HEART RATE: 76 BPM | HEIGHT: 65 IN | WEIGHT: 212 LBS | OXYGEN SATURATION: 98 % | SYSTOLIC BLOOD PRESSURE: 116 MMHG | DIASTOLIC BLOOD PRESSURE: 65 MMHG

## 2020-11-26 PROCEDURE — 97161 PT EVAL LOW COMPLEX 20 MIN: CPT

## 2020-11-26 PROCEDURE — 97165 OT EVAL LOW COMPLEX 30 MIN: CPT

## 2020-11-26 PROCEDURE — 97110 THERAPEUTIC EXERCISES: CPT

## 2020-11-26 PROCEDURE — 63710000001 DEXAMETHASONE PER 0.25 MG: Performed by: NEUROLOGICAL SURGERY

## 2020-11-26 PROCEDURE — 94799 UNLISTED PULMONARY SVC/PX: CPT

## 2020-11-26 PROCEDURE — 97535 SELF CARE MNGMENT TRAINING: CPT

## 2020-11-26 RX ORDER — TRAMADOL HYDROCHLORIDE 50 MG/1
50 TABLET, COATED ORAL EVERY 4 HOURS PRN
Qty: 60 TABLET | Refills: 0 | Status: SHIPPED | OUTPATIENT
Start: 2020-11-26

## 2020-11-26 RX ORDER — HYDROCODONE BITARTRATE AND ACETAMINOPHEN 7.5; 325 MG/1; MG/1
1 TABLET ORAL EVERY 6 HOURS PRN
Qty: 45 TABLET | Refills: 0 | Status: SHIPPED | OUTPATIENT
Start: 2020-11-26 | End: 2021-03-11

## 2020-11-26 RX ORDER — GABAPENTIN 100 MG/1
200 CAPSULE ORAL 3 TIMES DAILY
Qty: 180 CAPSULE | Refills: 0 | Status: SHIPPED | OUTPATIENT
Start: 2020-11-26 | End: 2021-01-14 | Stop reason: SDUPTHER

## 2020-11-26 RX ADMIN — DEXAMETHASONE 4 MG: 4 TABLET ORAL at 05:43

## 2020-11-26 RX ADMIN — ESTRADIOL 2 MG: 0.5 TABLET ORAL at 09:00

## 2020-11-26 RX ADMIN — SODIUM CHLORIDE, PRESERVATIVE FREE 3 ML: 5 INJECTION INTRAVENOUS at 09:00

## 2020-11-26 RX ADMIN — BACLOFEN 10 MG: 10 TABLET ORAL at 05:43

## 2020-11-26 RX ADMIN — SERTRALINE HYDROCHLORIDE 50 MG: 50 TABLET, FILM COATED ORAL at 08:59

## 2020-11-26 RX ADMIN — DOCUSATE SODIUM 100 MG: 100 CAPSULE, LIQUID FILLED ORAL at 08:59

## 2020-11-26 RX ADMIN — OXYCODONE HYDROCHLORIDE AND ACETAMINOPHEN 1 TABLET: 7.5; 325 TABLET ORAL at 06:52

## 2020-11-26 RX ADMIN — ASPIRIN 325 MG ORAL TABLET 325 MG: 325 PILL ORAL at 08:59

## 2020-11-26 RX ADMIN — ACETAMINOPHEN 500 MG: 500 TABLET, FILM COATED ORAL at 05:43

## 2020-11-26 RX ADMIN — OXYCODONE HYDROCHLORIDE AND ACETAMINOPHEN 1 TABLET: 7.5; 325 TABLET ORAL at 02:35

## 2020-11-26 RX ADMIN — GABAPENTIN 200 MG: 100 CAPSULE ORAL at 05:43

## 2020-12-11 ENCOUNTER — TELEMEDICINE (OUTPATIENT)
Dept: INTERNAL MEDICINE | Facility: CLINIC | Age: 55
End: 2020-12-11

## 2020-12-11 DIAGNOSIS — M62.830 BACK MUSCLE SPASM: ICD-10-CM

## 2020-12-11 DIAGNOSIS — F33.1 MODERATE EPISODE OF RECURRENT MAJOR DEPRESSIVE DISORDER (HCC): Primary | ICD-10-CM

## 2020-12-11 DIAGNOSIS — E78.2 MIXED HYPERLIPIDEMIA: ICD-10-CM

## 2020-12-11 DIAGNOSIS — F41.9 ANXIETY: ICD-10-CM

## 2020-12-11 PROCEDURE — 99214 OFFICE O/P EST MOD 30 MIN: CPT | Performed by: FAMILY MEDICINE

## 2020-12-11 RX ORDER — TIZANIDINE 4 MG/1
4 TABLET ORAL EVERY 8 HOURS PRN
Qty: 90 TABLET | Refills: 1 | Status: SHIPPED | OUTPATIENT
Start: 2020-12-11 | End: 2021-03-30 | Stop reason: SDUPTHER

## 2020-12-11 NOTE — PROGRESS NOTES
Subjective   Lizette Keith is a 55 y.o. female.     No chief complaint on file.    You have chosen to receive care through a telehealth visit.  Do you consent to use a video/audio connection for your medical care today? Yes    This was an audio and video enabled telemedicine encounter.  There were no vitals taken for this visit.      Depression  Visit Type: follow-up  Patient presents with the following symptoms: nervousness/anxiety.  Patient is not experiencing: anhedonia, chest pain, choking sensation, compulsions, confusion, decreased concentration, depressed mood, dizziness, dry mouth, excessive worry, fatigue, feelings of hopelessness, feelings of worthlessness, hypersomnia, hyperventilation, insomnia, irritability, malaise, memory impairment, muscle tension, nausea, obsessions, palpitations, panic, psychomotor agitation, psychomotor retardation, restlessness, shortness of breath, suicidal ideas, suicidal planning, thoughts of death, weight gain and weight loss.  Frequency of symptoms: rarely   Severity: mild   Sleep quality: fair  Nighttime awakenings: several  Compliance with medications:  %        Anxiety  Presents for follow-up visit. Symptoms include nervous/anxious behavior. Patient reports no chest pain, compulsions, confusion, decreased concentration, depressed mood, dizziness, dry mouth, excessive worry, feeling of choking, hyperventilation, insomnia, irritability, malaise, muscle tension, nausea, obsessions, palpitations, panic, restlessness, shortness of breath or suicidal ideas. Symptoms occur rarely. The severity of symptoms is mild. The quality of sleep is fair. Nighttime awakenings: several.     Her past medical history is significant for depression. Compliance with medications is %.   Back Pain  This is a chronic problem. The current episode started more than 1 year ago. The problem occurs constantly. The problem has been rapidly improving since onset. The pain is present in the  lumbar spine. The quality of the pain is described as burning. The pain radiates to the right knee and right thigh. The pain is at a severity of 7/10 (yesterday pain decreased). The pain is moderate. The pain is worse during the night. Associated symptoms include leg pain and paresthesias. Pertinent negatives include no abdominal pain, bladder incontinence, bowel incontinence, chest pain, dysuria, fever, headaches, numbness, paresis, pelvic pain, perianal numbness, tingling, weakness or weight loss. Risk factors include lack of exercise and obesity. She has tried muscle relaxant, NSAIDs, analgesics and bed rest (surgery) for the symptoms. The treatment provided moderate relief.      Pt has lumbar disc surgery day before Thanksgiving. Surgery went well. Pt is on bed rest for another week. Pt is with her daughter. Pt no longer has numbness in feet and back pain is better.  Pt has decreased smoking to 2 pk per week and is trying to quit smoking.    Pt had muscle spasm in her leg from pravastatin. Pt had to stop the pravastatin.    Pt is still taking the sertraline and and feels that it is doing well.     Pt is still waiting on disability.   The following portions of the patient's history were reviewed and updated as appropriate: allergies, current medications, past family history, past medical history, past social history, past surgical history and problem list.    Past Medical History:   Diagnosis Date   • Allergic    • Anemia     during pregnancy   • Anxiety    • Arthritis    • Blood in urine    • Chronic fatigue    • Colon polyp 02/2018    7 at last colonoscopy   • Degeneration of L4-L5 intervertebral disc 12/10/2019   • Depression    • Elevated cholesterol    • Endometriosis    • Fibromyalgia, primary    • Fracture    • GERD (gastroesophageal reflux disease)    • H/O mammogram 02/2018   • Headache    • Herniated intervertebral disc of lumbar spine     l4-l5   • HL (hearing loss)    • Hypertension    • Irritable  bowel syndrome    • Pap smear for cervical cancer screening 2018?   • Wears glasses       Past Surgical History:   Procedure Laterality Date   • CARDIAC CATHETERIZATION  11/13/2020    Dr Salas, EF 60%, no significant stenosis   • CARDIAC CATHETERIZATION N/A 11/13/2020    Procedure: Left Heart Cath;  Surgeon: Sai Salas MD;  Location:  [facility];  Service: Cardiology;  Laterality: N/A;   • CHOLECYSTECTOMY     • COLONOSCOPY  02/06/2018    3 year f/u polypectomy Dr MARTA Waller   • HYSTERECTOMY     • INNER EAR SURGERY     • LUMBAR DISCECTOMY Right 11/25/2020    Procedure: LUMBAR DISCECTOMY L4-5 RIGHT;  Surgeon: Qasim Kelsey MD;  Location:  [facility];  Service: Neurosurgery;  Laterality: Right;   • OOPHORECTOMY     • TEMPOROMANDIBULAR JOINT ARTHROPLASTY  1984   • TONSILLECTOMY     • URETHRA SURGERY      as a baby       Family History   Problem Relation Age of Onset   • Melanoma Mother         eye   • Diabetes Mother    • Heart disease Mother    • Arthritis Mother    • Depression Mother    • Colon polyps Mother    • Thyroid disease Mother    • Hyperlipidemia Mother    • Cancer Mother    • Heart attack Mother    • Migraines Mother    • Cancer Father         pancreatic and liver   • Diabetes Father    • Depression Father    • Colon polyps Father    • Bipolar disorder Father         split personality   • ADD / ADHD Son    • Depression Son    • Diabetes Maternal Aunt    • Diabetes Maternal Uncle    • Diabetes Paternal Aunt    • Diabetes Paternal Uncle    • Cancer Maternal Grandmother         uterine/cervical   • Heart disease Maternal Grandfather    • Heart disease Paternal Grandfather    • Migraines Sister    • Bipolar disorder Daughter    • Coronary artery disease Brother    • Breast cancer Neg Hx    • Ovarian cancer Neg Hx       Social History     Socioeconomic History   • Marital status:      Spouse name: Not on file   • Number of children: Not on file   • Years of education: Not on file    • Highest education level: Not on file   Tobacco Use   • Smoking status: Current Every Day Smoker     Packs/day: 0.50     Years: 34.00     Pack years: 17.00     Types: Cigarettes   • Smokeless tobacco: Never Used   • Tobacco comment: 0.5-1ppd   Substance and Sexual Activity   • Alcohol use: No   • Drug use: No   • Sexual activity: Not Currently     Birth control/protection: None      Allergies   Allergen Reactions   • Statins Myalgia   • Erythromycin GI Intolerance     Tolerates zpak   • Lipitor [Atorvastatin Calcium] Myalgia   • Penicillins Hives     States ok with keflex   • Pravastatin Myalgia   • Relafen [Nabumetone] GI Intolerance   • Cymbalta [Duloxetine Hcl] Rash       Review of Systems   Constitutional: Negative.  Negative for chills, diaphoresis, fatigue, fever, irritability, weight gain and weight loss.   HENT: Negative.  Negative for ear pain, nosebleeds, postnasal drip, rhinorrhea, sinus pressure, sneezing and sore throat.    Eyes: Negative.  Negative for redness and itching.   Respiratory: Negative.  Negative for cough, choking, shortness of breath and wheezing.    Cardiovascular: Negative.  Negative for chest pain and palpitations.   Gastrointestinal: Negative.  Negative for abdominal pain, bowel incontinence, constipation, diarrhea, nausea and vomiting.   Endocrine: Negative.  Negative for cold intolerance and heat intolerance.   Genitourinary: Negative.  Negative for bladder incontinence, dysuria, frequency, hematuria, pelvic pain and urgency.   Musculoskeletal: Positive for back pain, gait problem and myalgias. Negative for arthralgias and neck pain.   Skin: Negative.  Negative for color change and rash.   Allergic/Immunologic: Positive for environmental allergies.   Neurological: Positive for paresthesias. Negative for dizziness, tingling, syncope, weakness, light-headedness, numbness and headaches.   Hematological: Negative.  Negative for adenopathy. Does not bruise/bleed easily.    Psychiatric/Behavioral: Negative for confusion, decreased concentration, dysphoric mood and suicidal ideas. The patient is nervous/anxious. The patient does not have insomnia.         Anxiety and depression are much better on the sertraline       Objective   Physical Exam  Constitutional:       Appearance: Normal appearance.   Pulmonary:      Effort: Pulmonary effort is normal. No respiratory distress.   Neurological:      Mental Status: She is alert and oriented to person, place, and time.   Psychiatric:         Mood and Affect: Mood normal.         Behavior: Behavior normal.         Thought Content: Thought content normal.         Judgment: Judgment normal.         Assessment/Plan   Diagnoses and all orders for this visit:    1. Moderate episode of recurrent major depressive disorder (CMS/HCC) (Primary)  -     sertraline (Zoloft) 50 MG tablet; Take 1 tablet by mouth Daily.  Dispense: 30 tablet; Refill: 2    2. Mixed hyperlipidemia    3. Back muscle spasm  -     tiZANidine (ZANAFLEX) 4 MG tablet; Take 1 tablet by mouth Every 8 (Eight) Hours As Needed for Muscle Spasms.  Dispense: 90 tablet; Refill: 1    4. Anxiety  -     sertraline (Zoloft) 50 MG tablet; Take 1 tablet by mouth Daily.  Dispense: 30 tablet; Refill: 2      Pt had to stop the statins because of muscle ache.              Current Outpatient Medications:   •  aspirin 325 MG tablet, Take 325 mg by mouth Daily., Disp: , Rfl:   •  docusate sodium (COLACE) 100 MG capsule, Take 100 mg by mouth Daily., Disp: , Rfl:   •  estradiol (ESTRACE) 2 MG tablet, Take 1 tablet by mouth once daily (Patient taking differently: Take 2 mg by mouth Daily.), Disp: 30 tablet, Rfl: 2  •  gabapentin (NEURONTIN) 100 MG capsule, Take 2 capsules by mouth 3 (Three) Times a Day for 30 days., Disp: 180 capsule, Rfl: 0  •  HYDROcodone-acetaminophen (Norco) 7.5-325 MG per tablet, Take 1 tablet by mouth Every 6 (Six) Hours As Needed for Moderate Pain ., Disp: 45 tablet, Rfl: 0  •   lisinopril-hydrochlorothiazide (PRINZIDE,ZESTORETIC) 20-12.5 MG per tablet, Take 1 tablet by mouth 2 (two) times a day., Disp: 180 tablet, Rfl: 1  •  montelukast (SINGULAIR) 10 MG tablet, Take 1 tablet by mouth Every Night., Disp: 30 tablet, Rfl: 6  •  ondansetron ODT (Zofran ODT) 4 MG disintegrating tablet, Place 1 tablet on the tongue Every 8 (Eight) Hours As Needed for Nausea or Vomiting., Disp: 40 tablet, Rfl: 2  •  potassium chloride (KLOR-CON) 10 MEQ CR tablet, Take 1 tablet by mouth Daily. For potassium, Disp: 90 tablet, Rfl: 1  •  sertraline (Zoloft) 50 MG tablet, Take 1 tablet by mouth Daily., Disp: 30 tablet, Rfl: 2  •  tiZANidine (ZANAFLEX) 4 MG tablet, Take 1 tablet by mouth Every 8 (Eight) Hours As Needed for Muscle Spasms., Disp: 90 tablet, Rfl: 1  •  Ultram 50 MG tablet, Take 1 tablet by mouth Every 4 (Four) Hours As Needed for Moderate Pain ., Disp: 60 tablet, Rfl: 0  No current facility-administered medications for this visit.     Facility-Administered Medications Ordered in Other Visits:   •  mupirocin (BACTROBAN) 2 % nasal ointment, , Nasal, BID, Kassidy Camilo PA-C    Return in about 3 months (around 3/11/2021), or if symptoms worsen or fail to improve, for Recheck anxiety, depression, lipid.

## 2021-01-14 ENCOUNTER — OFFICE VISIT (OUTPATIENT)
Dept: NEUROSURGERY | Facility: CLINIC | Age: 56
End: 2021-01-14

## 2021-01-14 VITALS — WEIGHT: 212 LBS | BODY MASS INDEX: 35.32 KG/M2 | HEIGHT: 65 IN

## 2021-01-14 DIAGNOSIS — M19.90 ARTHRITIS: ICD-10-CM

## 2021-01-14 DIAGNOSIS — E66.9 OBESITY (BMI 35.0-39.9 WITHOUT COMORBIDITY): ICD-10-CM

## 2021-01-14 DIAGNOSIS — E66.01 CLASS 2 SEVERE OBESITY DUE TO EXCESS CALORIES WITH SERIOUS COMORBIDITY AND BODY MASS INDEX (BMI) OF 35.0 TO 35.9 IN ADULT (HCC): ICD-10-CM

## 2021-01-14 DIAGNOSIS — M51.36 DEGENERATION OF L4-L5 INTERVERTEBRAL DISC: Primary | ICD-10-CM

## 2021-01-14 DIAGNOSIS — M51.26 HNP (HERNIATED NUCLEUS PULPOSUS), LUMBAR: ICD-10-CM

## 2021-01-14 PROCEDURE — 99024 POSTOP FOLLOW-UP VISIT: CPT | Performed by: PHYSICIAN ASSISTANT

## 2021-01-14 RX ORDER — GABAPENTIN 100 MG/1
200 CAPSULE ORAL 3 TIMES DAILY
Qty: 180 CAPSULE | Refills: 0 | Status: SHIPPED | OUTPATIENT
Start: 2021-01-14 | End: 2021-01-18 | Stop reason: SDUPTHER

## 2021-01-14 NOTE — PROGRESS NOTES
Patient ID: Lizette Keith is a 55 y.o. female  [id number]    You have chosen to receive care through a telephone visit. Do you consent to use a telephone visit for your medical care today? YES    CC: Back pain, improvement in right leg pain    History of Present Illness   Patient is status post L4-5 right discectomy on 11/25/2020.  On today's telemedicine visit the patient reports that she is up ambulating she has not been progressing her activities because of ongoing low back pain which she does understand was not guaranteed to improve with surgery but she does have improvement in her right leg pain, her pain has not completely resolved but it is much better.  She been careful with her activities of ADLs.    Past Medical History:   Diagnosis Date   • Allergic    • Anemia     during pregnancy   • Anxiety    • Arthritis    • Blood in urine    • Chronic fatigue    • Colon polyp 02/2018    7 at last colonoscopy   • Degeneration of L4-L5 intervertebral disc 12/10/2019   • Depression    • Elevated cholesterol    • Endometriosis    • Fibromyalgia, primary    • Fracture    • GERD (gastroesophageal reflux disease)    • H/O mammogram 02/2018   • Headache    • Herniated intervertebral disc of lumbar spine     l4-l5   • HL (hearing loss)    • Hypertension    • Irritable bowel syndrome    • Pap smear for cervical cancer screening 2018?   • Wears glasses        Allergies   Allergen Reactions   • Statins Myalgia   • Erythromycin GI Intolerance     Tolerates zpak   • Lipitor [Atorvastatin Calcium] Myalgia   • Penicillins Hives     States ok with keflex   • Pravastatin Myalgia   • Relafen [Nabumetone] GI Intolerance   • Cymbalta [Duloxetine Hcl] Rash       Current Outpatient Medications:   •  aspirin 325 MG tablet, Take 325 mg by mouth Daily., Disp: , Rfl:   •  docusate sodium (COLACE) 100 MG capsule, Take 100 mg by mouth Daily., Disp: , Rfl:   •  estradiol (ESTRACE) 2 MG tablet, Take 1 tablet by mouth once daily (Patient  taking differently: Take 2 mg by mouth Daily.), Disp: 30 tablet, Rfl: 2  •  gabapentin (NEURONTIN) 100 MG capsule, Take 2 capsules by mouth 3 (Three) Times a Day for 30 days., Disp: 180 capsule, Rfl: 0  •  HYDROcodone-acetaminophen (Norco) 7.5-325 MG per tablet, Take 1 tablet by mouth Every 6 (Six) Hours As Needed for Moderate Pain ., Disp: 45 tablet, Rfl: 0  •  lisinopril-hydrochlorothiazide (PRINZIDE,ZESTORETIC) 20-12.5 MG per tablet, Take 1 tablet by mouth 2 (two) times a day., Disp: 180 tablet, Rfl: 1  •  montelukast (SINGULAIR) 10 MG tablet, Take 1 tablet by mouth Every Night., Disp: 30 tablet, Rfl: 6  •  ondansetron ODT (Zofran ODT) 4 MG disintegrating tablet, Place 1 tablet on the tongue Every 8 (Eight) Hours As Needed for Nausea or Vomiting., Disp: 40 tablet, Rfl: 2  •  potassium chloride (KLOR-CON) 10 MEQ CR tablet, Take 1 tablet by mouth Daily. For potassium, Disp: 90 tablet, Rfl: 1  •  sertraline (Zoloft) 50 MG tablet, Take 1 tablet by mouth Daily., Disp: 30 tablet, Rfl: 2  •  tiZANidine (ZANAFLEX) 4 MG tablet, Take 1 tablet by mouth Every 8 (Eight) Hours As Needed for Muscle Spasms., Disp: 90 tablet, Rfl: 1  •  Ultram 50 MG tablet, Take 1 tablet by mouth Every 4 (Four) Hours As Needed for Moderate Pain ., Disp: 60 tablet, Rfl: 0  No current facility-administered medications for this visit.     Facility-Administered Medications Ordered in Other Visits:   •  mupirocin (BACTROBAN) 2 % nasal ointment, , Nasal, BID, Ton, Kassidy RIZZO PA-C  Social History     Tobacco Use   • Smoking status: Current Every Day Smoker     Packs/day: 0.50     Years: 34.00     Pack years: 17.00     Types: Cigarettes   • Smokeless tobacco: Never Used   • Tobacco comment: 0.5-1ppd   Substance Use Topics   • Alcohol use: No   • Drug use: No     Review of Systems   Constitutional: Negative for activity change, appetite change, chills, diaphoresis, fatigue, fever and unexpected weight change.   HENT: Negative for congestion, dental  "problem, drooling, ear discharge, ear pain, facial swelling, hearing loss, mouth sores, nosebleeds, postnasal drip, rhinorrhea, sinus pressure, sinus pain, sneezing, sore throat, tinnitus, trouble swallowing and voice change.    Eyes: Negative for photophobia, pain, discharge, redness, itching and visual disturbance.   Respiratory: Negative for apnea, cough, choking, chest tightness, shortness of breath, wheezing and stridor.    Cardiovascular: Negative for chest pain, palpitations and leg swelling.   Gastrointestinal: Negative for abdominal distention, abdominal pain, anal bleeding, blood in stool, constipation, diarrhea, nausea, rectal pain and vomiting.   Endocrine: Negative for cold intolerance, heat intolerance, polydipsia, polyphagia and polyuria.   Genitourinary: Negative for decreased urine volume, difficulty urinating, dyspareunia, dysuria, enuresis, flank pain, frequency, genital sores, hematuria, menstrual problem, pelvic pain, urgency, vaginal bleeding, vaginal discharge and vaginal pain.   Musculoskeletal: Positive for back pain. Negative for arthralgias, gait problem, joint swelling, myalgias, neck pain and neck stiffness.   Skin: Negative for color change, pallor, rash and wound.   Allergic/Immunologic: Negative for environmental allergies, food allergies and immunocompromised state.   Neurological: Negative for dizziness, tremors, seizures, syncope, facial asymmetry, speech difficulty, weakness, light-headedness, numbness and headaches.   Hematological: Negative for adenopathy. Does not bruise/bleed easily.   Psychiatric/Behavioral: Negative for agitation, behavioral problems, confusion, decreased concentration, dysphoric mood, hallucinations, self-injury, sleep disturbance and suicidal ideas. The patient is not nervous/anxious and is not hyperactive.         Ht 165.1 cm (65\")   Wt 96.2 kg (212 lb)   BMI 35.28 kg/m²     PE:  Patient sounds well on the phone, no cough, wheezing or shortness of " breath is detected.    Neurologic Exam   Ms. Keith reports that she is up and she is ambulating she has stiffness in her lower back but she does not have weakness in her leg.    Radiological data:  I have reviewed the MRI scan that we had prior to surgery which shows mild degenerative disc at L3-4 and degenerative disc at L4-5.  She does have mild bilateral facet hypertrophy and narrowing which is consistent with degenerative osteoarthritis.    Medical Decision Making:   Impression:  1.  Degenerative disc disease, L3-4 and L4-5 worse at L4-5.  She has had improvement in her right leg pain but it is not resolved since surgery.  2.  Degenerative osteoarthritis  3.  Obesity, BMI 35.28-we have discussed her weight and the absolute necessity that she get on an exercise program and lose some of this weight in an attempt to improve her back pain.  4.  Patient is ready to attend outpatient physical therapy to work on Aiyana exercises and strengthening of the core and lower extremities.  Depending on how she does over the next month with physical therapy we will make determination of whether she should be sent for an epidural steroid injection in an attempt to improve her pain, she is in agreement with this plan.  We will mail her a prescription for physical therapy and she will go to someone and her area, she has recently moved to [place].  5.  Telemedicine visit in 4 weeks to discuss progress with physical therapy and weight loss and determine if epidural steroid injection is our next step.  6.  Continue her gabapentin of 200 mg 3 times daily for ongoing leg pain.  I have told her we should continue this for the next month and see how she does with therapy.  She knows that we would like to get her off this medication if at all possible.  And she is in agreement.      Patient's Body mass index is 35.28 kg/m². BMI is above normal parameters. Recommendations include: educational material, exercise counseling, nutrition  counseling and I have made referral to physical therapy.       Social History    Tobacco Use      Smoking status: Current Every Day Smoker        Packs/day: 0.50        Years: 34.00        Pack years: 17        Types: Cigarettes      Smokeless tobacco: Never Used      Tobacco comment: 0.5-1ppd       This visit has been rescheduled as a phone visit to comply with patient safety concerns in accordance with CDC recommendation.  Total time of discussion was 20 minutes.    Prisca Camilo, PAC

## 2021-01-18 ENCOUNTER — TELEPHONE (OUTPATIENT)
Dept: NEUROSURGERY | Facility: CLINIC | Age: 56
End: 2021-01-18

## 2021-01-18 DIAGNOSIS — M51.26 HNP (HERNIATED NUCLEUS PULPOSUS), LUMBAR: ICD-10-CM

## 2021-01-18 DIAGNOSIS — M51.36 DEGENERATION OF L4-L5 INTERVERTEBRAL DISC: ICD-10-CM

## 2021-01-18 RX ORDER — GABAPENTIN 100 MG/1
200 CAPSULE ORAL 3 TIMES DAILY
Qty: 180 CAPSULE | Refills: 0 | Status: SHIPPED | OUTPATIENT
Start: 2021-01-18 | End: 2021-03-02

## 2021-01-18 NOTE — TELEPHONE ENCOUNTER
Caller:  CAILIN TOPETE    Relationship: SELF    Best call back number: [phone]    Medication needed: GABAPENTIN   Requested Prescriptions      No prescriptions requested or ordered in this encounter       When do you need the refill by: ASAP     What details did the patient provide when requesting the medication: PATIENT HAS BEEN TRYING TO WEAN OFF THE GABAPENTIN BUT IS STILL HAVING SIGNIFICANT NERVE PAIN. SHE SAW CAILIN CALERO ON 1/14 AND WAS TOLD A REFILL WOULD BE CALLED IN FOR THE GABAPENTIN BUT WHEN SHE SPOKE WITH HER PHARMACY ON Saturday THEY SAID THEY DID NOT HAVE A REFILL FOR HER.     Does the patient have less than a 3 day supply:  [x] Yes  [] No    What is the patient's preferred pharmacy:  [facility], [address]

## 2021-02-03 ENCOUNTER — TELEPHONE (OUTPATIENT)
Dept: NEUROSURGERY | Facility: CLINIC | Age: 56
End: 2021-02-03

## 2021-02-03 NOTE — TELEPHONE ENCOUNTER
Provider:  Prisca VALDEZ  Caller: patient  Time of call:   12:01  Phone #:  [phone]  Surgery:  Lumbar discectomy  Surgery Date:  11/25/20  Last visit:   01/14/21  Next visit: 02/11/21    MOSHE:         Reason for call:     Patient called and said she is suppose to start Physical therapy on the 10 th, however she has had increased back and leg pain since her visit.    She wants to know if she should wait until after her follow-up with Prisca VALDEZ before she starts PT?

## 2021-02-04 ENCOUNTER — HOSPITAL ENCOUNTER (OUTPATIENT)
Dept: MAMMOGRAPHY | Facility: HOSPITAL | Age: 56
Discharge: HOME | End: 2021-02-04
Admitting: FAMILY MEDICINE

## 2021-02-04 DIAGNOSIS — Z12.31 ENCOUNTER FOR SCREENING MAMMOGRAM FOR MALIGNANT NEOPLASM OF BREAST: ICD-10-CM

## 2021-02-04 PROCEDURE — 77067 SCR MAMMO BI INCL CAD: CPT | Performed by: RADIOLOGY

## 2021-02-04 PROCEDURE — 77063 BREAST TOMOSYNTHESIS BI: CPT | Performed by: RADIOLOGY

## 2021-02-04 PROCEDURE — 77067 SCR MAMMO BI INCL CAD: CPT

## 2021-02-04 PROCEDURE — 77063 BREAST TOMOSYNTHESIS BI: CPT

## 2021-02-15 ENCOUNTER — APPOINTMENT (OUTPATIENT)
Dept: PREADMISSION TESTING | Facility: HOSPITAL | Age: 56
End: 2021-02-15

## 2021-03-02 ENCOUNTER — OFFICE VISIT (OUTPATIENT)
Dept: NEUROSURGERY | Facility: CLINIC | Age: 56
End: 2021-03-02

## 2021-03-02 VITALS — BODY MASS INDEX: 35.32 KG/M2 | WEIGHT: 212 LBS | HEIGHT: 65 IN

## 2021-03-02 DIAGNOSIS — M51.36 DEGENERATION OF L4-L5 INTERVERTEBRAL DISC: ICD-10-CM

## 2021-03-02 DIAGNOSIS — F32.A DEPRESSION, UNSPECIFIED DEPRESSION TYPE: ICD-10-CM

## 2021-03-02 DIAGNOSIS — M79.7 FIBROMYALGIA: ICD-10-CM

## 2021-03-02 DIAGNOSIS — M47.26 OSTEOARTHRITIS OF SPINE WITH RADICULOPATHY, LUMBAR REGION: Primary | ICD-10-CM

## 2021-03-02 DIAGNOSIS — F41.9 ANXIETY: ICD-10-CM

## 2021-03-02 DIAGNOSIS — E66.9 OBESITY (BMI 35.0-39.9 WITHOUT COMORBIDITY): ICD-10-CM

## 2021-03-02 DIAGNOSIS — R23.2 HOT FLASHES: ICD-10-CM

## 2021-03-02 DIAGNOSIS — M51.26 HNP (HERNIATED NUCLEUS PULPOSUS), LUMBAR: ICD-10-CM

## 2021-03-02 DIAGNOSIS — R53.81 PHYSICAL DECONDITIONING: ICD-10-CM

## 2021-03-02 DIAGNOSIS — M19.90 ARTHRITIS: ICD-10-CM

## 2021-03-02 PROCEDURE — 99443 PR PHYS/QHP TELEPHONE EVALUATION 21-30 MIN: CPT | Performed by: PHYSICIAN ASSISTANT

## 2021-03-02 RX ORDER — MELOXICAM 15 MG/1
15 TABLET ORAL DAILY
Qty: 60 TABLET | Refills: 2 | Status: SHIPPED | OUTPATIENT
Start: 2021-03-02 | End: 2021-03-03 | Stop reason: SDUPTHER

## 2021-03-02 RX ORDER — GABAPENTIN 300 MG/1
300 CAPSULE ORAL 3 TIMES DAILY
Qty: 90 CAPSULE | Refills: 1 | Status: SHIPPED | OUTPATIENT
Start: 2021-03-02 | End: 2021-03-03 | Stop reason: SDUPTHER

## 2021-03-02 NOTE — PROGRESS NOTES
Patient ID: Lizette Keith is a 55 y.o. female  [id number]    You have chosen to receive care through a telephone visit. Do you consent to use a telephone visit for your medical care today? YES    CC: back and leg pain worse    History of Present Illness   Back and leg pain is back to the same intensity as prior to surgery. She remembers walking her dogs in January and the dogs jerked the leash and that is when her pain got worse. Her leg pain is worse than the back. Back pain is worse with bending doing dishes and laundry. Standing after 10 min her pain starts. Her pain is worse in the am. She hurts by the time she gets to the back of the [employer]. She has been to 2 visits of PT.    Past Medical History:   Diagnosis Date   • Allergic    • Anemia     during pregnancy   • Anxiety    • Arthritis    • Blood in urine    • Chronic fatigue    • Colon polyp 02/2018    7 at last colonoscopy   • Degeneration of L4-L5 intervertebral disc 12/10/2019   • Depression    • Elevated cholesterol    • Endometriosis    • Fibromyalgia, primary    • Fracture    • GERD (gastroesophageal reflux disease)    • H/O mammogram 02/2018   • Headache    • Herniated intervertebral disc of lumbar spine     l4-l5   • HL (hearing loss)    • Hypertension    • Irritable bowel syndrome    • Pap smear for cervical cancer screening 2018?   • Wears glasses        Allergies   Allergen Reactions   • Statins Myalgia   • Erythromycin GI Intolerance     Tolerates zpak   • Lipitor [Atorvastatin Calcium] Myalgia   • Penicillins Hives     States ok with keflex   • Pravastatin Myalgia   • Relafen [Nabumetone] GI Intolerance   • Sertraline Other (See Comments)     Restless leg   • Cymbalta [Duloxetine Hcl] Rash       Current Outpatient Medications:   •  aspirin 325 MG tablet, Take 325 mg by mouth Daily., Disp: , Rfl:   •  docusate sodium (COLACE) 100 MG capsule, Take 100 mg by mouth Daily., Disp: , Rfl:   •  estradiol (ESTRACE) 2 MG tablet, Take 1 tablet  by mouth once daily (Patient taking differently: Take 2 mg by mouth Daily.), Disp: 30 tablet, Rfl: 2  •  HYDROcodone-acetaminophen (Norco) 7.5-325 MG per tablet, Take 1 tablet by mouth Every 6 (Six) Hours As Needed for Moderate Pain ., Disp: 45 tablet, Rfl: 0  •  lisinopril-hydrochlorothiazide (PRINZIDE,ZESTORETIC) 20-12.5 MG per tablet, Take 1 tablet by mouth 2 (two) times a day., Disp: 180 tablet, Rfl: 1  •  montelukast (SINGULAIR) 10 MG tablet, Take 1 tablet by mouth Every Night., Disp: 30 tablet, Rfl: 6  •  ondansetron ODT (Zofran ODT) 4 MG disintegrating tablet, Place 1 tablet on the tongue Every 8 (Eight) Hours As Needed for Nausea or Vomiting., Disp: 40 tablet, Rfl: 2  •  potassium chloride (KLOR-CON) 10 MEQ CR tablet, Take 1 tablet by mouth Daily. For potassium, Disp: 90 tablet, Rfl: 1  •  predniSONE (DELTASONE) 20 MG tablet, 3 tabs for 3 days, 2 tabs for 3 days, 1 tab for 3 days, Disp: 18 tablet, Rfl: 0  •  sertraline (Zoloft) 50 MG tablet, Take 1 tablet by mouth Daily., Disp: 30 tablet, Rfl: 2  •  tiZANidine (ZANAFLEX) 4 MG tablet, Take 1 tablet by mouth Every 8 (Eight) Hours As Needed for Muscle Spasms., Disp: 90 tablet, Rfl: 1  •  Ultram 50 MG tablet, Take 1 tablet by mouth Every 4 (Four) Hours As Needed for Moderate Pain ., Disp: 60 tablet, Rfl: 0  •  gabapentin (NEURONTIN) 100 MG capsule, Take 2 capsules by mouth 3 (Three) Times a Day for 30 days., Disp: 180 capsule, Rfl: 0  No current facility-administered medications for this visit.     Facility-Administered Medications Ordered in Other Visits:   •  mupirocin (BACTROBAN) 2 % nasal ointment, , Nasal, BID, Croucher, Kassidy RIZZO PA-C  Social History     Tobacco Use   • Smoking status: Current Every Day Smoker     Packs/day: 0.50     Years: 34.00     Pack years: 17.00     Types: Cigarettes   • Smokeless tobacco: Never Used   • Tobacco comment: 0.5-1ppd   Substance Use Topics   • Alcohol use: No   • Drug use: No     Review of Systems   Constitutional:  Negative for activity change, appetite change, chills, diaphoresis, fatigue, fever and unexpected weight change.   HENT: Negative for congestion, dental problem, drooling, ear discharge, ear pain, facial swelling, hearing loss, mouth sores, nosebleeds, postnasal drip, rhinorrhea, sinus pressure, sinus pain, sneezing, sore throat, tinnitus, trouble swallowing and voice change.    Eyes: Negative for photophobia, pain, discharge, redness, itching and visual disturbance.   Respiratory: Negative for apnea, cough, choking, chest tightness, shortness of breath, wheezing and stridor.    Cardiovascular: Negative for chest pain, palpitations and leg swelling.   Gastrointestinal: Negative for abdominal distention, abdominal pain, anal bleeding, blood in stool, constipation, diarrhea, nausea, rectal pain and vomiting.   Endocrine: Negative for cold intolerance, heat intolerance, polydipsia, polyphagia and polyuria.   Genitourinary: Negative for decreased urine volume, difficulty urinating, dyspareunia, dysuria, enuresis, flank pain, frequency, genital sores, hematuria, menstrual problem, pelvic pain, urgency, vaginal bleeding, vaginal discharge and vaginal pain.   Musculoskeletal: Positive for back pain, gait problem (right leg pain) and joint swelling. Negative for arthralgias, myalgias, neck pain and neck stiffness.   Skin: Negative for color change, pallor, rash and wound.   Allergic/Immunologic: Negative for environmental allergies, food allergies and immunocompromised state.   Neurological: Negative for dizziness, tremors, seizures, syncope, facial asymmetry, speech difficulty, weakness, light-headedness, numbness and headaches.   Hematological: Negative for adenopathy. Does not bruise/bleed easily.   Psychiatric/Behavioral: Positive for sleep disturbance. Negative for agitation, behavioral problems, confusion, decreased concentration, dysphoric mood, hallucinations, self-injury and suicidal ideas. The patient is not  "nervous/anxious and is not hyperactive.         Ht 165.1 cm (65\")   Wt 96.2 kg (212 lb)   BMI 35.28 kg/m²     PE:  A/A/C    Neurologic Exam   Ambulating without weakness    Medical Decision Making:   Impression:  1.  Recurrent back and leg pain  2.  Degenerative disc disease L4-5  3.  Osteoarthritis  4.  Obesity-BMI 35.28  5.  Physical deconditioning  6.  Depression  7.  Anxiety  8.  Fibromyalgia  9.  Plantar fasciitis-both feet      Plan:  The patient reports that she was walking the dogs in mid January and they pulled on the leash and jerked her and since that time she has had increased back and leg pain she reports the pain is to the intensity as it was prior to surgery.  She reports the leg pain is worse than the back pain.  She has only been to 2 visits of physical therapy and plans on continuing with that program in an attempt to improve her symptoms.  We have discussed the necessity of weight loss, core strengthening, appropriate activities and restrictions with bending.  She has recently been for custom orthotics for bilateral plantar fasciitis.  We will have a telephone follow-up visit in 4 weeks.  I have increased her Neurontin to 300 mg 3 times daily and changed her anti-inflammatory medication to Mobic 15 mg once a day.  I did tell her that should her symptoms change between now and her follow-up appointment to give me a call in the office.,  She will need a new MRI scan of the lumbar spine with and without contrast as well as flexion-extension x-rays of the lumbar spine.    Patient's Body mass index is 35.28 kg/m². BMI is above normal parameters. Recommendations include: exercise counseling.       Social History    Tobacco Use      Smoking status: Current Every Day Smoker        Packs/day: 0.50        Years: 34.00        Pack years: 17        Types: Cigarettes      Smokeless tobacco: Never Used      Tobacco comment: 0.5-1ppd       This visit has been rescheduled as a phone visit to comply with patient " safety concerns in accordance with CDC recommendation.  Total time of discussion was 30 minutes.    Prisca Camilo, AVA

## 2021-03-03 DIAGNOSIS — M51.26 HNP (HERNIATED NUCLEUS PULPOSUS), LUMBAR: ICD-10-CM

## 2021-03-03 DIAGNOSIS — M51.36 DEGENERATION OF L4-L5 INTERVERTEBRAL DISC: ICD-10-CM

## 2021-03-03 DIAGNOSIS — M47.26 OSTEOARTHRITIS OF SPINE WITH RADICULOPATHY, LUMBAR REGION: ICD-10-CM

## 2021-03-03 DIAGNOSIS — E66.9 OBESITY (BMI 35.0-39.9 WITHOUT COMORBIDITY): ICD-10-CM

## 2021-03-03 RX ORDER — MELOXICAM 15 MG/1
15 TABLET ORAL DAILY
Qty: 60 TABLET | Refills: 2 | Status: SHIPPED | OUTPATIENT
Start: 2021-03-03 | End: 2021-03-05

## 2021-03-03 RX ORDER — GABAPENTIN 300 MG/1
300 CAPSULE ORAL 3 TIMES DAILY
Qty: 90 CAPSULE | Refills: 1 | Status: SHIPPED | OUTPATIENT
Start: 2021-03-03 | End: 2021-03-05

## 2021-03-03 NOTE — TELEPHONE ENCOUNTER
Requested Prescriptions     Pending Prescriptions Disp Refills   • gabapentin (NEURONTIN) 300 MG capsule 90 capsule 1     Sig: Take 1 capsule by mouth 3 (Three) Times a Day.   • meloxicam (MOBIC) 15 MG tablet 60 tablet 2     Sig: Take 1 tablet by mouth Daily.           From previous encounter, patient states that she cannot switch to [facility],she has to stay with [facility].     Her Neurontin and Mobic were both sent to [facility]. I called patient to confirm and she states that her insurance has her locked at [facility] and they will not pay if she goes anywhere else. If it is okay, can I cancel the rx at [facility] and request rx for [facility]?    MOSHE:      01/16/2021 Tramadol Hcl 50MG 1967 90 30 PRASANTH ROD [facility] 10-  0720  [facility] 15 1  02/13/2021 Gabapentin 100MG 1965 180 30 ABDIAS SIMEON [facility] 10-  0720  [facility] 1 02/18/2021 Tramadol Hcl  50MG/50MG/50MG/50MG/50MG/  1965 44 15 PRASANTH ROD [facility] 10-  0720  [facility] 15 1

## 2021-03-03 NOTE — TELEPHONE ENCOUNTER
Caller: BALJINDER TOPETE    Relationship: PT    Best call back number: [phone]    Medication needed:     NEURONTIN 300 MG 3X DAY  MOBIC 15 MG 1X DAY    When do you need the refill by: TODAY    What details did the patient provide when requesting the medication: PT IS OUT OF NEURONTIN, MOBIC IS NEW PRESCRIPTION    Does the patient have less than a 3 day supply:  [x] Yes  [] No    What is the patient's preferred pharmacy:      [facility] [address] [place] [address]     [phone]    PT STATES SHE WAS INFORMED SHE CANNOT SWITCH TO [facility], SHE HAS TO STAY WITH [facility]. PT ASKS IF DAKOTAH CALERO CAN RESEND HER RX TO [facility].

## 2021-03-05 RX ORDER — MELOXICAM 15 MG/1
15 TABLET ORAL DAILY
Qty: 60 TABLET | Refills: 2 | Status: SHIPPED | OUTPATIENT
Start: 2021-03-05 | End: 2021-09-23

## 2021-03-05 RX ORDER — GABAPENTIN 300 MG/1
300 CAPSULE ORAL 3 TIMES DAILY
Qty: 90 CAPSULE | Refills: 1 | Status: SHIPPED | OUTPATIENT
Start: 2021-03-05 | End: 2021-04-20 | Stop reason: SDUPTHER

## 2021-03-05 NOTE — TELEPHONE ENCOUNTER
DELETE AFTER REVIEWING: Telephone encounter to be sent to the clinical pool.  If patient has less than a 3 day supply left, send the encounter HIGH Priority.    Caller: PT  Relationship: SELF  Best call back number: [phone]    Medication needed:   Requested Prescriptions     Signed Prescriptions Disp Refills   • gabapentin (NEURONTIN) 300 MG capsule 90 capsule 1     Sig: Take 1 capsule by mouth 3 (Three) Times a Day.     Authorizing Provider: CAILIN CALERO   • meloxicam (MOBIC) 15 MG tablet 60 tablet 2     Sig: Take 1 tablet by mouth Daily.     Authorizing Provider: CAILIN CALERO       When do you need the refill by:ASAP    What details did the patient provide when requesting the medication: PT STATES THAT [employer] LOCKED HER IN AT THE [facility].  SHE CAN NOT GET AHOLD OF [employer]HER TO HAVE THIS CHANGED.  CAN WE PLEASE CANCEL THE RX FOR GABAPENTIN AND MOBIC AT THE [facility] [place] [facility] AND RESEND IT TO [facility]?    Does the patient have less than a 3 day supply:  [x] Yes  [] No    What is the patient's preferred pharmacy:[facility] [id number] [place], [place] [id number] [phone]    PLEASE ADVISE  THANK YOU

## 2021-03-05 NOTE — TELEPHONE ENCOUNTER
Gabapentin and Meloxicam cancelled and pended for re-sign.      Pt's insurance will not approve at pharmacy rxs sent to.  Pt needs rx sent to updated [facility] [address].

## 2021-03-08 ENCOUNTER — APPOINTMENT (OUTPATIENT)
Dept: MAMMOGRAPHY | Facility: HOSPITAL | Age: 56
End: 2021-03-08

## 2021-03-11 ENCOUNTER — OFFICE VISIT (OUTPATIENT)
Dept: INTERNAL MEDICINE | Facility: CLINIC | Age: 56
End: 2021-03-11

## 2021-03-11 VITALS
SYSTOLIC BLOOD PRESSURE: 132 MMHG | BODY MASS INDEX: 37.65 KG/M2 | HEIGHT: 65 IN | HEART RATE: 87 BPM | TEMPERATURE: 97.5 F | OXYGEN SATURATION: 97 % | DIASTOLIC BLOOD PRESSURE: 88 MMHG | WEIGHT: 226 LBS

## 2021-03-11 DIAGNOSIS — E87.6 HYPOKALEMIA: ICD-10-CM

## 2021-03-11 DIAGNOSIS — J30.2 SEASONAL ALLERGIES: ICD-10-CM

## 2021-03-11 DIAGNOSIS — R11.0 NAUSEA: ICD-10-CM

## 2021-03-11 DIAGNOSIS — I10 ESSENTIAL HYPERTENSION: ICD-10-CM

## 2021-03-11 DIAGNOSIS — J01.00 ACUTE MAXILLARY SINUSITIS, RECURRENCE NOT SPECIFIED: Primary | ICD-10-CM

## 2021-03-11 DIAGNOSIS — E11.65 TYPE 2 DIABETES MELLITUS WITH HYPERGLYCEMIA, WITHOUT LONG-TERM CURRENT USE OF INSULIN (HCC): ICD-10-CM

## 2021-03-11 PROCEDURE — 99214 OFFICE O/P EST MOD 30 MIN: CPT | Performed by: FAMILY MEDICINE

## 2021-03-11 RX ORDER — AZITHROMYCIN 250 MG/1
TABLET, FILM COATED ORAL
Qty: 6 TABLET | Refills: 0 | Status: SHIPPED | OUTPATIENT
Start: 2021-03-11 | End: 2021-06-21

## 2021-03-11 RX ORDER — POTASSIUM CHLORIDE 750 MG/1
10 TABLET, FILM COATED, EXTENDED RELEASE ORAL DAILY
Qty: 90 TABLET | Refills: 1 | Status: SHIPPED | OUTPATIENT
Start: 2021-03-11 | End: 2021-09-23 | Stop reason: SDUPTHER

## 2021-03-11 RX ORDER — LISINOPRIL AND HYDROCHLOROTHIAZIDE 20; 12.5 MG/1; MG/1
1 TABLET ORAL 2 TIMES DAILY
Qty: 180 TABLET | Refills: 1 | Status: SHIPPED | OUTPATIENT
Start: 2021-03-11 | End: 2021-09-23 | Stop reason: SDUPTHER

## 2021-03-11 RX ORDER — METHOCARBAMOL 750 MG/1
750 TABLET, FILM COATED ORAL 3 TIMES DAILY PRN
COMMUNITY
Start: 2021-02-11 | End: 2021-08-24

## 2021-03-11 RX ORDER — METHYLPREDNISOLONE 4 MG/1
TABLET ORAL
Qty: 21 EACH | Refills: 0 | Status: SHIPPED | OUTPATIENT
Start: 2021-03-11 | End: 2021-06-21

## 2021-03-11 RX ORDER — MONTELUKAST SODIUM 10 MG/1
10 TABLET ORAL NIGHTLY
Qty: 30 TABLET | Refills: 6 | Status: SHIPPED | OUTPATIENT
Start: 2021-03-11 | End: 2021-09-23 | Stop reason: SDUPTHER

## 2021-03-11 RX ORDER — ONDANSETRON 4 MG/1
4 TABLET, ORALLY DISINTEGRATING ORAL EVERY 8 HOURS PRN
Qty: 40 TABLET | Refills: 2 | Status: SHIPPED | OUTPATIENT
Start: 2021-03-11 | End: 2021-09-23 | Stop reason: SDUPTHER

## 2021-03-11 NOTE — PROGRESS NOTES
"Subjective   Lizette Keith is a 55 y.o. female.     Chief Complaint   Patient presents with   • Hypertension     f/u   • Menopause       Visit Vitals  /88 (BP Location: Left arm, Patient Position: Sitting, Cuff Size: Large Adult)   Pulse 87   Temp 97.5 °F (36.4 °C) (Infrared)   Ht 165.1 cm (65\")   Wt 103 kg (226 lb)   SpO2 97%   BMI 37.61 kg/m²       Wt Readings from Last 3 Encounters:   03/11/21 103 kg (226 lb)   03/02/21 96.2 kg (212 lb)   01/14/21 96.2 kg (212 lb)       Hypertension  This is a chronic problem. The current episode started more than 1 year ago. The problem has been rapidly improving since onset. The problem is controlled. Associated symptoms include malaise/fatigue. Pertinent negatives include no anxiety, blurred vision, chest pain, headaches, neck pain, orthopnea, palpitations, peripheral edema, shortness of breath or sweats. Risk factors for coronary artery disease include obesity, family history, dyslipidemia and smoking/tobacco exposure. Current antihypertension treatment includes ACE inhibitors and diuretics. The current treatment provides significant improvement. There is no history of angina, kidney disease, CAD/MI, CVA, heart failure, left ventricular hypertrophy, PVD or retinopathy. There is no history of sleep apnea or a thyroid problem.   Sinusitis  This is a new problem. The current episode started in the past 7 days. The problem has been gradually worsening since onset. There has been no fever. The pain is moderate. Associated symptoms include sinus pressure and swollen glands. Pertinent negatives include no chills, congestion, coughing, diaphoresis, ear pain, headaches, hoarse voice, neck pain, shortness of breath, sneezing or sore throat. Past treatments include oral decongestants. The treatment provided no relief.      Pt is still smoking 1/2 ppd.  Pt will get a new mammogram on 3/18/21.  Pt has been taking estradiol for menopause. Pt will get elevated blood pressure " after a week or 2 off.     Pt is in pain management for back and hip pain. Pt has not gotten disability and is living with her daughter.   Pt thinks that she is have a sinus infection./    Pt feels that depression is better.     Pt has had elevated HGA1c in the past, daughter was in ER last week with glucose over 500.   Patient is concerned about her blood sugars.  Patient has gained weight since last seen.  Patient has limited range of motion for walking and exercise because of the pain in her back and hips.     The following portions of the patient's history were reviewed and updated as appropriate: allergies, current medications, past family history, past medical history, past social history, past surgical history and problem list.    Past Medical History:   Diagnosis Date   • Allergic    • Anemia     during pregnancy   • Anxiety    • Arthritis    • Blood in urine    • Chronic fatigue    • Colon polyp 02/2018    7 at last colonoscopy   • Degeneration of L4-L5 intervertebral disc 12/10/2019   • Depression    • Elevated cholesterol    • Endometriosis    • Fibromyalgia, primary    • Fracture    • GERD (gastroesophageal reflux disease)    • H/O mammogram 02/2018   • Headache    • Herniated intervertebral disc of lumbar spine     l4-l5   • HL (hearing loss)    • Hypertension    • Irritable bowel syndrome    • Pap smear for cervical cancer screening 2018?   • Wears glasses       Past Surgical History:   Procedure Laterality Date   • CARDIAC CATHETERIZATION  11/13/2020    Dr Salas, EF 60%, no significant stenosis   • CARDIAC CATHETERIZATION N/A 11/13/2020    Procedure: Left Heart Cath;  Surgeon: Sai Salas MD;  Location: [facility] CATH INVASIVE LOCATION;  Service: Cardiology;  Laterality: N/A;   • CHOLECYSTECTOMY     • COLONOSCOPY  02/06/2018    3 year f/u polypectomy Dr MARTA Waller   • HYSTERECTOMY     • INNER EAR SURGERY     • LUMBAR DISCECTOMY Right 11/25/2020    Procedure: LUMBAR DISCECTOMY L4-5 RIGHT;  Surgeon:  Qasim Kelsey MD;  Location: [facility];  Service: Neurosurgery;  Laterality: Right;   • OOPHORECTOMY     • TEMPOROMANDIBULAR JOINT ARTHROPLASTY  1984   • TONSILLECTOMY     • URETHRA SURGERY      as a baby       Family History   Problem Relation Age of Onset   • Melanoma Mother         eye   • Diabetes Mother    • Heart disease Mother    • Arthritis Mother    • Depression Mother    • Colon polyps Mother    • Thyroid disease Mother    • Hyperlipidemia Mother    • Cancer Mother    • Heart attack Mother    • Migraines Mother    • Cancer Father         pancreatic and liver   • Diabetes Father    • Depression Father    • Colon polyps Father    • Bipolar disorder Father         split personality   • ADD / ADHD Son    • Depression Son    • Diabetes Maternal Aunt    • Diabetes Maternal Uncle    • Diabetes Paternal Aunt    • Diabetes Paternal Uncle    • Cancer Maternal Grandmother         uterine/cervical   • Heart disease Maternal Grandfather    • Heart disease Paternal Grandfather    • Migraines Sister    • Bipolar disorder Daughter    • Coronary artery disease Brother    • Breast cancer Neg Hx    • Ovarian cancer Neg Hx       Social History     Socioeconomic History   • Marital status:      Spouse name: Not on file   • Number of children: Not on file   • Years of education: Not on file   • Highest education level: Not on file   Tobacco Use   • Smoking status: Current Every Day Smoker     Packs/day: 0.50     Years: 34.00     Pack years: 17.00     Types: Cigarettes   • Smokeless tobacco: Never Used   • Tobacco comment: 0.5-1ppd   Vaping Use   • Vaping Use: Never used   Substance and Sexual Activity   • Alcohol use: No   • Drug use: No   • Sexual activity: Not Currently     Birth control/protection: None      Allergies   Allergen Reactions   • Statins Myalgia   • Erythromycin GI Intolerance     Tolerates zpak   • Lipitor [Atorvastatin Calcium] Myalgia   • Penicillins Hives     States ok with keflex   •  Pravastatin Myalgia   • Relafen [Nabumetone] GI Intolerance   • Sertraline Other (See Comments)     Restless leg   • Cymbalta [Duloxetine Hcl] Rash       Review of Systems   Constitutional: Positive for malaise/fatigue. Negative for chills and diaphoresis.   HENT: Positive for sinus pressure. Negative for congestion, ear pain, hoarse voice, sneezing and sore throat.    Eyes: Negative for blurred vision.   Respiratory: Negative for cough and shortness of breath.    Cardiovascular: Negative for chest pain, palpitations and orthopnea.   Musculoskeletal: Negative for neck pain.   Neurological: Negative for headaches.   Psychiatric/Behavioral: Negative for dysphoric mood. The patient is nervous/anxious (since living with her daughter).        Objective   Physical Exam  Vitals and nursing note reviewed.   Constitutional:       Appearance: She is well-developed.   HENT:      Head: Normocephalic.      Right Ear: External ear normal.      Left Ear: External ear normal.      Nose: Nose normal.   Eyes:      General: Lids are normal.      Conjunctiva/sclera: Conjunctivae normal.      Pupils: Pupils are equal, round, and reactive to light.   Neck:      Thyroid: No thyroid mass or thyromegaly.      Vascular: No carotid bruit.      Trachea: Trachea normal.   Cardiovascular:      Rate and Rhythm: Normal rate and regular rhythm.      Heart sounds: No murmur.   Pulmonary:      Effort: Pulmonary effort is normal. No respiratory distress.      Breath sounds: Normal breath sounds. No decreased breath sounds, wheezing, rhonchi or rales.   Chest:      Chest wall: No tenderness.   Abdominal:      General: Bowel sounds are normal.      Palpations: Abdomen is soft.      Tenderness: There is no abdominal tenderness.   Musculoskeletal:         General: Normal range of motion.      Cervical back: Normal range of motion and neck supple.   Skin:     General: Skin is warm and dry.   Neurological:      Mental Status: She is alert and oriented to  person, place, and time.   Psychiatric:         Behavior: Behavior normal.         Assessment/Plan   Diagnoses and all orders for this visit:    1. Acute maxillary sinusitis, recurrence not specified (Primary)    2. Essential hypertension  -     lisinopril-hydrochlorothiazide (PRINZIDE,ZESTORETIC) 20-12.5 MG per tablet; Take 1 tablet by mouth 2 (two) times a day.  Dispense: 180 tablet; Refill: 1  -     Comprehensive Metabolic Panel; Future  -     TSH Rfx On Abnormal To Free T4; Future  -     Lipid Panel; Future  -     CBC & Differential; Future  -     CBC & Differential  -     Lipid Panel  -     TSH Rfx On Abnormal To Free T4  -     Comprehensive Metabolic Panel    3. Hypokalemia  -     potassium chloride (KLOR-CON) 10 MEQ CR tablet; Take 1 tablet by mouth Daily. For potassium  Dispense: 90 tablet; Refill: 1    4. Seasonal allergies  -     montelukast (SINGULAIR) 10 MG tablet; Take 1 tablet by mouth Every Night.  Dispense: 30 tablet; Refill: 6    5. Nausea  -     ondansetron ODT (Zofran ODT) 4 MG disintegrating tablet; Place 1 tablet on the tongue Every 8 (Eight) Hours As Needed for Nausea or Vomiting.  Dispense: 40 tablet; Refill: 2    6. Type 2 diabetes mellitus with hyperglycemia, without long-term current use of insulin (CMS/[facility])  -     Hemoglobin A1c; Future  -     Microalbumin / Creatinine Urine Ratio - Urine, Clean Catch; Future  -     Microalbumin / Creatinine Urine Ratio - Urine, Clean Catch  -     Hemoglobin A1c    Other orders  -     azithromycin (Zithromax Z-Bry) 250 MG tablet; Take 2 tablets the first day, then 1 tablet daily for 4 days.  Dispense: 6 tablet; Refill: 0  -     methylPREDNISolone (MEDROL) 4 MG dose pack; Take as directed on package instructions.  Dispense: 21 each; Refill: 0        Hold estradiol until after mammogram  Patient has been trying to reduce the estradiol on her own to taking it 2-3 times per week.         Current Outpatient Medications:   •  docusate sodium (COLACE) 100 MG  capsule, Take 100 mg by mouth Daily., Disp: , Rfl:   •  estradiol (ESTRACE) 2 MG tablet, Take 1 tablet by mouth once daily (Patient taking differently: Take 2 mg by mouth Daily. Taking every other day), Disp: 30 tablet, Rfl: 2  •  gabapentin (NEURONTIN) 300 MG capsule, Take 1 capsule by mouth 3 (Three) Times a Day., Disp: 90 capsule, Rfl: 1  •  lisinopril-hydrochlorothiazide (PRINZIDE,ZESTORETIC) 20-12.5 MG per tablet, Take 1 tablet by mouth 2 (two) times a day., Disp: 180 tablet, Rfl: 1  •  meloxicam (MOBIC) 15 MG tablet, Take 1 tablet by mouth Daily., Disp: 60 tablet, Rfl: 2  •  montelukast (SINGULAIR) 10 MG tablet, Take 1 tablet by mouth Every Night., Disp: 30 tablet, Rfl: 6  •  ondansetron ODT (Zofran ODT) 4 MG disintegrating tablet, Place 1 tablet on the tongue Every 8 (Eight) Hours As Needed for Nausea or Vomiting., Disp: 40 tablet, Rfl: 2  •  potassium chloride (KLOR-CON) 10 MEQ CR tablet, Take 1 tablet by mouth Daily. For potassium, Disp: 90 tablet, Rfl: 1  •  tiZANidine (ZANAFLEX) 4 MG tablet, Take 1 tablet by mouth Every 8 (Eight) Hours As Needed for Muscle Spasms., Disp: 90 tablet, Rfl: 1  •  Ultram 50 MG tablet, Take 1 tablet by mouth Every 4 (Four) Hours As Needed for Moderate Pain ., Disp: 60 tablet, Rfl: 0  •  azithromycin (Zithromax Z-Bry) 250 MG tablet, Take 2 tablets the first day, then 1 tablet daily for 4 days., Disp: 6 tablet, Rfl: 0  •  methocarbamol (ROBAXIN) 750 MG tablet, Take 750 mg by mouth 3 (Three) Times a Day As Needed., Disp: , Rfl:   •  methylPREDNISolone (MEDROL) 4 MG dose pack, Take as directed on package instructions., Disp: 21 each, Rfl: 0  No current facility-administered medications for this visit.    Facility-Administered Medications Ordered in Other Visits:   •  mupirocin (BACTROBAN) 2 % nasal ointment, , Nasal, BID, Croucher, Kassidy A, PA-C    Return in about 3 months (around 6/11/2021), or if symptoms worsen or fail to improve, for Recheck htn, Annual.

## 2021-03-12 DIAGNOSIS — R74.01 ELEVATED LIVER TRANSAMINASE LEVEL: Primary | ICD-10-CM

## 2021-03-12 PROBLEM — E11.69 DIABETES MELLITUS TYPE 2 IN OBESE: Status: ACTIVE | Noted: 2021-03-11

## 2021-03-12 PROBLEM — E66.9 DIABETES MELLITUS TYPE 2 IN OBESE (HCC): Status: ACTIVE | Noted: 2021-03-11

## 2021-03-12 LAB
ALBUMIN SERPL-MCNC: 4.3 G/DL (ref 3.8–4.9)
ALBUMIN/CREAT UR: 5 MG/G CREAT (ref 0–29)
ALBUMIN/GLOB SERPL: 1.6 {RATIO} (ref 1.2–2.2)
ALP SERPL-CCNC: 156 IU/L (ref 39–117)
ALT SERPL-CCNC: 36 IU/L (ref 0–32)
AST SERPL-CCNC: 50 IU/L (ref 0–40)
BASOPHILS # BLD AUTO: 0.1 X10E3/UL (ref 0–0.2)
BASOPHILS NFR BLD AUTO: 1 %
BILIRUB SERPL-MCNC: <0.2 MG/DL (ref 0–1.2)
BUN SERPL-MCNC: 12 MG/DL (ref 6–24)
BUN/CREAT SERPL: 19 (ref 9–23)
CALCIUM SERPL-MCNC: 9.8 MG/DL (ref 8.7–10.2)
CHLORIDE SERPL-SCNC: 102 MMOL/L (ref 96–106)
CHOLEST SERPL-MCNC: 284 MG/DL (ref 100–199)
CO2 SERPL-SCNC: 21 MMOL/L (ref 20–29)
CREAT SERPL-MCNC: 0.64 MG/DL (ref 0.57–1)
CREAT UR-MCNC: 111.2 MG/DL
EOSINOPHIL # BLD AUTO: 0.2 X10E3/UL (ref 0–0.4)
EOSINOPHIL NFR BLD AUTO: 2 %
ERYTHROCYTE [DISTWIDTH] IN BLOOD BY AUTOMATED COUNT: 13 % (ref 11.7–15.4)
GLOBULIN SER CALC-MCNC: 2.7 G/DL (ref 1.5–4.5)
GLUCOSE SERPL-MCNC: 131 MG/DL (ref 65–99)
HBA1C MFR BLD: 8.1 % (ref 4.8–5.6)
HCT VFR BLD AUTO: 42.2 % (ref 34–46.6)
HDLC SERPL-MCNC: 41 MG/DL
HGB BLD-MCNC: 14.5 G/DL (ref 11.1–15.9)
IMM GRANULOCYTES # BLD AUTO: 0 X10E3/UL (ref 0–0.1)
IMM GRANULOCYTES NFR BLD AUTO: 0 %
LDLC SERPL CALC-MCNC: 176 MG/DL (ref 0–99)
LYMPHOCYTES # BLD AUTO: 3 X10E3/UL (ref 0.7–3.1)
LYMPHOCYTES NFR BLD AUTO: 24 %
MCH RBC QN AUTO: 31.4 PG (ref 26.6–33)
MCHC RBC AUTO-ENTMCNC: 34.4 G/DL (ref 31.5–35.7)
MCV RBC AUTO: 91 FL (ref 79–97)
MICROALBUMIN UR-MCNC: 5.3 UG/ML
MONOCYTES # BLD AUTO: 0.7 X10E3/UL (ref 0.1–0.9)
MONOCYTES NFR BLD AUTO: 6 %
NEUTROPHILS # BLD AUTO: 8.2 X10E3/UL (ref 1.4–7)
NEUTROPHILS NFR BLD AUTO: 67 %
PLATELET # BLD AUTO: 281 X10E3/UL (ref 150–450)
POTASSIUM SERPL-SCNC: 4.5 MMOL/L (ref 3.5–5.2)
PROT SERPL-MCNC: 7 G/DL (ref 6–8.5)
RBC # BLD AUTO: 4.62 X10E6/UL (ref 3.77–5.28)
SODIUM SERPL-SCNC: 138 MMOL/L (ref 134–144)
TRIGL SERPL-MCNC: 344 MG/DL (ref 0–149)
TSH SERPL DL<=0.005 MIU/L-ACNC: 1.62 UIU/ML (ref 0.45–4.5)
VLDLC SERPL CALC-MCNC: 67 MG/DL (ref 5–40)
WBC # BLD AUTO: 12.2 X10E3/UL (ref 3.4–10.8)

## 2021-03-17 DIAGNOSIS — E66.9 DIABETES MELLITUS TYPE 2 IN OBESE (HCC): ICD-10-CM

## 2021-03-17 DIAGNOSIS — E78.5 HYPERLIPIDEMIA, UNSPECIFIED HYPERLIPIDEMIA TYPE: Primary | ICD-10-CM

## 2021-03-17 DIAGNOSIS — E11.69 DIABETES MELLITUS TYPE 2 IN OBESE (HCC): ICD-10-CM

## 2021-03-17 RX ORDER — EZETIMIBE 10 MG/1
10 TABLET ORAL DAILY
Qty: 30 TABLET | Refills: 3 | Status: SHIPPED | OUTPATIENT
Start: 2021-03-17 | End: 2021-06-21 | Stop reason: SDUPTHER

## 2021-03-18 ENCOUNTER — HOSPITAL ENCOUNTER (OUTPATIENT)
Dept: MAMMOGRAPHY | Facility: HOSPITAL | Age: 56
Discharge: HOME | End: 2021-03-18

## 2021-03-18 ENCOUNTER — HOSPITAL ENCOUNTER (OUTPATIENT)
Dept: ULTRASOUND IMAGING | Facility: HOSPITAL | Age: 56
Discharge: HOME | End: 2021-03-18

## 2021-03-18 DIAGNOSIS — R92.8 ABNORMAL MAMMOGRAM: ICD-10-CM

## 2021-03-18 PROCEDURE — 76642 ULTRASOUND BREAST LIMITED: CPT

## 2021-03-18 PROCEDURE — 76642 ULTRASOUND BREAST LIMITED: CPT | Performed by: RADIOLOGY

## 2021-03-18 PROCEDURE — G0279 TOMOSYNTHESIS, MAMMO: HCPCS

## 2021-03-18 PROCEDURE — 77066 DX MAMMO INCL CAD BI: CPT

## 2021-03-18 PROCEDURE — 77066 DX MAMMO INCL CAD BI: CPT | Performed by: RADIOLOGY

## 2021-03-18 PROCEDURE — 77062 BREAST TOMOSYNTHESIS BI: CPT | Performed by: RADIOLOGY

## 2021-03-21 ENCOUNTER — PATIENT MESSAGE (OUTPATIENT)
Dept: INTERNAL MEDICINE | Facility: CLINIC | Age: 56
End: 2021-03-21

## 2021-03-21 DIAGNOSIS — E11.65 TYPE 2 DIABETES MELLITUS WITH HYPERGLYCEMIA, WITHOUT LONG-TERM CURRENT USE OF INSULIN (HCC): Primary | ICD-10-CM

## 2021-03-22 RX ORDER — DAPAGLIFLOZIN 5 MG/1
5 TABLET, FILM COATED ORAL DAILY
Qty: 56 TABLET | Refills: 0 | COMMUNITY
Start: 2021-03-22 | End: 2021-06-21 | Stop reason: SDUPTHER

## 2021-03-22 NOTE — TELEPHONE ENCOUNTER
Anne Diallo MA 3/21/2021 2:09 PM EDT      ----- Message -----  From: Lizette Keith  Sent: 3/21/2021 1:26 PM EDT  To: [facility] [address] Clinical Pool  Subject: Prescription Question     The metformin is not going to work. Made me feel like i had the flu. Can we get me on something else?

## 2021-03-29 ENCOUNTER — PATIENT MESSAGE (OUTPATIENT)
Dept: INTERNAL MEDICINE | Facility: CLINIC | Age: 56
End: 2021-03-29

## 2021-03-29 DIAGNOSIS — M62.830 BACK MUSCLE SPASM: ICD-10-CM

## 2021-03-30 RX ORDER — TIZANIDINE 4 MG/1
4 TABLET ORAL EVERY 8 HOURS PRN
Qty: 90 TABLET | Refills: 1 | Status: SHIPPED | OUTPATIENT
Start: 2021-03-30 | End: 2021-06-21 | Stop reason: SDUPTHER

## 2021-03-30 NOTE — TELEPHONE ENCOUNTER
Brittni Barone MA 3/30/2021 7:25 AM EDT      ----- Message -----  From: Lizette Keith  Sent: 3/29/2021 5:20 PM EDT  To: [facility]  Clinical Pool  Subject: RE: Prescription Question     I usually take the methacarbomal during the day and tizanidine at bedtime.     ----- Message -----  From: Denise Hanks MD  Sent: 3/29/21 4:58 PM  To: Lizette Keith  Subject: RE: Prescription Question    I have tizanidine and methocarbamol listed on your chart. Are you taking both muscle relaxants?      ----- Message -----   From:Lizette Keith   Sent:3/29/2021 12:18 PM EDT   To:Denise Hanks MD   Subject:Prescription Question    Can i get refills on my tizanidine?

## 2021-04-12 ENCOUNTER — IMMUNIZATION (OUTPATIENT)
Dept: VACCINE CLINIC | Facility: HOSPITAL | Age: 56
End: 2021-04-12

## 2021-04-12 PROCEDURE — 0001A: CPT | Performed by: INTERNAL MEDICINE

## 2021-04-12 PROCEDURE — 91300 HC SARSCOV02 VAC 30MCG/0.3ML IM: CPT | Performed by: INTERNAL MEDICINE

## 2021-04-20 ENCOUNTER — OFFICE VISIT (OUTPATIENT)
Dept: NEUROSURGERY | Facility: CLINIC | Age: 56
End: 2021-04-20

## 2021-04-20 VITALS — WEIGHT: 226 LBS | BODY MASS INDEX: 37.65 KG/M2 | HEIGHT: 65 IN

## 2021-04-20 DIAGNOSIS — G47.00 INSOMNIA, UNSPECIFIED TYPE: ICD-10-CM

## 2021-04-20 DIAGNOSIS — E11.69 DIABETES MELLITUS TYPE 2 IN OBESE (HCC): ICD-10-CM

## 2021-04-20 DIAGNOSIS — R53.81 PHYSICAL DECONDITIONING: ICD-10-CM

## 2021-04-20 DIAGNOSIS — F32.A DEPRESSION, UNSPECIFIED DEPRESSION TYPE: ICD-10-CM

## 2021-04-20 DIAGNOSIS — M19.90 ARTHRITIS: ICD-10-CM

## 2021-04-20 DIAGNOSIS — M51.36 DEGENERATION OF L4-L5 INTERVERTEBRAL DISC: ICD-10-CM

## 2021-04-20 DIAGNOSIS — M51.26 HNP (HERNIATED NUCLEUS PULPOSUS), LUMBAR: ICD-10-CM

## 2021-04-20 DIAGNOSIS — R53.82 CHRONIC FATIGUE: ICD-10-CM

## 2021-04-20 DIAGNOSIS — E66.9 OBESITY (BMI 35.0-39.9 WITHOUT COMORBIDITY): Primary | ICD-10-CM

## 2021-04-20 DIAGNOSIS — M79.7 FIBROMYALGIA: ICD-10-CM

## 2021-04-20 DIAGNOSIS — F41.9 ANXIETY: ICD-10-CM

## 2021-04-20 DIAGNOSIS — E66.9 DIABETES MELLITUS TYPE 2 IN OBESE (HCC): ICD-10-CM

## 2021-04-20 DIAGNOSIS — M47.26 OSTEOARTHRITIS OF SPINE WITH RADICULOPATHY, LUMBAR REGION: ICD-10-CM

## 2021-04-20 PROCEDURE — 99442 PR PHYS/QHP TELEPHONE EVALUATION 11-20 MIN: CPT | Performed by: PHYSICIAN ASSISTANT

## 2021-04-20 RX ORDER — GABAPENTIN 300 MG/1
300 CAPSULE ORAL 3 TIMES DAILY
Qty: 90 CAPSULE | Refills: 1 | Status: SHIPPED | OUTPATIENT
Start: 2021-04-20 | End: 2021-09-23 | Stop reason: DRUGHIGH

## 2021-04-20 RX ORDER — MAGNESIUM GLUCONATE 27 MG(500)
27 TABLET ORAL 2 TIMES DAILY
COMMUNITY
End: 2022-08-24

## 2021-04-20 NOTE — PROGRESS NOTES
Patient ID: Lizette Keith is a 55 y.o. female  [id number]    You have chosen to receive care through a telephone visit. Do you consent to use a telephone visit for your medical care today? YES    CC: back and bilateral leg pain    History of Present Illness   Is a very pleasant 55-year-old female with back and bilateral leg pain, right worse than the left.  She has worsening back pain with bending, walking, standing and lifting.  She had a fall in early March with an increase in leg and back pain.  She is doing fairly well with her current medication regimen, she is also seeing pain management on a regular basis.  Overall she is getting by with daily activities but does definitely realize that some days her pain is worse than others.  She does not describe any weakness or loss of bladder function.    Past Medical History:   Diagnosis Date   • Allergic    • Anemia     during pregnancy   • Anxiety    • Arthritis    • Blood in urine    • Chronic fatigue    • Colon polyp 02/2018    7 at last colonoscopy   • Degeneration of L4-L5 intervertebral disc 12/10/2019   • Depression    • Diabetes mellitus type 2 in obese (CMS/[facility]) 03/11/2021   • Elevated cholesterol    • Endometriosis    • Fibromyalgia, primary    • Fracture    • GERD (gastroesophageal reflux disease)    • H/O mammogram 02/2018   • Headache    • Herniated intervertebral disc of lumbar spine     l4-l5   • HL (hearing loss)    • Hypertension    • Irritable bowel syndrome    • Pap smear for cervical cancer screening 2018?   • Wears glasses        Allergies   Allergen Reactions   • Statins Myalgia   • Erythromycin GI Intolerance     Tolerates zpak   • Lipitor [Atorvastatin Calcium] Myalgia   • Metformin Myalgia   • Penicillins Hives     States ok with keflex   • Pravastatin Myalgia   • Relafen [Nabumetone] GI Intolerance   • Sertraline Other (See Comments)     Restless leg   • Cymbalta [Duloxetine Hcl] Rash       Current Outpatient Medications:   •   dapagliflozin (Farxiga) 5 MG tablet tablet, Take 1 tablet by mouth Daily., Disp: 56 tablet, Rfl: 0  •  docusate sodium (COLACE) 100 MG capsule, Take 100 mg by mouth Daily., Disp: , Rfl:   •  ezetimibe (Zetia) 10 MG tablet, Take 1 tablet by mouth Daily., Disp: 30 tablet, Rfl: 3  •  gabapentin (NEURONTIN) 300 MG capsule, Take 1 capsule by mouth 3 (Three) Times a Day., Disp: 90 capsule, Rfl: 1  •  lisinopril-hydrochlorothiazide (PRINZIDE,ZESTORETIC) 20-12.5 MG per tablet, Take 1 tablet by mouth 2 (two) times a day., Disp: 180 tablet, Rfl: 1  •  magnesium gluconate (MAGONATE) 500 MG tablet, Take 27 mg by mouth 2 (Two) Times a Day., Disp: , Rfl:   •  meloxicam (MOBIC) 15 MG tablet, Take 1 tablet by mouth Daily., Disp: 60 tablet, Rfl: 2  •  methocarbamol (ROBAXIN) 750 MG tablet, Take 750 mg by mouth 3 (Three) Times a Day As Needed., Disp: , Rfl:   •  montelukast (SINGULAIR) 10 MG tablet, Take 1 tablet by mouth Every Night., Disp: 30 tablet, Rfl: 6  •  ondansetron ODT (Zofran ODT) 4 MG disintegrating tablet, Place 1 tablet on the tongue Every 8 (Eight) Hours As Needed for Nausea or Vomiting., Disp: 40 tablet, Rfl: 2  •  potassium chloride (KLOR-CON) 10 MEQ CR tablet, Take 1 tablet by mouth Daily. For potassium, Disp: 90 tablet, Rfl: 1  •  tiZANidine (ZANAFLEX) 4 MG tablet, Take 1 tablet by mouth Every 8 (Eight) Hours As Needed for Muscle Spasms., Disp: 90 tablet, Rfl: 1  •  Ultram 50 MG tablet, Take 1 tablet by mouth Every 4 (Four) Hours As Needed for Moderate Pain ., Disp: 60 tablet, Rfl: 0  •  azithromycin (Zithromax Z-Bry) 250 MG tablet, Take 2 tablets the first day, then 1 tablet daily for 4 days., Disp: 6 tablet, Rfl: 0  •  estradiol (ESTRACE) 2 MG tablet, Take 1 tablet by mouth once daily (Patient taking differently: Take 2 mg by mouth Daily. Taking every other day), Disp: 30 tablet, Rfl: 2  •  methylPREDNISolone (MEDROL) 4 MG dose pack, Take as directed on package instructions., Disp: 21 each, Rfl: 0  No current  facility-administered medications for this visit.    Facility-Administered Medications Ordered in Other Visits:   •  mupirocin (BACTROBAN) 2 % nasal ointment, , Nasal, BID, Kassidy Camilo PA-C  Social History     Tobacco Use   • Smoking status: Current Every Day Smoker     Packs/day: 0.50     Years: 34.00     Pack years: 17.00     Types: Cigarettes   • Smokeless tobacco: Never Used   • Tobacco comment: 0.5-1ppd   Vaping Use   • Vaping Use: Never used   Substance Use Topics   • Alcohol use: No   • Drug use: No     Review of Systems   Constitutional: Negative for activity change, appetite change, chills, diaphoresis, fatigue, fever and unexpected weight change.   HENT: Negative for congestion, dental problem, drooling, ear discharge, ear pain, facial swelling, hearing loss, mouth sores, nosebleeds, postnasal drip, rhinorrhea, sinus pressure, sinus pain, sneezing, sore throat, tinnitus, trouble swallowing and voice change.    Eyes: Negative for photophobia, pain, discharge, redness, itching and visual disturbance.   Respiratory: Negative for apnea, cough, choking, chest tightness, shortness of breath, wheezing and stridor.    Cardiovascular: Negative for chest pain, palpitations and leg swelling.   Gastrointestinal: Negative for abdominal distention, abdominal pain, anal bleeding, blood in stool, constipation, diarrhea, nausea, rectal pain and vomiting.   Endocrine: Negative for cold intolerance, heat intolerance, polydipsia, polyphagia and polyuria.   Genitourinary: Negative for decreased urine volume, difficulty urinating, dyspareunia, dysuria, enuresis, flank pain, frequency, genital sores, hematuria, menstrual problem, pelvic pain, urgency, vaginal bleeding, vaginal discharge and vaginal pain.   Musculoskeletal: Negative for arthralgias, back pain, gait problem, joint swelling, myalgias, neck pain and neck stiffness.   Skin: Negative for color change, pallor, rash and wound.   Allergic/Immunologic: Negative  "for environmental allergies, food allergies and immunocompromised state.   Neurological: Negative for dizziness, tremors, seizures, syncope, facial asymmetry, speech difficulty, weakness, light-headedness, numbness and headaches.   Hematological: Negative for adenopathy. Does not bruise/bleed easily.   Psychiatric/Behavioral: Negative for agitation, behavioral problems, confusion, decreased concentration, dysphoric mood, hallucinations, self-injury, sleep disturbance and suicidal ideas. The patient is not nervous/anxious and is not hyperactive.         Ht 165.1 cm (65\")   Wt 103 kg (226 lb)   BMI 37.61 kg/m²     PE:  She sounds well on the phone, no cough wheezing or shortness of breath noted.    Neurologic Exam patient is ambulatory but continues to favor the right leg when she walks.    Independent Review of Radiographic Studies:   I have again reviewed the MRI scan of the lumbar spine which shows a degenerative disc at L3-4, L4-5 with bilateral foraminal narrowing and disc bulging deviating caudal and to the right.      Medical Decision Making:   Impression:  1.  Degenerative osteoarthritis bilateral foraminal narrowing L4-5  2.  Degenerative disc disease L3-4 and L4-5  3.  HNP L4-5, right  4.  Chronic low back pain  5.  Bilateral leg pain consistent with #1, #2 and #3  6  Obesity-BMI 37.6   7.  Physical deconditioning  8.  Anxiety  9.  Insomnia    Plan:  We have again reviewed the findings on MRI scan, we have discussed potential surgical intervention that potentially could include lumbar fusion.  With her chronic back and bilateral leg pain and her involvement in the pain clinic with a regimen that is providing her enough relief that she can perform ADLs I would hesitate to offer surgical intervention at this time.  In the future should anything change she would need a new MRI scan of the lumbar spine and see us in the office for reevaluation.  I would ask if pain management could take over her Neurontin and " anti-inflammatory medications so that she does not have to have yet another appointment.    Patient's Body mass index is 37.61 kg/m². BMI is above normal parameters. Recommendations include: nutrition counseling and referral to primary care.       Social History    Tobacco Use      Smoking status: Current Every Day Smoker        Packs/day: 0.50        Years: 34.00        Pack years: 17        Types: Cigarettes      Smokeless tobacco: Never Used      Tobacco comment: 0.5-1ppd       This visit has been rescheduled as a phone visit to comply with patient safety concerns in accordance with CDC recommendation.  Total time of discussion was 20 minutes.    Prisca Camilo, PAC

## 2021-05-03 ENCOUNTER — IMMUNIZATION (OUTPATIENT)
Dept: VACCINE CLINIC | Facility: HOSPITAL | Age: 56
End: 2021-05-03

## 2021-05-03 PROCEDURE — 0002A: CPT | Performed by: INTERNAL MEDICINE

## 2021-05-03 PROCEDURE — 91300 HC SARSCOV02 VAC 30MCG/0.3ML IM: CPT | Performed by: INTERNAL MEDICINE

## 2021-06-21 ENCOUNTER — OFFICE VISIT (OUTPATIENT)
Dept: INTERNAL MEDICINE | Facility: CLINIC | Age: 56
End: 2021-06-21

## 2021-06-21 ENCOUNTER — LAB (OUTPATIENT)
Dept: LAB | Facility: HOSPITAL | Age: 56
End: 2021-06-21

## 2021-06-21 VITALS
WEIGHT: 223.8 LBS | HEIGHT: 65 IN | HEART RATE: 123 BPM | SYSTOLIC BLOOD PRESSURE: 140 MMHG | OXYGEN SATURATION: 94 % | DIASTOLIC BLOOD PRESSURE: 92 MMHG | BODY MASS INDEX: 37.29 KG/M2 | TEMPERATURE: 98.2 F | RESPIRATION RATE: 16 BRPM

## 2021-06-21 DIAGNOSIS — M25.551 BILATERAL HIP PAIN: ICD-10-CM

## 2021-06-21 DIAGNOSIS — R20.8 BURNING SENSATION OF FEET: ICD-10-CM

## 2021-06-21 DIAGNOSIS — M25.552 CHRONIC PAIN OF BOTH HIPS: ICD-10-CM

## 2021-06-21 DIAGNOSIS — R82.90 ABNORMAL URINALYSIS: ICD-10-CM

## 2021-06-21 DIAGNOSIS — M25.551 CHRONIC PAIN OF BOTH HIPS: ICD-10-CM

## 2021-06-21 DIAGNOSIS — E78.5 HYPERLIPIDEMIA, UNSPECIFIED HYPERLIPIDEMIA TYPE: ICD-10-CM

## 2021-06-21 DIAGNOSIS — M25.552 BILATERAL HIP PAIN: ICD-10-CM

## 2021-06-21 DIAGNOSIS — G89.29 CHRONIC PAIN OF BOTH HIPS: ICD-10-CM

## 2021-06-21 DIAGNOSIS — M62.830 BACK MUSCLE SPASM: ICD-10-CM

## 2021-06-21 DIAGNOSIS — M79.10 MYALGIA: ICD-10-CM

## 2021-06-21 DIAGNOSIS — R32 URINARY INCONTINENCE, UNSPECIFIED TYPE: ICD-10-CM

## 2021-06-21 DIAGNOSIS — E11.65 TYPE 2 DIABETES MELLITUS WITH HYPERGLYCEMIA, WITHOUT LONG-TERM CURRENT USE OF INSULIN (HCC): ICD-10-CM

## 2021-06-21 DIAGNOSIS — Z00.00 ANNUAL PHYSICAL EXAM: Primary | ICD-10-CM

## 2021-06-21 LAB
BILIRUB BLD-MCNC: NEGATIVE MG/DL
CLARITY, POC: ABNORMAL
COLOR UR: YELLOW
EXPIRATION DATE: ABNORMAL
GLUCOSE UR STRIP-MCNC: ABNORMAL MG/DL
KETONES UR QL: NEGATIVE
LEUKOCYTE EST, POC: ABNORMAL
Lab: ABNORMAL
NITRITE UR-MCNC: NEGATIVE MG/ML
PH UR: 6 [PH] (ref 5–8)
PROT UR STRIP-MCNC: NEGATIVE MG/DL
RBC # UR STRIP: ABNORMAL /UL
SP GR UR: 1.03 (ref 1–1.03)
UROBILINOGEN UR QL: NORMAL

## 2021-06-21 PROCEDURE — 99396 PREV VISIT EST AGE 40-64: CPT | Performed by: FAMILY MEDICINE

## 2021-06-21 PROCEDURE — 81003 URINALYSIS AUTO W/O SCOPE: CPT | Performed by: FAMILY MEDICINE

## 2021-06-21 RX ORDER — DAPAGLIFLOZIN 5 MG/1
5 TABLET, FILM COATED ORAL DAILY
Qty: 90 TABLET | Refills: 0 | Status: SHIPPED | OUTPATIENT
Start: 2021-06-21 | End: 2021-09-23 | Stop reason: SDUPTHER

## 2021-06-21 RX ORDER — EZETIMIBE 10 MG/1
10 TABLET ORAL DAILY
Qty: 30 TABLET | Refills: 3 | Status: SHIPPED | OUTPATIENT
Start: 2021-06-21 | End: 2021-09-23 | Stop reason: SDUPTHER

## 2021-06-21 RX ORDER — ALBUTEROL SULFATE 90 UG/1
AEROSOL, METERED RESPIRATORY (INHALATION) AS NEEDED
COMMUNITY
Start: 2021-05-08 | End: 2022-08-24

## 2021-06-21 RX ORDER — TIZANIDINE 4 MG/1
4 TABLET ORAL EVERY 8 HOURS PRN
Qty: 90 TABLET | Refills: 1 | Status: SHIPPED | OUTPATIENT
Start: 2021-06-21 | End: 2021-08-24 | Stop reason: SDUPTHER

## 2021-06-21 NOTE — PROGRESS NOTES
"Subjective   Lizette Keith is a 55 y.o. female.     Chief Complaint   Patient presents with   • Annual Exam       Visit Vitals  /92   Pulse (!) 123   Temp 98.2 °F (36.8 °C) (Infrared)   Resp 16   Ht 165.1 cm (65\")   Wt 102 kg (223 lb 12.8 oz)   SpO2 94%   BMI 37.24 kg/m²         History of Present Illness   Pt here for annual exam.  Pt has severe back pain and has problems even standing for 10 minutes to do the dishes.  Pt needs parking permit.  Pt states that her feet hurt to the point of being unable to walk.     Pt has cysts right breast, she is to follow up mammo in Sept.  Estradiol was discontinued.     Pt as burning in both feet. Feet will go numb with socks. Numbness is worse since back surgery    Pt is appealing disability.  She has been currently unable to work.  Her daughter is paying off bills.  She wants to wait on her eye exam as she also needs new glasses.  She also wants to wait on her colonoscopy.  The following portions of the patient's history were reviewed and updated as appropriate: allergies, current medications, past family history, past medical history, past social history, past surgical history and problem list.    Past Medical History:   Diagnosis Date   • Allergic    • Anemia     during pregnancy   • Anxiety    • Arthritis    • Blood in urine    • Chronic fatigue    • Colon polyp 02/2018    7 at last colonoscopy   • Degeneration of L4-L5 intervertebral disc 12/10/2019   • Depression    • Diabetes mellitus type 2 in obese (CMS/[facility]) 03/11/2021   • Elevated cholesterol    • Endometriosis    • Fibromyalgia, primary    • Fracture    • GERD (gastroesophageal reflux disease)    • H/O mammogram 02/2018   • Headache    • Herniated intervertebral disc of lumbar spine     l4-l5   • HL (hearing loss)    • Hypertension    • Irritable bowel syndrome    • Pap smear for cervical cancer screening 2018?   • Wears glasses       Past Surgical History:   Procedure Laterality Date   • CARDIAC " CATHETERIZATION  11/13/2020    Dr Salas, EF 60%, no significant stenosis   • CARDIAC CATHETERIZATION N/A 11/13/2020    Procedure: Left Heart Cath;  Surgeon: Sai Salas MD;  Location:  [facility];  Service: Cardiology;  Laterality: N/A;   • CHOLECYSTECTOMY     • COLONOSCOPY  02/06/2018    3 year f/u polypectomy Dr MARTA Waller   • HYSTERECTOMY     • INNER EAR SURGERY     • LUMBAR DISCECTOMY Right 11/25/2020    Procedure: LUMBAR DISCECTOMY L4-5 RIGHT;  Surgeon: Qasim Kelsey MD;  Location:  [facility];  Service: Neurosurgery;  Laterality: Right;   • OOPHORECTOMY     • TEMPOROMANDIBULAR JOINT ARTHROPLASTY  1984   • TONSILLECTOMY     • URETHRA SURGERY      as a baby       Family History   Problem Relation Age of Onset   • Melanoma Mother         eye   • Diabetes Mother    • Heart disease Mother    • Arthritis Mother    • Depression Mother    • Colon polyps Mother    • Thyroid disease Mother    • Hyperlipidemia Mother    • Cancer Mother    • Heart attack Mother    • Migraines Mother    • Cancer Father         pancreatic and liver   • Diabetes Father    • Depression Father    • Colon polyps Father    • Bipolar disorder Father         split personality   • ADD / ADHD Son    • Depression Son    • Diabetes Maternal Aunt    • Diabetes Maternal Uncle    • Diabetes Paternal Aunt    • Diabetes Paternal Uncle    • Cancer Maternal Grandmother         uterine/cervical   • Heart disease Maternal Grandfather    • Heart disease Paternal Grandfather    • Migraines Sister    • Bipolar disorder Daughter    • Coronary artery disease Brother    • Breast cancer Neg Hx    • Ovarian cancer Neg Hx       Social History     Socioeconomic History   • Marital status:      Spouse name: Not on file   • Number of children: Not on file   • Years of education: Not on file   • Highest education level: Not on file   Tobacco Use   • Smoking status: Current Every Day Smoker     Packs/day: 0.50     Years: 34.00     Pack years:  17.00     Types: Cigarettes   • Smokeless tobacco: Never Used   • Tobacco comment: 0.5-1ppd   Vaping Use   • Vaping Use: Never used   Substance and Sexual Activity   • Alcohol use: No   • Drug use: No   • Sexual activity: Not Currently     Birth control/protection: None      Allergies   Allergen Reactions   • Statins Myalgia   • Erythromycin GI Intolerance     Tolerates zpak   • Lipitor [Atorvastatin Calcium] Myalgia   • Metformin Myalgia   • Penicillins Hives     States ok with keflex   • Pravastatin Myalgia   • Relafen [Nabumetone] GI Intolerance   • Sertraline Other (See Comments)     Restless leg   • Cymbalta [Duloxetine Hcl] Rash       Review of Systems   Constitutional: Positive for activity change (decreased because of pain) and fatigue. Negative for appetite change, chills, diaphoresis, fever and unexpected weight change.   HENT: Negative.  Negative for congestion, dental problem, drooling, ear discharge, ear pain, facial swelling, hearing loss, mouth sores, nosebleeds, postnasal drip, rhinorrhea, sinus pressure, sinus pain, sneezing, sore throat, tinnitus, trouble swallowing and voice change.    Eyes: Negative.  Negative for photophobia, pain, discharge, redness, itching and visual disturbance.   Respiratory: Negative.  Negative for apnea, cough, choking, chest tightness, shortness of breath, wheezing and stridor.    Cardiovascular: Negative.  Negative for chest pain, palpitations and leg swelling.   Gastrointestinal: Positive for anal bleeding (3x in past 2 weeks, bright red). Negative for abdominal distention, abdominal pain, blood in stool, constipation, diarrhea, nausea, rectal pain and vomiting.   Endocrine: Negative.  Negative for cold intolerance, heat intolerance, polydipsia, polyphagia and polyuria.   Genitourinary: Negative.  Negative for decreased urine volume, difficulty urinating, dyspareunia, dysuria, enuresis, flank pain, frequency, genital sores, hematuria, menstrual problem, pelvic pain,  urgency, vaginal bleeding, vaginal discharge and vaginal pain.        Urine leakage   Musculoskeletal: Positive for arthralgias (both hips) and back pain (low back). Negative for gait problem, joint swelling, myalgias, neck pain and neck stiffness.   Skin: Negative.  Negative for color change, pallor, rash and wound.   Allergic/Immunologic: Negative.  Negative for environmental allergies, food allergies and immunocompromised state.   Neurological: Negative.  Negative for dizziness, tremors, seizures, syncope, facial asymmetry, speech difficulty, weakness, light-headedness, numbness and headaches.        Feet burn   Hematological: Negative.  Negative for adenopathy. Does not bruise/bleed easily.   Psychiatric/Behavioral: Positive for sleep disturbance. Negative for agitation, behavioral problems, confusion, decreased concentration, dysphoric mood, hallucinations, self-injury and suicidal ideas. The patient is not nervous/anxious and is not hyperactive.        Objective   Physical Exam  Vitals and nursing note reviewed.   Constitutional:       General: She is not in acute distress.     Appearance: She is well-developed. She is not diaphoretic.   HENT:      Head: Normocephalic and atraumatic.      Right Ear: Tympanic membrane, ear canal and external ear normal.      Left Ear: Tympanic membrane, ear canal and external ear normal.      Nose: Nose normal.      Mouth/Throat:      Lips: Pink.      Mouth: Mucous membranes are moist. Mucous membranes are not pale, not dry and not cyanotic. No injury.      Dentition: Normal dentition.      Tongue: No lesions.      Palate: No mass.      Pharynx: Uvula midline. No pharyngeal swelling, oropharyngeal exudate, posterior oropharyngeal erythema or uvula swelling.   Eyes:      General: Lids are normal. No scleral icterus.        Right eye: No foreign body, discharge or hordeolum.         Left eye: No foreign body, discharge or hordeolum.      Extraocular Movements:      Right eye:  Normal extraocular motion and no nystagmus.      Left eye: Normal extraocular motion and no nystagmus.      Conjunctiva/sclera: Conjunctivae normal.      Right eye: Right conjunctiva is not injected. No chemosis, exudate or hemorrhage.     Left eye: Left conjunctiva is not injected. No chemosis, exudate or hemorrhage.     Pupils: Pupils are equal, round, and reactive to light.   Neck:      Thyroid: No thyroid mass or thyromegaly.      Vascular: No carotid bruit.      Trachea: Trachea normal. No tracheal deviation.   Cardiovascular:      Rate and Rhythm: Regular rhythm. Tachycardia present.      Pulses:           Dorsalis pedis pulses are 2+ on the right side and 2+ on the left side.        Posterior tibial pulses are 2+ on the right side and 2+ on the left side.      Heart sounds: Normal heart sounds. No murmur heard.   No friction rub. No gallop.    Pulmonary:      Effort: Pulmonary effort is normal. No respiratory distress.      Breath sounds: Normal breath sounds. No decreased breath sounds, wheezing, rhonchi or rales.   Chest:      Chest wall: No tenderness.      Breasts: Sarabjit Score is 5. Breasts are symmetrical.         Right: Normal. No swelling, bleeding, inverted nipple, mass, nipple discharge, skin change or tenderness.         Left: Normal. No swelling, bleeding, inverted nipple, mass, nipple discharge, skin change or tenderness.   Abdominal:      General: Bowel sounds are normal. There is no distension.      Palpations: Abdomen is soft. There is no hepatomegaly, splenomegaly or mass.      Tenderness: There is no abdominal tenderness. There is no guarding or rebound.      Hernia: No hernia is present.   Musculoskeletal:         General: No deformity.      Cervical back: Normal range of motion and neck supple.      Lumbar back: Spasms, tenderness and bony tenderness present.      Right hip: Tenderness present. No deformity, lacerations, bony tenderness or crepitus. Decreased range of motion. Normal  strength.      Left hip: Tenderness present. No deformity, lacerations, bony tenderness or crepitus. Decreased range of motion. Normal strength.      Right lower le+ Edema present.      Left lower le+ Edema present.      Right foot: Normal range of motion. No deformity, bunion, Charcot foot, foot drop or prominent metatarsal heads.      Left foot: Normal range of motion. No deformity, bunion, Charcot foot, foot drop or prominent metatarsal heads.   Feet:      Right foot:      Protective Sensation: 10 sites tested. 10 sites sensed.      Skin integrity: Skin integrity normal.      Left foot:      Protective Sensation: 10 sites tested. 10 sites sensed.      Skin integrity: Skin integrity normal.      Comments: Diabetic Foot Exam Performed and Monofilament Test Performed    Lymphadenopathy:      Head:      Right side of head: No submandibular adenopathy.      Left side of head: No submandibular adenopathy.      Cervical: No cervical adenopathy.      Right cervical: No superficial, deep or posterior cervical adenopathy.     Left cervical: No superficial, deep or posterior cervical adenopathy.      Upper Body:      Right upper body: No supraclavicular, axillary or pectoral adenopathy.      Left upper body: No supraclavicular, axillary or pectoral adenopathy.   Skin:     General: Skin is warm and dry.      Findings: No rash.      Nails: There is no clubbing.   Neurological:      Mental Status: She is alert and oriented to person, place, and time.      Cranial Nerves: No cranial nerve deficit.      Sensory: No sensory deficit.      Coordination: Coordination normal.      Deep Tendon Reflexes: Reflexes are normal and symmetric.      Reflex Scores:       Bicep reflexes are 2+ on the right side and 2+ on the left side.       Brachioradialis reflexes are 2+ on the right side and 2+ on the left side.       Patellar reflexes are 2+ on the right side and 2+ on the left side.  Psychiatric:         Attention and Perception:  Attention normal.         Mood and Affect: Mood and affect normal.         Speech: Speech normal.         Behavior: Behavior normal.         Thought Content: Thought content normal.         Cognition and Memory: Cognition normal.         Judgment: Judgment normal.         Assessment/Plan   Diagnoses and all orders for this visit:    1. Annual physical exam (Primary)    2. Hyperlipidemia, unspecified hyperlipidemia type  -     ezetimibe (Zetia) 10 MG tablet; Take 1 tablet by mouth Daily.  Dispense: 30 tablet; Refill: 3  -     Lipid Panel; Future  -     Lipid Panel    3. Back muscle spasm  -     tiZANidine (ZANAFLEX) 4 MG tablet; Take 1 tablet by mouth Every 8 (Eight) Hours As Needed for Muscle Spasms.  Dispense: 90 tablet; Refill: 1    4. Type 2 diabetes mellitus with hyperglycemia, without long-term current use of insulin (CMS/[facility])  -     dapagliflozin (Farxiga) 5 MG tablet tablet; Take 1 tablet by mouth Daily.  Dispense: 90 tablet; Refill: 0  -     Comprehensive Metabolic Panel; Future  -     Hemoglobin A1c; Future  -     Lipid Panel; Future  -     POC Urinalysis Dipstick, Automated  -     Lipid Panel  -     Comprehensive Metabolic Panel  -     Hemoglobin A1c    5. Myalgia  -     CK; Future  -     Sedimentation Rate; Future  -     Rheumatoid Factor; Future  -     CK  -     Rheumatoid Factor  -     Sedimentation Rate    6. Chronic pain of both hips  -     Sedimentation Rate; Future  -     Rheumatoid Factor; Future  -     Rheumatoid Factor  -     Sedimentation Rate    7. Bilateral hip pain  -     XR Hips Bilateral With or Without Pelvis 3-4 View; Future    8. Burning sensation of feet  -     Vitamin B12; Future  -     Folate; Future  -     Folate  -     Vitamin B12    9. Abnormal urinalysis  -     Urine Culture - , Urine, Clean Catch; Future  -     Urine Culture - , Urine, Clean Catch    10. Urinary incontinence, unspecified type  -     POC Urinalysis Dipstick, Automated  -     Urine Culture - , Urine, Clean Catch;  Future  -     Urine Culture - , Urine, Clean Catch      Plus 28 days of samples of farxiga.     Parking permit form filled out.      Pt has colonoscopy on 7/7/21    Please follow a low animal fat diet that is also low in sugar, low in junk food, low in sweet drinks and low in alcohol.  Please increase the amount of fiber in your diet as well as increasing your daily exercise, such as walking.      Current Outpatient Medications:   •  dapagliflozin (Farxiga) 5 MG tablet tablet, Take 1 tablet by mouth Daily., Disp: 90 tablet, Rfl: 0  •  docusate sodium (COLACE) 100 MG capsule, Take 100 mg by mouth Daily., Disp: , Rfl:   •  ezetimibe (Zetia) 10 MG tablet, Take 1 tablet by mouth Daily., Disp: 30 tablet, Rfl: 3  •  gabapentin (NEURONTIN) 300 MG capsule, Take 1 capsule by mouth 3 (Three) Times a Day., Disp: 90 capsule, Rfl: 1  •  lisinopril-hydrochlorothiazide (PRINZIDE,ZESTORETIC) 20-12.5 MG per tablet, Take 1 tablet by mouth 2 (two) times a day., Disp: 180 tablet, Rfl: 1  •  magnesium gluconate (MAGONATE) 500 MG tablet, Take 27 mg by mouth 2 (Two) Times a Day., Disp: , Rfl:   •  meloxicam (MOBIC) 15 MG tablet, Take 1 tablet by mouth Daily., Disp: 60 tablet, Rfl: 2  •  methocarbamol (ROBAXIN) 750 MG tablet, Take 750 mg by mouth 3 (Three) Times a Day As Needed., Disp: , Rfl:   •  montelukast (SINGULAIR) 10 MG tablet, Take 1 tablet by mouth Every Night., Disp: 30 tablet, Rfl: 6  •  ondansetron ODT (Zofran ODT) 4 MG disintegrating tablet, Place 1 tablet on the tongue Every 8 (Eight) Hours As Needed for Nausea or Vomiting., Disp: 40 tablet, Rfl: 2  •  potassium chloride (KLOR-CON) 10 MEQ CR tablet, Take 1 tablet by mouth Daily. For potassium, Disp: 90 tablet, Rfl: 1  •  tiZANidine (ZANAFLEX) 4 MG tablet, Take 1 tablet by mouth Every 8 (Eight) Hours As Needed for Muscle Spasms., Disp: 90 tablet, Rfl: 1  •  Ultram 50 MG tablet, Take 1 tablet by mouth Every 4 (Four) Hours As Needed for Moderate Pain ., Disp: 60 tablet, Rfl:  0  •  Ventolin  (90 Base) MCG/ACT inhaler, INHALE 2 PUFFS BY MOUTH EVERY 4 TO 6 HOURS AS NEEDED FOR 10 DAYS, Disp: , Rfl:   No current facility-administered medications for this visit.    Facility-Administered Medications Ordered in Other Visits:   •  mupirocin (BACTROBAN) 2 % nasal ointment, , Nasal, BID, Kassidy Camilo PA-C    Return in about 3 months (around 9/21/2021) for Recheck DM.     Recent Results (from the past 168 hour(s))   POC Urinalysis Dipstick, Automated    Collection Time: 06/21/21  2:10 PM    Specimen: Urine   Result Value Ref Range    Color Yellow Yellow, Straw, Dark Yellow, Elizabeth    Clarity, UA Cloudy (A) Clear    Specific Gravity  1.030 1.005 - 1.030    pH, Urine 6.0 5.0 - 8.0    Leukocytes Trace (A) Negative    Nitrite, UA Negative Negative    Protein, POC Negative Negative mg/dL    Glucose, UA 2+ (A) Negative, 1000 mg/dL (3+) mg/dL    Ketones, UA Negative Negative    Urobilinogen, UA Normal Normal    Bilirubin Negative Negative    Blood, UA Trace (A) Negative    Lot Number [id number]     Expiration Date 1/8/2023

## 2021-06-22 ENCOUNTER — LAB (OUTPATIENT)
Dept: LAB | Facility: HOSPITAL | Age: 56
End: 2021-06-22

## 2021-06-22 DIAGNOSIS — Z12.11 SCREENING FOR COLON CANCER: Primary | ICD-10-CM

## 2021-06-22 PROBLEM — M54.50 LOW BACK PAIN: Status: ACTIVE | Noted: 2020-03-02

## 2021-06-22 PROBLEM — Z79.891 LONG-TERM CURRENT USE OF OPIATE ANALGESIC: Status: ACTIVE | Noted: 2021-03-15

## 2021-06-22 LAB
ALBUMIN SERPL-MCNC: 4.6 G/DL (ref 3.8–4.9)
ALBUMIN/GLOB SERPL: 1.8 {RATIO} (ref 1.2–2.2)
ALP SERPL-CCNC: 184 IU/L (ref 48–121)
ALT SERPL-CCNC: 43 IU/L (ref 0–32)
AST SERPL-CCNC: 61 IU/L (ref 0–40)
BILIRUB SERPL-MCNC: 0.2 MG/DL (ref 0–1.2)
BUN SERPL-MCNC: 15 MG/DL (ref 6–24)
BUN/CREAT SERPL: 22 (ref 9–23)
CALCIUM SERPL-MCNC: 10 MG/DL (ref 8.7–10.2)
CHLORIDE SERPL-SCNC: 100 MMOL/L (ref 96–106)
CHOLEST SERPL-MCNC: 254 MG/DL (ref 100–199)
CO2 SERPL-SCNC: 25 MMOL/L (ref 20–29)
CREAT SERPL-MCNC: 0.69 MG/DL (ref 0.57–1)
ERYTHROCYTE [SEDIMENTATION RATE] IN BLOOD BY WESTERGREN METHOD: 50 MM/HR (ref 0–40)
FOLATE SERPL-MCNC: 14.2 NG/ML
GLOBULIN SER CALC-MCNC: 2.6 G/DL (ref 1.5–4.5)
GLUCOSE SERPL-MCNC: 260 MG/DL (ref 65–99)
HBA1C MFR BLD: 8.6 % (ref 4.8–5.6)
HDLC SERPL-MCNC: 40 MG/DL
LDLC SERPL CALC-MCNC: 151 MG/DL (ref 0–99)
POTASSIUM SERPL-SCNC: 4.1 MMOL/L (ref 3.5–5.2)
PROT SERPL-MCNC: 7.2 G/DL (ref 6–8.5)
SODIUM SERPL-SCNC: 140 MMOL/L (ref 134–144)
TRIGL SERPL-MCNC: 339 MG/DL (ref 0–149)
VIT B12 SERPL-MCNC: 491 PG/ML (ref 232–1245)
VLDLC SERPL CALC-MCNC: 63 MG/DL (ref 5–40)

## 2021-06-23 ENCOUNTER — HOSPITAL ENCOUNTER (OUTPATIENT)
Dept: GENERAL RADIOLOGY | Facility: HOSPITAL | Age: 56
Discharge: HOME | End: 2021-06-23
Admitting: FAMILY MEDICINE

## 2021-06-23 DIAGNOSIS — M25.552 BILATERAL HIP PAIN: ICD-10-CM

## 2021-06-23 DIAGNOSIS — M25.551 BILATERAL HIP PAIN: ICD-10-CM

## 2021-06-23 PROCEDURE — 73522 X-RAY EXAM HIPS BI 3-4 VIEWS: CPT

## 2021-06-25 ENCOUNTER — PRIOR AUTHORIZATION (OUTPATIENT)
Dept: INTERNAL MEDICINE | Facility: CLINIC | Age: 56
End: 2021-06-25

## 2021-06-25 NOTE — TELEPHONE ENCOUNTER
Submitted PA over the phone with [employer].   Ref #: [id number]    24 hour turn around time and determination will be faxed to the office.     Insurance Information:   [employer]  BIN: [id number]  PCN: [id number]  ID: [id number]  Phone: [phone]

## 2021-06-28 LAB
BACTERIA UR CULT: ABNORMAL
OTHER ANTIBIOTIC SUSC ISLT: ABNORMAL

## 2021-06-29 RX ORDER — NITROFURANTOIN 25; 75 MG/1; MG/1
100 CAPSULE ORAL 2 TIMES DAILY
Qty: 20 CAPSULE | Refills: 0 | Status: SHIPPED | OUTPATIENT
Start: 2021-06-29 | End: 2021-09-23

## 2021-06-30 ENCOUNTER — TELEPHONE (OUTPATIENT)
Dept: INTERNAL MEDICINE | Facility: CLINIC | Age: 56
End: 2021-06-30

## 2021-07-07 ENCOUNTER — OUTSIDE FACILITY SERVICE (OUTPATIENT)
Dept: GASTROENTEROLOGY | Facility: CLINIC | Age: 56
End: 2021-07-07

## 2021-07-07 PROCEDURE — 45385 COLONOSCOPY W/LESION REMOVAL: CPT | Performed by: INTERNAL MEDICINE

## 2021-07-07 PROCEDURE — 88305 TISSUE EXAM BY PATHOLOGIST: CPT | Performed by: INTERNAL MEDICINE

## 2021-07-07 PROCEDURE — 45388 COLONOSCOPY W/ABLATION: CPT | Performed by: INTERNAL MEDICINE

## 2021-07-08 ENCOUNTER — LAB REQUISITION (OUTPATIENT)
Dept: LAB | Facility: HOSPITAL | Age: 56
End: 2021-07-08

## 2021-07-08 DIAGNOSIS — Z12.11 ENCOUNTER FOR SCREENING FOR MALIGNANT NEOPLASM OF COLON: ICD-10-CM

## 2021-07-08 DIAGNOSIS — Z86.010 PERSONAL HISTORY OF COLONIC POLYPS: ICD-10-CM

## 2021-07-09 LAB
CYTO UR: NORMAL
LAB AP CASE REPORT: NORMAL
LAB AP CLINICAL INFORMATION: NORMAL
PATH REPORT.FINAL DX SPEC: NORMAL
PATH REPORT.GROSS SPEC: NORMAL

## 2021-07-30 ENCOUNTER — PATIENT MESSAGE (OUTPATIENT)
Dept: INTERNAL MEDICINE | Facility: CLINIC | Age: 56
End: 2021-07-30

## 2021-07-30 DIAGNOSIS — G89.29 CHRONIC HIP PAIN, BILATERAL: Primary | ICD-10-CM

## 2021-07-30 DIAGNOSIS — M25.551 CHRONIC HIP PAIN, BILATERAL: Primary | ICD-10-CM

## 2021-07-30 DIAGNOSIS — M25.552 CHRONIC HIP PAIN, BILATERAL: Primary | ICD-10-CM

## 2021-07-30 NOTE — TELEPHONE ENCOUNTER
Anne Diallo MA 7/30/2021 3:49 PM EDT      ----- Message -----  From: Lizette Keith  Sent: 7/30/2021 3:32 PM EDT  To: [facility] [address] Clinical Pool  Subject: Referral Request     You were going to refer me to an orthopedic dr. Can we please go ahead and get that referral done please? Thank you.

## 2021-08-13 ENCOUNTER — TELEPHONE (OUTPATIENT)
Dept: ORTHOPEDIC SURGERY | Facility: CLINIC | Age: 56
End: 2021-08-13

## 2021-08-13 ENCOUNTER — OFFICE VISIT (OUTPATIENT)
Dept: ORTHOPEDIC SURGERY | Facility: CLINIC | Age: 56
End: 2021-08-13

## 2021-08-13 VITALS
HEIGHT: 65 IN | HEART RATE: 106 BPM | SYSTOLIC BLOOD PRESSURE: 142 MMHG | DIASTOLIC BLOOD PRESSURE: 89 MMHG | BODY MASS INDEX: 36.32 KG/M2 | WEIGHT: 218 LBS

## 2021-08-13 DIAGNOSIS — M47.26 OSTEOARTHRITIS OF SPINE WITH RADICULOPATHY, LUMBAR REGION: ICD-10-CM

## 2021-08-13 DIAGNOSIS — M16.11 PRIMARY OSTEOARTHRITIS OF RIGHT HIP: Primary | ICD-10-CM

## 2021-08-13 PROCEDURE — 99244 OFF/OP CNSLTJ NEW/EST MOD 40: CPT | Performed by: ORTHOPAEDIC SURGERY

## 2021-08-13 RX ORDER — ALPRAZOLAM 0.25 MG/1
0.25 TABLET ORAL 2 TIMES DAILY PRN
Qty: 1 TABLET | Refills: 0 | Status: SHIPPED | OUTPATIENT
Start: 2021-08-13 | End: 2021-08-16 | Stop reason: SDUPTHER

## 2021-08-13 NOTE — PROGRESS NOTES
Orthopaedic Clinic Note: Hip New Patient    Chief Complaint   Patient presents with   • Pain     bilateral hip pain        HPI  Consult from: Denise LOZANO MD    Lizette Keith is a 56 y.o. female who presents with bilateral hip pain for 1 year(s). Onset atraumatic and gradual in nature. Pain is localized to lumbar back, lateral trochanter, and radiates to the groin and is a 7/10 on the pain scale.she states the pain is bilateral but worse in the right versus left.  She is primarily having the groin pain in the right side with minimal in the left side.  Pain is described as throbbing and stabbing. Associated symptoms include pain, popping and stiffness.  The pain is worse with walking, standing, sleeping, leisure, lying on affected side, rising from seated position and any movement of the joint; resting improve the pain. Previous treatments have included: NSAIDS and physical therapy since symptom onset. Although some transient relief was reported with these interventions, these conservative measures have failed and symptoms have persisted. The patient is limited in daily activities and has had a significant decrease in quality of life as a result. She denies fevers, chills, or constitutional symptoms.  She does smoke on a daily basis.    I have reviewed the following portions of the patient's history:History of Present Illness    Past Medical History:   Diagnosis Date   • Allergic    • Anemia     during pregnancy   • Anxiety    • Arthritis    • Blood in urine    • Chronic fatigue    • Colon polyp 02/2018    7 at last colonoscopy   • Degeneration of L4-L5 intervertebral disc 12/10/2019   • Depression    • Diabetes mellitus type 2 in obese (CMS/[facility]) 03/11/2021   • Elevated cholesterol    • Endometriosis    • Fibromyalgia, primary    • Fracture    • GERD (gastroesophageal reflux disease)    • H/O mammogram 02/2018   • Headache    • Herniated intervertebral disc of lumbar spine     l4-l5   • HL (hearing loss)     • Hypertension    • Irritable bowel syndrome    • Pap smear for cervical cancer screening 2018?   • Wears glasses       Past Surgical History:   Procedure Laterality Date   • CARDIAC CATHETERIZATION  11/13/2020    Dr Salas, EF 60%, no significant stenosis   • CARDIAC CATHETERIZATION N/A 11/13/2020    Procedure: Left Heart Cath;  Surgeon: Sai Salas MD;  Location:  [facility];  Service: Cardiology;  Laterality: N/A;   • CHOLECYSTECTOMY     • COLONOSCOPY  02/06/2018    3 year f/u polypectomy Dr MARTA Waller   • COLONOSCOPY W/ POLYPECTOMY  07/07/2021    Dr Waller.  hyperplastic   • HYSTERECTOMY     • INNER EAR SURGERY     • LUMBAR DISCECTOMY Right 11/25/2020    Procedure: LUMBAR DISCECTOMY L4-5 RIGHT;  Surgeon: Qasim Kelsey MD;  Location:  [facility] OR;  Service: Neurosurgery;  Laterality: Right;   • OOPHORECTOMY     • TEMPOROMANDIBULAR JOINT ARTHROPLASTY  1984   • TONSILLECTOMY     • URETHRA SURGERY      as a baby       Family History   Problem Relation Age of Onset   • Melanoma Mother         eye   • Diabetes Mother    • Heart disease Mother    • Arthritis Mother    • Depression Mother    • Colon polyps Mother    • Thyroid disease Mother    • Hyperlipidemia Mother    • Cancer Mother    • Heart attack Mother    • Migraines Mother    • Cancer Father         pancreatic and liver   • Diabetes Father    • Depression Father    • Colon polyps Father    • Bipolar disorder Father         split personality   • ADD / ADHD Son    • Depression Son    • Diabetes Maternal Aunt    • Diabetes Maternal Uncle    • Diabetes Paternal Aunt    • Diabetes Paternal Uncle    • Cancer Maternal Grandmother         uterine/cervical   • Heart disease Maternal Grandfather    • Heart disease Paternal Grandfather    • Migraines Sister    • Bipolar disorder Daughter    • Coronary artery disease Brother    • Breast cancer Neg Hx    • Ovarian cancer Neg Hx      Social History     Socioeconomic History   • Marital status:       Spouse name: Not on file   • Number of children: Not on file   • Years of education: Not on file   • Highest education level: Not on file   Tobacco Use   • Smoking status: Current Every Day Smoker     Packs/day: 0.50     Years: 34.00     Pack years: 17.00     Types: Cigarettes     Start date: 1984   • Smokeless tobacco: Never Used   • Tobacco comment: 0.5-1ppd   Vaping Use   • Vaping Use: Never used   Substance and Sexual Activity   • Alcohol use: Yes     Comment: occ   • Drug use: No   • Sexual activity: Not Currently     Birth control/protection: None      Current Outpatient Medications on File Prior to Visit   Medication Sig Dispense Refill   • dapagliflozin (Farxiga) 5 MG tablet tablet Take 1 tablet by mouth Daily. 90 tablet 0   • Diclofenac Sodium (VOLTAREN) 1 % gel gel diclofenac 1 % topical gel     • docusate sodium (COLACE) 100 MG capsule Take 100 mg by mouth Daily.     • ezetimibe (Zetia) 10 MG tablet Take 1 tablet by mouth Daily. 30 tablet 3   • gabapentin (NEURONTIN) 300 MG capsule Take 1 capsule by mouth 3 (Three) Times a Day. 90 capsule 1   • lisinopril-hydrochlorothiazide (PRINZIDE,ZESTORETIC) 20-12.5 MG per tablet Take 1 tablet by mouth 2 (two) times a day. 180 tablet 1   • magnesium gluconate (MAGONATE) 500 MG tablet Take 27 mg by mouth 2 (Two) Times a Day.     • montelukast (SINGULAIR) 10 MG tablet Take 1 tablet by mouth Every Night. 30 tablet 6   • potassium chloride (KLOR-CON) 10 MEQ CR tablet Take 1 tablet by mouth Daily. For potassium 90 tablet 1   • tiZANidine (ZANAFLEX) 4 MG tablet Take 1 tablet by mouth Every 8 (Eight) Hours As Needed for Muscle Spasms. 90 tablet 1   • Ultram 50 MG tablet Take 1 tablet by mouth Every 4 (Four) Hours As Needed for Moderate Pain . 60 tablet 0   • Ventolin  (90 Base) MCG/ACT inhaler INHALE 2 PUFFS BY MOUTH EVERY 4 TO 6 HOURS AS NEEDED FOR 10 DAYS     • meloxicam (MOBIC) 15 MG tablet Take 1 tablet by mouth Daily. 60 tablet 2   • methocarbamol (ROBAXIN)  750 MG tablet Take 750 mg by mouth 3 (Three) Times a Day As Needed.     • nitrofurantoin, macrocrystal-monohydrate, (Macrobid) 100 MG capsule Take 1 capsule by mouth 2 (Two) Times a Day. 20 capsule 0   • ondansetron ODT (Zofran ODT) 4 MG disintegrating tablet Place 1 tablet on the tongue Every 8 (Eight) Hours As Needed for Nausea or Vomiting. 40 tablet 2   • Sod Picosulfate-Mag Ox-Cit Acd 10-3.5-12 MG-GM -GM/160ML solution Take 1 kit by mouth Take As Directed. Follow instructions mailed to your home. If you did not receive instructions; call [phone]. 320 mL 0     Current Facility-Administered Medications on File Prior to Visit   Medication Dose Route Frequency Provider Last Rate Last Admin   • mupirocin (BACTROBAN) 2 % nasal ointment   Nasal BID Kassidy Camilo PA-C          Allergies   Allergen Reactions   • Statins Myalgia   • Erythromycin GI Intolerance     Tolerates zpak   • Lipitor [Atorvastatin Calcium] Myalgia   • Metformin Myalgia   • Penicillins Hives     States ok with keflex   • Pravastatin Myalgia   • Relafen [Nabumetone] GI Intolerance   • Sertraline Other (See Comments)     Restless leg   • Cymbalta [Duloxetine Hcl] Rash        Review of Systems   Constitutional: Negative.    HENT: Positive for sinus pressure.    Eyes: Negative.    Respiratory: Negative.    Cardiovascular: Negative.    Gastrointestinal: Negative.    Endocrine: Positive for heat intolerance.   Genitourinary: Positive for flank pain and pelvic pain.   Musculoskeletal: Positive for arthralgias and back pain.   Skin: Negative.    Allergic/Immunologic: Positive for environmental allergies.   Neurological: Negative.    Hematological: Negative.    Psychiatric/Behavioral: Positive for decreased concentration. The patient is nervous/anxious.         The patient's Review of Systems was personally reviewed and confirmed as accurate.    The following portions of the patient's history were reviewed and updated as appropriate: allergies,  "current medications, past family history, past medical history, past social history, past surgical history and problem list.    Physical Exam  Blood pressure 142/89, pulse 106, height 165.1 cm (65\"), weight 98.9 kg (218 lb), not currently breastfeeding.    Body mass index is 36.28 kg/m².    GENERAL APPEARANCE: awake, alert & oriented x 3, in no acute distress and well developed, well nourished  PSYCH: normal affect  LUNGS:  breathing nonlabored  EYES: sclera anicteric  CARDIOVASCULAR: palpable dorsalis pedis, palpable posterior tibial bilaterally. Capillary refill less than 2 seconds  EXTREMITIES: no clubbing, cyanosis  GAIT:  Normal           Right Hip Exam:  RANGE OF MOTION:   FLEXION CONTRACTURE: None   FLEXION: 110 degrees   INTERNAL ROTATION: 20 degrees at 90 degrees of flexion   EXTERNAL ROTATION: 40 degrees at 90 degrees of flexion    PAIN WITH HIP MOTION: Yes, slight pain in groin with internal rotation of hip  PAIN WITH LOGROLL: no  STINCHFIELD TEST: negative    KNEE EXAM: full knee ROM (0-120 degrees), stable to varus and valgus stress at terminal extension and 30 degrees flexion     STRENGTH:  5/5 hip adduction, abduction, flexion. 5/5 strength knee flexion, extension. 5/5 strength ankle dorsiflexion and plantarflexion.     GREATER TROCHANTER BURSAL PAIN:  no     REFLEXES:   PATELLAR 2+/4   ACHILLES 2+/4    CLONUS: negative  STRAIGHT LEG TEST:   negative    SENSATION TO LIGHT TOUCH:  DEEP PERONEAL/SUPERFICIAL PERONEAL/SURAL/SAPHENOUS/TIBIAL:  intact    EDEMA:   no  ERYTHEMA:  no  WOUNDS/INCISIONS: no overlying skin problems.      Left Hip Exam:   RANGE OF MOTION:   FLEXION CONTRACTURE: None   FLEXION: 110 degrees   INTERNAL ROTATION: 20 degrees at 90 degrees of flexion   EXTERNAL ROTATION: 40 degrees at 90 degrees of flexion    PAIN WITH HIP MOTION: no  PAIN WITH LOGROLL: no  STINCHFIELD TEST: negative    KNEE EXAM: full knee ROM (0-120 degrees), stable to varus and valgus stress at terminal extension and " 30 degrees flexion     STRENGTH:  5/5 hip adduction, abduction, flexion. 5/5 strength knee flexion, extension. 5/5 strength ankle dorsiflexion and plantarflexion.     GREATER TROCHANTER BURSAL PAIN:  no     REFLEXES:   PATELLAR 2+/4   ACHILLES 2+/4    CLONUS: negative  STRAIGHT LEG TEST:   negative    SENSATION TO LIGHT TOUCH:  DEEP PERONEAL/SUPERFICIAL PERONEAL/SURAL/SAPHENOUS/TIBIAL:  intact    EDEMA:   no  ERYTHEMA:  no  WOUNDS/INCISIONS: no overlying skin problems.      ------------------------------------------------------------------    LEG LENGTHS:  equal  _____________________________________________________  _____________________________________________________    RADIOGRAPHIC FINDINGS:   AP pelvis and bilateral hip radiographs from 6/23/2021 are personally interpreted.  Radiographs demonstrate mild to moderate arthritic changes of the right hip with small periarticular osteophyte formation at the margins of the femoral head neck junction as well as margins of the acetabulum.  Joint space remains well-preserved.  Left hip demonstrates mild arthritic changes with minimal osteophyte formation or joint space narrowing.    Assessment/Plan:   Diagnosis Plan   1. Primary osteoarthritis of right hip  [place] Guide For Pain Meds Inj    ALPRAZolam (Xanax) 0.25 MG tablet   2. Osteoarthritis of spine with radiculopathy, lumbar region       Patient has a constellation of lumbar spondylosis and radicular pain as well as some groin pain in the right side concerning for possible mild inflammation/arthritis of the right hip joint.  Her joint motion is well-preserved but slightly symptomatic with internal rotation of the hip on the right side.  It is unclear what is causing her pain whether this be lumbar spine pathology versus hip joint arthritis.  I discussed with her as a diagnostic therapeutic measure an intra-articular injection of the right hip can be performed to determine how much of her pain is improved with the  injection.  She is agreeable to this plan.  I will see her back after the injection to discuss results and how to proceed.  I will give her a single dose of Xanax to help with anxiety during the injection process.    Chicho Radford MD  08/13/21  11:40 EDT

## 2021-08-13 NOTE — TELEPHONE ENCOUNTER
Pharmacy called regarding patient prescription for Altrazolam. It was about they sent over 1 tablet and they're asking if it needs to be changed to 2 tablets. You can contact Karen at [phone]

## 2021-08-16 ENCOUNTER — TELEPHONE (OUTPATIENT)
Dept: ORTHOPEDIC SURGERY | Facility: CLINIC | Age: 56
End: 2021-08-16

## 2021-08-16 DIAGNOSIS — M16.11 PRIMARY OSTEOARTHRITIS OF RIGHT HIP: ICD-10-CM

## 2021-08-16 RX ORDER — ALPRAZOLAM 0.25 MG/1
0.25 TABLET ORAL
Qty: 1 TABLET | Refills: 0 | Status: SHIPPED | OUTPATIENT
Start: 2021-08-16 | End: 2021-08-16

## 2021-08-16 NOTE — TELEPHONE ENCOUNTER
Called Kassidy to let her know that a new prescription is sent in. She sees it and will notify the patient

## 2021-08-16 NOTE — TELEPHONE ENCOUNTER
CAILIN FROM [facility] CALLED AND HAD A QUESTION REGARDING MEDICATION THAT WAS SENT OVER? CAILIN CAN BE REACHED @ [phone]

## 2021-08-16 NOTE — TELEPHONE ENCOUNTER
"Dr. Radford,    The pharmacy called asking if the prescription is for one or two tablets due to the verbiage \"take one tablet by mouth 2 times a day as needed\"  Does the patient need one or two tablets?  Also, they are requesting a new prescription since it is a controlled substance and the prescription was sent 3 days ago.    Thanks,    Reza  "

## 2021-08-23 ENCOUNTER — PATIENT MESSAGE (OUTPATIENT)
Dept: INTERNAL MEDICINE | Facility: CLINIC | Age: 56
End: 2021-08-23

## 2021-08-23 DIAGNOSIS — M62.830 BACK MUSCLE SPASM: ICD-10-CM

## 2021-08-24 RX ORDER — TIZANIDINE 4 MG/1
4 TABLET ORAL EVERY 8 HOURS PRN
Qty: 90 TABLET | Refills: 1 | Status: SHIPPED | OUTPATIENT
Start: 2021-08-24 | End: 2021-09-23 | Stop reason: SDUPTHER

## 2021-08-24 NOTE — TELEPHONE ENCOUNTER
Barbara Goodwin MD 8/24/2021 7:45 AM EDT      ----- Message -----  From: Allie Hill MA  Sent: 8/24/2021 7:39 AM EDT  To: Barbara Goodwin MD  Subject: FW: Prescription Question       ----- Message -----  From: Lizette Keith  Sent: 8/23/2021 4:09 PM EDT  To: [facility]  Clinical Pool  Subject: Prescription Question     Can i please get my refill on tizanidine? I have enough for 2 days left.

## 2021-08-30 ENCOUNTER — TELEMEDICINE (OUTPATIENT)
Dept: INTERNAL MEDICINE | Facility: CLINIC | Age: 56
End: 2021-08-30

## 2021-08-30 ENCOUNTER — HOSPITAL ENCOUNTER (OUTPATIENT)
Dept: GENERAL RADIOLOGY | Facility: HOSPITAL | Age: 56
End: 2021-08-30

## 2021-08-30 ENCOUNTER — TRANSCRIBE ORDERS (OUTPATIENT)
Dept: ADMINISTRATIVE | Facility: HOSPITAL | Age: 56
End: 2021-08-30

## 2021-08-30 DIAGNOSIS — J01.40 SUBACUTE PANSINUSITIS: Primary | ICD-10-CM

## 2021-08-30 DIAGNOSIS — M54.16 LUMBAR RADICULOPATHY: Primary | ICD-10-CM

## 2021-08-30 PROCEDURE — 99213 OFFICE O/P EST LOW 20 MIN: CPT | Performed by: FAMILY MEDICINE

## 2021-08-30 RX ORDER — AZITHROMYCIN 250 MG/1
TABLET, FILM COATED ORAL
Qty: 6 TABLET | Refills: 0 | Status: SHIPPED | OUTPATIENT
Start: 2021-08-30 | End: 2021-09-23

## 2021-08-30 RX ORDER — METHYLPREDNISOLONE 4 MG/1
TABLET ORAL
Qty: 21 EACH | Refills: 0 | Status: SHIPPED | OUTPATIENT
Start: 2021-08-30 | End: 2021-09-23

## 2021-08-30 NOTE — PROGRESS NOTES
Subjective   Lizette Keith is a 56 y.o. female.     Chief Complaint   Patient presents with   • Sinusitis     This was an audio and video enabled telemedicine encounter.    You have chosen to receive care through a telehealth visit.  Do you consent to use a video/audio connection for your medical care today? Yes    There were no vitals taken for this visit.      Sinusitis  This is a new problem. The current episode started in the past 7 days. The problem is unchanged. There has been no fever. Her pain is at a severity of 8/10. The pain is moderate (behind eyes). Associated symptoms include sinus pressure and swollen glands. Pertinent negatives include no chills, congestion, coughing, diaphoresis, ear pain, headaches, hoarse voice, neck pain, shortness of breath, sneezing or sore throat. Past treatments include oral decongestants. The treatment provided mild relief.      Pt thinks that she has sinus infection. Pt denies fever or cough.  Pt has been since since before tooth pulled last Wednesday.  Dentist said tooth socket ok.   Pt has been vaccinated against Covid.   Pain behind her eyes.     The following portions of the patient's history were reviewed and updated as appropriate: allergies, current medications, past family history, past medical history, past social history, past surgical history and problem list.    Past Medical History:   Diagnosis Date   • Allergic    • Anemia     during pregnancy   • Anxiety    • Arthritis    • Blood in urine    • Chronic fatigue    • Colon polyp 02/2018    7 at last colonoscopy   • Degeneration of L4-L5 intervertebral disc 12/10/2019   • Depression    • Diabetes mellitus type 2 in obese (CMS/[facility]) 03/11/2021   • Elevated cholesterol    • Endometriosis    • Fibromyalgia, primary    • Fracture    • GERD (gastroesophageal reflux disease)    • H/O mammogram 02/2018   • Headache    • Herniated intervertebral disc of lumbar spine     l4-l5   • HL (hearing loss)    • Hypertension     • Irritable bowel syndrome    • Pap smear for cervical cancer screening 2018?   • Wears glasses       Past Surgical History:   Procedure Laterality Date   • CARDIAC CATHETERIZATION  11/13/2020    Dr Salas, EF 60%, no significant stenosis   • CARDIAC CATHETERIZATION N/A 11/13/2020    Procedure: Left Heart Cath;  Surgeon: Sai Salas MD;  Location:  [facility];  Service: Cardiology;  Laterality: N/A;   • CHOLECYSTECTOMY     • COLONOSCOPY  02/06/2018    3 year f/u polypectomy Dr MARTA Waller   • COLONOSCOPY W/ POLYPECTOMY  07/07/2021    Dr Waller.  hyperplastic   • HYSTERECTOMY     • INNER EAR SURGERY     • LUMBAR DISCECTOMY Right 11/25/2020    Procedure: LUMBAR DISCECTOMY L4-5 RIGHT;  Surgeon: Qasim Kelsey MD;  Location:  [facility];  Service: Neurosurgery;  Laterality: Right;   • OOPHORECTOMY     • TEMPOROMANDIBULAR JOINT ARTHROPLASTY  1984   • TONSILLECTOMY     • URETHRA SURGERY      as a baby       Family History   Problem Relation Age of Onset   • Melanoma Mother         eye   • Diabetes Mother    • Heart disease Mother    • Arthritis Mother    • Depression Mother    • Colon polyps Mother    • Thyroid disease Mother    • Hyperlipidemia Mother    • Cancer Mother    • Heart attack Mother    • Migraines Mother    • Cancer Father         pancreatic and liver   • Diabetes Father    • Depression Father    • Colon polyps Father    • Bipolar disorder Father         split personality   • ADD / ADHD Son    • Depression Son    • Diabetes Maternal Aunt    • Diabetes Maternal Uncle    • Diabetes Paternal Aunt    • Diabetes Paternal Uncle    • Cancer Maternal Grandmother         uterine/cervical   • Heart disease Maternal Grandfather    • Heart disease Paternal Grandfather    • Migraines Sister    • Bipolar disorder Daughter    • Coronary artery disease Brother    • Breast cancer Neg Hx    • Ovarian cancer Neg Hx       Social History     Socioeconomic History   • Marital status:      Spouse name:  Not on file   • Number of children: Not on file   • Years of education: Not on file   • Highest education level: Not on file   Tobacco Use   • Smoking status: Current Every Day Smoker     Packs/day: 0.50     Years: 34.00     Pack years: 17.00     Types: Cigarettes     Start date: 1984   • Smokeless tobacco: Never Used   • Tobacco comment: 0.5-1ppd   Vaping Use   • Vaping Use: Never used   Substance and Sexual Activity   • Alcohol use: Yes     Comment: occ   • Drug use: No   • Sexual activity: Not Currently     Birth control/protection: None      Allergies   Allergen Reactions   • Statins Myalgia   • Erythromycin GI Intolerance     Tolerates zpak   • Lipitor [Atorvastatin Calcium] Myalgia   • Metformin Myalgia   • Penicillins Hives     States ok with keflex   • Pravastatin Myalgia   • Relafen [Nabumetone] GI Intolerance   • Sertraline Other (See Comments)     Restless leg   • Cymbalta [Duloxetine Hcl] Rash       Review of Systems   Constitutional: Negative for chills and diaphoresis.   HENT: Positive for sinus pressure. Negative for congestion, ear pain, hoarse voice, sneezing and sore throat.    Respiratory: Negative for cough and shortness of breath.    Musculoskeletal: Negative for neck pain.   Neurological: Negative for headaches.       Objective   Physical Exam  Constitutional:       Comments: Video visit   HENT:      Head: Normocephalic.      Nose: Nose normal.   Eyes:      Extraocular Movements: Extraocular movements intact.   Pulmonary:      Effort: Pulmonary effort is normal. No respiratory distress.   Musculoskeletal:      Cervical back: Normal range of motion.   Neurological:      Mental Status: She is alert and oriented to person, place, and time.   Psychiatric:         Mood and Affect: Mood normal.         Behavior: Behavior normal.         Thought Content: Thought content normal.         Judgment: Judgment normal.         Assessment/Plan   Diagnoses and all orders for this visit:    1. Subacute  pansinusitis (Primary)  -     azithromycin (Zithromax Z-Bry) 250 MG tablet; Take 2 tablets the first day, then 1 tablet daily for 4 days.  Dispense: 6 tablet; Refill: 0    Other orders  -     methylPREDNISolone (MEDROL) 4 MG dose pack; Take as directed on package instructions.  Dispense: 21 each; Refill: 0                   Current Outpatient Medications:   •  azithromycin (Zithromax Z-Bry) 250 MG tablet, Take 2 tablets the first day, then 1 tablet daily for 4 days., Disp: 6 tablet, Rfl: 0  •  dapagliflozin (Farxiga) 5 MG tablet tablet, Take 1 tablet by mouth Daily., Disp: 90 tablet, Rfl: 0  •  Diclofenac Sodium (VOLTAREN) 1 % gel gel, diclofenac 1 % topical gel, Disp: , Rfl:   •  docusate sodium (COLACE) 100 MG capsule, Take 100 mg by mouth Daily., Disp: , Rfl:   •  ezetimibe (Zetia) 10 MG tablet, Take 1 tablet by mouth Daily., Disp: 30 tablet, Rfl: 3  •  gabapentin (NEURONTIN) 300 MG capsule, Take 1 capsule by mouth 3 (Three) Times a Day., Disp: 90 capsule, Rfl: 1  •  lisinopril-hydrochlorothiazide (PRINZIDE,ZESTORETIC) 20-12.5 MG per tablet, Take 1 tablet by mouth 2 (two) times a day., Disp: 180 tablet, Rfl: 1  •  magnesium gluconate (MAGONATE) 500 MG tablet, Take 27 mg by mouth 2 (Two) Times a Day., Disp: , Rfl:   •  meloxicam (MOBIC) 15 MG tablet, Take 1 tablet by mouth Daily., Disp: 60 tablet, Rfl: 2  •  methylPREDNISolone (MEDROL) 4 MG dose pack, Take as directed on package instructions., Disp: 21 each, Rfl: 0  •  montelukast (SINGULAIR) 10 MG tablet, Take 1 tablet by mouth Every Night., Disp: 30 tablet, Rfl: 6  •  nitrofurantoin, macrocrystal-monohydrate, (Macrobid) 100 MG capsule, Take 1 capsule by mouth 2 (Two) Times a Day., Disp: 20 capsule, Rfl: 0  •  ondansetron ODT (Zofran ODT) 4 MG disintegrating tablet, Place 1 tablet on the tongue Every 8 (Eight) Hours As Needed for Nausea or Vomiting., Disp: 40 tablet, Rfl: 2  •  potassium chloride (KLOR-CON) 10 MEQ CR tablet, Take 1 tablet by mouth Daily. For  potassium, Disp: 90 tablet, Rfl: 1  •  Sod Picosulfate-Mag Ox-Cit Acd 10-3.5-12 MG-GM -GM/160ML solution, Take 1 kit by mouth Take As Directed. Follow instructions mailed to your home. If you did not receive instructions; call [phone]., Disp: 320 mL, Rfl: 0  •  tiZANidine (ZANAFLEX) 4 MG tablet, Take 1 tablet by mouth Every 8 (Eight) Hours As Needed for Muscle Spasms., Disp: 90 tablet, Rfl: 1  •  Ultram 50 MG tablet, Take 1 tablet by mouth Every 4 (Four) Hours As Needed for Moderate Pain ., Disp: 60 tablet, Rfl: 0  •  Ventolin  (90 Base) MCG/ACT inhaler, INHALE 2 PUFFS BY MOUTH EVERY 4 TO 6 HOURS AS NEEDED FOR 10 DAYS, Disp: , Rfl:   No current facility-administered medications for this visit.    Facility-Administered Medications Ordered in Other Visits:   •  mupirocin (BACTROBAN) 2 % nasal ointment, , Nasal, BID, Kassidy Camilo PA-C    Return in about 24 days (around 9/23/2021), or if symptoms worsen or fail to improve, for Next scheduled follow up, Recheck.

## 2021-09-13 ENCOUNTER — TELEPHONE (OUTPATIENT)
Dept: INTERNAL MEDICINE | Facility: CLINIC | Age: 56
End: 2021-09-13

## 2021-09-13 DIAGNOSIS — Z76.89 RETURN TO WORK EVALUATION: Primary | ICD-10-CM

## 2021-09-15 ENCOUNTER — APPOINTMENT (OUTPATIENT)
Dept: MAMMOGRAPHY | Facility: HOSPITAL | Age: 56
End: 2021-09-15

## 2021-09-21 ENCOUNTER — HOSPITAL ENCOUNTER (OUTPATIENT)
Dept: MRI IMAGING | Facility: HOSPITAL | Age: 56
Discharge: HOME | End: 2021-09-21
Admitting: NURSE PRACTITIONER

## 2021-09-21 DIAGNOSIS — M54.16 LUMBAR RADICULOPATHY: ICD-10-CM

## 2021-09-21 PROCEDURE — 0 GADOBENATE DIMEGLUMINE 529 MG/ML SOLUTION: Performed by: NURSE PRACTITIONER

## 2021-09-21 PROCEDURE — A9577 INJ MULTIHANCE: HCPCS | Performed by: NURSE PRACTITIONER

## 2021-09-21 PROCEDURE — 72158 MRI LUMBAR SPINE W/O & W/DYE: CPT

## 2021-09-21 RX ADMIN — GADOBENATE DIMEGLUMINE 20 ML: 529 INJECTION, SOLUTION INTRAVENOUS at 11:05

## 2021-09-23 ENCOUNTER — TELEMEDICINE (OUTPATIENT)
Dept: INTERNAL MEDICINE | Facility: CLINIC | Age: 56
End: 2021-09-23

## 2021-09-23 VITALS — DIASTOLIC BLOOD PRESSURE: 80 MMHG | SYSTOLIC BLOOD PRESSURE: 130 MMHG

## 2021-09-23 DIAGNOSIS — M62.830 BACK MUSCLE SPASM: ICD-10-CM

## 2021-09-23 DIAGNOSIS — R11.0 NAUSEA: ICD-10-CM

## 2021-09-23 DIAGNOSIS — E78.5 HYPERLIPIDEMIA, UNSPECIFIED HYPERLIPIDEMIA TYPE: ICD-10-CM

## 2021-09-23 DIAGNOSIS — E11.65 TYPE 2 DIABETES MELLITUS WITH HYPERGLYCEMIA, WITHOUT LONG-TERM CURRENT USE OF INSULIN (HCC): ICD-10-CM

## 2021-09-23 DIAGNOSIS — R74.01 ELEVATED TRANSAMINASE LEVEL: ICD-10-CM

## 2021-09-23 DIAGNOSIS — J30.2 SEASONAL ALLERGIES: ICD-10-CM

## 2021-09-23 DIAGNOSIS — E87.6 HYPOKALEMIA: ICD-10-CM

## 2021-09-23 DIAGNOSIS — I10 ESSENTIAL HYPERTENSION: ICD-10-CM

## 2021-09-23 DIAGNOSIS — R70.0 ELEVATED SED RATE: Primary | ICD-10-CM

## 2021-09-23 PROBLEM — M25.552 PAIN OF BOTH HIP JOINTS: Status: ACTIVE | Noted: 2021-06-23

## 2021-09-23 PROBLEM — M47.816 LUMBAR SPONDYLOSIS: Status: ACTIVE | Noted: 2021-06-23

## 2021-09-23 PROBLEM — M25.551 PAIN OF BOTH HIP JOINTS: Status: ACTIVE | Noted: 2021-06-23

## 2021-09-23 PROCEDURE — 99214 OFFICE O/P EST MOD 30 MIN: CPT | Performed by: FAMILY MEDICINE

## 2021-09-23 RX ORDER — DAPAGLIFLOZIN 5 MG/1
5 TABLET, FILM COATED ORAL DAILY
Qty: 90 TABLET | Refills: 0 | Status: SHIPPED | OUTPATIENT
Start: 2021-09-23 | End: 2021-12-22

## 2021-09-23 RX ORDER — POTASSIUM CHLORIDE 750 MG/1
10 TABLET, FILM COATED, EXTENDED RELEASE ORAL DAILY
Qty: 90 TABLET | Refills: 1 | Status: SHIPPED | OUTPATIENT
Start: 2021-09-23 | End: 2022-04-12

## 2021-09-23 RX ORDER — MONTELUKAST SODIUM 10 MG/1
10 TABLET ORAL NIGHTLY
Qty: 30 TABLET | Refills: 6 | Status: SHIPPED | OUTPATIENT
Start: 2021-09-23 | End: 2022-05-23 | Stop reason: SDUPTHER

## 2021-09-23 RX ORDER — DICLOFENAC SODIUM 75 MG/1
TABLET, DELAYED RELEASE ORAL EVERY 12 HOURS SCHEDULED
COMMUNITY
End: 2022-05-23 | Stop reason: ALTCHOICE

## 2021-09-23 RX ORDER — EZETIMIBE 10 MG/1
10 TABLET ORAL DAILY
Qty: 30 TABLET | Refills: 3 | Status: SHIPPED | OUTPATIENT
Start: 2021-09-23 | End: 2022-05-23 | Stop reason: SDUPTHER

## 2021-09-23 RX ORDER — LISINOPRIL AND HYDROCHLOROTHIAZIDE 20; 12.5 MG/1; MG/1
1 TABLET ORAL 2 TIMES DAILY
Qty: 180 TABLET | Refills: 1 | Status: SHIPPED | OUTPATIENT
Start: 2021-09-23 | End: 2022-05-23 | Stop reason: ALTCHOICE

## 2021-09-23 RX ORDER — TIZANIDINE 4 MG/1
4 TABLET ORAL EVERY 8 HOURS PRN
Qty: 90 TABLET | Refills: 3 | Status: SHIPPED | OUTPATIENT
Start: 2021-09-23 | End: 2021-10-18

## 2021-09-23 RX ORDER — GABAPENTIN 600 MG/1
800 TABLET ORAL 3 TIMES DAILY
COMMUNITY
Start: 2021-08-21 | End: 2022-02-21 | Stop reason: DRUGHIGH

## 2021-09-23 RX ORDER — ONDANSETRON 4 MG/1
4 TABLET, ORALLY DISINTEGRATING ORAL EVERY 8 HOURS PRN
Qty: 40 TABLET | Refills: 2 | Status: SHIPPED | OUTPATIENT
Start: 2021-09-23 | End: 2022-08-24 | Stop reason: SDUPTHER

## 2021-09-23 NOTE — PROGRESS NOTES
Subjective   Lizette Keith is a 56 y.o. female.     Chief Complaint   Patient presents with   • Diabetes   • Hyperlipidemia   • Hypertension     This was an audio and video enabled telemedicine encounter.    You have chosen to receive care through a telehealth visit.  Do you consent to use a video/audio connection for your medical care today? Yes    Visit Vitals  /80 Comment: per pt         Diabetes  She presents for her follow-up diabetic visit. She has type 2 diabetes mellitus. Her disease course has been fluctuating. Pertinent negatives for hypoglycemia include no headaches or sweats. Associated symptoms include fatigue. Pertinent negatives for diabetes include no blurred vision, no chest pain, no foot paresthesias, no foot ulcerations, no polydipsia, no polyphagia, no polyuria, no visual change, no weakness and no weight loss. There are no hypoglycemic complications. Symptoms are stable. Pertinent negatives for diabetic complications include no CVA, heart disease, nephropathy, peripheral neuropathy, PVD or retinopathy. Risk factors for coronary artery disease include diabetes mellitus, dyslipidemia, family history, hypertension, obesity and tobacco exposure. Current diabetic treatment includes oral agent (monotherapy). She is compliant with treatment most of the time. She is following a generally healthy diet. Meal planning includes avoidance of concentrated sweets. She participates in exercise intermittently. Her home blood glucose trend is fluctuating minimally. An ACE inhibitor/angiotensin II receptor blocker is being taken. Eye exam is current (2 weeks ago).   Hyperlipidemia  This is a chronic problem. The current episode started more than 1 year ago. The problem is controlled. Recent lipid tests were reviewed and are normal. Exacerbating diseases include diabetes. She has no history of chronic renal disease, hypothyroidism, liver disease, obesity or nephrotic syndrome. Pertinent negatives include  no chest pain, focal sensory loss, focal weakness, leg pain, myalgias or shortness of breath. Current antihyperlipidemic treatment includes ezetimibe. The current treatment provides significant improvement of lipids. Compliance problems include adherence to exercise.  Risk factors for coronary artery disease include diabetes mellitus, dyslipidemia, family history, hypertension, obesity and post-menopausal.   Hypertension  This is a chronic problem. The current episode started more than 1 year ago. The problem is unchanged. The problem is controlled. Associated symptoms include anxiety (from pain). Pertinent negatives include no blurred vision, chest pain, headaches, malaise/fatigue, neck pain, orthopnea, palpitations, peripheral edema, PND, shortness of breath or sweats. Risk factors for coronary artery disease include dyslipidemia, diabetes mellitus, obesity, post-menopausal state and smoking/tobacco exposure. Current antihypertension treatment includes ACE inhibitors and diuretics. The current treatment provides significant improvement. There are no compliance problems.  There is no history of angina, kidney disease, CAD/MI, CVA, heart failure, left ventricular hypertrophy, PVD or retinopathy. There is no history of chronic renal disease, sleep apnea or a thyroid problem.      Pt's daughter is in quarantine, pt is not ill.   Pt has a lot of right leg pain and pain is causing a drawing sensation.    Right hip feels better after hip injection.  Back is still painful.   Pt needs refill of singulair for allergies.   Pt has had covid vaccine.  Flu shot when available  The following portions of the patient's history were reviewed and updated as appropriate: allergies, current medications, past family history, past medical history, past social history, past surgical history and problem list.    Past Medical History:   Diagnosis Date   • Allergic    • Anemia     during pregnancy   • Anxiety    • Arthritis    • Blood in  urine    • Chronic fatigue    • Colon polyp 02/2018    7 at last colonoscopy   • Degeneration of L4-L5 intervertebral disc 12/10/2019   • Depression    • Diabetes mellitus type 2 in obese (CMS/HCC) 03/11/2021   • Elevated cholesterol    • Endometriosis    • Fibromyalgia, primary    • Fracture    • GERD (gastroesophageal reflux disease)    • H/O mammogram 02/2018   • Headache    • Herniated intervertebral disc of lumbar spine     l4-l5   • HL (hearing loss)    • Hypertension    • Irritable bowel syndrome    • Pap smear for cervical cancer screening 2018?   • Wears glasses       Past Surgical History:   Procedure Laterality Date   • CARDIAC CATHETERIZATION  11/13/2020    Dr Salas, EF 60%, no significant stenosis   • CARDIAC CATHETERIZATION N/A 11/13/2020    Procedure: Left Heart Cath;  Surgeon: Sai Salas MD;  Location:  [facility];  Service: Cardiology;  Laterality: N/A;   • CHOLECYSTECTOMY     • COLONOSCOPY  02/06/2018    3 year f/u polypectomy Dr MARTA Waller   • COLONOSCOPY W/ POLYPECTOMY  07/07/2021    Dr Waller.  hyperplastic   • HYSTERECTOMY     • INNER EAR SURGERY     • LUMBAR DISCECTOMY Right 11/25/2020    Procedure: LUMBAR DISCECTOMY L4-5 RIGHT;  Surgeon: Qasim Kelsey MD;  Location:  [facility];  Service: Neurosurgery;  Laterality: Right;   • OOPHORECTOMY     • TEMPOROMANDIBULAR JOINT ARTHROPLASTY  1984   • TONSILLECTOMY     • URETHRA SURGERY      as a baby       Family History   Problem Relation Age of Onset   • Melanoma Mother         eye   • Diabetes Mother    • Heart disease Mother    • Arthritis Mother    • Depression Mother    • Colon polyps Mother    • Thyroid disease Mother    • Hyperlipidemia Mother    • Cancer Mother    • Heart attack Mother    • Migraines Mother    • Cancer Father         pancreatic and liver   • Diabetes Father    • Depression Father    • Colon polyps Father    • Bipolar disorder Father         split personality   • ADD / ADHD Son    • Depression Son    •  Diabetes Maternal Aunt    • Diabetes Maternal Uncle    • Diabetes Paternal Aunt    • Diabetes Paternal Uncle    • Cancer Maternal Grandmother         uterine/cervical   • Heart disease Maternal Grandfather    • Heart disease Paternal Grandfather    • Migraines Sister    • Bipolar disorder Daughter    • Coronary artery disease Brother    • Breast cancer Neg Hx    • Ovarian cancer Neg Hx       Social History     Socioeconomic History   • Marital status:      Spouse name: Not on file   • Number of children: Not on file   • Years of education: Not on file   • Highest education level: Not on file   Tobacco Use   • Smoking status: Current Every Day Smoker     Packs/day: 0.50     Years: 34.00     Pack years: 17.00     Types: Cigarettes     Start date: 1984   • Smokeless tobacco: Never Used   • Tobacco comment: 0.5-1ppd   Vaping Use   • Vaping Use: Never used   Substance and Sexual Activity   • Alcohol use: Yes     Comment: occ   • Drug use: No   • Sexual activity: Not Currently     Birth control/protection: None      Allergies   Allergen Reactions   • Statins Myalgia   • Erythromycin GI Intolerance     Tolerates zpak   • Lipitor [Atorvastatin Calcium] Myalgia   • Metformin Myalgia   • Penicillins Hives     States ok with keflex   • Pravastatin Myalgia   • Relafen [Nabumetone] GI Intolerance   • Sertraline Other (See Comments)     Restless leg   • Cymbalta [Duloxetine Hcl] Rash       Review of Systems   Constitutional: Positive for fatigue. Negative for malaise/fatigue and weight loss.   Eyes: Negative for blurred vision.   Respiratory: Negative for shortness of breath.    Cardiovascular: Negative for chest pain, palpitations, orthopnea and PND.   Endocrine: Negative for polydipsia, polyphagia and polyuria.   Musculoskeletal: Positive for back pain and gait problem. Negative for myalgias and neck pain.   Neurological: Negative for focal weakness, weakness and headaches.       Objective   Physical  Exam  Constitutional:       Comments: Video visit   HENT:      Head: Normocephalic.      Nose: Nose normal.   Eyes:      Extraocular Movements: Extraocular movements intact.   Pulmonary:      Effort: Pulmonary effort is normal. No respiratory distress.   Musculoskeletal:      Cervical back: Normal range of motion.   Neurological:      Mental Status: She is alert and oriented to person, place, and time.   Psychiatric:         Mood and Affect: Mood normal.         Behavior: Behavior normal.         Thought Content: Thought content normal.         Judgment: Judgment normal.         Assessment/Plan   Diagnoses and all orders for this visit:    1. Elevated sed rate (Primary)  -     Sedimentation Rate; Future    2. Essential hypertension  -     lisinopril-hydrochlorothiazide (PRINZIDE,ZESTORETIC) 20-12.5 MG per tablet; Take 1 tablet by mouth 2 (two) times a day.  Dispense: 180 tablet; Refill: 1  -     Comprehensive Metabolic Panel; Future  -     TSH Rfx On Abnormal To Free T4; Future  -     Lipid Panel; Future  -     CBC & Differential; Future    3. Hypokalemia  -     potassium chloride (KLOR-CON) 10 MEQ CR tablet; Take 1 tablet by mouth Daily. For potassium  Dispense: 90 tablet; Refill: 1  -     Comprehensive Metabolic Panel; Future    4. Hyperlipidemia, unspecified hyperlipidemia type  -     ezetimibe (Zetia) 10 MG tablet; Take 1 tablet by mouth Daily.  Dispense: 30 tablet; Refill: 3  -     Comprehensive Metabolic Panel; Future  -     Lipid Panel; Future    5. Seasonal allergies  -     montelukast (SINGULAIR) 10 MG tablet; Take 1 tablet by mouth Every Night.  Dispense: 30 tablet; Refill: 6    6. Back muscle spasm  -     tiZANidine (ZANAFLEX) 4 MG tablet; Take 1 tablet by mouth Every 8 (Eight) Hours As Needed for Muscle Spasms.  Dispense: 90 tablet; Refill: 3    7. Nausea  -     ondansetron ODT (Zofran ODT) 4 MG disintegrating tablet; Place 1 tablet on the tongue Every 8 (Eight) Hours As Needed for Nausea or Vomiting.   Dispense: 40 tablet; Refill: 2    8. Type 2 diabetes mellitus with hyperglycemia, without long-term current use of insulin (CMS/HCC)  -     dapagliflozin (Farxiga) 5 MG tablet tablet; Take 1 tablet by mouth Daily.  Dispense: 90 tablet; Refill: 0  -     Comprehensive Metabolic Panel; Future  -     Hemoglobin A1c; Future    9. Elevated transaminase level  -     Hepatitis panel, acute; Future        Reviewed the spinal MRI with pt.            Current Outpatient Medications:   •  dapagliflozin (Farxiga) 5 MG tablet tablet, Take 1 tablet by mouth Daily., Disp: 90 tablet, Rfl: 0  •  diclofenac (VOLTAREN) 75 MG EC tablet, Every 12 (Twelve) Hours., Disp: , Rfl:   •  docusate sodium (COLACE) 100 MG capsule, Take 100 mg by mouth Daily., Disp: , Rfl:   •  ezetimibe (Zetia) 10 MG tablet, Take 1 tablet by mouth Daily., Disp: 30 tablet, Rfl: 3  •  gabapentin (NEURONTIN) 600 MG tablet, Take 600 mg by mouth 3 (Three) Times a Day., Disp: , Rfl:   •  lisinopril-hydrochlorothiazide (PRINZIDE,ZESTORETIC) 20-12.5 MG per tablet, Take 1 tablet by mouth 2 (two) times a day., Disp: 180 tablet, Rfl: 1  •  magnesium gluconate (MAGONATE) 500 MG tablet, Take 27 mg by mouth 2 (Two) Times a Day., Disp: , Rfl:   •  montelukast (SINGULAIR) 10 MG tablet, Take 1 tablet by mouth Every Night., Disp: 30 tablet, Rfl: 6  •  ondansetron ODT (Zofran ODT) 4 MG disintegrating tablet, Place 1 tablet on the tongue Every 8 (Eight) Hours As Needed for Nausea or Vomiting., Disp: 40 tablet, Rfl: 2  •  potassium chloride (KLOR-CON) 10 MEQ CR tablet, Take 1 tablet by mouth Daily. For potassium, Disp: 90 tablet, Rfl: 1  •  Sod Picosulfate-Mag Ox-Cit Acd 10-3.5-12 MG-GM -GM/160ML solution, Take 1 kit by mouth Take As Directed. Follow instructions mailed to your home. If you did not receive instructions; call [phone]., Disp: 320 mL, Rfl: 0  •  tiZANidine (ZANAFLEX) 4 MG tablet, Take 1 tablet by mouth Every 8 (Eight) Hours As Needed for Muscle Spasms., Disp: 90  tablet, Rfl: 3  •  Ultram 50 MG tablet, Take 1 tablet by mouth Every 4 (Four) Hours As Needed for Moderate Pain ., Disp: 60 tablet, Rfl: 0  •  Ventolin  (90 Base) MCG/ACT inhaler, INHALE 2 PUFFS BY MOUTH EVERY 4 TO 6 HOURS AS NEEDED FOR 10 DAYS, Disp: , Rfl:   No current facility-administered medications for this visit.    Facility-Administered Medications Ordered in Other Visits:   •  mupirocin (BACTROBAN) 2 % nasal ointment, , Nasal, BID, Kassidy Camilo PA-C    Return in about 3 months (around 12/23/2021), or if symptoms worsen or fail to improve, for Recheck.     Narrative & Impression   EXAMINATION: MRI LUMBAR SPINE W WO CONTRAST-      INDICATION: m54.16; M54.16-Radiculopathy, lumbar region     TECHNIQUE: Multiplanar multisequence MRI of the lumbar spine performed  with and without IV contrast     COMPARISON: 8/20/2020     FINDINGS: Vertebral body heights are maintained without evidence of  acute fracture and alignment is anatomic without evidence of listhesis  or subluxation. There are no suspicious marrow replacing lesions.  Minimal edematous Modic endplate changes noted at L4-5. The conus  medullaris and cauda equina nerve roots are satisfactory in appearance.  The paraspinal soft tissues demonstrate minimal operative changes from  prior hemilaminotomy on the right at L4-5. Spondylosis changes are  evident with areas of involvement noted including     L1-2, no significant spinal canal or neuroforaminal impingement..     L2-3, minimal disc bulge and facet arthropathy without significant  associated spinal canal or neuroforaminal impingement.     L3-4, circumferential disc bulge and bilateral facet arthropathy with  some mild-to-moderate spinal canal and bilateral neuroforaminal  narrowing, stable from comparison.     L4-5, operative changes from prior hemilaminotomy and discectomy. There  is minimal persistent abnormal soft tissue which demonstrates  enhancement in the ventral aspect of the spinal  canal compatible with  scar tissue. There is a small component of persistent disc bulge. There  is moderate resultant spinal canal narrowing and bilateral  neuroforaminal stenosis, greater on the right.     L5-S1, minimal disc bulge and facet arthropathy with mild spinal canal  and bilateral neuroforaminal narrowing.     IMPRESSION:  Operative changes from prior right hemilaminotomy and  discectomy at L4-5 on the right. There is a combination of minimal  persistent disc bulge and enhancing scar tissue resulting in moderate  persistent narrowing of the spinal canal and bilateral neural foramina,  greater on the right. Adjacent level spondylosis changes are present,  unchanged from comparison.        This report was finalized on 9/21/2021 3:06 PM by Qasim Victoria.

## 2021-10-18 ENCOUNTER — OFFICE VISIT (OUTPATIENT)
Dept: INTERNAL MEDICINE | Facility: CLINIC | Age: 56
End: 2021-10-18

## 2021-10-18 VITALS
WEIGHT: 220 LBS | TEMPERATURE: 97.8 F | DIASTOLIC BLOOD PRESSURE: 84 MMHG | SYSTOLIC BLOOD PRESSURE: 132 MMHG | OXYGEN SATURATION: 97 % | HEIGHT: 65 IN | HEART RATE: 103 BPM | BODY MASS INDEX: 36.65 KG/M2

## 2021-10-18 DIAGNOSIS — M62.830 BACK MUSCLE SPASM: ICD-10-CM

## 2021-10-18 DIAGNOSIS — J01.00 ACUTE MAXILLARY SINUSITIS, RECURRENCE NOT SPECIFIED: ICD-10-CM

## 2021-10-18 DIAGNOSIS — M62.838 MUSCLE SPASM OF RIGHT LEG: ICD-10-CM

## 2021-10-18 DIAGNOSIS — R09.81 NASAL CONGESTION: Primary | ICD-10-CM

## 2021-10-18 DIAGNOSIS — R53.83 OTHER FATIGUE: ICD-10-CM

## 2021-10-18 DIAGNOSIS — Z20.822 CLOSE EXPOSURE TO COVID-19 VIRUS: ICD-10-CM

## 2021-10-18 PROCEDURE — 99214 OFFICE O/P EST MOD 30 MIN: CPT | Performed by: FAMILY MEDICINE

## 2021-10-18 RX ORDER — TIZANIDINE 4 MG/1
4 TABLET ORAL 2 TIMES DAILY
Qty: 1 TABLET | Refills: 0 | Status: SHIPPED | OUTPATIENT
Start: 2021-10-18 | End: 2021-12-22 | Stop reason: SDUPTHER

## 2021-10-18 RX ORDER — IPRATROPIUM BROMIDE 21 UG/1
2 SPRAY, METERED NASAL EVERY 12 HOURS
Qty: 30 ML | Refills: 1 | Status: SHIPPED | OUTPATIENT
Start: 2021-10-18

## 2021-10-18 RX ORDER — METHOCARBAMOL 500 MG/1
500 TABLET, FILM COATED ORAL DAILY
Qty: 30 TABLET | Refills: 1 | Status: SHIPPED | OUTPATIENT
Start: 2021-10-18 | End: 2022-05-23 | Stop reason: SDUPTHER

## 2021-10-18 RX ORDER — AZITHROMYCIN 250 MG/1
TABLET, FILM COATED ORAL
Qty: 6 TABLET | Refills: 0 | Status: SHIPPED | OUTPATIENT
Start: 2021-10-18 | End: 2021-12-22

## 2021-10-18 NOTE — PROGRESS NOTES
"Subjective   Lizette Keith is a 56 y.o. female.     Chief Complaint   Patient presents with   • Nasal Congestion     stuffy nose mild SOA, exhausted no energy couple days. was exposed to Covid 1 week ago.  Has been vaccinated       Visit Vitals  /84   Pulse 103   Temp 97.8 °F (36.6 °C)   Ht 165.1 cm (65\")   Wt 99.8 kg (220 lb)   SpO2 97%   BMI 36.61 kg/m²         URI   This is a new problem. The current episode started yesterday. The problem has been unchanged. The fever has been present for less than 1 day. Associated symptoms include congestion (nasal). Pertinent negatives include no abdominal pain, chest pain, coughing, diarrhea, dysuria, ear pain, headaches, joint pain, joint swelling, nausea, neck pain, plugged ear sensation, rash, rhinorrhea, sinus pain, sneezing, sore throat, swollen glands, vomiting or wheezing. Treatments tried: singulair. The treatment provided no relief.   Fatigue  This is a new problem. The current episode started yesterday. The problem occurs constantly. The problem has been unchanged. Associated symptoms include chills, congestion (nasal), fatigue and weakness. Pertinent negatives include no abdominal pain, anorexia, arthralgias, change in bowel habit, chest pain, coughing, diaphoresis, fever, headaches, joint swelling, myalgias, nausea, neck pain, numbness, rash, sore throat, swollen glands, urinary symptoms, vertigo, visual change or vomiting. Nothing aggravates the symptoms. She has tried nothing for the symptoms. The treatment provided no relief.      Pt feels exhausted. Pt's mother tested positive to Covid on Monday.  Pt and Mom and 2 others are in household.   Mom got antibody infusion on Wednesday and had to go back in hospital for pneumonia.   Mom was vaccinated. Mom is home.   Pt was vaccinated for Covid, but not flu.     Pt has muscle spasm in her right leg. Pt asked about methocarbamol at mid day and tizanidine in early and and hs. Pt gets sedated from Tizanidine. "   The following portions of the patient's history were reviewed and updated as appropriate: allergies, current medications, past family history, past medical history, past social history, past surgical history and problem list.    Past Medical History:   Diagnosis Date   • Allergic    • Anemia     during pregnancy   • Anxiety    • Arthritis    • Blood in urine    • Chronic fatigue    • Colon polyp 02/2018    9 at last colonoscopy   • Degeneration of L4-L5 intervertebral disc 12/10/2019   • Depression    • Diabetes mellitus type 2 in obese (HCC) 03/11/2021   • Elevated cholesterol    • Endometriosis    • Fibromyalgia, primary    • Fracture    • GERD (gastroesophageal reflux disease)    • H/O mammogram 02/2018   • Headache    • Herniated intervertebral disc of lumbar spine     l4-l5   • HL (hearing loss)    • Hypertension    • Irritable bowel syndrome    • Pap smear for cervical cancer screening 2018?   • Wears glasses       Past Surgical History:   Procedure Laterality Date   • CARDIAC CATHETERIZATION  11/13/2020    Dr Salas, EF 60%, no significant stenosis   • CARDIAC CATHETERIZATION N/A 11/13/2020    Procedure: Left Heart Cath;  Surgeon: Sai Salas MD;  Location: [facility] CATH INVASIVE LOCATION;  Service: Cardiology;  Laterality: N/A;   • CHOLECYSTECTOMY     • COLONOSCOPY  02/06/2018    3 year f/u polypectomy Dr MARTA Waller   • COLONOSCOPY W/ POLYPECTOMY  07/07/2021    Dr Waller.  hyperplastic   • HYSTERECTOMY     • INNER EAR SURGERY     • LUMBAR DISCECTOMY Right 11/25/2020    Procedure: LUMBAR DISCECTOMY L4-5 RIGHT;  Surgeon: Qasim Kelsey MD;  Location: [facility] OR;  Service: Neurosurgery;  Laterality: Right;   • OOPHORECTOMY     • TEMPOROMANDIBULAR JOINT ARTHROPLASTY  1984   • TONSILLECTOMY     • URETHRA SURGERY      as a baby       Family History   Problem Relation Age of Onset   • Melanoma Mother         eye   • Diabetes Mother    • Heart disease Mother    • Arthritis Mother    • Depression Mother    • Colon  polyps Mother    • Thyroid disease Mother    • Hyperlipidemia Mother    • Cancer Mother    • Heart attack Mother    • Migraines Mother    • Cancer Father         pancreatic and liver   • Diabetes Father    • Depression Father    • Colon polyps Father    • Bipolar disorder Father         split personality   • ADD / ADHD Son    • Depression Son    • Diabetes Maternal Aunt    • Diabetes Maternal Uncle    • Diabetes Paternal Aunt    • Diabetes Paternal Uncle    • Cancer Maternal Grandmother         uterine/cervical   • Heart disease Maternal Grandfather    • Heart disease Paternal Grandfather    • Migraines Sister    • Bipolar disorder Daughter    • Coronary artery disease Brother    • Breast cancer Neg Hx    • Ovarian cancer Neg Hx       Social History     Socioeconomic History   • Marital status:    Tobacco Use   • Smoking status: Current Every Day Smoker     Packs/day: 0.50     Years: 34.00     Pack years: 17.00     Types: Cigarettes     Start date: 1984   • Smokeless tobacco: Never Used   • Tobacco comment: 0.5-1ppd   Vaping Use   • Vaping Use: Never used   Substance and Sexual Activity   • Alcohol use: Yes     Comment: occ   • Drug use: No   • Sexual activity: Not Currently     Birth control/protection: None      Allergies   Allergen Reactions   • Statins Myalgia   • Erythromycin GI Intolerance     Tolerates zpak   • Lipitor [Atorvastatin Calcium] Myalgia   • Metformin Myalgia   • Penicillins Hives     States ok with keflex   • Pravastatin Myalgia   • Relafen [Nabumetone] GI Intolerance   • Sertraline Other (See Comments)     Restless leg   • Cymbalta [Duloxetine Hcl] Rash       Review of Systems   Constitutional: Positive for chills and fatigue. Negative for diaphoresis and fever.   HENT: Positive for congestion (nasal). Negative for ear pain, rhinorrhea, sinus pain, sneezing and sore throat.    Respiratory: Positive for shortness of breath. Negative for cough and wheezing.    Cardiovascular: Negative for  chest pain.   Gastrointestinal: Negative for abdominal pain, anorexia, change in bowel habit, diarrhea, nausea and vomiting.   Genitourinary: Negative for dysuria.   Musculoskeletal: Negative for arthralgias, joint pain, joint swelling, myalgias and neck pain.   Skin: Negative for rash.   Neurological: Positive for weakness. Negative for vertigo, numbness and headaches.       Objective   Physical Exam  Vitals and nursing note reviewed.   Constitutional:       Appearance: She is well-developed.   HENT:      Head: Normocephalic.      Right Ear: Tympanic membrane, ear canal and external ear normal.      Left Ear: Tympanic membrane, ear canal and external ear normal.      Nose:      Right Sinus: Maxillary sinus tenderness present. No frontal sinus tenderness.      Left Sinus: Maxillary sinus tenderness present. No frontal sinus tenderness.      Mouth/Throat:      Lips: Pink.      Mouth: Mucous membranes are moist. No injury.      Dentition: Normal dentition.      Tongue: No lesions.      Palate: No mass.      Pharynx: Oropharynx is clear. Uvula midline. No pharyngeal swelling, oropharyngeal exudate, posterior oropharyngeal erythema or uvula swelling.   Eyes:      General: Lids are normal.      Conjunctiva/sclera: Conjunctivae normal.      Pupils: Pupils are equal, round, and reactive to light.   Neck:      Thyroid: No thyroid mass or thyromegaly.      Vascular: No carotid bruit.      Trachea: Trachea normal.   Cardiovascular:      Rate and Rhythm: Normal rate and regular rhythm.      Heart sounds: No murmur heard.      Pulmonary:      Effort: Pulmonary effort is normal. No respiratory distress.      Breath sounds: Normal breath sounds. No decreased breath sounds, wheezing, rhonchi or rales.   Chest:      Chest wall: No tenderness.   Abdominal:      General: Bowel sounds are normal.      Palpations: Abdomen is soft.      Tenderness: There is no abdominal tenderness.   Musculoskeletal:         General: Normal range of  motion.      Cervical back: Normal range of motion and neck supple.   Skin:     General: Skin is warm and dry.   Neurological:      Mental Status: She is alert and oriented to person, place, and time.   Psychiatric:         Behavior: Behavior normal.         Assessment/Plan   Diagnoses and all orders for this visit:    1. Nasal congestion (Primary)  -     ipratropium (ATROVENT) 0.03 % nasal spray; 2 sprays into the nostril(s) as directed by provider Every 12 (Twelve) Hours.  Dispense: 30 mL; Refill: 1    2. Close exposure to COVID-19 virus  -     COVID-19,[facility],NP/OP Swab in [employer] or ESwab 72 HR TAT - Swab, Nasopharynx; Future  -     COVID-19,[facility],NP/OP Swab in Transport Media or ESwab 72 HR TAT - Swab, Nasopharynx    3. Acute maxillary sinusitis, recurrence not specified  -     azithromycin (Zithromax Z-Bry) 250 MG tablet; Take 2 tablets the first day, then 1 tablet daily for 4 days.  Dispense: 6 tablet; Refill: 0    4. Other fatigue  -     Cancel: POCT Influenza A/B  -     COVID-19,[facility],NP/OP Swab in [employer] Media or ESwab 72 HR TAT - Swab, Nasopharynx; Future  -     COVID-19,[facility],NP/OP Swab in Transport Media or ESwab 72 HR TAT - Swab, Nasopharynx    5. Muscle spasm of right leg  -     methocarbamol (Robaxin) 500 MG tablet; Take 1 tablet by mouth Daily. With lunch  Dispense: 30 tablet; Refill: 1        Watch for sedation with muscle relaxer.   Decrease the tizanidine to twice a day morning and evening.         Current Outpatient Medications:   •  dapagliflozin (Farxiga) 5 MG tablet tablet, Take 1 tablet by mouth Daily., Disp: 90 tablet, Rfl: 0  •  diclofenac (VOLTAREN) 75 MG EC tablet, Every 12 (Twelve) Hours., Disp: , Rfl:   •  docusate sodium (COLACE) 100 MG capsule, Take 100 mg by mouth Daily., Disp: , Rfl:   •  ezetimibe (Zetia) 10 MG tablet, Take 1 tablet by mouth Daily., Disp: 30 tablet, Rfl: 3  •  gabapentin (NEURONTIN) 600 MG tablet, Take 600 mg by mouth 3 (Three) Times a Day.,  Disp: , Rfl:   •  lisinopril-hydrochlorothiazide (PRINZIDE,ZESTORETIC) 20-12.5 MG per tablet, Take 1 tablet by mouth 2 (two) times a day., Disp: 180 tablet, Rfl: 1  •  magnesium gluconate (MAGONATE) 500 MG tablet, Take 27 mg by mouth 2 (Two) Times a Day., Disp: , Rfl:   •  montelukast (SINGULAIR) 10 MG tablet, Take 1 tablet by mouth Every Night., Disp: 30 tablet, Rfl: 6  •  ondansetron ODT (Zofran ODT) 4 MG disintegrating tablet, Place 1 tablet on the tongue Every 8 (Eight) Hours As Needed for Nausea or Vomiting., Disp: 40 tablet, Rfl: 2  •  potassium chloride (KLOR-CON) 10 MEQ CR tablet, Take 1 tablet by mouth Daily. For potassium, Disp: 90 tablet, Rfl: 1  •  tiZANidine (ZANAFLEX) 4 MG tablet, Take 1 tablet by mouth Every 8 (Eight) Hours As Needed for Muscle Spasms., Disp: 90 tablet, Rfl: 3  •  Ultram 50 MG tablet, Take 1 tablet by mouth Every 4 (Four) Hours As Needed for Moderate Pain ., Disp: 60 tablet, Rfl: 0  •  Ventolin  (90 Base) MCG/ACT inhaler, INHALE 2 PUFFS BY MOUTH EVERY 4 TO 6 HOURS AS NEEDED FOR 10 DAYS, Disp: , Rfl:   •  azithromycin (Zithromax Z-Bry) 250 MG tablet, Take 2 tablets the first day, then 1 tablet daily for 4 days., Disp: 6 tablet, Rfl: 0  •  ipratropium (ATROVENT) 0.03 % nasal spray, 2 sprays into the nostril(s) as directed by provider Every 12 (Twelve) Hours., Disp: 30 mL, Rfl: 1  •  methocarbamol (Robaxin) 500 MG tablet, Take 1 tablet by mouth Daily. With lunch, Disp: 30 tablet, Rfl: 1  No current facility-administered medications for this visit.    Facility-Administered Medications Ordered in Other Visits:   •  mupirocin (BACTROBAN) 2 % nasal ointment, , Nasal, BID, Kassidy Camilo PA-C    Return if symptoms worsen or fail to improve, for Recheck.

## 2021-10-25 LAB
Lab: NORMAL
Lab: NORMAL

## 2021-10-27 LAB
LABCORP SARS-COV-2, NAA 2 DAY TAT: NORMAL
SARS-COV-2 RNA RESP QL NAA+PROBE: NOT DETECTED
WRITTEN AUTHORIZATION: NORMAL

## 2021-11-01 ENCOUNTER — TELEPHONE (OUTPATIENT)
Dept: INTERNAL MEDICINE | Facility: CLINIC | Age: 56
End: 2021-11-01

## 2021-11-01 NOTE — TELEPHONE ENCOUNTER
----- Message from Denise LOZANO MD sent at 10/29/2021  6:09 PM EDT -----  Regarding: assistive device needed?  Received forearm from medical source that wants it mailed back but there is no return address.  Does not state what she needs.  A cane or a walker or a rolling walker or a wheelchair.  Insurance might not cover as this was not discussed at last office visit.

## 2021-11-02 NOTE — TELEPHONE ENCOUNTER
Called and spoke with patient, she states that this is from the people who are handling her disability case. She states that she was notified by a provider (dosen't remember which one) that she should be walking with an assistant device. She states that she doesn't remember getting a prescription or note for an assistant device. She states that she does have some additional paperwork but doesn't think that Dr. Hanks needs to see it before her court appearance in a few weeks. She states that she does think she is a candidate for a cane due to her history of back surgeries. She states that she will contact the company to get the return address/information and let us know.

## 2021-11-03 ENCOUNTER — PATIENT MESSAGE (OUTPATIENT)
Dept: INTERNAL MEDICINE | Facility: CLINIC | Age: 56
End: 2021-11-03

## 2021-11-09 NOTE — TELEPHONE ENCOUNTER
From: Lizette Keith  To: Denise Hanks MD  Sent: 11/3/2021 6:34 PM EDT  Subject: Disability paper    Fax # for [employer] for that disability paper is [phone]. Thank you for your help!

## 2021-12-22 ENCOUNTER — TELEPHONE (OUTPATIENT)
Dept: INTERNAL MEDICINE | Facility: CLINIC | Age: 56
End: 2021-12-22

## 2021-12-22 ENCOUNTER — OFFICE VISIT (OUTPATIENT)
Dept: INTERNAL MEDICINE | Facility: CLINIC | Age: 56
End: 2021-12-22

## 2021-12-22 ENCOUNTER — LAB (OUTPATIENT)
Dept: LAB | Facility: HOSPITAL | Age: 56
End: 2021-12-22

## 2021-12-22 VITALS
OXYGEN SATURATION: 96 % | HEIGHT: 65 IN | SYSTOLIC BLOOD PRESSURE: 130 MMHG | BODY MASS INDEX: 36.92 KG/M2 | TEMPERATURE: 97.8 F | DIASTOLIC BLOOD PRESSURE: 86 MMHG | RESPIRATION RATE: 18 BRPM | WEIGHT: 221.6 LBS | HEART RATE: 115 BPM

## 2021-12-22 DIAGNOSIS — E11.65 TYPE 2 DIABETES MELLITUS WITH HYPERGLYCEMIA, WITH LONG-TERM CURRENT USE OF INSULIN (HCC): Primary | ICD-10-CM

## 2021-12-22 DIAGNOSIS — I10 ESSENTIAL HYPERTENSION: ICD-10-CM

## 2021-12-22 DIAGNOSIS — Z79.4 TYPE 2 DIABETES MELLITUS WITH HYPERGLYCEMIA, WITH LONG-TERM CURRENT USE OF INSULIN (HCC): Primary | ICD-10-CM

## 2021-12-22 DIAGNOSIS — M62.830 BACK MUSCLE SPASM: ICD-10-CM

## 2021-12-22 DIAGNOSIS — R70.0 ELEVATED SED RATE: ICD-10-CM

## 2021-12-22 DIAGNOSIS — E11.65 TYPE 2 DIABETES MELLITUS WITH HYPERGLYCEMIA, WITHOUT LONG-TERM CURRENT USE OF INSULIN (HCC): ICD-10-CM

## 2021-12-22 DIAGNOSIS — K64.9 HEMORRHOIDS, UNSPECIFIED HEMORRHOID TYPE: ICD-10-CM

## 2021-12-22 DIAGNOSIS — E87.6 HYPOKALEMIA: ICD-10-CM

## 2021-12-22 DIAGNOSIS — F41.9 ANXIETY: ICD-10-CM

## 2021-12-22 DIAGNOSIS — E78.5 HYPERLIPIDEMIA, UNSPECIFIED HYPERLIPIDEMIA TYPE: ICD-10-CM

## 2021-12-22 LAB
BASOPHILS # BLD AUTO: 0.09 10*3/MM3 (ref 0–0.2)
BASOPHILS NFR BLD AUTO: 0.8 % (ref 0–1.5)
DEPRECATED RDW RBC AUTO: 42.7 FL (ref 37–54)
EOSINOPHIL # BLD AUTO: 0.19 10*3/MM3 (ref 0–0.4)
EOSINOPHIL NFR BLD AUTO: 1.8 % (ref 0.3–6.2)
ERYTHROCYTE [DISTWIDTH] IN BLOOD BY AUTOMATED COUNT: 12.4 % (ref 12.3–15.4)
ERYTHROCYTE [SEDIMENTATION RATE] IN BLOOD: 79 MM/HR (ref 0–30)
EXPIRATION DATE: NORMAL
HBA1C MFR BLD: 10 %
HCT VFR BLD AUTO: 44.9 % (ref 34–46.6)
HGB BLD-MCNC: 15.1 G/DL (ref 12–15.9)
IMM GRANULOCYTES # BLD AUTO: 0.03 10*3/MM3 (ref 0–0.05)
IMM GRANULOCYTES NFR BLD AUTO: 0.3 % (ref 0–0.5)
LYMPHOCYTES # BLD AUTO: 2.09 10*3/MM3 (ref 0.7–3.1)
LYMPHOCYTES NFR BLD AUTO: 19.6 % (ref 19.6–45.3)
Lab: NORMAL
MCH RBC QN AUTO: 31.9 PG (ref 26.6–33)
MCHC RBC AUTO-ENTMCNC: 33.6 G/DL (ref 31.5–35.7)
MCV RBC AUTO: 94.9 FL (ref 79–97)
MONOCYTES # BLD AUTO: 0.62 10*3/MM3 (ref 0.1–0.9)
MONOCYTES NFR BLD AUTO: 5.8 % (ref 5–12)
NEUTROPHILS NFR BLD AUTO: 7.66 10*3/MM3 (ref 1.7–7)
NEUTROPHILS NFR BLD AUTO: 71.7 % (ref 42.7–76)
NRBC BLD AUTO-RTO: 0 /100 WBC (ref 0–0.2)
PLATELET # BLD AUTO: 246 10*3/MM3 (ref 140–450)
PMV BLD AUTO: 10.4 FL (ref 6–12)
RBC # BLD AUTO: 4.73 10*6/MM3 (ref 3.77–5.28)
WBC NRBC COR # BLD: 10.68 10*3/MM3 (ref 3.4–10.8)

## 2021-12-22 PROCEDURE — 83036 HEMOGLOBIN GLYCOSYLATED A1C: CPT | Performed by: PHYSICIAN ASSISTANT

## 2021-12-22 PROCEDURE — 80074 ACUTE HEPATITIS PANEL: CPT | Performed by: FAMILY MEDICINE

## 2021-12-22 PROCEDURE — 82550 ASSAY OF CK (CPK): CPT | Performed by: FAMILY MEDICINE

## 2021-12-22 PROCEDURE — 80053 COMPREHEN METABOLIC PANEL: CPT | Performed by: FAMILY MEDICINE

## 2021-12-22 PROCEDURE — 3046F HEMOGLOBIN A1C LEVEL >9.0%: CPT | Performed by: PHYSICIAN ASSISTANT

## 2021-12-22 PROCEDURE — 85652 RBC SED RATE AUTOMATED: CPT | Performed by: FAMILY MEDICINE

## 2021-12-22 PROCEDURE — 84443 ASSAY THYROID STIM HORMONE: CPT | Performed by: FAMILY MEDICINE

## 2021-12-22 PROCEDURE — 80061 LIPID PANEL: CPT | Performed by: FAMILY MEDICINE

## 2021-12-22 PROCEDURE — 36415 COLL VENOUS BLD VENIPUNCTURE: CPT

## 2021-12-22 PROCEDURE — 99214 OFFICE O/P EST MOD 30 MIN: CPT | Performed by: PHYSICIAN ASSISTANT

## 2021-12-22 PROCEDURE — 85025 COMPLETE CBC W/AUTO DIFF WBC: CPT | Performed by: FAMILY MEDICINE

## 2021-12-22 RX ORDER — BLOOD SUGAR DIAGNOSTIC
1 STRIP MISCELLANEOUS DAILY
Qty: 30 EACH | Refills: 11 | Status: SHIPPED | OUTPATIENT
Start: 2021-12-22 | End: 2022-08-24 | Stop reason: SDUPTHER

## 2021-12-22 RX ORDER — HYDROXYZINE PAMOATE 25 MG/1
25 CAPSULE ORAL 3 TIMES DAILY PRN
Qty: 60 CAPSULE | Refills: 2 | Status: SHIPPED | OUTPATIENT
Start: 2021-12-22 | End: 2023-03-23 | Stop reason: SDUPTHER

## 2021-12-22 RX ORDER — BLOOD-GLUCOSE METER
KIT MISCELLANEOUS
Qty: 1 EACH | Refills: 0 | Status: SHIPPED | OUTPATIENT
Start: 2021-12-22 | End: 2022-02-21

## 2021-12-22 RX ORDER — TIZANIDINE 4 MG/1
4 TABLET ORAL EVERY 8 HOURS PRN
Qty: 90 TABLET | Refills: 2 | Status: SHIPPED | OUTPATIENT
Start: 2021-12-22 | End: 2022-05-23 | Stop reason: SDUPTHER

## 2021-12-22 RX ORDER — DAPAGLIFLOZIN 5 MG/1
5 TABLET, FILM COATED ORAL DAILY
Qty: 90 TABLET | Refills: 0 | Status: SHIPPED | OUTPATIENT
Start: 2021-12-22 | End: 2021-12-27

## 2021-12-22 RX ORDER — GLUCOSAMINE HCL/CHONDROITIN SU 500-400 MG
CAPSULE ORAL
Qty: 300 EACH | Refills: 2 | Status: SHIPPED | OUTPATIENT
Start: 2021-12-22 | End: 2022-02-21

## 2021-12-22 RX ORDER — HYDROCORTISONE 25 MG/G
CREAM TOPICAL 2 TIMES DAILY
Qty: 30 G | Refills: 0 | Status: SHIPPED | OUTPATIENT
Start: 2021-12-22 | End: 2022-01-05

## 2021-12-22 RX ORDER — SYRING-NEEDL,DISP,INSUL,0.3 ML 30 GX5/16"
SYRINGE, EMPTY DISPOSABLE MISCELLANEOUS
Qty: 300 EACH | Refills: 2 | Status: SHIPPED | OUTPATIENT
Start: 2021-12-22 | End: 2022-02-21

## 2021-12-22 NOTE — PROGRESS NOTES
Chief Complaint  Diabetes (last A1c 6/21/21), Hyperglycemia (above 200 when checked at home last 3 days ), and Hemorrhoids (after her last colonoscopy has had trouble every BM, would like a cream or referral to someone who can help her)    Subjective          History of Present Illness  Lizette Keith presents to [facility] for   DMII:  Last few days her sugars have been 220s-240s. She does not routinely check her sugars. Had not checked her sugars for at least the last month but before her sugars were running 160s and her A1c was 8.6. Had been feeling shaky and took her blood sugar because she thought it would be low. Has felt fatigued for the last several weeks as well.  Does drink soda and is working on cutting back. Knows she will eventually need insulin as many family members are on insulin with their DMII, is ok starting insulin today if needed.   Lab Results       Component                Value               Date                       HGBA1C                   10.0                12/22/2021                 HGBA1C                   8.6 (H)             06/21/2021                 HGBA1C                   8.1 (H)             03/11/2021                Anxiety:  Tried her daughters vistaril and it helped. Has tried and failed paxil, zoloft, and buspar in the past. No hi/si. Does not feel like she has problems with depression, just anxiety if she has to get out and go to the store for example. No hi/si    Hemorrhoids:  req a cream for this, is using otc stuff and it has not helped. She is having bleeding and pain with BMs. The area is swollen and painful. Sometimes she cannot sit down due to the pain, would like to see a specialist.       Review of Systems   Constitutional: Positive for fatigue. Negative for fever and unexpected weight loss.   Respiratory: Negative for cough, shortness of breath and wheezing.    Cardiovascular: Negative for chest pain and palpitations.    Gastrointestinal: Positive for blood in stool. Negative for abdominal pain.       The following portions of the patient's history were reviewed and updated as appropriate: allergies, current medications, past family history, past medical history, past social history, past surgical history and problem list.  Allergies   Allergen Reactions   • Statins Myalgia   • Erythromycin GI Intolerance     Tolerates zpak   • Lipitor [Atorvastatin Calcium] Myalgia   • Metformin Myalgia   • Penicillins Hives     States ok with keflex   • Pravastatin Myalgia   • Relafen [Nabumetone] GI Intolerance   • Sertraline Other (See Comments)     Restless leg   • Cymbalta [Duloxetine Hcl] Rash     Current Outpatient Medications on File Prior to Visit   Medication Sig Dispense Refill   • docusate sodium (COLACE) 100 MG capsule Take 100 mg by mouth As Needed.     • ezetimibe (Zetia) 10 MG tablet Take 1 tablet by mouth Daily. 30 tablet 3   • gabapentin (NEURONTIN) 600 MG tablet Take 600 mg by mouth 3 (Three) Times a Day.     • ipratropium (ATROVENT) 0.03 % nasal spray 2 sprays into the nostril(s) as directed by provider Every 12 (Twelve) Hours. 30 mL 1   • lisinopril-hydrochlorothiazide (PRINZIDE,ZESTORETIC) 20-12.5 MG per tablet Take 1 tablet by mouth 2 (two) times a day. 180 tablet 1   • methocarbamol (Robaxin) 500 MG tablet Take 1 tablet by mouth Daily. With lunch (Patient taking differently: Take 500 mg by mouth As Needed. With lunch) 30 tablet 1   • montelukast (SINGULAIR) 10 MG tablet Take 1 tablet by mouth Every Night. 30 tablet 6   • ondansetron ODT (Zofran ODT) 4 MG disintegrating tablet Place 1 tablet on the tongue Every 8 (Eight) Hours As Needed for Nausea or Vomiting. 40 tablet 2   • potassium chloride (KLOR-CON) 10 MEQ CR tablet Take 1 tablet by mouth Daily. For potassium 90 tablet 1   • Ultram 50 MG tablet Take 1 tablet by mouth Every 4 (Four) Hours As Needed for Moderate Pain . 60 tablet 0   • Ventolin  (90 Base) MCG/ACT  "inhaler As Needed.     • [DISCONTINUED] dapagliflozin (Farxiga) 5 MG tablet tablet Take 1 tablet by mouth Daily. 90 tablet 0   • [DISCONTINUED] tiZANidine (ZANAFLEX) 4 MG tablet Take 1 tablet by mouth 2 (two) times a day. 1 tablet 0   • diclofenac (VOLTAREN) 75 MG EC tablet Every 12 (Twelve) Hours.     • magnesium gluconate (MAGONATE) 500 MG tablet Take 27 mg by mouth 2 (Two) Times a Day.     • [DISCONTINUED] azithromycin (Zithromax Z-Bry) 250 MG tablet Take 2 tablets the first day, then 1 tablet daily for 4 days. 6 tablet 0     Current Facility-Administered Medications on File Prior to Visit   Medication Dose Route Frequency Provider Last Rate Last Admin   • mupirocin (BACTROBAN) 2 % nasal ointment   Nasal BID Kassidy Camilo PA-C           Social History     Tobacco Use   Smoking Status Current Every Day Smoker   • Packs/day: 0.50   • Years: 34.00   • Pack years: 17.00   • Types: Cigarettes   • Start date: 1984   Smokeless Tobacco Never Used   Tobacco Comment    0.5-1ppd        Objective   Vital Signs:   Vitals:    12/22/21 1002   BP: 130/86   Pulse: 115   Resp: 18   Temp: 97.8 °F (36.6 °C)   TempSrc: Infrared   SpO2: 96%   Weight: 101 kg (221 lb 9.6 oz)   Height: 165.1 cm (65\")      Physical Exam  Vitals reviewed.   Constitutional:       General: She is not in acute distress.     Appearance: Normal appearance.   HENT:      Head: Normocephalic and atraumatic.   Eyes:      General: No scleral icterus.     Extraocular Movements: Extraocular movements intact.      Conjunctiva/sclera: Conjunctivae normal.   Cardiovascular:      Rate and Rhythm: Normal rate and regular rhythm.      Heart sounds: Normal heart sounds. No murmur heard.      Pulmonary:      Effort: Pulmonary effort is normal. No respiratory distress.      Breath sounds: Normal breath sounds. No stridor. No wheezing or rhonchi.   Musculoskeletal:      Cervical back: Normal range of motion and neck supple.   Skin:     General: Skin is warm and dry.      " Coloration: Skin is not jaundiced.   Neurological:      General: No focal deficit present.      Mental Status: She is alert and oriented to person, place, and time.      Gait: Gait normal.   Psychiatric:         Mood and Affect: Mood normal.         Behavior: Behavior normal.        No LMP recorded. Patient has had a hysterectomy.    Result Review :              Results for orders placed or performed in visit on 21   POC Glycosylated Hemoglobin (Hb A1C)    Specimen: Blood   Result Value Ref Range    Hemoglobin A1C 10.0 %    Lot Number [id number]     Expiration Date 2023          New Medications Ordered This Visit   Medications   • dapagliflozin (Farxiga) 5 MG tablet tablet     Sig: Take 1 tablet by mouth Daily.     Dispense:  90 tablet     Refill:  0   • Insulin Glargine (LANTUS SOLOSTAR) 100 UNIT/ML injection pen     Sig: Start at 6 u and increase by 2 units every 2-3 days until fasting sugars under 140s. Max dose 18 units     Dispense:  6 pen     Refill:  1   • hydrOXYzine pamoate (Vistaril) 25 MG capsule     Sig: Take 1 capsule by mouth 3 (Three) Times a Day As Needed for Itching.     Dispense:  60 capsule     Refill:  2   • tiZANidine (ZANAFLEX) 4 MG tablet     Sig: Take 1 tablet by mouth Every 8 (Eight) Hours As Needed for Muscle Spasms.     Dispense:  90 tablet     Refill:  2   • glucose monitor monitoring kit     Sig: Use as directed, E11.65     Dispense:  1 each     Refill:  0   • Glucose Blood (Blood Glucose Test) strip     Sig: Use with glucometer daily as directed, E11.65     Dispense:  300 each     Refill:  2   • Lancet Device misc     Sig: Use with glucometer daily as directed, E11.65     Dispense:  300 each     Refill:  2   • Hydrocortisone, Perianal, (ANUSOL-HC) 2.5 % rectal cream     Sig: Insert  into the rectum 2 (Two) Times a Day for 14 days.     Dispense:  30 g     Refill:  0   • Insulin Pen Needle (Pen Needles) 32G X 6 MM misc     Si each Daily.     Dispense:  30 each     Refill:   11          Assessment and Plan    Diagnoses and all orders for this visit:    1. Type 2 diabetes mellitus with hyperglycemia, with long-term current use of insulin ([facility]) (Primary)  Assessment & Plan:  Diabetes is worsening.   Reminded to bring in blood sugar diary at next visit.  Dietary recommendations for ADA diet.  Regular aerobic exercise.  Discussed ways to avoid symptomatic hypoglycemia.  Medication changes per orders.  Diabetes will be reassessed 2 weeks. Start lantus, monitor for hypoglycemia, increase insulin as directed. Declined diabetic educator or nutritionist referral.     Orders:  -     POC Glycosylated Hemoglobin (Hb A1C)  -     dapagliflozin (Farxiga) 5 MG tablet tablet; Take 1 tablet by mouth Daily.  Dispense: 90 tablet; Refill: 0  -     Insulin Glargine (LANTUS SOLOSTAR) 100 UNIT/ML injection pen; Start at 6 u and increase by 2 units every 2-3 days until fasting sugars under 140s. Max dose 18 units  Dispense: 6 pen; Refill: 1  -     glucose monitor monitoring kit; Use as directed, E11.65  Dispense: 1 each; Refill: 0  -     Glucose Blood (Blood Glucose Test) strip; Use with glucometer daily as directed, E11.65  Dispense: 300 each; Refill: 2  -     Lancet Device misc; Use with glucometer daily as directed, E11.65  Dispense: 300 each; Refill: 2  -     Insulin Pen Needle (Pen Needles) 32G X 6 MM misc; 1 each Daily.  Dispense: 30 each; Refill: 11    2. Anxiety  Assessment & Plan:  Vistaril prn for situational anxiety    Orders:  -     hydrOXYzine pamoate (Vistaril) 25 MG capsule; Take 1 capsule by mouth 3 (Three) Times a Day As Needed for Itching.  Dispense: 60 capsule; Refill: 2    3. Back muscle spasm  -     tiZANidine (ZANAFLEX) 4 MG tablet; Take 1 tablet by mouth Every 8 (Eight) Hours As Needed for Muscle Spasms.  Dispense: 90 tablet; Refill: 2    4. Hemorrhoids, unspecified hemorrhoid type  Assessment & Plan:  Refer to GI per pt request, will treat with topical steroids    Orders:  -      Ambulatory Referral to Gastroenterology  -     Hydrocortisone, Perianal, (ANUSOL-HC) 2.5 % rectal cream; Insert  into the rectum 2 (Two) Times a Day for 14 days.  Dispense: 30 g; Refill: 0      Follow Up   Return in about 2 weeks (around 1/5/2022).    Follow up if symptoms worsen or persist or has new or concerning symptoms, go to ER for severe symptoms.   Reviewed common medication effects and side effects and advised to report side effects immediately.  Encouraged medication compliance and the importance of keeping scheduled follow up appointments with me and any other providers.  If a referral was made please contact our office if you have not heard about an appointment in the next 2 weeks.   If labs or images are ordered we will contact you with the results within the next week.  If you have not heard from us after a week please call our office to inquire about the results.   Patient was given instructions and counseling regarding her condition or for health maintenance advice. Please see specific information pulled into the AVS if appropriate.     Yokasta Coronel PA-C    * Please note that portions of this note were completed with a voice recognition program.

## 2021-12-22 NOTE — TELEPHONE ENCOUNTER
Caller: Lizette Keith    Relationship: Self    Best call back number: [phone]    Requested Prescriptions:   LANTUS SOLOSTAR PEN NEEDLES FOR THE INJECTION PEN      Pharmacy where request should be sent:    [address] [phone]  - [phone]   [phone]    Additional details provided by patient:   PATIENT STATED THAT SHE WAS SEEN IN THE OFFICE TODAY 12/22/2021 AND PUT ON MEDICATION   Insulin Glargine (LANTUS SOLOSTAR) 100 UNIT/ML injection pen  BUT DID NOT GET A PRESCRIPTION FOR THE PEN NEEDLES CALLED INTO THE PHARMACY     Does the patient have less than a 3 day supply:  [x] Yes  [] No    Bart Castorena Rep   12/22/21 12:44 EST

## 2021-12-22 NOTE — ASSESSMENT & PLAN NOTE
Diabetes is worsening.   Reminded to bring in blood sugar diary at next visit.  Dietary recommendations for ADA diet.  Regular aerobic exercise.  Discussed ways to avoid symptomatic hypoglycemia.  Medication changes per orders.  Diabetes will be reassessed 2 weeks. Start lantus, monitor for hypoglycemia, increase insulin as directed. Declined diabetic educator or nutritionist referral.

## 2021-12-23 ENCOUNTER — TELEPHONE (OUTPATIENT)
Dept: INTERNAL MEDICINE | Facility: CLINIC | Age: 56
End: 2021-12-23

## 2021-12-23 DIAGNOSIS — R74.01 ELEVATED TRANSAMINASE LEVEL: Primary | ICD-10-CM

## 2021-12-23 DIAGNOSIS — N18.30 CKD STAGE 3 DUE TO TYPE 2 DIABETES MELLITUS (HCC): Primary | ICD-10-CM

## 2021-12-23 DIAGNOSIS — E11.22 CKD STAGE 3 DUE TO TYPE 2 DIABETES MELLITUS (HCC): Primary | ICD-10-CM

## 2021-12-23 LAB
ALBUMIN SERPL-MCNC: 4.7 G/DL (ref 3.5–5.2)
ALBUMIN/GLOB SERPL: 1.5 G/DL
ALP SERPL-CCNC: 213 U/L (ref 39–117)
ALT SERPL W P-5'-P-CCNC: 76 U/L (ref 1–33)
ANION GAP SERPL CALCULATED.3IONS-SCNC: 13.4 MMOL/L (ref 5–15)
AST SERPL-CCNC: 57 U/L (ref 1–32)
BILIRUB SERPL-MCNC: 0.2 MG/DL (ref 0–1.2)
BUN SERPL-MCNC: 44 MG/DL (ref 6–20)
BUN/CREAT SERPL: 33.1 (ref 7–25)
CALCIUM SPEC-SCNC: 10.3 MG/DL (ref 8.6–10.5)
CHLORIDE SERPL-SCNC: 97 MMOL/L (ref 98–107)
CHOLEST SERPL-MCNC: 342 MG/DL (ref 0–200)
CK SERPL-CCNC: 33 U/L (ref 20–180)
CO2 SERPL-SCNC: 23.6 MMOL/L (ref 22–29)
CREAT SERPL-MCNC: 1.33 MG/DL (ref 0.57–1)
GFR SERPL CREATININE-BSD FRML MDRD: 42 ML/MIN/1.73
GLOBULIN UR ELPH-MCNC: 3.2 GM/DL
GLUCOSE SERPL-MCNC: 275 MG/DL (ref 65–99)
HDLC SERPL-MCNC: 47 MG/DL (ref 40–60)
LDLC SERPL CALC-MCNC: 205 MG/DL (ref 0–100)
LDLC/HDLC SERPL: 4.46 {RATIO}
POTASSIUM SERPL-SCNC: 4.8 MMOL/L (ref 3.5–5.2)
PROT SERPL-MCNC: 7.9 G/DL (ref 6–8.5)
SODIUM SERPL-SCNC: 134 MMOL/L (ref 136–145)
TRIGL SERPL-MCNC: 427 MG/DL (ref 0–150)
TSH SERPL DL<=0.05 MIU/L-ACNC: 1.88 UIU/ML (ref 0.27–4.2)
VLDLC SERPL-MCNC: 90 MG/DL (ref 5–40)

## 2021-12-23 NOTE — TELEPHONE ENCOUNTER
----- Message from Denise LOZANO MD sent at 12/23/2021 12:48 PM EST -----  Lab is very abnormal.  Sedimentation rate has increased.  Kidney function has decreased significantly since this summer.  Nephrology referral will be placed.  ALT and AST liver tests have increased.  Alkaline phosphatase is increased.  CT at [facility] shows fatty liver in 2018.  Fatty liver can cause increase in liver functions.  Fatty liver can also cause scarring of the liver.  Has patient had a more recent CT or ultrasound of her liver?  Please follow low animal fat, low sugar, low alcohol diet.

## 2021-12-23 NOTE — TELEPHONE ENCOUNTER
Called and reviewed lab results with patient. She states that she does know about the fatty liver and hasn't had any recent imaging on the abdomen/liver since 2018. She states that she is concerned about the elevation in the sed rate. She doesn't know where this could be coming from. She states that she is agreeable to seeing Nephrology and will be awaiting a call from the referral coordinator regarding appointment.

## 2021-12-27 ENCOUNTER — PATIENT MESSAGE (OUTPATIENT)
Dept: INTERNAL MEDICINE | Facility: CLINIC | Age: 56
End: 2021-12-27

## 2021-12-27 DIAGNOSIS — E11.65 TYPE 2 DIABETES MELLITUS WITH HYPERGLYCEMIA, WITH LONG-TERM CURRENT USE OF INSULIN (HCC): ICD-10-CM

## 2021-12-27 DIAGNOSIS — Z79.4 TYPE 2 DIABETES MELLITUS WITH HYPERGLYCEMIA, WITH LONG-TERM CURRENT USE OF INSULIN (HCC): ICD-10-CM

## 2021-12-27 RX ORDER — DAPAGLIFLOZIN 5 MG/1
10 TABLET, FILM COATED ORAL DAILY
Qty: 90 TABLET | Refills: 0 | Status: SHIPPED | OUTPATIENT
Start: 2021-12-27 | End: 2022-02-21 | Stop reason: SDUPTHER

## 2021-12-27 NOTE — TELEPHONE ENCOUNTER
Anne Diallo MA 12/27/2021 10:45 AM EST      ----- Message -----  From: Lizette Keith  Sent: 12/27/2021 10:21 AM EST  To: [facility] Clinical Pool  Subject: Farxiga     I dont think the farxiga is doing me any good. Should we consider something else? My sugar is staying pretty high during the day. Still 200 to 300+ during the day.

## 2022-01-10 ENCOUNTER — TELEMEDICINE (OUTPATIENT)
Dept: INTERNAL MEDICINE | Facility: CLINIC | Age: 57
End: 2022-01-10

## 2022-01-10 DIAGNOSIS — E11.22 CKD STAGE 3 DUE TO TYPE 2 DIABETES MELLITUS: ICD-10-CM

## 2022-01-10 DIAGNOSIS — Z79.4 TYPE 2 DIABETES MELLITUS WITH HYPERGLYCEMIA, WITH LONG-TERM CURRENT USE OF INSULIN: ICD-10-CM

## 2022-01-10 DIAGNOSIS — E11.65 TYPE 2 DIABETES MELLITUS WITH HYPERGLYCEMIA, WITH LONG-TERM CURRENT USE OF INSULIN: ICD-10-CM

## 2022-01-10 DIAGNOSIS — K64.9 HEMORRHOIDS, UNSPECIFIED HEMORRHOID TYPE: ICD-10-CM

## 2022-01-10 DIAGNOSIS — J01.00 ACUTE MAXILLARY SINUSITIS, RECURRENCE NOT SPECIFIED: Primary | ICD-10-CM

## 2022-01-10 DIAGNOSIS — N18.30 CKD STAGE 3 DUE TO TYPE 2 DIABETES MELLITUS: ICD-10-CM

## 2022-01-10 PROCEDURE — 99215 OFFICE O/P EST HI 40 MIN: CPT | Performed by: FAMILY MEDICINE

## 2022-01-10 RX ORDER — AZITHROMYCIN 250 MG/1
TABLET, FILM COATED ORAL
Qty: 6 TABLET | Refills: 0 | Status: SHIPPED | OUTPATIENT
Start: 2022-01-10 | End: 2022-02-21

## 2022-01-10 RX ORDER — FLASH GLUCOSE SCANNING READER
1 EACH MISCELLANEOUS CONTINUOUS
Qty: 1 EACH | Refills: 0 | Status: SHIPPED | OUTPATIENT
Start: 2022-01-10 | End: 2022-01-10 | Stop reason: DRUGHIGH

## 2022-01-10 RX ORDER — PREDNISONE 10 MG/1
10 TABLET ORAL 2 TIMES DAILY
Qty: 6 TABLET | Refills: 0 | Status: SHIPPED | OUTPATIENT
Start: 2022-01-10 | End: 2022-02-21

## 2022-01-10 NOTE — PROGRESS NOTES
Subjective   Lizette Keith is a 56 y.o. female.     Chief Complaint   Patient presents with   • Diabetes   • Hyperlipidemia       You have chosen to receive care through a telehealth visit.  Do you consent to use a video/audio connection for your medical care today? Yes    This was an audio and video enabled telemedicine encounter.    There were no vitals taken for this visit.      History of Present Illness   Pt had a negative Covid test on Friday.  Pt is having problems with her sugar.   Pt is taking 20 units of lantus nightly and the blood sugar is fluctuating.  Pt is taking farxiga in the morning.  Pt's sugar is rarely less that 200.   Pt trying to watch her sugar and carb intake.  Pt had a light meal on Saturday and ate early and sugar was 148 then next morning.  Last night pt had beef pot pie at 7:30 and sugar was 278 this morning.    Pt thinks that her sinuses are infected over the past week.  Pt usually takes Zpak and steroids. Pt advised to watch her blood sugar with the steroids.    Pt is on diclofenac for her back pain.  Pt is not sleeping well. Because of back pain    Pt has not heard from Nephrology or GI referral placed again after being cancelled by someone.   The following portions of the patient's history were reviewed and updated as appropriate: allergies, current medications, past family history, past medical history, past social history, past surgical history and problem list.    Past Medical History:   Diagnosis Date   • Allergic    • Anemia     during pregnancy   • Anxiety    • Arthritis    • Blood in urine    • Chronic fatigue    • Colon polyp 02/2018    7 at last colonoscopy   • Degeneration of L4-L5 intervertebral disc 12/10/2019   • Depression    • Diabetes mellitus type 2 in obese (HCC) 03/11/2021   • Elevated cholesterol    • Endometriosis    • Fibromyalgia, primary    • Fracture    • GERD (gastroesophageal reflux disease)    • H/O mammogram 02/2018   • Headache    • Herniated  intervertebral disc of lumbar spine     l4-l5   • HL (hearing loss)    • Hypertension    • Irritable bowel syndrome    • Pap smear for cervical cancer screening 2018?   • Wears glasses       Past Surgical History:   Procedure Laterality Date   • CARDIAC CATHETERIZATION  11/13/2020    Dr Salas, EF 60%, no significant stenosis   • CARDIAC CATHETERIZATION N/A 11/13/2020    Procedure: Left Heart Cath;  Surgeon: Sai Salas MD;  Location:  [facility];  Service: Cardiology;  Laterality: N/A;   • CHOLECYSTECTOMY     • COLONOSCOPY  02/06/2018    3 year f/u polypectomy Dr MARTA Waller   • COLONOSCOPY W/ POLYPECTOMY  07/07/2021    Dr Waller.  hyperplastic   • HYSTERECTOMY     • INNER EAR SURGERY     • LUMBAR DISCECTOMY Right 11/25/2020    Procedure: LUMBAR DISCECTOMY L4-5 RIGHT;  Surgeon: Qasim Kelsey MD;  Location:  [facility];  Service: Neurosurgery;  Laterality: Right;   • OOPHORECTOMY     • TEMPOROMANDIBULAR JOINT ARTHROPLASTY  1984   • TONSILLECTOMY     • URETHRA SURGERY      as a baby       Family History   Problem Relation Age of Onset   • Melanoma Mother         eye   • Diabetes Mother    • Heart disease Mother    • Arthritis Mother    • Depression Mother    • Colon polyps Mother    • Thyroid disease Mother    • Hyperlipidemia Mother    • Cancer Mother    • Heart attack Mother    • Migraines Mother    • Cancer Father         pancreatic and liver   • Diabetes Father    • Depression Father    • Colon polyps Father    • Bipolar disorder Father         split personality   • ADD / ADHD Son    • Depression Son    • Diabetes Maternal Aunt    • Diabetes Maternal Uncle    • Diabetes Paternal Aunt    • Diabetes Paternal Uncle    • Cancer Maternal Grandmother         uterine/cervical   • Heart disease Maternal Grandfather    • Heart disease Paternal Grandfather    • Migraines Sister    • Bipolar disorder Daughter    • Coronary artery disease Brother    • Breast cancer Neg Hx    • Ovarian cancer Neg Hx        Social History     Socioeconomic History   • Marital status:    Tobacco Use   • Smoking status: Current Every Day Smoker     Packs/day: 0.50     Years: 34.00     Pack years: 17.00     Types: Cigarettes     Start date: 1984   • Smokeless tobacco: Never Used   • Tobacco comment: 0.5-1ppd   Vaping Use   • Vaping Use: Never used   Substance and Sexual Activity   • Alcohol use: Yes     Comment: occ   • Drug use: No   • Sexual activity: Not Currently     Birth control/protection: None      Allergies   Allergen Reactions   • Statins Myalgia   • Erythromycin GI Intolerance     Tolerates zpak   • Lipitor [Atorvastatin Calcium] Myalgia   • Metformin Myalgia   • Penicillins Hives     States ok with keflex   • Pravastatin Myalgia   • Relafen [Nabumetone] GI Intolerance   • Sertraline Other (See Comments)     Restless leg   • Cymbalta [Duloxetine Hcl] Rash       Review of Systems   HENT: Positive for sinus pain.    Musculoskeletal: Positive for back pain.       Objective   Physical Exam  Constitutional:       Comments: Video visit   HENT:      Head: Normocephalic.   Eyes:      Extraocular Movements: Extraocular movements intact.   Pulmonary:      Effort: Pulmonary effort is normal. No respiratory distress.   Musculoskeletal:      Cervical back: Neck supple.   Neurological:      Mental Status: She is alert and oriented to person, place, and time.   Psychiatric:         Mood and Affect: Mood normal.         Behavior: Behavior normal.         Thought Content: Thought content normal.         Judgment: Judgment normal.         Assessment/Plan   Diagnoses and all orders for this visit:    1. Acute maxillary sinusitis, recurrence not specified (Primary)  -     azithromycin (Zithromax Z-Bry) 250 MG tablet; Take 2 tablets the first day, then 1 tablet daily for 4 days.  Dispense: 6 tablet; Refill: 0  -     predniSONE (DELTASONE) 10 MG tablet; Take 1 tablet by mouth 2 (Two) Times a Day.  Dispense: 6 tablet; Refill: 0    2. Type 2  diabetes mellitus with hyperglycemia, with long-term current use of insulin (HCC)  -     Continuous Blood Gluc Sensor (FreeStyle Trey 2 Sensor) misc; 1 each Every 14 (Fourteen) Days. E11.65, Z79.4  Dispense: 2 each; Refill: 3  -     Discontinue: Continuous Blood Gluc  (FreeStyle Trey 14 Day Alverda) device; 1 each Continuous. Use to monitor blood sugar E11.65; Z79.4  Dispense: 1 each; Refill: 0  -     Continuous Blood Gluc  (FreeStyle Trey 2 Alverda) device; 1 each Continuous. To monitor blood sugar E11.65, Z79.4  Dispense: 1 each; Refill: 0    3. Hemorrhoids, unspecified hemorrhoid type  -     Ambulatory Referral to Gastroenterology    4. CKD stage 3 due to type 2 diabetes mellitus (HCC)  -     Ambulatory Referral to Nephrology        If sugar staying above 200 ok to increase by 2 units every 3 days.  If sugar drops below 200, hold increase in glucose.  Patient advised to be very careful is if she is eating a lot of sweets she can start chasing her blood sugar.  Because of wide swings in her blood sugar, we will get her a freestyle trey to if okay with insurance         Current Outpatient Medications:   •  azithromycin (Zithromax Z-Bry) 250 MG tablet, Take 2 tablets the first day, then 1 tablet daily for 4 days., Disp: 6 tablet, Rfl: 0  •  Continuous Blood Gluc  (FreeStyle Trey 2 Alverda) device, 1 each Continuous. To monitor blood sugar E11.65, Z79.4, Disp: 1 each, Rfl: 0  •  Continuous Blood Gluc Sensor (FreeStyle Trey 2 Sensor) misc, 1 each Every 14 (Fourteen) Days. E11.65, Z79.4, Disp: 2 each, Rfl: 3  •  dapagliflozin (Farxiga) 5 MG tablet tablet, Take 2 tablets by mouth Daily., Disp: 90 tablet, Rfl: 0  •  diclofenac (VOLTAREN) 75 MG EC tablet, Every 12 (Twelve) Hours., Disp: , Rfl:   •  docusate sodium (COLACE) 100 MG capsule, Take 100 mg by mouth As Needed., Disp: , Rfl:   •  ezetimibe (Zetia) 10 MG tablet, Take 1 tablet by mouth Daily., Disp: 30 tablet, Rfl: 3  •  gabapentin  (NEURONTIN) 600 MG tablet, Take 600 mg by mouth 3 (Three) Times a Day., Disp: , Rfl:   •  Glucose Blood (Blood Glucose Test) strip, Use with glucometer daily as directed, E11.65, Disp: 300 each, Rfl: 2  •  glucose monitor monitoring kit, Use as directed, E11.65, Disp: 1 each, Rfl: 0  •  hydrOXYzine pamoate (Vistaril) 25 MG capsule, Take 1 capsule by mouth 3 (Three) Times a Day As Needed for Itching., Disp: 60 capsule, Rfl: 2  •  Insulin Glargine (LANTUS SOLOSTAR) 100 UNIT/ML injection pen, Start at 6 u and increase by 2 units every 2-3 days until fasting sugars under 140s. Max dose 18 units, Disp: 6 pen, Rfl: 1  •  Insulin Pen Needle (Pen Needles) 32G X 6 MM misc, 1 each Daily., Disp: 30 each, Rfl: 11  •  ipratropium (ATROVENT) 0.03 % nasal spray, 2 sprays into the nostril(s) as directed by provider Every 12 (Twelve) Hours., Disp: 30 mL, Rfl: 1  •  Lancet Device misc, Use with glucometer daily as directed, E11.65, Disp: 300 each, Rfl: 2  •  lisinopril-hydrochlorothiazide (PRINZIDE,ZESTORETIC) 20-12.5 MG per tablet, Take 1 tablet by mouth 2 (two) times a day., Disp: 180 tablet, Rfl: 1  •  magnesium gluconate (MAGONATE) 500 MG tablet, Take 27 mg by mouth 2 (Two) Times a Day., Disp: , Rfl:   •  methocarbamol (Robaxin) 500 MG tablet, Take 1 tablet by mouth Daily. With lunch (Patient taking differently: Take 500 mg by mouth As Needed. With lunch), Disp: 30 tablet, Rfl: 1  •  montelukast (SINGULAIR) 10 MG tablet, Take 1 tablet by mouth Every Night., Disp: 30 tablet, Rfl: 6  •  ondansetron ODT (Zofran ODT) 4 MG disintegrating tablet, Place 1 tablet on the tongue Every 8 (Eight) Hours As Needed for Nausea or Vomiting., Disp: 40 tablet, Rfl: 2  •  potassium chloride (KLOR-CON) 10 MEQ CR tablet, Take 1 tablet by mouth Daily. For potassium, Disp: 90 tablet, Rfl: 1  •  predniSONE (DELTASONE) 10 MG tablet, Take 1 tablet by mouth 2 (Two) Times a Day., Disp: 6 tablet, Rfl: 0  •  tiZANidine (ZANAFLEX) 4 MG tablet, Take 1 tablet by  mouth Every 8 (Eight) Hours As Needed for Muscle Spasms., Disp: 90 tablet, Rfl: 2  •  Ultram 50 MG tablet, Take 1 tablet by mouth Every 4 (Four) Hours As Needed for Moderate Pain ., Disp: 60 tablet, Rfl: 0  •  Ventolin  (90 Base) MCG/ACT inhaler, As Needed., Disp: , Rfl:   No current facility-administered medications for this visit.    Facility-Administered Medications Ordered in Other Visits:   •  mupirocin (BACTROBAN) 2 % nasal ointment, , Nasal, BID, Kassidy Camilo PA-C    Return in about 4 weeks (around 2/7/2022), or if symptoms worsen or fail to improve, for Recheck.     Recent Results (from the past 672 hour(s))   POC Glycosylated Hemoglobin (Hb A1C)    Collection Time: 12/22/21 10:19 AM    Specimen: Blood   Result Value Ref Range    Hemoglobin A1C 10.0 %    Lot Number [id number]     Expiration Date 06/28/2023    CK    Collection Time: 12/22/21 10:53 AM    Specimen: Blood   Result Value Ref Range    Creatine Kinase 33 20 - 180 U/L   Hepatitis panel, acute    Collection Time: 12/22/21 10:53 AM    Specimen: Blood   Result Value Ref Range    Hepatitis B Surface Ag Non-Reactive Non-Reactive    Hep A IgM Non-Reactive Non-Reactive    Hep B C IgM Non-Reactive Non-Reactive    Hepatitis C Ab Non-Reactive Non-Reactive   Comprehensive Metabolic Panel    Collection Time: 12/22/21 10:53 AM    Specimen: Blood   Result Value Ref Range    Glucose 275 (H) 65 - 99 mg/dL    BUN 44 (H) 6 - 20 mg/dL    Creatinine 1.33 (H) 0.57 - 1.00 mg/dL    Sodium 134 (L) 136 - 145 mmol/L    Potassium 4.8 3.5 - 5.2 mmol/L    Chloride 97 (L) 98 - 107 mmol/L    CO2 23.6 22.0 - 29.0 mmol/L    Calcium 10.3 8.6 - 10.5 mg/dL    Total Protein 7.9 6.0 - 8.5 g/dL    Albumin 4.70 3.50 - 5.20 g/dL    ALT (SGPT) 76 (H) 1 - 33 U/L    AST (SGOT) 57 (H) 1 - 32 U/L    Alkaline Phosphatase 213 (H) 39 - 117 U/L    Total Bilirubin 0.2 0.0 - 1.2 mg/dL    eGFR Non African Amer 42 (L) >60 mL/min/1.73    Globulin 3.2 gm/dL    A/G Ratio 1.5 g/dL     BUN/Creatinine Ratio 33.1 (H) 7.0 - 25.0    Anion Gap 13.4 5.0 - 15.0 mmol/L   TSH Rfx On Abnormal To Free T4    Collection Time: 12/22/21 10:53 AM    Specimen: Blood   Result Value Ref Range    TSH 1.880 0.270 - 4.200 uIU/mL   Lipid Panel    Collection Time: 12/22/21 10:53 AM    Specimen: Blood   Result Value Ref Range    Total Cholesterol 342 (H) 0 - 200 mg/dL    Triglycerides 427 (H) 0 - 150 mg/dL    HDL Cholesterol 47 40 - 60 mg/dL    LDL Cholesterol  205 (H) 0 - 100 mg/dL    VLDL Cholesterol 90 (H) 5 - 40 mg/dL    LDL/HDL Ratio 4.46    Sedimentation Rate    Collection Time: 12/22/21 10:53 AM    Specimen: Blood   Result Value Ref Range    Sed Rate 79 (H) 0 - 30 mm/hr   CBC Auto Differential    Collection Time: 12/22/21 10:53 AM    Specimen: Blood   Result Value Ref Range    WBC 10.68 3.40 - 10.80 10*3/mm3    RBC 4.73 3.77 - 5.28 10*6/mm3    Hemoglobin 15.1 12.0 - 15.9 g/dL    Hematocrit 44.9 34.0 - 46.6 %    MCV 94.9 79.0 - 97.0 fL    MCH 31.9 26.6 - 33.0 pg    MCHC 33.6 31.5 - 35.7 g/dL    RDW 12.4 12.3 - 15.4 %    RDW-SD 42.7 37.0 - 54.0 fl    MPV 10.4 6.0 - 12.0 fL    Platelets 246 140 - 450 10*3/mm3    Neutrophil % 71.7 42.7 - 76.0 %    Lymphocyte % 19.6 19.6 - 45.3 %    Monocyte % 5.8 5.0 - 12.0 %    Eosinophil % 1.8 0.3 - 6.2 %    Basophil % 0.8 0.0 - 1.5 %    Immature Grans % 0.3 0.0 - 0.5 %    Neutrophils, Absolute 7.66 (H) 1.70 - 7.00 10*3/mm3    Lymphocytes, Absolute 2.09 0.70 - 3.10 10*3/mm3    Monocytes, Absolute 0.62 0.10 - 0.90 10*3/mm3    Eosinophils, Absolute 0.19 0.00 - 0.40 10*3/mm3    Basophils, Absolute 0.09 0.00 - 0.20 10*3/mm3    Immature Grans, Absolute 0.03 0.00 - 0.05 10*3/mm3    nRBC 0.0 0.0 - 0.2 /100 WBC     I spent 57 minutes caring for Lizette on this date of service. This time includes time spent by me in the following activities: preparing for the visit, reviewing tests, obtaining and/or reviewing a separately obtained history, performing a medically appropriate examination and/or  evaluation, counseling and educating the patient/family/caregiver, ordering medications, tests, or procedures, referring and communicating with other health care professionals and documenting information in the medical record

## 2022-01-11 ENCOUNTER — TELEPHONE (OUTPATIENT)
Dept: INTERNAL MEDICINE | Facility: CLINIC | Age: 57
End: 2022-01-11

## 2022-01-11 NOTE — TELEPHONE ENCOUNTER
----- Message from Denise LOZANO MD sent at 1/10/2022  6:12 PM EST -----  Regarding: eye check date  Please check [facility] in [place] for last eye check

## 2022-01-11 NOTE — TELEPHONE ENCOUNTER
Spoke with [facility] and patient's last eye exam was done on 11/6/2021.  This has been updated in her chart.

## 2022-01-12 ENCOUNTER — TELEPHONE (OUTPATIENT)
Dept: INTERNAL MEDICINE | Facility: CLINIC | Age: 57
End: 2022-01-12

## 2022-01-12 NOTE — TELEPHONE ENCOUNTER
----- Message from Lizette Keith sent at 1/12/2022  9:53 AM EST -----  Regarding: Covid vac  I tried to get my covid booster but can't find my vac card from getting the original vac. I got them thru [facility] so would I be able to get a copy?

## 2022-01-20 ENCOUNTER — TELEPHONE (OUTPATIENT)
Dept: INTERNAL MEDICINE | Facility: CLINIC | Age: 57
End: 2022-01-20

## 2022-01-20 NOTE — TELEPHONE ENCOUNTER
Received fax today stating that FreeStyle Trey Sensor is not covered under her insurance. It does require a PA at this time. Complete PA?    Key: [id number]

## 2022-01-21 ENCOUNTER — APPOINTMENT (OUTPATIENT)
Dept: ULTRASOUND IMAGING | Facility: HOSPITAL | Age: 57
End: 2022-01-21

## 2022-01-27 ENCOUNTER — TELEPHONE (OUTPATIENT)
Dept: INTERNAL MEDICINE | Facility: CLINIC | Age: 57
End: 2022-01-27

## 2022-01-27 DIAGNOSIS — Z79.4 TYPE 2 DIABETES MELLITUS WITH HYPERGLYCEMIA, WITH LONG-TERM CURRENT USE OF INSULIN: Primary | ICD-10-CM

## 2022-01-27 DIAGNOSIS — E11.65 TYPE 2 DIABETES MELLITUS WITH HYPERGLYCEMIA, WITH LONG-TERM CURRENT USE OF INSULIN: Primary | ICD-10-CM

## 2022-01-27 RX ORDER — PROCHLORPERAZINE 25 MG/1
1 SUPPOSITORY RECTAL CONTINUOUS
Qty: 1 EACH | Refills: 0 | Status: SHIPPED | OUTPATIENT
Start: 2022-01-27

## 2022-01-27 RX ORDER — PROCHLORPERAZINE 25 MG/1
1 SUPPOSITORY RECTAL CONTINUOUS
Qty: 1 EACH | Refills: 3 | Status: SHIPPED | OUTPATIENT
Start: 2022-01-27 | End: 2022-05-05 | Stop reason: SDUPTHER

## 2022-01-27 RX ORDER — PROCHLORPERAZINE 25 MG/1
SUPPOSITORY RECTAL
Qty: 3 EACH | Refills: 12 | Status: SHIPPED | OUTPATIENT
Start: 2022-01-27 | End: 2022-02-21 | Stop reason: SDUPTHER

## 2022-01-27 NOTE — TELEPHONE ENCOUNTER
Fax received from [employer] that they will not cover freestyle david.  They however do prefer Dexcom G6.  Will order Dexcom.  Orders for Dexcom system sent to [facility].    Please notify x-ray tech if needing any x-ray or MRI.  Please avoid vitamin C tablets

## 2022-01-28 NOTE — TELEPHONE ENCOUNTER
The xray or  MRI will perry the sensor- it would have to be removed. If she has either procedure.  Also don't leave in a hot car for the same reason

## 2022-01-28 NOTE — TELEPHONE ENCOUNTER
PN.  She stated understanding.      Not sure what other part of message is talking about to notified xray tech if needing any xray or MRI.  Will ask when Dr. Hanks returns on Tuesday

## 2022-02-16 ENCOUNTER — LAB (OUTPATIENT)
Dept: LAB | Facility: HOSPITAL | Age: 57
End: 2022-02-16

## 2022-02-16 ENCOUNTER — HOSPITAL ENCOUNTER (OUTPATIENT)
Dept: ULTRASOUND IMAGING | Facility: HOSPITAL | Age: 57
Discharge: HOME | End: 2022-02-16

## 2022-02-16 ENCOUNTER — TRANSCRIBE ORDERS (OUTPATIENT)
Dept: LAB | Facility: HOSPITAL | Age: 57
End: 2022-02-16

## 2022-02-16 DIAGNOSIS — R74.01 ELEVATED TRANSAMINASE LEVEL: ICD-10-CM

## 2022-02-16 DIAGNOSIS — N28.9 URETERAL SLUDGE: Primary | ICD-10-CM

## 2022-02-16 DIAGNOSIS — N28.9 URETERAL SLUDGE: ICD-10-CM

## 2022-02-16 LAB
ALBUMIN SERPL-MCNC: 4.7 G/DL (ref 3.5–5.2)
ANION GAP SERPL CALCULATED.3IONS-SCNC: 14 MMOL/L (ref 5–15)
BACTERIA UR QL AUTO: ABNORMAL /HPF
BASOPHILS # BLD AUTO: 0.08 10*3/MM3 (ref 0–0.2)
BASOPHILS NFR BLD AUTO: 0.8 % (ref 0–1.5)
BILIRUB UR QL STRIP: NEGATIVE
BUN SERPL-MCNC: 23 MG/DL (ref 6–20)
BUN/CREAT SERPL: 23.2 (ref 7–25)
CALCIUM SPEC-SCNC: 9.8 MG/DL (ref 8.6–10.5)
CHLORIDE SERPL-SCNC: 100 MMOL/L (ref 98–107)
CLARITY UR: CLEAR
CO2 SERPL-SCNC: 25 MMOL/L (ref 22–29)
COLOR UR: YELLOW
CREAT SERPL-MCNC: 0.99 MG/DL (ref 0.57–1)
CREAT UR-MCNC: 109.7 MG/DL
DEPRECATED RDW RBC AUTO: 44.7 FL (ref 37–54)
EOSINOPHIL # BLD AUTO: 0.16 10*3/MM3 (ref 0–0.4)
EOSINOPHIL NFR BLD AUTO: 1.6 % (ref 0.3–6.2)
ERYTHROCYTE [DISTWIDTH] IN BLOOD BY AUTOMATED COUNT: 13.1 % (ref 12.3–15.4)
GFR SERPL CREATININE-BSD FRML MDRD: 58 ML/MIN/1.73
GLUCOSE SERPL-MCNC: 147 MG/DL (ref 65–99)
GLUCOSE UR STRIP-MCNC: ABNORMAL MG/DL
HCT VFR BLD AUTO: 43.9 % (ref 34–46.6)
HGB BLD-MCNC: 15 G/DL (ref 12–15.9)
HGB UR QL STRIP.AUTO: NEGATIVE
HYALINE CASTS UR QL AUTO: ABNORMAL /LPF
IMM GRANULOCYTES # BLD AUTO: 0.05 10*3/MM3 (ref 0–0.05)
IMM GRANULOCYTES NFR BLD AUTO: 0.5 % (ref 0–0.5)
KETONES UR QL STRIP: NEGATIVE
LEUKOCYTE ESTERASE UR QL STRIP.AUTO: NEGATIVE
LYMPHOCYTES # BLD AUTO: 2.65 10*3/MM3 (ref 0.7–3.1)
LYMPHOCYTES NFR BLD AUTO: 25.9 % (ref 19.6–45.3)
MCH RBC QN AUTO: 31.9 PG (ref 26.6–33)
MCHC RBC AUTO-ENTMCNC: 34.2 G/DL (ref 31.5–35.7)
MCV RBC AUTO: 93.4 FL (ref 79–97)
MONOCYTES # BLD AUTO: 0.63 10*3/MM3 (ref 0.1–0.9)
MONOCYTES NFR BLD AUTO: 6.2 % (ref 5–12)
NEUTROPHILS NFR BLD AUTO: 6.66 10*3/MM3 (ref 1.7–7)
NEUTROPHILS NFR BLD AUTO: 65 % (ref 42.7–76)
NITRITE UR QL STRIP: NEGATIVE
NRBC BLD AUTO-RTO: 0 /100 WBC (ref 0–0.2)
PH UR STRIP.AUTO: 6.5 [PH] (ref 5–8)
PHOSPHATE SERPL-MCNC: 3.7 MG/DL (ref 2.5–4.5)
PLATELET # BLD AUTO: 264 10*3/MM3 (ref 140–450)
PMV BLD AUTO: 10.3 FL (ref 6–12)
POTASSIUM SERPL-SCNC: 3.9 MMOL/L (ref 3.5–5.2)
PROT ?TM UR-MCNC: 18.6 MG/DL
PROT UR QL STRIP: NEGATIVE
PROT/CREAT UR: 169.6 MG/G CREA (ref 0–200)
RBC # BLD AUTO: 4.7 10*6/MM3 (ref 3.77–5.28)
RBC # UR STRIP: ABNORMAL /HPF
REF LAB TEST METHOD: ABNORMAL
SODIUM SERPL-SCNC: 139 MMOL/L (ref 136–145)
SP GR UR STRIP: >=1.03 (ref 1–1.03)
SQUAMOUS #/AREA URNS HPF: ABNORMAL /HPF
UROBILINOGEN UR QL STRIP: ABNORMAL
WBC # UR STRIP: ABNORMAL /HPF
WBC NRBC COR # BLD: 10.23 10*3/MM3 (ref 3.4–10.8)
YEAST URNS QL MICRO: ABNORMAL /HPF

## 2022-02-16 PROCEDURE — 36415 COLL VENOUS BLD VENIPUNCTURE: CPT

## 2022-02-16 PROCEDURE — 81001 URINALYSIS AUTO W/SCOPE: CPT

## 2022-02-16 PROCEDURE — 84156 ASSAY OF PROTEIN URINE: CPT

## 2022-02-16 PROCEDURE — 80069 RENAL FUNCTION PANEL: CPT

## 2022-02-16 PROCEDURE — 82570 ASSAY OF URINE CREATININE: CPT

## 2022-02-16 PROCEDURE — 85025 COMPLETE CBC W/AUTO DIFF WBC: CPT

## 2022-02-16 PROCEDURE — 76705 ECHO EXAM OF ABDOMEN: CPT

## 2022-02-21 ENCOUNTER — OFFICE VISIT (OUTPATIENT)
Dept: INTERNAL MEDICINE | Facility: CLINIC | Age: 57
End: 2022-02-21

## 2022-02-21 VITALS
TEMPERATURE: 97.5 F | HEART RATE: 107 BPM | SYSTOLIC BLOOD PRESSURE: 114 MMHG | HEIGHT: 65 IN | BODY MASS INDEX: 36.82 KG/M2 | WEIGHT: 221 LBS | DIASTOLIC BLOOD PRESSURE: 78 MMHG | OXYGEN SATURATION: 95 %

## 2022-02-21 DIAGNOSIS — F17.200 TOBACCO DEPENDENCE: ICD-10-CM

## 2022-02-21 DIAGNOSIS — Z79.4 TYPE 2 DIABETES MELLITUS WITH HYPERGLYCEMIA, WITH LONG-TERM CURRENT USE OF INSULIN: ICD-10-CM

## 2022-02-21 DIAGNOSIS — E78.2 MIXED HYPERLIPIDEMIA: ICD-10-CM

## 2022-02-21 DIAGNOSIS — K76.0 FATTY LIVER: ICD-10-CM

## 2022-02-21 DIAGNOSIS — Z01.818 PREOP GENERAL PHYSICAL EXAM: Primary | ICD-10-CM

## 2022-02-21 DIAGNOSIS — E11.65 TYPE 2 DIABETES MELLITUS WITH HYPERGLYCEMIA, WITH LONG-TERM CURRENT USE OF INSULIN: ICD-10-CM

## 2022-02-21 PROCEDURE — 93000 ELECTROCARDIOGRAM COMPLETE: CPT | Performed by: FAMILY MEDICINE

## 2022-02-21 PROCEDURE — 99214 OFFICE O/P EST MOD 30 MIN: CPT | Performed by: FAMILY MEDICINE

## 2022-02-21 RX ORDER — FLUCONAZOLE 150 MG/1
150 TABLET ORAL ONCE
Qty: 1 TABLET | Refills: 0 | Status: SHIPPED | OUTPATIENT
Start: 2022-02-21 | End: 2022-02-21

## 2022-02-21 RX ORDER — HYDROCORTISONE 25 MG/G
CREAM TOPICAL
COMMUNITY

## 2022-02-21 RX ORDER — DAPAGLIFLOZIN 5 MG/1
10 TABLET, FILM COATED ORAL DAILY
Qty: 90 TABLET | Refills: 0 | Status: SHIPPED | OUTPATIENT
Start: 2022-02-21 | End: 2022-02-22 | Stop reason: DRUGHIGH

## 2022-02-21 RX ORDER — PROCHLORPERAZINE 25 MG/1
SUPPOSITORY RECTAL
Qty: 3 EACH | Refills: 12 | Status: SHIPPED | OUTPATIENT
Start: 2022-02-21 | End: 2022-05-05 | Stop reason: SDUPTHER

## 2022-02-21 RX ORDER — GABAPENTIN 800 MG/1
1 TABLET ORAL 3 TIMES DAILY
COMMUNITY

## 2022-02-21 NOTE — PROGRESS NOTES
[facility]   PREOPERATIVE HISTORY AND PHYSICAL    Patient Name:Lizette Keith  : 1965  MRN: [id number]  Primary Care Physician: Denise Hanks MD  Date of admission: 3/21/22  Subjective   Subjective     Chief Complaint: preoperative evaluation    History of Present Illness  Lizette Keith is a 56 y.o. female who presents for preoperative evaluation. She is scheduled for anal cut that is chronic. Pt is going to have procedure at surgery center. Pt will have general anesthesia with Dr Uriarte,     Review of Systems   Constitutional: Positive for fatigue (chronic). Negative for activity change, appetite change, chills, diaphoresis, fever and unexpected weight change.   HENT: Positive for congestion (from allergies). Negative for dental problem, drooling, ear discharge, ear pain, facial swelling, hearing loss, mouth sores, nosebleeds, postnasal drip, rhinorrhea, sinus pressure, sinus pain, sneezing, sore throat, tinnitus, trouble swallowing and voice change.    Eyes: Negative.  Negative for photophobia, pain, discharge, redness, itching and visual disturbance.   Respiratory: Negative.  Negative for apnea, cough, choking, chest tightness, shortness of breath, wheezing and stridor.    Cardiovascular: Negative.  Negative for chest pain, palpitations and leg swelling.   Gastrointestinal: Positive for anal bleeding (rare) and rectal pain (fissure). Negative for abdominal distention, abdominal pain, blood in stool, constipation, diarrhea, nausea and vomiting.   Endocrine: Negative.  Negative for cold intolerance, heat intolerance, polydipsia, polyphagia and polyuria.   Genitourinary: Negative.  Negative for decreased urine volume, difficulty urinating, dyspareunia, dysuria, enuresis, flank pain, frequency, genital sores, hematuria, menstrual problem, pelvic pain, urgency, vaginal bleeding, vaginal discharge and vaginal pain.   Musculoskeletal: Negative.  Negative for arthralgias, back pain, gait  problem, joint swelling, myalgias, neck pain and neck stiffness.   Skin: Negative.  Negative for color change, pallor, rash and wound.   Allergic/Immunologic: Positive for environmental allergies. Negative for food allergies and immunocompromised state.   Neurological: Negative.  Negative for dizziness, tremors, seizures, syncope, facial asymmetry, speech difficulty, weakness, light-headedness, numbness and headaches.   Hematological: Negative.  Negative for adenopathy. Does not bruise/bleed easily.   Psychiatric/Behavioral: Negative.  Negative for agitation, behavioral problems, confusion, decreased concentration, dysphoric mood, hallucinations, self-injury, sleep disturbance and suicidal ideas. The patient is not nervous/anxious and is not hyperactive.         Personal History     Past Medical History:   Diagnosis Date   • Allergic    • Anemia     during pregnancy   • Anxiety    • Arthritis    • Blood in urine    • Chronic fatigue    • Colon polyp 02/2018    7 at last colonoscopy   • Degeneration of L4-L5 intervertebral disc 12/10/2019   • Depression    • Diabetes mellitus type 2 in obese (HCC) 03/11/2021   • Elevated cholesterol    • Endometriosis    • Fibromyalgia, primary    • Fracture    • GERD (gastroesophageal reflux disease)    • H/O mammogram 02/2018   • Headache    • Herniated intervertebral disc of lumbar spine     l4-l5   • HL (hearing loss)    • Hypertension    • Irritable bowel syndrome    • Pap smear for cervical cancer screening 2018?   • Wears glasses        Past Surgical History:   Procedure Laterality Date   • CARDIAC CATHETERIZATION  11/13/2020    Dr Salas, EF 60%, no significant stenosis   • CARDIAC CATHETERIZATION N/A 11/13/2020    Procedure: Left Heart Cath;  Surgeon: Sai Salas MD;  Location: [facility] INVASIVE LOCATION;  Service: Cardiology;  Laterality: N/A;   • CHOLECYSTECTOMY     • COLONOSCOPY  02/06/2018    3 year f/u polypectomy Dr MARTA Waller   • COLONOSCOPY W/ POLYPECTOMY   07/07/2021    Dr Waller.  hyperplastic   • HYSTERECTOMY     • INNER EAR SURGERY     • LUMBAR DISCECTOMY Right 11/25/2020    Procedure: LUMBAR DISCECTOMY L4-5 RIGHT;  Surgeon: Qaism Kelsey MD;  [facility];  Service: Neurosurgery;  Laterality: Right;   • OOPHORECTOMY     • TEMPOROMANDIBULAR JOINT ARTHROPLASTY  1984   • TONSILLECTOMY     • URETHRA SURGERY      as a baby        Family History: Her family history includes ADD / ADHD in her son; Arthritis in her mother; Bipolar disorder in her daughter and father; Cancer in her father, maternal grandmother, and mother; Colon polyps in her father and mother; Coronary artery disease in her brother; Depression in her father, mother, and son; Diabetes in her father, maternal aunt, maternal uncle, mother, paternal aunt, and paternal uncle; Heart attack in her mother; Heart disease in her maternal grandfather, mother, and paternal grandfather; Hyperlipidemia in her mother; Melanoma in her mother; Migraines in her mother and sister; Thyroid disease in her mother.     Social History: She  reports that she has been smoking cigarettes. She started smoking about 38 years ago. She has a 17.00 pack-year smoking history. She has never used smokeless tobacco. She reports current alcohol use. She reports that she does not use drugs.    Home Medications:  Dexcom G6 , Dexcom G6 Sensor, Dexcom G6 Transmitter, FreeStyle Trey 2 Cranberry, Hydrocortisone (Perianal), Insulin Glargine, Pen Needles, albuterol sulfate HFA, dapagliflozin, diclofenac, docusate sodium, ezetimibe, fluconazole, gabapentin, hydrOXYzine pamoate, ipratropium, lisinopril-hydrochlorothiazide, magnesium gluconate, methocarbamol, montelukast, ondansetron ODT, potassium chloride, tiZANidine, and traMADol    Current Outpatient Medications   Medication Sig Dispense Refill   • Continuous Blood Gluc  (Dexcom G6 ) device 1 each Continuous. To monitor glucose E11.65, z79.4 1 each 0   • Continuous  Blood Gluc  (FreeStyle Trey 2 [place]) device 1 each Continuous. To monitor blood sugar E11.65, Z79.4 1 each 0   • Continuous Blood Gluc Sensor (Dexcom G6 Sensor) Every 10 (Ten) Days. Apply to appropriate area to monitor glucose. E11.65, z79.4 3 each 12   • Continuous Blood Gluc Transmit (Dexcom G6 Transmitter) misc 1 each Continuous. To monitor glucose. E11.65, z79.4 1 each 3   • dapagliflozin (Farxiga) 5 MG tablet tablet Take 2 tablets by mouth Daily. 90 tablet 0   • diclofenac (VOLTAREN) 75 MG EC tablet Every 12 (Twelve) Hours.     • docusate sodium (COLACE) 100 MG capsule Take 100 mg by mouth As Needed.     • ezetimibe (Zetia) 10 MG tablet Take 1 tablet by mouth Daily. 30 tablet 3   • gabapentin (NEURONTIN) 800 MG tablet Take 1 tablet by mouth 3 (Three) Times a Day.     • Hydrocortisone, Perianal, (Procto-Med HC) 2.5 % rectal cream Procto-Med HC 2.5 % topical cream perineal applicator   INSERT INTO RECTUM TWICE DAILY AS DIRECTED FOR 14 DAYS     • hydrOXYzine pamoate (Vistaril) 25 MG capsule Take 1 capsule by mouth 3 (Three) Times a Day As Needed for Itching. 60 capsule 2   • Insulin Glargine (LANTUS SOLOSTAR) 100 UNIT/ML injection pen Inject 28 units nightly 6 pen 1   • Insulin Pen Needle (Pen Needles) 32G X 6 MM misc 1 each Daily. 30 each 11   • ipratropium (ATROVENT) 0.03 % nasal spray 2 sprays into the nostril(s) as directed by provider Every 12 (Twelve) Hours. 30 mL 1   • lisinopril-hydrochlorothiazide (PRINZIDE,ZESTORETIC) 20-12.5 MG per tablet Take 1 tablet by mouth 2 (two) times a day. 180 tablet 1   • magnesium gluconate (MAGONATE) 500 MG tablet Take 27 mg by mouth 2 (Two) Times a Day.     • methocarbamol (Robaxin) 500 MG tablet Take 1 tablet by mouth Daily. With lunch (Patient taking differently: Take 500 mg by mouth As Needed. With lunch) 30 tablet 1   • montelukast (SINGULAIR) 10 MG tablet Take 1 tablet by mouth Every Night. 30 tablet 6   • ondansetron ODT (Zofran ODT) 4 MG disintegrating  tablet Place 1 tablet on the tongue Every 8 (Eight) Hours As Needed for Nausea or Vomiting. 40 tablet 2   • potassium chloride (KLOR-CON) 10 MEQ CR tablet Take 1 tablet by mouth Daily. For potassium 90 tablet 1   • tiZANidine (ZANAFLEX) 4 MG tablet Take 1 tablet by mouth Every 8 (Eight) Hours As Needed for Muscle Spasms. 90 tablet 2   • Ultram 50 MG tablet Take 1 tablet by mouth Every 4 (Four) Hours As Needed for Moderate Pain . 60 tablet 0   • Ventolin  (90 Base) MCG/ACT inhaler As Needed.     • fluconazole (Diflucan) 150 MG tablet Take 1 tablet by mouth 1 (One) Time for 1 dose. 1 tablet 0     No current facility-administered medications for this visit.     Facility-Administered Medications Ordered in Other Visits   Medication Dose Route Frequency Provider Last Rate Last Admin   • mupirocin (BACTROBAN) 2 % nasal ointment   Nasal BID Kassidy Camilo PA-C           Allergies:  She is allergic to statins, erythromycin, lipitor [atorvastatin calcium], metformin, penicillins, pravastatin, relafen [nabumetone], sertraline, and cymbalta [duloxetine hcl].    Objective    Objective     Vitals:    Temp:  [97.5 °F (36.4 °C)] 97.5 °F (36.4 °C)  Heart Rate:  [107] 107  BP: (114)/(78) 114/78      02/21/22  1629   Weight: 100 kg (221 lb)      Body mass index is 36.78 kg/m².    Physical Exam  Vitals and nursing note reviewed.   Constitutional:       Appearance: She is well-developed.   HENT:      Head: Normocephalic.      Right Ear: External ear normal.      Left Ear: External ear normal.      Nose: Nose normal.      Mouth/Throat:      Lips: Pink.      Mouth: Mucous membranes are moist. No injury.      Dentition: Normal dentition.      Tongue: No lesions.      Pharynx: No pharyngeal swelling, oropharyngeal exudate, posterior oropharyngeal erythema or uvula swelling.      Comments: Mallampati 4  Eyes:      General: Lids are normal.      Conjunctiva/sclera: Conjunctivae normal.      Pupils: Pupils are equal, round, and  reactive to light.   Neck:      Thyroid: No thyroid mass or thyromegaly.      Vascular: No carotid bruit.      Trachea: Trachea normal.   Cardiovascular:      Rate and Rhythm: Normal rate and regular rhythm.      Heart sounds: No murmur heard.      Pulmonary:      Effort: Pulmonary effort is normal. No respiratory distress.      Breath sounds: Normal breath sounds. No decreased breath sounds, wheezing, rhonchi or rales.   Chest:      Chest wall: No tenderness.   Abdominal:      General: Bowel sounds are normal.      Palpations: Abdomen is soft.      Tenderness: There is no abdominal tenderness.   Musculoskeletal:         General: Normal range of motion.      Cervical back: Normal range of motion and neck supple.   Skin:     General: Skin is warm and dry.   Neurological:      Mental Status: She is alert and oriented to person, place, and time.   Psychiatric:         Behavior: Behavior normal.         Assessment/Plan   Assessment / Plan     Brief Patient Summary:  Lizette Keith is a 56 y.o. female who presents for preoperative evaluation.    Anal surgery    Active Hospital Problems:  There are no active hospital problems to display for this patient.    Plan: check liver and lipid lab.   .Diagnoses and all orders for this visit:    1. Preop general physical exam (Primary)  -     ECG 12 Lead    2. Type 2 diabetes mellitus with hyperglycemia, with long-term current use of insulin (HCC)  -     Continuous Blood Gluc Sensor (Dexcom G6 Sensor); Every 10 (Ten) Days. Apply to appropriate area to monitor glucose. E11.65, z79.4  Dispense: 3 each; Refill: 12  -     Insulin Glargine (LANTUS SOLOSTAR) 100 UNIT/ML injection pen; Inject 28 units nightly  Dispense: 6 pen; Refill: 1  -     dapagliflozin (Farxiga) 5 MG tablet tablet; Take 2 tablets by mouth Daily.  Dispense: 90 tablet; Refill: 0  -     Hepatic Function Panel; Future    3. Mixed hyperlipidemia  -     Lipid Panel; Future    4. Fatty liver    5. Tobacco  dependence    Other orders  -     fluconazole (Diflucan) 150 MG tablet; Take 1 tablet by mouth 1 (One) Time for 1 dose.  Dispense: 1 tablet; Refill: 0    Please follow a low animal fat diet that is also low in sugar, low in junk food, low in sweet drinks and low in alcohol.  Please increase the amount of fiber in your diet as well as increasing your daily exercise, such as walking.  Weight loss is treatment for fatty liver.     If triglycerides staying high will add Omega3.  Patient is glucose has improved markedly since going on insulin.  Patient is using 28 units of Lantus on a regular basis, as well as Farxiga 10 mg in the morning.     We will check to see the liver enzymes and lipids have improved since starting insulin.  Continue to stop smoking(currently 1/2 ppd)    ECG 12 Lead    Date/Time: 2/21/2022 5:17 PM  Performed by: Denise Hanks MD  Authorized by: Denise Hanks MD   Comparison: compared with previous ECG from 10/26/2020  Similar to previous ECG  Rhythm: sinus rhythm  Rate: normal  BPM: 98  Conduction: conduction normal  QRS axis: normal (P, R, T: 56, -11, 22)  Other findings: non-specific ST-T wave changes    Clinical impression: non-specific ECG  Comments: W[place] int 139 ms  QRS 79 ms  QT/QTc 334/390 ms                Denise Hanks MD      Recent Results (from the past 2688 hour(s))   POC Glycosylated Hemoglobin (Hb A1C)    Collection Time: 12/22/21 10:19 AM    Specimen: Blood   Result Value Ref Range    Hemoglobin A1C 10.0 %    Lot Number [id number]     Expiration Date 06/28/2023    CK    Collection Time: 12/22/21 10:53 AM    Specimen: Blood   Result Value Ref Range    Creatine Kinase 33 20 - 180 U/L   Hepatitis panel, acute    Collection Time: 12/22/21 10:53 AM    Specimen: Blood   Result Value Ref Range    Hepatitis B Surface Ag Non-Reactive Non-Reactive    Hep A IgM Non-Reactive Non-Reactive    Hep B C IgM Non-Reactive Non-Reactive    Hepatitis C Ab Non-Reactive Non-Reactive    Comprehensive Metabolic Panel    Collection Time: 12/22/21 10:53 AM    Specimen: Blood   Result Value Ref Range    Glucose 275 (H) 65 - 99 mg/dL    BUN 44 (H) 6 - 20 mg/dL    Creatinine 1.33 (H) 0.57 - 1.00 mg/dL    Sodium 134 (L) 136 - 145 mmol/L    Potassium 4.8 3.5 - 5.2 mmol/L    Chloride 97 (L) 98 - 107 mmol/L    CO2 23.6 22.0 - 29.0 mmol/L    Calcium 10.3 8.6 - 10.5 mg/dL    Total Protein 7.9 6.0 - 8.5 g/dL    Albumin 4.70 3.50 - 5.20 g/dL    ALT (SGPT) 76 (H) 1 - 33 U/L    AST (SGOT) 57 (H) 1 - 32 U/L    Alkaline Phosphatase 213 (H) 39 - 117 U/L    Total Bilirubin 0.2 0.0 - 1.2 mg/dL    eGFR Non African Amer 42 (L) >60 mL/min/1.73    Globulin 3.2 gm/dL    A/G Ratio 1.5 g/dL    BUN/Creatinine Ratio 33.1 (H) 7.0 - 25.0    Anion Gap 13.4 5.0 - 15.0 mmol/L   TSH Rfx On Abnormal To Free T4    Collection Time: 12/22/21 10:53 AM    Specimen: Blood   Result Value Ref Range    TSH 1.880 0.270 - 4.200 uIU/mL   Lipid Panel    Collection Time: 12/22/21 10:53 AM    Specimen: Blood   Result Value Ref Range    Total Cholesterol 342 (H) 0 - 200 mg/dL    Triglycerides 427 (H) 0 - 150 mg/dL    HDL Cholesterol 47 40 - 60 mg/dL    LDL Cholesterol  205 (H) 0 - 100 mg/dL    VLDL Cholesterol 90 (H) 5 - 40 mg/dL    LDL/HDL Ratio 4.46    Sedimentation Rate    Collection Time: 12/22/21 10:53 AM    Specimen: Blood   Result Value Ref Range    Sed Rate 79 (H) 0 - 30 mm/hr   CBC Auto Differential    Collection Time: 12/22/21 10:53 AM    Specimen: Blood   Result Value Ref Range    WBC 10.68 3.40 - 10.80 10*3/mm3    RBC 4.73 3.77 - 5.28 10*6/mm3    Hemoglobin 15.1 12.0 - 15.9 g/dL    Hematocrit 44.9 34.0 - 46.6 %    MCV 94.9 79.0 - 97.0 fL    MCH 31.9 26.6 - 33.0 pg    MCHC 33.6 31.5 - 35.7 g/dL    RDW 12.4 12.3 - 15.4 %    RDW-SD 42.7 37.0 - 54.0 fl    MPV 10.4 6.0 - 12.0 fL    Platelets 246 140 - 450 10*3/mm3    Neutrophil % 71.7 42.7 - 76.0 %    Lymphocyte % 19.6 19.6 - 45.3 %    Monocyte % 5.8 5.0 - 12.0 %    Eosinophil % 1.8 0.3 -  6.2 %    Basophil % 0.8 0.0 - 1.5 %    Immature Grans % 0.3 0.0 - 0.5 %    Neutrophils, Absolute 7.66 (H) 1.70 - 7.00 10*3/mm3    Lymphocytes, Absolute 2.09 0.70 - 3.10 10*3/mm3    Monocytes, Absolute 0.62 0.10 - 0.90 10*3/mm3    Eosinophils, Absolute 0.19 0.00 - 0.40 10*3/mm3    Basophils, Absolute 0.09 0.00 - 0.20 10*3/mm3    Immature Grans, Absolute 0.03 0.00 - 0.05 10*3/mm3    nRBC 0.0 0.0 - 0.2 /100 WBC   Renal Function Panel    Collection Time: 02/16/22  8:41 AM    Specimen: Blood   Result Value Ref Range    Glucose 147 (H) 65 - 99 mg/dL    BUN 23 (H) 6 - 20 mg/dL    Creatinine 0.99 0.57 - 1.00 mg/dL    Sodium 139 136 - 145 mmol/L    Potassium 3.9 3.5 - 5.2 mmol/L    Chloride 100 98 - 107 mmol/L    CO2 25.0 22.0 - 29.0 mmol/L    Calcium 9.8 8.6 - 10.5 mg/dL    Albumin 4.70 3.50 - 5.20 g/dL    Phosphorus 3.7 2.5 - 4.5 mg/dL    Anion Gap 14.0 5.0 - 15.0 mmol/L    BUN/Creatinine Ratio 23.2 7.0 - 25.0    eGFR Non African Amer 58 (L) >60 mL/min/1.73   Protein / Creatinine Ratio, Urine - Urine, Clean Catch    Collection Time: 02/16/22  8:41 AM    Specimen: Urine, Clean Catch   Result Value Ref Range    Protein/Creatinine Ratio, Urine 169.6 0.0 - 200.0 mg/G Crea    Creatinine, Urine 109.7 mg/dL    Total Protein, Urine 18.6 mg/dL   CBC Auto Differential    Collection Time: 02/16/22  8:41 AM    Specimen: Blood   Result Value Ref Range    WBC 10.23 3.40 - 10.80 10*3/mm3    RBC 4.70 3.77 - 5.28 10*6/mm3    Hemoglobin 15.0 12.0 - 15.9 g/dL    Hematocrit 43.9 34.0 - 46.6 %    MCV 93.4 79.0 - 97.0 fL    MCH 31.9 26.6 - 33.0 pg    MCHC 34.2 31.5 - 35.7 g/dL    RDW 13.1 12.3 - 15.4 %    RDW-SD 44.7 37.0 - 54.0 fl    MPV 10.3 6.0 - 12.0 fL    Platelets 264 140 - 450 10*3/mm3    Neutrophil % 65.0 42.7 - 76.0 %    Lymphocyte % 25.9 19.6 - 45.3 %    Monocyte % 6.2 5.0 - 12.0 %    Eosinophil % 1.6 0.3 - 6.2 %    Basophil % 0.8 0.0 - 1.5 %    Immature Grans % 0.5 0.0 - 0.5 %    Neutrophils, Absolute 6.66 1.70 - 7.00 10*3/mm3     Lymphocytes, Absolute 2.65 0.70 - 3.10 10*3/mm3    Monocytes, Absolute 0.63 0.10 - 0.90 10*3/mm3    Eosinophils, Absolute 0.16 0.00 - 0.40 10*3/mm3    Basophils, Absolute 0.08 0.00 - 0.20 10*3/mm3    Immature Grans, Absolute 0.05 0.00 - 0.05 10*3/mm3    nRBC 0.0 0.0 - 0.2 /100 WBC   Urinalysis without microscopic (no culture) - Urine, Clean Catch    Collection Time: 02/16/22  8:41 AM    Specimen: Urine, Clean Catch   Result Value Ref Range    Color, UA Yellow Yellow, Straw    Appearance, UA Clear Clear    pH, UA 6.5 5.0 - 8.0    Specific Gravity, UA >=1.030 1.005 - 1.030    Glucose, UA >=1000 mg/dL (3+) (A) Negative    Ketones, UA Negative Negative    Bilirubin, UA Negative Negative    Blood, UA Negative Negative    Protein, UA Negative Negative    Leuk Esterase, UA Negative Negative    Nitrite, UA Negative Negative    Urobilinogen, UA 0.2 E.U./dL 0.2 - 1.0 E.U./dL   Urinalysis, Microscopic Only - Urine, Clean Catch    Collection Time: 02/16/22  8:41 AM    Specimen: Urine, Clean Catch   Result Value Ref Range    RBC, UA 0-2 None Seen, 0-2 /HPF    WBC, UA 6-12 (A) None Seen, 0-2 /HPF    Bacteria, UA 1+ (A) None Seen /HPF    Squamous Epithelial Cells, UA 7-12 (A) None Seen, 0-2 /HPF    Yeast, UA Moderate/2+ Budding Yeast None Seen /HPF    Hyaline Casts, UA 13-20 None Seen /LPF    Methodology Manual Light Microscopy        Narrative & Impression   EXAMINATION: US LIVER-      INDICATION: elevated transaminase; R74.01-Elevation of levels of liver  transaminase levels      COMPARISON: NONE     FINDINGS: Sonographic grayscale and color Doppler evaluation of the  right upper quadrant demonstrates     Partially imaged pancreas unremarkable.     The liver demonstrates increased size and echogenicity compatible with  steatosis. The liver measures 19 cm in greatest dimension. There is no  biliary ductal dilatation or suspicious focal hepatic lesion. Patent  portal and imaged hepatic veins with appropriate flow directionality.      Prior cholecystectomy.     Unremarkable 5 mm common bile duct.     The right kidney measures 11.5 cm in length without apparent mass or  hydronephrosis. Normal color Doppler flow.     IMPRESSION:  Hepatomegaly and moderate diffuse hepatic steatosis. No  additional acute findings.     This report was finalized on 2/16/2022 10:22 AM by Qasim Victoria.

## 2022-02-22 ENCOUNTER — TELEPHONE (OUTPATIENT)
Dept: INTERNAL MEDICINE | Facility: CLINIC | Age: 57
End: 2022-02-22

## 2022-02-22 DIAGNOSIS — E11.65 TYPE 2 DIABETES MELLITUS WITH HYPERGLYCEMIA, WITH LONG-TERM CURRENT USE OF INSULIN: Primary | ICD-10-CM

## 2022-02-22 DIAGNOSIS — Z79.4 TYPE 2 DIABETES MELLITUS WITH HYPERGLYCEMIA, WITH LONG-TERM CURRENT USE OF INSULIN: Primary | ICD-10-CM

## 2022-02-22 RX ORDER — DAPAGLIFLOZIN 10 MG/1
1 TABLET, FILM COATED ORAL DAILY
Qty: 30 TABLET | Refills: 2 | Status: SHIPPED | OUTPATIENT
Start: 2022-02-22 | End: 2022-05-23 | Stop reason: SDUPTHER

## 2022-02-22 NOTE — TELEPHONE ENCOUNTER
Patrick from [facility] called in asking if we could change patient's Farxiga 5 mg BID dosing to Farxiga 10 mg once daily. Patient is agreeable to make the switch and this is recommended per insurance as well.     Gave verbal orders for Patrick to change from Farxiga 5 mg BID to Farxiga 10 mg daily with a qty of 90. He verbalized understanding and made adjustment in patient profile.

## 2022-03-04 ENCOUNTER — TELEPHONE (OUTPATIENT)
Dept: INTERNAL MEDICINE | Facility: CLINIC | Age: 57
End: 2022-03-04

## 2022-03-04 NOTE — TELEPHONE ENCOUNTER
Called Amira at surgeons office.  They were needing a EKG that did not say unconfirmed report on it. Needs to be an official report Since this is the only EKG we have.  She will call the pt to have her go to the hospital to get EKG.  She does not need anything else from us at this time.  PN on VM that Surgeon office will be contacting her

## 2022-03-04 NOTE — TELEPHONE ENCOUNTER
Caller: Lizette Keith    Relationship to patient: Self    Best call back number: [phone]    Patient is needing: PATIENT IS WANTING ERIC TO REACH OUT TO SURGEON TO SEE WHAT THEY NEED FROM PCP. PATIENT STATES THEY HAD GIVEN HER A CALL AND SENT AN E-MAIL THAT THE ANESTHESIOLOGIST WANTS A COPY OF THE EKG AND THE T WAVE ABNORMALITIES HAVE BEEN CONFIRMED. PATIENT STATES THE WOMAN SHE SPOKE WITH AT THE COLORECTAL SURGEON'S OFFICE IS NAMED FRANCINE.    PATIENTS NAME NEEDS TO BE AT THE TOP OF ANY FORMS SENT    PHONE: [phone]    PLEASE GIVE PATIENT A CALL BACK WITH UPDATES

## 2022-03-17 ENCOUNTER — APPOINTMENT (OUTPATIENT)
Dept: MAMMOGRAPHY | Facility: HOSPITAL | Age: 57
End: 2022-03-17

## 2022-03-31 ENCOUNTER — LAB (OUTPATIENT)
Dept: LAB | Facility: HOSPITAL | Age: 57
End: 2022-03-31

## 2022-03-31 DIAGNOSIS — E11.65 TYPE 2 DIABETES MELLITUS WITH HYPERGLYCEMIA, WITH LONG-TERM CURRENT USE OF INSULIN: ICD-10-CM

## 2022-03-31 DIAGNOSIS — E78.2 MIXED HYPERLIPIDEMIA: ICD-10-CM

## 2022-03-31 DIAGNOSIS — Z79.4 TYPE 2 DIABETES MELLITUS WITH HYPERGLYCEMIA, WITH LONG-TERM CURRENT USE OF INSULIN: ICD-10-CM

## 2022-04-01 ENCOUNTER — TELEPHONE (OUTPATIENT)
Dept: INTERNAL MEDICINE | Facility: CLINIC | Age: 57
End: 2022-04-01

## 2022-04-01 LAB
ALBUMIN SERPL-MCNC: 4.5 G/DL (ref 3.5–5.2)
ALP SERPL-CCNC: 245 U/L (ref 39–117)
ALT SERPL-CCNC: 41 U/L (ref 1–33)
AST SERPL-CCNC: 42 U/L (ref 1–32)
BILIRUB DIRECT SERPL-MCNC: <0.2 MG/DL (ref 0–0.3)
BILIRUB SERPL-MCNC: 0.3 MG/DL (ref 0–1.2)
CHOLEST SERPL-MCNC: 277 MG/DL (ref 0–200)
HDLC SERPL-MCNC: 29 MG/DL (ref 40–60)
LDLC SERPL CALC-MCNC: 160 MG/DL (ref 0–100)
PROT SERPL-MCNC: 7.2 G/DL (ref 6–8.5)
TRIGL SERPL-MCNC: 457 MG/DL (ref 0–150)
VLDLC SERPL CALC-MCNC: 88 MG/DL (ref 5–40)

## 2022-04-01 NOTE — TELEPHONE ENCOUNTER
PN of results. She stated understanding.  She says she has minimal abdominal pain.  She will discuss nutritionist at next appointment

## 2022-04-01 NOTE — TELEPHONE ENCOUNTER
----- Message from Denise LOZANO MD sent at 4/1/2022  8:08 AM EDT -----  ALT and AST liver tests are elevated but improved.  Alkaline phosphatase is gotten worse.  Is she having any abdominal pain?  The triglycerides are still very high and unchanged.  The LDL is elevated but has improved.  She want to talk to a nutritionist about diet change?

## 2022-04-11 DIAGNOSIS — E87.6 HYPOKALEMIA: ICD-10-CM

## 2022-04-12 RX ORDER — POTASSIUM CHLORIDE 750 MG/1
TABLET, FILM COATED, EXTENDED RELEASE ORAL
Qty: 90 TABLET | Refills: 0 | Status: SHIPPED | OUTPATIENT
Start: 2022-04-12 | End: 2022-08-24 | Stop reason: SDUPTHER

## 2022-04-29 ENCOUNTER — PATIENT MESSAGE (OUTPATIENT)
Dept: INTERNAL MEDICINE | Facility: CLINIC | Age: 57
End: 2022-04-29

## 2022-04-29 DIAGNOSIS — Z79.4 TYPE 2 DIABETES MELLITUS WITH HYPERGLYCEMIA, WITH LONG-TERM CURRENT USE OF INSULIN: ICD-10-CM

## 2022-04-29 DIAGNOSIS — E11.65 TYPE 2 DIABETES MELLITUS WITH HYPERGLYCEMIA, WITH LONG-TERM CURRENT USE OF INSULIN: ICD-10-CM

## 2022-04-29 NOTE — TELEPHONE ENCOUNTER
Carmen Armstrong 4/29/2022 12:37 PM EDT      ----- Message -----  From: Lizette Keith  Sent: 4/29/2022 12:33 PM EDT  To: [facility] [address] Clinical Pool  Subject: Dexcom 6     I just put my last sensor in. Also had a message that my transmitter battery expires in about 2 weeks. Can you please go ahead and send a prescription in for those? Thank you.

## 2022-05-05 RX ORDER — PROCHLORPERAZINE 25 MG/1
1 SUPPOSITORY RECTAL CONTINUOUS
Qty: 1 EACH | Refills: 3 | Status: SHIPPED | OUTPATIENT
Start: 2022-05-05 | End: 2022-08-24 | Stop reason: SDUPTHER

## 2022-05-05 RX ORDER — PROCHLORPERAZINE 25 MG/1
SUPPOSITORY RECTAL
Qty: 3 EACH | Refills: 12 | Status: SHIPPED | OUTPATIENT
Start: 2022-05-05

## 2022-05-23 ENCOUNTER — OFFICE VISIT (OUTPATIENT)
Dept: INTERNAL MEDICINE | Facility: CLINIC | Age: 57
End: 2022-05-23

## 2022-05-23 VITALS
WEIGHT: 216 LBS | HEIGHT: 65 IN | BODY MASS INDEX: 35.99 KG/M2 | SYSTOLIC BLOOD PRESSURE: 124 MMHG | OXYGEN SATURATION: 96 % | HEART RATE: 112 BPM | TEMPERATURE: 97.3 F | DIASTOLIC BLOOD PRESSURE: 78 MMHG

## 2022-05-23 DIAGNOSIS — G89.29 CHRONIC MIDLINE LOW BACK PAIN WITH BILATERAL SCIATICA: ICD-10-CM

## 2022-05-23 DIAGNOSIS — J30.2 SEASONAL ALLERGIES: ICD-10-CM

## 2022-05-23 DIAGNOSIS — M62.830 BACK MUSCLE SPASM: ICD-10-CM

## 2022-05-23 DIAGNOSIS — M62.838 MUSCLE SPASM OF RIGHT LEG: ICD-10-CM

## 2022-05-23 DIAGNOSIS — E78.5 HYPERLIPIDEMIA, UNSPECIFIED HYPERLIPIDEMIA TYPE: ICD-10-CM

## 2022-05-23 DIAGNOSIS — M54.42 CHRONIC MIDLINE LOW BACK PAIN WITH BILATERAL SCIATICA: ICD-10-CM

## 2022-05-23 DIAGNOSIS — Z79.4 TYPE 2 DIABETES MELLITUS WITH HYPERGLYCEMIA, WITH LONG-TERM CURRENT USE OF INSULIN: Primary | ICD-10-CM

## 2022-05-23 DIAGNOSIS — F17.200 TOBACCO DEPENDENCE: ICD-10-CM

## 2022-05-23 DIAGNOSIS — M25.552 PAIN OF BOTH HIP JOINTS: ICD-10-CM

## 2022-05-23 DIAGNOSIS — M54.41 CHRONIC MIDLINE LOW BACK PAIN WITH BILATERAL SCIATICA: ICD-10-CM

## 2022-05-23 DIAGNOSIS — N17.9 AKI (ACUTE KIDNEY INJURY): ICD-10-CM

## 2022-05-23 DIAGNOSIS — E11.65 TYPE 2 DIABETES MELLITUS WITH HYPERGLYCEMIA, WITH LONG-TERM CURRENT USE OF INSULIN: Primary | ICD-10-CM

## 2022-05-23 DIAGNOSIS — M25.551 PAIN OF BOTH HIP JOINTS: ICD-10-CM

## 2022-05-23 LAB
EXPIRATION DATE: NORMAL
HBA1C MFR BLD: 8.6 %
Lab: NORMAL

## 2022-05-23 PROCEDURE — 3052F HG A1C>EQUAL 8.0%<EQUAL 9.0%: CPT | Performed by: FAMILY MEDICINE

## 2022-05-23 PROCEDURE — 99214 OFFICE O/P EST MOD 30 MIN: CPT | Performed by: FAMILY MEDICINE

## 2022-05-23 PROCEDURE — 83036 HEMOGLOBIN GLYCOSYLATED A1C: CPT | Performed by: FAMILY MEDICINE

## 2022-05-23 RX ORDER — TIZANIDINE 4 MG/1
4 TABLET ORAL EVERY 8 HOURS PRN
Qty: 90 TABLET | Refills: 2 | Status: SHIPPED | OUTPATIENT
Start: 2022-05-23 | End: 2022-08-24 | Stop reason: SDUPTHER

## 2022-05-23 RX ORDER — MONTELUKAST SODIUM 10 MG/1
10 TABLET ORAL NIGHTLY
Qty: 30 TABLET | Refills: 6 | Status: SHIPPED | OUTPATIENT
Start: 2022-05-23 | End: 2022-08-24 | Stop reason: SDUPTHER

## 2022-05-23 RX ORDER — LIDOCAINE 50 MG/G
1 PATCH TOPICAL EVERY 24 HOURS
Qty: 30 EACH | Refills: 5 | Status: SHIPPED | OUTPATIENT
Start: 2022-05-23

## 2022-05-23 RX ORDER — DAPAGLIFLOZIN 10 MG/1
1 TABLET, FILM COATED ORAL DAILY
Qty: 30 TABLET | Refills: 2 | Status: SHIPPED | OUTPATIENT
Start: 2022-05-23 | End: 2022-08-05

## 2022-05-23 RX ORDER — METHOCARBAMOL 500 MG/1
500 TABLET, FILM COATED ORAL DAILY
Qty: 30 TABLET | Refills: 2 | Status: SHIPPED | OUTPATIENT
Start: 2022-05-23 | End: 2022-08-24 | Stop reason: SDUPTHER

## 2022-05-23 RX ORDER — AMLODIPINE BESYLATE AND BENAZEPRIL HYDROCHLORIDE 5; 20 MG/1; MG/1
1 CAPSULE ORAL DAILY
COMMUNITY
Start: 2022-04-21

## 2022-05-23 RX ORDER — FUROSEMIDE 20 MG/1
TABLET ORAL
COMMUNITY
Start: 2022-05-10

## 2022-05-23 RX ORDER — EZETIMIBE 10 MG/1
10 TABLET ORAL DAILY
Qty: 30 TABLET | Refills: 5 | Status: SHIPPED | OUTPATIENT
Start: 2022-05-23 | End: 2022-08-24 | Stop reason: SDUPTHER

## 2022-05-23 NOTE — PROGRESS NOTES
"Subjective   Lizette Keith is a 56 y.o. female.     Chief Complaint   Patient presents with   • Diabetes     A1C 12/22=10.0   • Hypertension   • Back Pain     Low back pain Started hurting worse since off antiinflammatory about 1 month.          Visit Vitals  /78   Pulse 112   Temp 97.3 °F (36.3 °C)   Ht 165.1 cm (65\")   Wt 98 kg (216 lb)   SpO2 96%   BMI 35.94 kg/m²       Wt Readings from Last 3 Encounters:   05/23/22 98 kg (216 lb)   02/21/22 100 kg (221 lb)   12/22/21 101 kg (221 lb 9.6 oz)         Diabetes  She presents for her follow-up diabetic visit. She has type 2 diabetes mellitus. Her disease course has been improving. There are no hypoglycemic associated symptoms. Pertinent negatives for hypoglycemia include no headaches or sweats. Associated symptoms include fatigue, foot paresthesias, polydipsia and weight loss. Pertinent negatives for diabetes include no blurred vision, no chest pain, no foot ulcerations, no polyphagia, no polyuria, no visual change and no weakness. There are no hypoglycemic complications. Symptoms are improving. Diabetic complications include nephropathy and peripheral neuropathy. Pertinent negatives for diabetic complications include no CVA, heart disease, PVD or retinopathy. Risk factors for coronary artery disease include diabetes mellitus, dyslipidemia, hypertension, obesity, post-menopausal, tobacco exposure, sedentary lifestyle and family history. Current diabetic treatment includes insulin injections and oral agent (monotherapy). Her weight is decreasing steadily. She is following a generally healthy diet. Meal planning includes avoidance of concentrated sweets. She never participates in exercise. An ACE inhibitor/angiotensin II receptor blocker is being taken. She does not see a podiatrist.Eye exam is not current.   Hypertension  This is a chronic problem. The current episode started more than 1 year ago. The problem is unchanged. The problem is controlled. " Associated symptoms include peripheral edema. Pertinent negatives include no anxiety, blurred vision, chest pain, headaches, malaise/fatigue, neck pain, orthopnea, palpitations, PND, shortness of breath or sweats. There are no associated agents to hypertension. Risk factors for coronary artery disease include obesity, post-menopausal state, dyslipidemia, family history, diabetes mellitus, sedentary lifestyle and smoking/tobacco exposure. Current antihypertension treatment includes calcium channel blockers, ACE inhibitors and diuretics. Hypertensive end-organ damage includes kidney disease. There is no history of angina, CAD/MI, CVA, heart failure, left ventricular hypertrophy, PVD or retinopathy. There is no history of sleep apnea or a thyroid problem.   Back Pain  This is a chronic problem. The current episode started more than 1 year ago. The problem occurs constantly. The problem is unchanged. The pain is present in the lumbar spine. The pain radiates to the left foot, right foot, right thigh, left thigh, left knee and right knee. The pain is at a severity of 10/10. The pain is severe. The pain is the same all the time. The symptoms are aggravated by bending, position, twisting, sitting and standing. Associated symptoms include numbness, paresthesias and weight loss. Pertinent negatives include no abdominal pain, bladder incontinence, bowel incontinence, chest pain, dysuria, fever, headaches, leg pain, paresis, pelvic pain, perianal numbness, tingling or weakness. Risk factors include obesity, menopause, lack of exercise and sedentary lifestyle. She has tried muscle relaxant, analgesics, bed rest and chiropractic manipulation for the symptoms. The treatment provided mild relief.      Pt having low back pain that is radiating down back to both feet.   Pt is on intermittent gabapentin and tramadol from pain management.  Pt needed a prior auth for her tramadol and her pain management hadn't sent it in.   Pt has  arthritis and DDD of lumbar spine. Pt has had back injections without improvement.  Pt has days when she cannot get out of bed. Pt has problems loading .   The last time she did PT it did not help.     Pt has enlarged liver and fatty liver.     Pt has CKD3 and Nephrology stopped her lisinopril HCTZ and started her on amlodipine/benazapril and lasix. Pt is no longer taking the NSAIDS because of CKD. Pt will be seeing Nephrology in about a month.     Pt has high cholesterol and does not tolerate the statins.  Pt is taking zetia.  Pt is losing weight, without dieting. Pt taking farxiga for her DM.   Pt is drinking more water. Pt is not feeling bad except or pain level.   Pt is taking methocarbamol at lunch and prn tizanidine.    Pt having RLS.     Pt is still smoking cigarettes.     Pt sees Dr Salas on 6/6/22 for elevated heart rate.   Pt had to do a lot of driving this past month.     The following portions of the patient's history were reviewed and updated as appropriate: allergies, current medications, past family history, past medical history, past social history, past surgical history and problem list.    Past Medical History:   Diagnosis Date   • Allergic    • Anemia     during pregnancy   • Anxiety    • Arthritis    • Blood in urine    • Chronic fatigue    • Colon polyp 02/2018    7 at last colonoscopy   • Degeneration of L4-L5 intervertebral disc 12/10/2019   • Depression    • Diabetes mellitus type 2 in obese (HCC) 03/11/2021   • Elevated cholesterol    • Endometriosis    • Fibromyalgia, primary    • Fracture    • GERD (gastroesophageal reflux disease)    • H/O mammogram 02/2018   • Headache    • Herniated intervertebral disc of lumbar spine     l4-l5   • HL (hearing loss)    • Hypertension    • Irritable bowel syndrome    • Pap smear for cervical cancer screening 2018?   • Wears glasses       Past Surgical History:   Procedure Laterality Date   • CARDIAC CATHETERIZATION  11/13/2020    Dr Salas, EF  60%, no significant stenosis   • CARDIAC CATHETERIZATION N/A 11/13/2020    Procedure: Left Heart Cath;  Surgeon: Sai Salas MD;  Location:  [facility];  Service: Cardiology;  Laterality: N/A;   • CHOLECYSTECTOMY     • COLONOSCOPY  02/06/2018    3 year f/u polypectomy Dr MARTA Waller   • COLONOSCOPY W/ POLYPECTOMY  07/07/2021    Dr Waller.  hyperplastic   • HYSTERECTOMY     • INNER EAR SURGERY     • LUMBAR DISCECTOMY Right 11/25/2020    Procedure: LUMBAR DISCECTOMY L4-5 RIGHT;  Surgeon: Qasim Kelsye MD;  Location:  [facility];  Service: Neurosurgery;  Laterality: Right;   • OOPHORECTOMY     • TEMPOROMANDIBULAR JOINT ARTHROPLASTY  1984   • TONSILLECTOMY     • URETHRA SURGERY      as a baby       Family History   Problem Relation Age of Onset   • Melanoma Mother         eye   • Diabetes Mother    • Heart disease Mother    • Arthritis Mother    • Depression Mother    • Colon polyps Mother    • Thyroid disease Mother    • Hyperlipidemia Mother    • Cancer Mother    • Heart attack Mother    • Migraines Mother    • Cancer Father         pancreatic and liver   • Diabetes Father    • Depression Father    • Colon polyps Father    • Bipolar disorder Father         split personality   • ADD / ADHD Son    • Depression Son    • Diabetes Maternal Aunt    • Diabetes Maternal Uncle    • Diabetes Paternal Aunt    • Diabetes Paternal Uncle    • Cancer Maternal Grandmother         uterine/cervical   • Heart disease Maternal Grandfather    • Heart disease Paternal Grandfather    • Migraines Sister    • Bipolar disorder Daughter    • Coronary artery disease Brother    • Breast cancer Neg Hx    • Ovarian cancer Neg Hx       Social History     Socioeconomic History   • Marital status:    Tobacco Use   • Smoking status: Current Every Day Smoker     Packs/day: 0.50     Years: 34.00     Pack years: 17.00     Types: Cigarettes     Start date: 1984   • Smokeless tobacco: Never Used   • Tobacco comment: 0.5-1ppd    Vaping Use   • Vaping Use: Never used   Substance and Sexual Activity   • Alcohol use: Yes     Comment: occ   • Drug use: No   • Sexual activity: Not Currently     Birth control/protection: None      Allergies   Allergen Reactions   • Statins Myalgia   • Erythromycin GI Intolerance     Tolerates zpak   • Lipitor [Atorvastatin Calcium] Myalgia   • Metformin Myalgia   • Penicillins Hives     States ok with keflex   • Pravastatin Myalgia   • Relafen [Nabumetone] GI Intolerance   • Sertraline Other (See Comments)     Restless leg   • Cymbalta [Duloxetine Hcl] Rash       Review of Systems   Constitutional: Positive for fatigue and weight loss. Negative for fever and malaise/fatigue.   Eyes: Negative for blurred vision.   Respiratory: Negative for shortness of breath.    Cardiovascular: Negative for chest pain, palpitations, orthopnea and PND.   Gastrointestinal: Negative for abdominal pain and bowel incontinence.   Endocrine: Positive for polydipsia. Negative for polyphagia and polyuria.   Genitourinary: Negative for bladder incontinence, dysuria and pelvic pain.   Musculoskeletal: Positive for back pain. Negative for neck pain.   Neurological: Positive for numbness and paresthesias. Negative for tingling, weakness and headaches.       Objective   Physical Exam  Vitals and nursing note reviewed.   Constitutional:       Appearance: She is well-developed.   HENT:      Head: Normocephalic.      Right Ear: External ear normal.      Left Ear: External ear normal.      Nose: Nose normal.   Eyes:      General: Lids are normal.      Conjunctiva/sclera: Conjunctivae normal.      Pupils: Pupils are equal, round, and reactive to light.   Neck:      Thyroid: No thyroid mass or thyromegaly.      Vascular: No carotid bruit.      Trachea: Trachea normal.   Cardiovascular:      Rate and Rhythm: Normal rate and regular rhythm.      Heart sounds: No murmur heard.  Pulmonary:      Effort: Pulmonary effort is normal. No respiratory  distress.      Breath sounds: Normal breath sounds. No decreased breath sounds, wheezing, rhonchi or rales.   Chest:      Chest wall: No tenderness.   Abdominal:      General: Bowel sounds are normal.      Palpations: Abdomen is soft.      Tenderness: There is no abdominal tenderness.   Musculoskeletal:         General: Normal range of motion.      Cervical back: Normal range of motion and neck supple.      Lumbar back: Spasms and tenderness present.   Skin:     General: Skin is warm and dry.   Neurological:      Mental Status: She is alert and oriented to person, place, and time.   Psychiatric:         Behavior: Behavior normal.         Assessment & Plan   Diagnoses and all orders for this visit:    1. Type 2 diabetes mellitus with hyperglycemia, with long-term current use of insulin (HCC) (Primary)  -     POC Glycosylated Hemoglobin (Hb A1C)  -     Dapagliflozin Propanediol (Farxiga) 10 MG tablet; Take 10 mg by mouth Daily.  Dispense: 30 tablet; Refill: 2  -     Insulin Glargine (LANTUS SOLOSTAR) 100 UNIT/ML injection pen; Inject 28 units nightly  Dispense: 6 pen; Refill: 2    2. Muscle spasm of right leg  -     methocarbamol (Robaxin) 500 MG tablet; Take 1 tablet by mouth Daily. With lunch  Dispense: 30 tablet; Refill: 2  -     Ambulatory Referral to Physical Therapy Evaluate and treat    3. Back muscle spasm  -     tiZANidine (ZANAFLEX) 4 MG tablet; Take 1 tablet by mouth Every 8 (Eight) Hours As Needed for Muscle Spasms.  Dispense: 90 tablet; Refill: 2  -     Ambulatory Referral to Physical Therapy Evaluate and treat    4. Seasonal allergies  -     montelukast (SINGULAIR) 10 MG tablet; Take 1 tablet by mouth Every Night.  Dispense: 30 tablet; Refill: 6    5. Hyperlipidemia, unspecified hyperlipidemia type  -     ezetimibe (Zetia) 10 MG tablet; Take 1 tablet by mouth Daily.  Dispense: 30 tablet; Refill: 5    6. Chronic midline low back pain with bilateral sciatica  -     Ambulatory Referral to Physical Therapy  Evaluate and treat  -     lidocaine (LIDODERM) 5 %; Place 1 patch on the skin as directed by provider Daily. Remove & Discard patch within 12 hours or as directed by MD  Dispense: 30 each; Refill: 5    7. Pain of both hip joints  -     Ambulatory Referral to Physical Therapy Evaluate and treat    8. WINSTON (acute kidney injury) (HCC)    9. Tobacco dependence      Tighten diet and continue weight loss.  Discussed smoking cessation.               Current Outpatient Medications:   •  amLODIPine-benazepril (LOTREL 5-20) 5-20 MG per capsule, Take 1 capsule by mouth Daily., Disp: , Rfl:   •  Continuous Blood Gluc  (Dexcom G6 ) device, 1 each Continuous. To monitor glucose E11.65, z79.4, Disp: 1 each, Rfl: 0  •  Continuous Blood Gluc Sensor (Dexcom G6 Sensor), Every 10 (Ten) Days. Apply to appropriate area to monitor glucose. E11.65, z79.4, Disp: 3 each, Rfl: 12  •  Continuous Blood Gluc Transmit (Dexcom G6 Transmitter) misc, 1 each Continuous. To monitor glucose. E11.65, z79.4, Disp: 1 each, Rfl: 3  •  Dapagliflozin Propanediol (Farxiga) 10 MG tablet, Take 10 mg by mouth Daily., Disp: 30 tablet, Rfl: 2  •  docusate sodium (COLACE) 100 MG capsule, Take 100 mg by mouth As Needed., Disp: , Rfl:   •  ezetimibe (Zetia) 10 MG tablet, Take 1 tablet by mouth Daily., Disp: 30 tablet, Rfl: 5  •  furosemide (LASIX) 20 MG tablet, TAKE 1 TABLET BY MOUTH ONCE DAILY AS NEEDED FOR SWELLING OF LEGS, Disp: , Rfl:   •  gabapentin (NEURONTIN) 800 MG tablet, Take 1 tablet by mouth 3 (Three) Times a Day., Disp: , Rfl:   •  Hydrocortisone, Perianal, (ANUSOL-HC) 2.5 % rectal cream, Procto-Med HC 2.5 % topical cream perineal applicator  INSERT INTO RECTUM TWICE DAILY AS DIRECTED FOR 14 DAYS, Disp: , Rfl:   •  hydrOXYzine pamoate (Vistaril) 25 MG capsule, Take 1 capsule by mouth 3 (Three) Times a Day As Needed for Itching., Disp: 60 capsule, Rfl: 2  •  Insulin Glargine (LANTUS SOLOSTAR) 100 UNIT/ML injection pen, Inject 28 units  nightly, Disp: 6 pen, Rfl: 2  •  Insulin Pen Needle (Pen Needles) 32G X 6 MM misc, 1 each Daily., Disp: 30 each, Rfl: 11  •  ipratropium (ATROVENT) 0.03 % nasal spray, 2 sprays into the nostril(s) as directed by provider Every 12 (Twelve) Hours., Disp: 30 mL, Rfl: 1  •  magnesium gluconate (MAGONATE) 500 MG tablet, Take 27 mg by mouth 2 (Two) Times a Day., Disp: , Rfl:   •  methocarbamol (Robaxin) 500 MG tablet, Take 1 tablet by mouth Daily. With lunch, Disp: 30 tablet, Rfl: 2  •  montelukast (SINGULAIR) 10 MG tablet, Take 1 tablet by mouth Every Night., Disp: 30 tablet, Rfl: 6  •  ondansetron ODT (Zofran ODT) 4 MG disintegrating tablet, Place 1 tablet on the tongue Every 8 (Eight) Hours As Needed for Nausea or Vomiting., Disp: 40 tablet, Rfl: 2  •  potassium chloride 10 MEQ CR tablet, TAKE 1 TABLET BY MOUTH ONCE DAILY FOR  POTASSIUM, Disp: 90 tablet, Rfl: 0  •  tiZANidine (ZANAFLEX) 4 MG tablet, Take 1 tablet by mouth Every 8 (Eight) Hours As Needed for Muscle Spasms., Disp: 90 tablet, Rfl: 2  •  Ultram 50 MG tablet, Take 1 tablet by mouth Every 4 (Four) Hours As Needed for Moderate Pain ., Disp: 60 tablet, Rfl: 0  •  Ventolin  (90 Base) MCG/ACT inhaler, As Needed., Disp: , Rfl:   •  lidocaine (LIDODERM) 5 %, Place 1 patch on the skin as directed by provider Daily. Remove & Discard patch within 12 hours or as directed by MD, Disp: 30 each, Rfl: 5  No current facility-administered medications for this visit.    Facility-Administered Medications Ordered in Other Visits:   •  mupirocin (BACTROBAN) 2 % nasal ointment, , Nasal, BID, Kassidy Camilo PA-C    Return in about 3 months (around 8/23/2022), or if symptoms worsen or fail to improve, for Recheck.     Hemoglobin A1C   Date Value Ref Range Status   05/23/2022 8.6 % Final   12/22/2021 10.0 % Final   06/21/2021 8.6 (H) 4.8 - 5.6 % Final     Comment:              Prediabetes: 5.7 - 6.4           Diabetes: >6.4           Glycemic control for adults with  diabetes: <7.0     03/11/2021 8.1 (H) 4.8 - 5.6 % Final     Comment:              Prediabetes: 5.7 - 6.4           Diabetes: >6.4           Glycemic control for adults with diabetes: <7.0     11/23/2020 6.60 (H) 4.80 - 5.60 % Final   11/13/2020 6.50 (H) 4.80 - 5.60 % Final   09/30/2020 6.60 (H) 4.80 - 5.60 % Final

## 2022-06-06 ENCOUNTER — OFFICE VISIT (OUTPATIENT)
Dept: CARDIOLOGY | Facility: CLINIC | Age: 57
End: 2022-06-06

## 2022-06-06 VITALS
DIASTOLIC BLOOD PRESSURE: 78 MMHG | WEIGHT: 214 LBS | BODY MASS INDEX: 35.65 KG/M2 | OXYGEN SATURATION: 98 % | SYSTOLIC BLOOD PRESSURE: 122 MMHG | HEART RATE: 99 BPM | HEIGHT: 65 IN

## 2022-06-06 DIAGNOSIS — R00.0 TACHYCARDIA: ICD-10-CM

## 2022-06-06 DIAGNOSIS — E78.2 MIXED HYPERLIPIDEMIA: ICD-10-CM

## 2022-06-06 DIAGNOSIS — R94.39 ABNORMAL STRESS TEST: ICD-10-CM

## 2022-06-06 DIAGNOSIS — I10 PRIMARY HYPERTENSION: Primary | ICD-10-CM

## 2022-06-06 PROCEDURE — 99213 OFFICE O/P EST LOW 20 MIN: CPT | Performed by: INTERNAL MEDICINE

## 2022-06-06 NOTE — PROGRESS NOTES
[facility] Cardiology  [address], [address]  [place], [place], [address]    [phone]  WWW.[email or web address] HospitalServ[facility] CorpMin[facility]           OUTPATIENT CLINIC CONSULTATION NOTE    Patient care team:  Patient Care Team:  Denise Hanks MD as PCP - General (Family Medicine)  Celso Bueno PA-C (Physician Assistant)  Skyler Waller MD as Consulting Physician (Gastroenterology)  Darius Rahman DPM as Consulting Physician (Podiatry)  Elizabeth Anderson APRN (Nurse Practitioner)  Oswaldo Uriarte MD as Consulting Physician (Colon and Rectal Surgery)  Sai Salas MD as Consulting Physician (Cardiology)  Yoel Newell MD as Consulting Physician (Nephrology)    Requesting Provider and Reason for consultation: The patient is being seen today at the request of No ref. provider found for preoperative cardiac risk assessment, abnormal ECG.     Subjective:   Chief complaint:   Chief Complaint   Patient presents with   • Hypertension   • Rapid Heart Rate       HPI:    Lizette Keith is a 56 y.o. female.  Partial problem list, including cardiac problems:  1. Abnormal ECG at baseline  a. Flattened/borderline inverted T waves in the inferior leads, chronic, dating back at least to 12/2018  b. Stress test 11/2020:  LVEF 70%, small-sized, mildly severe area of ischemia located in the apex. Low to intermediate risk study.   c. LHC 11/13/2020:  No hemodynamically significant, flow-limiting stenoses. Normal LVEF 60%.   2. Hypertension  3. Hyperlipidemia  4. GERD  5. Fibromyalgia  6. Anemia  7. Anxiety/depression  8. Tobacco dependence    Today the patient presents for follow up.  She has been doing well from a cardiac standpoint since her last visit. She has been diagnosed with diabetes within the last year and is also following with [facility] for decreased kidney function.  She also follows with Pain Management for chronic back pain. Denies chest pain, palpitations, shortness of  breath, lower extremity edema, lightheadedness or syncope.     Review of Systems:  As noted in HPI.     PFSH:  Patient Active Problem List   Diagnosis   • Blood in urine   • Seasonal allergies   • Hypertension   • Hyperlipemia   • GERD (gastroesophageal reflux disease)   • IBS (irritable bowel syndrome)   • Fibromyalgia   • Hearing loss   • Chronic fatigue   • Depression   • Anxiety   • Insomnia   • Encounter for tobacco use cessation counseling   • Vasomotor flushing   • Tachycardia   • Libido, decreased   • Onychomycosis   • Prediabetes   • Tobacco dependence   • Anxiety and depression   • Tobacco abuse   • SIRS (systemic inflammatory response syndrome) ([facility])   • WINSTON (acute kidney injury) ([facility])   • Headache   • Menopausal symptom   • Degeneration of L4-L5 intervertebral disc   • Arthritis   • HNP (herniated nucleus pulposus), lumbar   • Class 2 obesity due to excess calories in adult   • Physical deconditioning   • Class 2 severe obesity due to excess calories with serious comorbidity and body mass index (BMI) of 35.0 to 35.9 in adult ([facility])   • Abnormal stress test   • Obesity (BMI 35.0-39.9 without comorbidity)   • Osteoarthritis of spine with radiculopathy, lumbar region   • Type 2 diabetes mellitus with hyperglycemia, with long-term current use of insulin ([facility])   • Low back pain   • Long-term current use of opiate analgesic   • Lumbar spondylosis   • Pain of both hip joints   • Hemorrhoids   • Fatty liver         Current Outpatient Medications:   •  amLODIPine-benazepril (LOTREL 5-20) 5-20 MG per capsule, Take 1 capsule by mouth Daily., Disp: , Rfl:   •  Continuous Blood Gluc  (Dexcom G6 ) device, 1 each Continuous. To monitor glucose E11.65, z79.4, Disp: 1 each, Rfl: 0  •  Continuous Blood Gluc Sensor (Dexcom G6 Sensor), Every 10 (Ten) Days. Apply to appropriate area to monitor glucose. E11.65, z79.4, Disp: 3 each, Rfl: 12  •  Continuous Blood Gluc Transmit (Dexcom G6 Transmitter) misc, 1 each  Continuous. To monitor glucose. E11.65, z79.4, Disp: 1 each, Rfl: 3  •  Dapagliflozin Propanediol (Farxiga) 10 MG tablet, Take 10 mg by mouth Daily., Disp: 30 tablet, Rfl: 2  •  docusate sodium (COLACE) 100 MG capsule, Take 100 mg by mouth As Needed., Disp: , Rfl:   •  ezetimibe (Zetia) 10 MG tablet, Take 1 tablet by mouth Daily., Disp: 30 tablet, Rfl: 5  •  furosemide (LASIX) 20 MG tablet, TAKE 1 TABLET BY MOUTH ONCE DAILY AS NEEDED FOR SWELLING OF LEGS, Disp: , Rfl:   •  gabapentin (NEURONTIN) 800 MG tablet, Take 1 tablet by mouth 3 (Three) Times a Day., Disp: , Rfl:   •  Hydrocortisone, Perianal, (ANUSOL-HC) 2.5 % rectal cream, Procto-Med HC 2.5 % topical cream perineal applicator  INSERT INTO RECTUM TWICE DAILY AS DIRECTED FOR 14 DAYS, Disp: , Rfl:   •  hydrOXYzine pamoate (Vistaril) 25 MG capsule, Take 1 capsule by mouth 3 (Three) Times a Day As Needed for Itching., Disp: 60 capsule, Rfl: 2  •  Insulin Glargine (LANTUS SOLOSTAR) 100 UNIT/ML injection pen, Inject 28 units nightly, Disp: 6 pen, Rfl: 2  •  Insulin Pen Needle (Pen Needles) 32G X 6 MM misc, 1 each Daily., Disp: 30 each, Rfl: 11  •  ipratropium (ATROVENT) 0.03 % nasal spray, 2 sprays into the nostril(s) as directed by provider Every 12 (Twelve) Hours., Disp: 30 mL, Rfl: 1  •  lidocaine (LIDODERM) 5 %, Place 1 patch on the skin as directed by provider Daily. Remove & Discard patch within 12 hours or as directed by MD, Disp: 30 each, Rfl: 5  •  magnesium gluconate (MAGONATE) 500 MG tablet, Take 27 mg by mouth 2 (Two) Times a Day., Disp: , Rfl:   •  methocarbamol (Robaxin) 500 MG tablet, Take 1 tablet by mouth Daily. With lunch, Disp: 30 tablet, Rfl: 2  •  montelukast (SINGULAIR) 10 MG tablet, Take 1 tablet by mouth Every Night., Disp: 30 tablet, Rfl: 6  •  ondansetron ODT (Zofran ODT) 4 MG disintegrating tablet, Place 1 tablet on the tongue Every 8 (Eight) Hours As Needed for Nausea or Vomiting., Disp: 40 tablet, Rfl: 2  •  potassium chloride 10 MEQ CR  tablet, TAKE 1 TABLET BY MOUTH ONCE DAILY FOR  POTASSIUM, Disp: 90 tablet, Rfl: 0  •  tiZANidine (ZANAFLEX) 4 MG tablet, Take 1 tablet by mouth Every 8 (Eight) Hours As Needed for Muscle Spasms., Disp: 90 tablet, Rfl: 2  •  Ultram 50 MG tablet, Take 1 tablet by mouth Every 4 (Four) Hours As Needed for Moderate Pain ., Disp: 60 tablet, Rfl: 0  •  Ventolin  (90 Base) MCG/ACT inhaler, As Needed., Disp: , Rfl:   No current facility-administered medications for this visit.    Facility-Administered Medications Ordered in Other Visits:   •  mupirocin (BACTROBAN) 2 % nasal ointment, , Nasal, BID, Kassidy Camilo PA-C    Allergies   Allergen Reactions   • Statins Myalgia   • Erythromycin GI Intolerance     Tolerates zpak   • Lipitor [Atorvastatin Calcium] Myalgia   • Metformin Myalgia   • Penicillins Hives     States ok with keflex   • Pravastatin Myalgia   • Relafen [Nabumetone] GI Intolerance   • Sertraline Other (See Comments)     Restless leg   • Cymbalta [Duloxetine Hcl] Rash       Social History     Socioeconomic History   • Marital status:    Tobacco Use   • Smoking status: Current Every Day Smoker     Packs/day: 0.50     Years: 34.00     Pack years: 17.00     Types: Cigarettes     Start date: 1984   • Smokeless tobacco: Never Used   • Tobacco comment: 0.5-1ppd   Vaping Use   • Vaping Use: Never used   Substance and Sexual Activity   • Alcohol use: Yes     Comment: occ   • Drug use: No   • Sexual activity: Not Currently     Birth control/protection: None     Family History   Problem Relation Age of Onset   • Melanoma Mother         eye   • Diabetes Mother    • Heart disease Mother    • Arthritis Mother    • Depression Mother    • Colon polyps Mother    • Thyroid disease Mother    • Hyperlipidemia Mother    • Cancer Mother    • Heart attack Mother    • Migraines Mother    • Cancer Father         pancreatic and liver   • Diabetes Father    • Depression Father    • Colon polyps Father    • Bipolar  "disorder Father         split personality   • ADD / ADHD Son    • Depression Son    • Diabetes Maternal Aunt    • Diabetes Maternal Uncle    • Diabetes Paternal Aunt    • Diabetes Paternal Uncle    • Cancer Maternal Grandmother         uterine/cervical   • Heart disease Maternal Grandfather    • Heart disease Paternal Grandfather    • Migraines Sister    • Bipolar disorder Daughter    • Coronary artery disease Brother    • Breast cancer Neg Hx    • Ovarian cancer Neg Hx          Objective:   Physical Exam:  /78 (BP Location: Right arm, Patient Position: Sitting)   Pulse 99   Ht 165.1 cm (65\")   Wt 97.1 kg (214 lb)   SpO2 98%   BMI 35.61 kg/m²   CONSTITUTIONAL: No acute distress  RESPIRATORY: Normal effort. Clear to auscultation bilaterally without wheezing or rales  CARDIOVASCULAR: Regular rate and rhythm with normal S1 and S2. Without murmur.  PERIPHERAL VASCULAR: Normal radial pulse. There is no lower extremity edema bilaterally.      10/2020 exam: 2+ DP pulses bilaterally.      Labs:  BUN   Date Value Ref Range Status   02/16/2022 23 (H) 6 - 20 mg/dL Final     Creatinine   Date Value Ref Range Status   02/16/2022 0.99 0.57 - 1.00 mg/dL Final     Potassium   Date Value Ref Range Status   02/16/2022 3.9 3.5 - 5.2 mmol/L Final     ALT (SGPT)   Date Value Ref Range Status   03/31/2022 41 (H) 1 - 33 U/L Final   12/22/2021 76 (H) 1 - 33 U/L Final     AST (SGOT)   Date Value Ref Range Status   03/31/2022 42 (H) 1 - 32 U/L Final   12/22/2021 57 (H) 1 - 32 U/L Final     WBC   Date Value Ref Range Status   02/16/2022 10.23 3.40 - 10.80 10*3/mm3 Final   03/11/2021 12.2 (H) 3.4 - 10.8 x10E3/uL Final     Hemoglobin   Date Value Ref Range Status   02/16/2022 15.0 12.0 - 15.9 g/dL Final     Hematocrit   Date Value Ref Range Status   02/16/2022 43.9 34.0 - 46.6 % Final     Platelets   Date Value Ref Range Status   02/16/2022 264 140 - 450 10*3/mm3 Final       Lab Results   Component Value Date    CHOL 342 (H) " 12/22/2021    CHOL 196 11/13/2020     Lab Results   Component Value Date    TRIG 457 (H) 03/31/2022    TRIG 427 (H) 12/22/2021    TRIG 339 (H) 06/21/2021     Lab Results   Component Value Date    HDL 29 (L) 03/31/2022    HDL 47 12/22/2021    HDL 40 06/21/2021     Lab Results   Component Value Date     (H) 03/31/2022     No components found for: LDLDIRECTC    Diagnostic Data:    Procedures    Stress test 11/02/2020  ·  Left ventricular ejection fraction is hyperdynamic (Calculated EF > 70%).  · Myocardial perfusion imaging indicates a small-sized, mildly severe area of ischemia located in the apex.  · Impressions are consistent with an abnormal but overall low to intermediate risk study.    [facility] 11/2020  · There were no hemodynamically significant, flow-limiting stenoses.  · Normal left ventricular ejection fraction 60%        Assessment and Plan:     Abnormal ECG   Pre-operative cardiovascular examination  Sinus tachycardia  Essential hypertension  Tobacco dependence  -Currently without anginal symptoms.   -Likely false positive stress test followed by normal cardiac cath.    -Multiple risk factors for CAD including hypertension, long smoking history, family history of premature coronary artery disease, stable EKG abnormalities.    -Discussed continued risk factor and lifestyle modifications including diet, exercise, smoking cessation, blood pressure and diabetes management.     - Return if symptoms worsen or fail to improve.    Scribed for Sai Salas MD by ASHLEY Vazquez. 6/6/2022  14:11 EDT    I, Sai Salas MD, personally performed the services as scribed by the above named individual. I have made any necessary edits and it is both accurate and complete.     Sai Salas MD, MSc, FACC, [facility]  Interventional Cardiology  [facility]

## 2022-06-20 ENCOUNTER — TELEPHONE (OUTPATIENT)
Dept: INTERNAL MEDICINE | Facility: CLINIC | Age: 57
End: 2022-06-20

## 2022-08-05 DIAGNOSIS — Z79.4 TYPE 2 DIABETES MELLITUS WITH HYPERGLYCEMIA, WITH LONG-TERM CURRENT USE OF INSULIN: ICD-10-CM

## 2022-08-05 DIAGNOSIS — E11.65 TYPE 2 DIABETES MELLITUS WITH HYPERGLYCEMIA, WITH LONG-TERM CURRENT USE OF INSULIN: ICD-10-CM

## 2022-08-05 RX ORDER — DAPAGLIFLOZIN 10 MG/1
TABLET, FILM COATED ORAL
Qty: 60 TABLET | Refills: 0 | Status: SHIPPED | OUTPATIENT
Start: 2022-08-05 | End: 2022-08-24 | Stop reason: SDUPTHER

## 2022-08-05 NOTE — TELEPHONE ENCOUNTER
Rx Refill Note  Requested Prescriptions     Pending Prescriptions Disp Refills   • Farxiga 10 MG tablet [Pharmacy Med Name: Farxiga 10 MG Oral Tablet] 60 tablet 0     Sig: Take 1 tablet by mouth once daily      Last filled: 05/23/2022  Last office visit with prescribing clinician: 5/23/2022      Next office visit with prescribing clinician: 8/24/2022 April REECE Diallo MA  08/05/22, 14:48 EDT     Patient should have enough to get her to her appointment. Please advise on early refill if appropriate.

## 2022-08-11 DIAGNOSIS — E11.65 TYPE 2 DIABETES MELLITUS WITH HYPERGLYCEMIA, WITH LONG-TERM CURRENT USE OF INSULIN: ICD-10-CM

## 2022-08-11 DIAGNOSIS — Z79.4 TYPE 2 DIABETES MELLITUS WITH HYPERGLYCEMIA, WITH LONG-TERM CURRENT USE OF INSULIN: ICD-10-CM

## 2022-08-11 RX ORDER — DAPAGLIFLOZIN 10 MG/1
TABLET, FILM COATED ORAL
Qty: 60 TABLET | Refills: 0 | OUTPATIENT
Start: 2022-08-11

## 2022-08-23 DIAGNOSIS — M62.830 BACK MUSCLE SPASM: ICD-10-CM

## 2022-08-24 ENCOUNTER — OFFICE VISIT (OUTPATIENT)
Dept: INTERNAL MEDICINE | Facility: CLINIC | Age: 57
End: 2022-08-24

## 2022-08-24 ENCOUNTER — LAB (OUTPATIENT)
Dept: LAB | Facility: HOSPITAL | Age: 57
End: 2022-08-24

## 2022-08-24 VITALS
HEART RATE: 98 BPM | WEIGHT: 203 LBS | OXYGEN SATURATION: 97 % | BODY MASS INDEX: 33.82 KG/M2 | TEMPERATURE: 97.8 F | SYSTOLIC BLOOD PRESSURE: 128 MMHG | HEIGHT: 65 IN | DIASTOLIC BLOOD PRESSURE: 82 MMHG

## 2022-08-24 DIAGNOSIS — M62.838 MUSCLE SPASM OF RIGHT LEG: ICD-10-CM

## 2022-08-24 DIAGNOSIS — E11.65 TYPE 2 DIABETES MELLITUS WITH HYPERGLYCEMIA, WITH LONG-TERM CURRENT USE OF INSULIN: Primary | ICD-10-CM

## 2022-08-24 DIAGNOSIS — R11.0 NAUSEA: ICD-10-CM

## 2022-08-24 DIAGNOSIS — M62.830 BACK MUSCLE SPASM: ICD-10-CM

## 2022-08-24 DIAGNOSIS — J30.2 SEASONAL ALLERGIES: ICD-10-CM

## 2022-08-24 DIAGNOSIS — E87.6 HYPOKALEMIA: ICD-10-CM

## 2022-08-24 DIAGNOSIS — E78.5 HYPERLIPIDEMIA, UNSPECIFIED HYPERLIPIDEMIA TYPE: ICD-10-CM

## 2022-08-24 DIAGNOSIS — N39.45 CONTINUOUS LEAKAGE OF URINE: ICD-10-CM

## 2022-08-24 DIAGNOSIS — Z79.4 TYPE 2 DIABETES MELLITUS WITH HYPERGLYCEMIA, WITH LONG-TERM CURRENT USE OF INSULIN: Primary | ICD-10-CM

## 2022-08-24 LAB
EXPIRATION DATE: ABNORMAL
HBA1C MFR BLD: 8.5 %
Lab: ABNORMAL

## 2022-08-24 PROCEDURE — 83036 HEMOGLOBIN GLYCOSYLATED A1C: CPT | Performed by: FAMILY MEDICINE

## 2022-08-24 PROCEDURE — 80053 COMPREHEN METABOLIC PANEL: CPT | Performed by: FAMILY MEDICINE

## 2022-08-24 PROCEDURE — 80061 LIPID PANEL: CPT | Performed by: FAMILY MEDICINE

## 2022-08-24 PROCEDURE — 99214 OFFICE O/P EST MOD 30 MIN: CPT | Performed by: FAMILY MEDICINE

## 2022-08-24 RX ORDER — EZETIMIBE 10 MG/1
10 TABLET ORAL DAILY
Qty: 30 TABLET | Refills: 5 | Status: SHIPPED | OUTPATIENT
Start: 2022-08-24 | End: 2023-03-23 | Stop reason: SDUPTHER

## 2022-08-24 RX ORDER — SPIRONOLACTONE 25 MG/1
TABLET ORAL DAILY PRN
COMMUNITY
Start: 2022-08-12

## 2022-08-24 RX ORDER — METHOCARBAMOL 500 MG/1
500 TABLET, FILM COATED ORAL DAILY
Qty: 30 TABLET | Refills: 2 | Status: SHIPPED | OUTPATIENT
Start: 2022-08-24 | End: 2023-03-23 | Stop reason: SDUPTHER

## 2022-08-24 RX ORDER — TIZANIDINE 4 MG/1
4 TABLET ORAL EVERY 8 HOURS PRN
Qty: 90 TABLET | Refills: 2 | Status: SHIPPED | OUTPATIENT
Start: 2022-08-24 | End: 2022-11-23 | Stop reason: SDUPTHER

## 2022-08-24 RX ORDER — BLOOD SUGAR DIAGNOSTIC
1 STRIP MISCELLANEOUS DAILY
Qty: 30 EACH | Refills: 11 | Status: SHIPPED | OUTPATIENT
Start: 2022-08-24

## 2022-08-24 RX ORDER — DAPAGLIFLOZIN 10 MG/1
1 TABLET, FILM COATED ORAL DAILY
Qty: 90 TABLET | Refills: 0 | Status: SHIPPED | OUTPATIENT
Start: 2022-08-24 | End: 2022-10-03

## 2022-08-24 RX ORDER — PROCHLORPERAZINE 25 MG/1
1 SUPPOSITORY RECTAL CONTINUOUS
Qty: 1 EACH | Refills: 3 | Status: SHIPPED | OUTPATIENT
Start: 2022-08-24

## 2022-08-24 RX ORDER — ONDANSETRON 4 MG/1
4 TABLET, ORALLY DISINTEGRATING ORAL EVERY 8 HOURS PRN
Qty: 40 TABLET | Refills: 2 | Status: SHIPPED | OUTPATIENT
Start: 2022-08-24 | End: 2023-01-03

## 2022-08-24 RX ORDER — MONTELUKAST SODIUM 10 MG/1
10 TABLET ORAL NIGHTLY
Qty: 30 TABLET | Refills: 6 | Status: SHIPPED | OUTPATIENT
Start: 2022-08-24 | End: 2023-03-23 | Stop reason: SDUPTHER

## 2022-08-24 RX ORDER — POTASSIUM CHLORIDE 750 MG/1
10 TABLET, FILM COATED, EXTENDED RELEASE ORAL DAILY
Qty: 90 TABLET | Refills: 0 | Status: SHIPPED | OUTPATIENT
Start: 2022-08-24 | End: 2022-11-18

## 2022-08-24 NOTE — PROGRESS NOTES
"Subjective   Lizette Keith is a 57 y.o. female.     Chief Complaint   Patient presents with   • Hypertension   • Back Pain   • Diabetes     A1C 5/23/22=8.6         Visit Vitals  /82   Pulse 98   Temp 97.8 °F (36.6 °C)   Ht 165.1 cm (65\")   Wt 92.1 kg (203 lb)   SpO2 97%   BMI 33.78 kg/m²       Wt Readings from Last 3 Encounters:   08/24/22 92.1 kg (203 lb)   06/06/22 97.1 kg (214 lb)   05/23/22 98 kg (216 lb)         Hypertension  This is a chronic problem. The current episode started more than 1 year ago. The problem is unchanged. The problem is controlled. Associated symptoms include peripheral edema (improved). Pertinent negatives include no anxiety, blurred vision, chest pain, headaches, malaise/fatigue, neck pain, orthopnea, palpitations, PND or shortness of breath. There are no associated agents to hypertension. Current antihypertension treatment includes diuretics, calcium channel blockers and ACE inhibitors. The current treatment provides significant improvement. Compliance problems include exercise.  There is no history of angina, kidney disease, CAD/MI, CVA, heart failure, left ventricular hypertrophy, PVD or retinopathy. There is no history of sleep apnea or a thyroid problem.   Back Pain  This is a chronic problem. The current episode started more than 1 year ago. The problem occurs constantly. The problem is unchanged. The pain is present in the lumbar spine. The quality of the pain is described as aching, burning, cramping, shooting and stabbing. The pain radiates to the right thigh, right foot and right knee. Pain scale: 5-10+ worse in the morning. The pain is worse during the day. The symptoms are aggravated by position and standing. Associated symptoms include bladder incontinence (leakage), leg pain, paresthesias and weight loss. Pertinent negatives include no abdominal pain, bowel incontinence, chest pain, dysuria, fever, headaches, numbness, paresis, pelvic pain, perianal numbness, " tingling or weakness. Risk factors include obesity. She has tried home exercises and walking for the symptoms. The treatment provided no relief.   Diabetes  She presents for her follow-up diabetic visit. She has type 2 diabetes mellitus. Her disease course has been stable. There are no hypoglycemic associated symptoms. Pertinent negatives for hypoglycemia include no confusion, dizziness or headaches. Associated symptoms include foot paresthesias and weight loss. Pertinent negatives for diabetes include no blurred vision, no chest pain, no fatigue, no foot ulcerations, no polydipsia, no polyphagia, no polyuria, no visual change and no weakness. There are no hypoglycemic complications. Symptoms are stable. Diabetic complications include peripheral neuropathy. Pertinent negatives for diabetic complications include no CVA, PVD or retinopathy. Risk factors for coronary artery disease include diabetes mellitus, hypertension, obesity and post-menopausal. Current diabetic treatment includes diet, insulin injections and oral agent (monotherapy). She is compliant with treatment most of the time. Her weight is decreasing steadily. She is following a generally healthy diet. Meal planning includes avoidance of concentrated sweets. (Pt not checking sugar needs new sensor.) An ACE inhibitor/angiotensin II receptor blocker is being taken. She sees a podiatrist.Eye exam is current.      Pt has lost weight since last visit.  Pt is off the anti-inflammatories. HGA1c is about the same.  Pt takes tizanadine tid. However if she is a work and needs a muscle relaxer, she will take the robaxin as it is less sedating.       Patient's daughter takes Ozempic and has not had a marked improvement in her blood sugar.  Patient would like to try Ozempic.  Patient denies any personal or family history of thyroid tumor.  The following portions of the patient's history were reviewed and updated as appropriate: allergies, current medications, past  family history, past medical history, past social history, past surgical history and problem list.    Past Medical History:   Diagnosis Date   • Allergic    • Anemia     during pregnancy   • Anxiety    • Arthritis    • Blood in urine    • Chronic fatigue    • Colon polyp 02/2018    7 at last colonoscopy   • Degeneration of L4-L5 intervertebral disc 12/10/2019   • Depression    • Diabetes mellitus type 2 in obese (HCC) 03/11/2021   • Elevated cholesterol    • Endometriosis    • Fibromyalgia, primary    • Fracture    • GERD (gastroesophageal reflux disease)    • H/O mammogram 02/2018   • Headache    • Herniated intervertebral disc of lumbar spine     l4-l5   • HL (hearing loss)    • Hypertension    • Irritable bowel syndrome    • Pap smear for cervical cancer screening 2018?   • Wears glasses       Past Surgical History:   Procedure Laterality Date   • CARDIAC CATHETERIZATION  11/13/2020    Dr Salas, EF 60%, no significant stenosis   • CARDIAC CATHETERIZATION N/A 11/13/2020    Procedure: Left Heart Cath;  Surgeon: Sai Salas MD;  Location:  [facility];  Service: Cardiology;  Laterality: N/A;   • CHOLECYSTECTOMY     • COLONOSCOPY  02/06/2018    3 year f/u polypectomy Dr MARTA Waller   • COLONOSCOPY W/ POLYPECTOMY  07/07/2021    Dr Waller.  hyperplastic   • HYSTERECTOMY     • INNER EAR SURGERY     • LUMBAR DISCECTOMY Right 11/25/2020    Procedure: LUMBAR DISCECTOMY L4-5 RIGHT;  Surgeon: Qasim Kelsey MD;  Location: [facility] OR;  Service: Neurosurgery;  Laterality: Right;   • OOPHORECTOMY     • TEMPOROMANDIBULAR JOINT ARTHROPLASTY  1984   • TONSILLECTOMY     • URETHRA SURGERY      as a baby       Family History   Problem Relation Age of Onset   • Melanoma Mother         eye   • Diabetes Mother    • Heart disease Mother    • Arthritis Mother    • Depression Mother    • Colon polyps Mother    • Thyroid disease Mother    • Hyperlipidemia Mother    • Cancer Mother    • Heart attack Mother    • Migraines  Mother    • Cancer Father         pancreatic and liver   • Diabetes Father    • Depression Father    • Colon polyps Father    • Bipolar disorder Father         split personality   • ADD / ADHD Son    • Depression Son    • Diabetes Maternal Aunt    • Diabetes Maternal Uncle    • Diabetes Paternal Aunt    • Diabetes Paternal Uncle    • Cancer Maternal Grandmother         uterine/cervical   • Heart disease Maternal Grandfather    • Heart disease Paternal Grandfather    • Migraines Sister    • Bipolar disorder Daughter    • Coronary artery disease Brother    • Breast cancer Neg Hx    • Ovarian cancer Neg Hx       Social History     Socioeconomic History   • Marital status:    Tobacco Use   • Smoking status: Current Every Day Smoker     Packs/day: 0.50     Years: 34.00     Pack years: 17.00     Types: Cigarettes     Start date: 1984   • Smokeless tobacco: Never Used   • Tobacco comment: 0.5-1ppd   Vaping Use   • Vaping Use: Never used   Substance and Sexual Activity   • Alcohol use: Yes     Comment: occ   • Drug use: No   • Sexual activity: Not Currently     Birth control/protection: None      Allergies   Allergen Reactions   • Statins Myalgia   • Erythromycin GI Intolerance     Tolerates zpak   • Lipitor [Atorvastatin Calcium] Myalgia   • Metformin Myalgia   • Penicillins Hives     States ok with keflex   • Pravastatin Myalgia   • Relafen [Nabumetone] GI Intolerance   • Sertraline Other (See Comments)     Restless leg   • Cymbalta [Duloxetine Hcl] Rash       Review of Systems   Constitutional: Positive for weight loss. Negative for fatigue, fever and malaise/fatigue.   Eyes: Negative for blurred vision.   Respiratory: Negative for shortness of breath.    Cardiovascular: Negative for chest pain, palpitations, orthopnea and PND.   Gastrointestinal: Negative for abdominal pain and bowel incontinence.   Endocrine: Negative for polydipsia, polyphagia and polyuria.   Genitourinary: Positive for bladder incontinence  (leakage). Negative for dysuria and pelvic pain.   Musculoskeletal: Positive for back pain. Negative for neck pain.   Neurological: Positive for paresthesias. Negative for dizziness, tingling, weakness, numbness and headaches.   Psychiatric/Behavioral: Negative for confusion.       Objective   Physical Exam  Vitals and nursing note reviewed.   Constitutional:       Appearance: She is well-developed.   HENT:      Head: Normocephalic.      Right Ear: External ear normal.      Left Ear: External ear normal.      Nose: Nose normal.   Eyes:      General: Lids are normal.      Conjunctiva/sclera: Conjunctivae normal.      Pupils: Pupils are equal, round, and reactive to light.   Neck:      Thyroid: No thyroid mass or thyromegaly.      Vascular: No carotid bruit.      Trachea: Trachea normal.   Cardiovascular:      Rate and Rhythm: Normal rate and regular rhythm.      Heart sounds: No murmur heard.  Pulmonary:      Effort: Pulmonary effort is normal. No respiratory distress.      Breath sounds: Normal breath sounds. No decreased breath sounds, wheezing, rhonchi or rales.   Chest:      Chest wall: No tenderness.   Abdominal:      General: Bowel sounds are normal.      Palpations: Abdomen is soft.      Tenderness: There is no abdominal tenderness.   Musculoskeletal:         General: Normal range of motion.      Cervical back: Normal range of motion and neck supple.   Skin:     General: Skin is warm and dry.   Neurological:      Mental Status: She is alert and oriented to person, place, and time.   Psychiatric:         Behavior: Behavior normal.         Assessment & Plan   Diagnoses and all orders for this visit:    1. Type 2 diabetes mellitus with hyperglycemia, with long-term current use of insulin (HCC) (Primary)  -     POC Glycosylated Hemoglobin (Hb A1C)  -     Continuous Blood Gluc Transmit (Dexcom G6 Transmitter) misc; 1 each Continuous. To monitor glucose. E11.65, z79.4  Dispense: 1 each; Refill: 3  -     Insulin  Glargine (LANTUS SOLOSTAR) 100 UNIT/ML injection pen; Inject 28 units nightly E11.65, Z79.4  Dispense: 6 pen; Refill: 3  -     dapagliflozin Propanediol (Farxiga) 10 MG tablet; Take 10 mg by mouth Daily.  Dispense: 90 tablet; Refill: 0  -     Insulin Pen Needle (Pen Needles) 32G X 6 MM misc; 1 each Daily.  Dispense: 30 each; Refill: 11  -     Semaglutide,0.25 or 0.5MG/DOS, (OZEMPIC) 2 MG/1.5ML solution pen-injector; Inject 0.25 mg under the skin into the appropriate area as directed 1 (One) Time Per Week.  Dispense: 1.5 mL; Refill: 2    2. Hypokalemia  -     potassium chloride 10 MEQ CR tablet; Take 1 tablet by mouth Daily.  Dispense: 90 tablet; Refill: 0    3. Hyperlipidemia, unspecified hyperlipidemia type  -     ezetimibe (Zetia) 10 MG tablet; Take 1 tablet by mouth Daily.  Dispense: 30 tablet; Refill: 5  -     Comprehensive Metabolic Panel; Future  -     Lipid Panel; Future    4. Seasonal allergies  -     montelukast (SINGULAIR) 10 MG tablet; Take 1 tablet by mouth Every Night.  Dispense: 30 tablet; Refill: 6    5. Back muscle spasm  -     tiZANidine (ZANAFLEX) 4 MG tablet; Take 1 tablet by mouth Every 8 (Eight) Hours As Needed for Muscle Spasms.  Dispense: 90 tablet; Refill: 2    6. Nausea  -     ondansetron ODT (Zofran ODT) 4 MG disintegrating tablet; Place 1 tablet on the tongue Every 8 (Eight) Hours As Needed for Nausea or Vomiting.  Dispense: 40 tablet; Refill: 2    7. Muscle spasm of right leg  -     methocarbamol (Robaxin) 500 MG tablet; Take 1 tablet by mouth Daily. With lunch  Dispense: 30 tablet; Refill: 2    8. Continuous leakage of urine  -     Ambulatory Referral to Urology      Add ozempic  Patient advised to let us know how she is doing with the Ozempic.  If there is a lot of improvement after 4 weeks we will increase the dose.       Addendum 8/26/2022.  Above should read if there is not a lot of improvement after 4 weeks we will increase the dose of Ozempic.    Current Outpatient Medications:   •   amLODIPine-benazepril (LOTREL 5-20) 5-20 MG per capsule, Take 1 capsule by mouth Daily., Disp: , Rfl:   •  Continuous Blood Gluc  (Dexcom G6 ) device, 1 each Continuous. To monitor glucose E11.65, z79.4, Disp: 1 each, Rfl: 0  •  Continuous Blood Gluc Sensor (Dexcom G6 Sensor), Every 10 (Ten) Days. Apply to appropriate area to monitor glucose. E11.65, z79.4, Disp: 3 each, Rfl: 12  •  Continuous Blood Gluc Transmit (Dexcom G6 Transmitter) misc, 1 each Continuous. To monitor glucose. E11.65, z79.4, Disp: 1 each, Rfl: 3  •  dapagliflozin Propanediol (Farxiga) 10 MG tablet, Take 10 mg by mouth Daily., Disp: 90 tablet, Rfl: 0  •  docusate sodium (COLACE) 100 MG capsule, Take 100 mg by mouth As Needed., Disp: , Rfl:   •  ezetimibe (Zetia) 10 MG tablet, Take 1 tablet by mouth Daily., Disp: 30 tablet, Rfl: 5  •  furosemide (LASIX) 20 MG tablet, TAKE 1 TABLET BY MOUTH ONCE DAILY AS NEEDED FOR SWELLING OF LEGS, Disp: , Rfl:   •  gabapentin (NEURONTIN) 800 MG tablet, Take 1 tablet by mouth 3 (Three) Times a Day., Disp: , Rfl:   •  Hydrocortisone, Perianal, (ANUSOL-HC) 2.5 % rectal cream, Procto-Med HC 2.5 % topical cream perineal applicator  INSERT INTO RECTUM TWICE DAILY AS DIRECTED FOR 14 DAYS, Disp: , Rfl:   •  hydrOXYzine pamoate (Vistaril) 25 MG capsule, Take 1 capsule by mouth 3 (Three) Times a Day As Needed for Itching., Disp: 60 capsule, Rfl: 2  •  Insulin Glargine (LANTUS SOLOSTAR) 100 UNIT/ML injection pen, Inject 28 units nightly E11.65, Z79.4, Disp: 6 pen, Rfl: 3  •  Insulin Pen Needle (Pen Needles) 32G X 6 MM misc, 1 each Daily., Disp: 30 each, Rfl: 11  •  ipratropium (ATROVENT) 0.03 % nasal spray, 2 sprays into the nostril(s) as directed by provider Every 12 (Twelve) Hours., Disp: 30 mL, Rfl: 1  •  lidocaine (LIDODERM) 5 %, Place 1 patch on the skin as directed by provider Daily. Remove & Discard patch within 12 hours or as directed by MD, Disp: 30 each, Rfl: 5  •  methocarbamol (Robaxin) 500 MG  tablet, Take 1 tablet by mouth Daily. With lunch, Disp: 30 tablet, Rfl: 2  •  montelukast (SINGULAIR) 10 MG tablet, Take 1 tablet by mouth Every Night., Disp: 30 tablet, Rfl: 6  •  ondansetron ODT (Zofran ODT) 4 MG disintegrating tablet, Place 1 tablet on the tongue Every 8 (Eight) Hours As Needed for Nausea or Vomiting., Disp: 40 tablet, Rfl: 2  •  potassium chloride 10 MEQ CR tablet, Take 1 tablet by mouth Daily., Disp: 90 tablet, Rfl: 0  •  spironolactone (ALDACTONE) 25 MG tablet, , Disp: , Rfl:   •  tiZANidine (ZANAFLEX) 4 MG tablet, Take 1 tablet by mouth Every 8 (Eight) Hours As Needed for Muscle Spasms., Disp: 90 tablet, Rfl: 2  •  Ultram 50 MG tablet, Take 1 tablet by mouth Every 4 (Four) Hours As Needed for Moderate Pain ., Disp: 60 tablet, Rfl: 0  •  Semaglutide,0.25 or 0.5MG/DOS, (OZEMPIC) 2 MG/1.5ML solution pen-injector, Inject 0.25 mg under the skin into the appropriate area as directed 1 (One) Time Per Week., Disp: 1.5 mL, Rfl: 2  No current facility-administered medications for this visit.    Facility-Administered Medications Ordered in Other Visits:   •  mupirocin (BACTROBAN) 2 % nasal ointment, , Nasal, BID, Ton, Kassidy RIZZO PA-C    Return in about 3 months (around 11/24/2022), or if symptoms worsen or fail to improve, for Recheck DM, lipid.     Hemoglobin A1C   Date Value Ref Range Status   08/24/2022 8.5 % Final   05/23/2022 8.6 % Final   12/22/2021 10.0 % Final   11/23/2020 6.60 (H) 4.80 - 5.60 % Final   11/13/2020 6.50 (H) 4.80 - 5.60 % Final   09/30/2020 6.60 (H) 4.80 - 5.60 % Final      I spent 30 minutes caring for Lizette on this date of service. This time includes time spent by me in the following activities: preparing for the visit, reviewing tests, obtaining and/or reviewing a separately obtained history, performing a medically appropriate examination and/or evaluation, counseling and educating the patient/family/caregiver, ordering medications, tests, or procedures and documenting  information in the medical record

## 2022-08-25 LAB
ALBUMIN SERPL-MCNC: 4.5 G/DL (ref 3.5–5.2)
ALBUMIN/GLOB SERPL: 1.7 G/DL
ALP SERPL-CCNC: 204 U/L (ref 39–117)
ALT SERPL W P-5'-P-CCNC: 15 U/L (ref 1–33)
ANION GAP SERPL CALCULATED.3IONS-SCNC: 12 MMOL/L (ref 5–15)
AST SERPL-CCNC: 22 U/L (ref 1–32)
BILIRUB SERPL-MCNC: 0.3 MG/DL (ref 0–1.2)
BUN SERPL-MCNC: 9 MG/DL (ref 6–20)
BUN/CREAT SERPL: 12.9 (ref 7–25)
CALCIUM SPEC-SCNC: 9.9 MG/DL (ref 8.6–10.5)
CHLORIDE SERPL-SCNC: 106 MMOL/L (ref 98–107)
CHOLEST SERPL-MCNC: 269 MG/DL (ref 0–200)
CO2 SERPL-SCNC: 24 MMOL/L (ref 22–29)
CREAT SERPL-MCNC: 0.7 MG/DL (ref 0.57–1)
EGFRCR SERPLBLD CKD-EPI 2021: 102.3 ML/MIN/1.73
GLOBULIN UR ELPH-MCNC: 2.7 GM/DL
GLUCOSE SERPL-MCNC: 179 MG/DL (ref 65–99)
HDLC SERPL-MCNC: 32 MG/DL (ref 40–60)
LDLC SERPL CALC-MCNC: 166 MG/DL (ref 0–100)
LDLC/HDLC SERPL: 5.12 {RATIO}
POTASSIUM SERPL-SCNC: 3.3 MMOL/L (ref 3.5–5.2)
PROT SERPL-MCNC: 7.2 G/DL (ref 6–8.5)
SODIUM SERPL-SCNC: 142 MMOL/L (ref 136–145)
TRIGL SERPL-MCNC: 366 MG/DL (ref 0–150)
VLDLC SERPL-MCNC: 71 MG/DL (ref 5–40)

## 2022-08-25 RX ORDER — TIZANIDINE 4 MG/1
TABLET ORAL
Qty: 90 TABLET | Refills: 0 | OUTPATIENT
Start: 2022-08-25

## 2022-08-29 ENCOUNTER — TELEPHONE (OUTPATIENT)
Dept: INTERNAL MEDICINE | Facility: CLINIC | Age: 57
End: 2022-08-29

## 2022-09-26 NOTE — TELEPHONE ENCOUNTER
----- Message from Denise LOZANO MD sent at 6/29/2021  5:09 PM EDT -----  2 different bacteria in the urine.  Macrobid has been sent to [facility] at [facility]  
PN and verbalized understanding.  
Never
Unable to assess

## 2022-09-30 ENCOUNTER — OFFICE VISIT (OUTPATIENT)
Dept: INTERNAL MEDICINE | Facility: CLINIC | Age: 57
End: 2022-09-30

## 2022-09-30 VITALS
SYSTOLIC BLOOD PRESSURE: 130 MMHG | OXYGEN SATURATION: 97 % | BODY MASS INDEX: 33.32 KG/M2 | TEMPERATURE: 98.2 F | HEART RATE: 102 BPM | HEIGHT: 65 IN | DIASTOLIC BLOOD PRESSURE: 84 MMHG | WEIGHT: 200 LBS

## 2022-09-30 DIAGNOSIS — F33.1 MODERATE EPISODE OF RECURRENT MAJOR DEPRESSIVE DISORDER: ICD-10-CM

## 2022-09-30 DIAGNOSIS — R09.81 SINUS CONGESTION: Primary | ICD-10-CM

## 2022-09-30 PROCEDURE — 99214 OFFICE O/P EST MOD 30 MIN: CPT | Performed by: FAMILY MEDICINE

## 2022-09-30 RX ORDER — METHYLPREDNISOLONE 4 MG/1
TABLET ORAL
Qty: 21 EACH | Refills: 0 | Status: SHIPPED | OUTPATIENT
Start: 2022-09-30 | End: 2022-10-31

## 2022-09-30 RX ORDER — CITALOPRAM 10 MG/1
10 TABLET ORAL DAILY
Qty: 30 TABLET | Refills: 1 | Status: SHIPPED | OUTPATIENT
Start: 2022-09-30 | End: 2022-10-31 | Stop reason: DRUGHIGH

## 2022-09-30 NOTE — PROGRESS NOTES
"Subjective   Lizette Keith is a 57 y.o. female.     Chief Complaint   Patient presents with   • Depression     Discuss restarting antidepressant   • Sinusitis     Sinus pressure 2 weeks         Visit Vitals  /84   Pulse 102   Temp 98.2 °F (36.8 °C)   Ht 165.1 cm (65\")   Wt 90.7 kg (200 lb)   SpO2 97%   BMI 33.28 kg/m²         Depression  Visit Type: initial  Onset of symptoms: 1-4 weeks ago (depression on and off for the past 40 yrs)  Progression since onset: gradually worsening  Patient presents with the following symptoms: anhedonia, depressed mood, dry mouth, excessive worry, fatigue, hypersomnia, insomnia, irritability, malaise, muscle tension, nausea, nervousness/anxiety and weight loss.  Patient is not experiencing: chest pain, choking sensation, compulsions, confusion, decreased concentration, dizziness, feelings of hopelessness, feelings of worthlessness, hyperventilation, memory impairment, obsessions, palpitations, panic, psychomotor agitation, psychomotor retardation, restlessness, shortness of breath, suicidal ideas, suicidal planning, thoughts of death and weight gain.  Frequency of symptoms: most days   Severity: moderate   Aggravated by: family issues (stress)  Sleep quality: poor  Nighttime awakenings: many  Risk factors: family history, previous episode of depression and prior hospitalization (hospitalized 3 times over the past 40 yrs, at least 15 yrs ago was last episode when she cut her left wrist)  Patient has a history of: anxiety/panic attacks, depression, fibromyalgia and suicide attempt  No history of: anemia, arrhythmia, asthma, bipolar disorder, CAD, CHF, chronic lung disease, hyperthyroidism, mental illness and substance abuse  Treatment tried: SSRI, group therapy, individual therapy and medications  Compliance with treatment: good      Sinusitis  This is a new problem. The current episode started 1 to 4 weeks ago. The problem is unchanged. There has been no fever. Her pain is " at a severity of 6/10. The pain is moderate. Associated symptoms include congestion, headaches and sinus pressure. Pertinent negatives include no chills, coughing, diaphoresis, ear pain, hoarse voice, neck pain, shortness of breath, sneezing, sore throat or swollen glands.      Pt has been fighting sinus congestion and pain in face and teeth for 2 weeks.    Pt wants to restart antidepressant. Pt has been more irritable the past 2 weeks.    Pt had low potassium last Nephrology visit.   The following portions of the patient's history were reviewed and updated as appropriate: allergies, current medications, past family history, past medical history, past social history, past surgical history and problem list.    Past Medical History:   Diagnosis Date   • Allergic    • Anemia     during pregnancy   • Anxiety    • Arthritis    • Blood in urine    • Chronic fatigue    • Colon polyp 02/2018    7 at last colonoscopy   • Degeneration of L4-L5 intervertebral disc 12/10/2019   • Depression    • Diabetes mellitus type 2 in obese (HCC) 03/11/2021   • Elevated cholesterol    • Endometriosis    • Fibromyalgia, primary    • Fracture    • GERD (gastroesophageal reflux disease)    • H/O mammogram 02/2018   • Headache    • Herniated intervertebral disc of lumbar spine     l4-l5   • HL (hearing loss)    • Hypertension    • Irritable bowel syndrome    • Pap smear for cervical cancer screening 2018?   • Wears glasses       Past Surgical History:   Procedure Laterality Date   • CARDIAC CATHETERIZATION  11/13/2020    Dr Salas, EF 60%, no significant stenosis   • CARDIAC CATHETERIZATION N/A 11/13/2020    Procedure: Left Heart Cath;  Surgeon: Sai Salas MD;  Location: [facility] INVAS[facility] LOC[facility];  Service: Cardiology;  Laterality: N/A;   • CHOLECYSTECTOMY     • COLONOSCOPY  02/06/2018    3 year f/u polypectomy Dr MARTA Waller   • COLONOSCOPY W/ POLYPECTOMY  07/07/2021    Dr Waller.  hyperplastic   • HYSTERECTOMY     • INNER EAR SURGERY      • LUMBAR DISCECTOMY Right 11/25/2020    Procedure: LUMBAR DISCECTOMY L4-5 RIGHT;  Surgeon: Qasim Kelsey MD;  Location: [facility];  Service: Neurosurgery;  Laterality: Right;   • OOPHORECTOMY     • TEMPOROMANDIBULAR JOINT ARTHROPLASTY  1984   • TONSILLECTOMY     • URETHRA SURGERY      as a baby       Family History   Problem Relation Age of Onset   • Melanoma Mother         eye   • Diabetes Mother    • Heart disease Mother    • Arthritis Mother    • Depression Mother    • Colon polyps Mother    • Thyroid disease Mother    • Hyperlipidemia Mother    • Cancer Mother    • Heart attack Mother    • Migraines Mother    • Cancer Father         pancreatic and liver   • Diabetes Father    • Depression Father    • Colon polyps Father    • Bipolar disorder Father         split personality   • Schizophrenia Father    • Migraines Sister    • Coronary artery disease Brother    • Bipolar disorder Daughter    • ADD / ADHD Son    • Depression Son    • Diabetes Maternal Aunt    • Diabetes Maternal Uncle    • Diabetes Paternal Aunt    • Diabetes Paternal Uncle    • Cancer Maternal Grandmother         uterine/cervical   • Heart disease Maternal Grandfather    • Heart disease Paternal Grandfather    • Breast cancer Neg Hx    • Ovarian cancer Neg Hx       Social History     Socioeconomic History   • Marital status:    Tobacco Use   • Smoking status: Current Every Day Smoker     Packs/day: 0.50     Years: 34.00     Pack years: 17.00     Types: Cigarettes     Start date: 1984   • Smokeless tobacco: Never Used   • Tobacco comment: 0.5-1ppd   Vaping Use   • Vaping Use: Never used   Substance and Sexual Activity   • Alcohol use: Yes     Comment: occ   • Drug use: No   • Sexual activity: Not Currently     Birth control/protection: None      Allergies   Allergen Reactions   • Statins Myalgia   • Erythromycin GI Intolerance     Tolerates zpak   • Lipitor [Atorvastatin Calcium] Myalgia   • Metformin Myalgia   • Penicillins Hives      States ok with keflex   • Pravastatin Myalgia   • Relafen [Nabumetone] GI Intolerance   • Sertraline Other (See Comments)     Restless leg   • Cymbalta [Duloxetine Hcl] Rash       Review of Systems   Constitutional: Positive for irritability and weight loss. Negative for chills, diaphoresis and weight gain.   HENT: Positive for congestion, dental problem (top teeth hurt from sinus infection), sinus pressure and sinus pain. Negative for ear pain, hoarse voice, sneezing and sore throat.    Respiratory: Negative for cough, choking and shortness of breath.    Cardiovascular: Negative for palpitations.   Musculoskeletal: Negative for neck pain.   Neurological: Positive for headaches.   Psychiatric/Behavioral: Negative for confusion, decreased concentration, substance abuse and suicidal ideas. The patient is nervous/anxious and has insomnia.        Objective   Physical Exam  Vitals and nursing note reviewed.   Constitutional:       Appearance: She is well-developed.   HENT:      Head: Normocephalic.      Right Ear: External ear normal.      Left Ear: External ear normal.      Nose:      Right Sinus: Maxillary sinus tenderness and frontal sinus tenderness present.      Left Sinus: Maxillary sinus tenderness and frontal sinus tenderness present.   Eyes:      General: Lids are normal.      Conjunctiva/sclera: Conjunctivae normal.      Pupils: Pupils are equal, round, and reactive to light.   Neck:      Thyroid: No thyroid mass or thyromegaly.      Vascular: No carotid bruit.      Trachea: Trachea normal.   Cardiovascular:      Rate and Rhythm: Normal rate and regular rhythm.      Heart sounds: No murmur heard.  Pulmonary:      Effort: Pulmonary effort is normal. No respiratory distress.      Breath sounds: Normal breath sounds. No decreased breath sounds, wheezing, rhonchi or rales.   Chest:      Chest wall: No tenderness.   Abdominal:      General: Bowel sounds are normal.      Palpations: Abdomen is soft.      Tenderness:  There is no abdominal tenderness.   Musculoskeletal:         General: Normal range of motion.      Cervical back: Normal range of motion and neck supple.   Skin:     General: Skin is warm and dry.   Neurological:      Mental Status: She is alert and oriented to person, place, and time.   Psychiatric:         Behavior: Behavior normal.         Assessment & Plan   Diagnoses and all orders for this visit:    1. Sinus congestion (Primary)  -     methylPREDNISolone (MEDROL) 4 MG dose pack; Take as directed on package instructions.  Dispense: 21 each; Refill: 0    2. Moderate episode of recurrent major depressive disorder (HCC)  -     citalopram (CeleXA) 10 MG tablet; Take 1 tablet by mouth Daily.  Dispense: 30 tablet; Refill: 1                   Current Outpatient Medications:   •  amLODIPine-benazepril (LOTREL 5-20) 5-20 MG per capsule, Take 1 capsule by mouth Daily., Disp: , Rfl:   •  Continuous Blood Gluc  (Dexcom G6 ) device, 1 each Continuous. To monitor glucose E11.65, z79.4, Disp: 1 each, Rfl: 0  •  Continuous Blood Gluc Sensor (Dexcom G6 Sensor), Every 10 (Ten) Days. Apply to appropriate area to monitor glucose. E11.65, z79.4, Disp: 3 each, Rfl: 12  •  Continuous Blood Gluc Transmit (Dexcom G6 Transmitter) misc, 1 each Continuous. To monitor glucose. E11.65, z79.4, Disp: 1 each, Rfl: 3  •  dapagliflozin Propanediol (Farxiga) 10 MG tablet, Take 10 mg by mouth Daily., Disp: 90 tablet, Rfl: 0  •  docusate sodium (COLACE) 100 MG capsule, Take 100 mg by mouth As Needed., Disp: , Rfl:   •  ezetimibe (Zetia) 10 MG tablet, Take 1 tablet by mouth Daily., Disp: 30 tablet, Rfl: 5  •  furosemide (LASIX) 20 MG tablet, TAKE 1 TABLET BY MOUTH ONCE DAILY AS NEEDED FOR SWELLING OF LEGS, Disp: , Rfl:   •  gabapentin (NEURONTIN) 800 MG tablet, Take 1 tablet by mouth 3 (Three) Times a Day., Disp: , Rfl:   •  Hydrocortisone, Perianal, (ANUSOL-HC) 2.5 % rectal cream, Procto-Med HC 2.5 % topical cream perineal  applicator  INSERT INTO RECTUM TWICE DAILY AS DIRECTED FOR 14 DAYS, Disp: , Rfl:   •  hydrOXYzine pamoate (Vistaril) 25 MG capsule, Take 1 capsule by mouth 3 (Three) Times a Day As Needed for Itching., Disp: 60 capsule, Rfl: 2  •  Insulin Glargine (LANTUS SOLOSTAR) 100 UNIT/ML injection pen, Inject 28 units nightly E11.65, Z79.4, Disp: 6 pen, Rfl: 3  •  Insulin Pen Needle (Pen Needles) 32G X 6 MM misc, 1 each Daily., Disp: 30 each, Rfl: 11  •  ipratropium (ATROVENT) 0.03 % nasal spray, 2 sprays into the nostril(s) as directed by provider Every 12 (Twelve) Hours., Disp: 30 mL, Rfl: 1  •  lidocaine (LIDODERM) 5 %, Place 1 patch on the skin as directed by provider Daily. Remove & Discard patch within 12 hours or as directed by MD, Disp: 30 each, Rfl: 5  •  methocarbamol (Robaxin) 500 MG tablet, Take 1 tablet by mouth Daily. With lunch, Disp: 30 tablet, Rfl: 2  •  montelukast (SINGULAIR) 10 MG tablet, Take 1 tablet by mouth Every Night., Disp: 30 tablet, Rfl: 6  •  ondansetron ODT (Zofran ODT) 4 MG disintegrating tablet, Place 1 tablet on the tongue Every 8 (Eight) Hours As Needed for Nausea or Vomiting., Disp: 40 tablet, Rfl: 2  •  potassium chloride 10 MEQ CR tablet, Take 1 tablet by mouth Daily., Disp: 90 tablet, Rfl: 0  •  Semaglutide,0.25 or 0.5MG/DOS, (OZEMPIC) 2 MG/1.5ML solution pen-injector, Inject 0.25 mg under the skin into the appropriate area as directed 1 (One) Time Per Week., Disp: 1.5 mL, Rfl: 2  •  spironolactone (ALDACTONE) 25 MG tablet, , Disp: , Rfl:   •  tiZANidine (ZANAFLEX) 4 MG tablet, Take 1 tablet by mouth Every 8 (Eight) Hours As Needed for Muscle Spasms., Disp: 90 tablet, Rfl: 2  •  Ultram 50 MG tablet, Take 1 tablet by mouth Every 4 (Four) Hours As Needed for Moderate Pain ., Disp: 60 tablet, Rfl: 0  •  citalopram (CeleXA) 10 MG tablet, Take 1 tablet by mouth Daily., Disp: 30 tablet, Rfl: 1  •  methylPREDNISolone (MEDROL) 4 MG dose pack, Take as directed on package instructions., Disp: 21  each, Rfl: 0  No current facility-administered medications for this visit.    Facility-Administered Medications Ordered in Other Visits:   •  mupirocin (BACTROBAN) 2 % nasal ointment, , Nasal, BID, Kassidy Camilo PA-C    Return in about 4 weeks (around 10/28/2022), or if symptoms worsen or fail to improve, for Recheck video visit.

## 2022-10-01 DIAGNOSIS — E11.65 TYPE 2 DIABETES MELLITUS WITH HYPERGLYCEMIA, WITH LONG-TERM CURRENT USE OF INSULIN: ICD-10-CM

## 2022-10-01 DIAGNOSIS — Z79.4 TYPE 2 DIABETES MELLITUS WITH HYPERGLYCEMIA, WITH LONG-TERM CURRENT USE OF INSULIN: ICD-10-CM

## 2022-10-03 RX ORDER — DAPAGLIFLOZIN 10 MG/1
TABLET, FILM COATED ORAL
Qty: 60 TABLET | Refills: 0 | Status: SHIPPED | OUTPATIENT
Start: 2022-10-03 | End: 2022-11-23 | Stop reason: SDUPTHER

## 2022-10-11 ENCOUNTER — PATIENT MESSAGE (OUTPATIENT)
Dept: INTERNAL MEDICINE | Facility: CLINIC | Age: 57
End: 2022-10-11

## 2022-10-31 ENCOUNTER — TELEMEDICINE (OUTPATIENT)
Dept: INTERNAL MEDICINE | Facility: CLINIC | Age: 57
End: 2022-10-31

## 2022-10-31 DIAGNOSIS — Z01.84 IMMUNITY STATUS TESTING: ICD-10-CM

## 2022-10-31 DIAGNOSIS — F33.1 MODERATE EPISODE OF RECURRENT MAJOR DEPRESSIVE DISORDER: Primary | ICD-10-CM

## 2022-10-31 DIAGNOSIS — Z23 NEED FOR COVID-19 VACCINE: ICD-10-CM

## 2022-10-31 DIAGNOSIS — Z23 NEED FOR IMMUNIZATION AGAINST INFLUENZA: ICD-10-CM

## 2022-10-31 PROCEDURE — 99214 OFFICE O/P EST MOD 30 MIN: CPT | Performed by: FAMILY MEDICINE

## 2022-10-31 RX ORDER — CITALOPRAM 20 MG/1
30 TABLET ORAL DAILY
Qty: 45 TABLET | Refills: 1 | Status: SHIPPED | OUTPATIENT
Start: 2022-10-31 | End: 2022-11-23 | Stop reason: SDUPTHER

## 2022-10-31 NOTE — PROGRESS NOTES
Subjective   Lizette Keith is a 57 y.o. female.     Chief Complaint   Patient presents with   • Depression     You have chosen to receive care through a telehealth visit.  Do you consent to use a video/audio connection for your medical care today? Yes    This was an audio and video enabled telemedicine encounter.    There were no vitals taken for this visit.      Depression  Visit Type: follow-up  Patient presents with the following symptoms: decreased concentration, depressed mood, dry mouth, fatigue, hypersomnia, insomnia, irritability (improved), muscle tension, nausea, nervousness/anxiety and weight loss (that she attributes to Ozempic).  Patient is not experiencing: anhedonia, chest pain, choking sensation, compulsions, confusion, dizziness, excessive worry, feelings of hopelessness, feelings of worthlessness, hyperventilation, malaise, memory impairment, obsessions, palpitations, panic, psychomotor agitation, psychomotor retardation, restlessness, shortness of breath, suicidal ideas, suicidal planning, thoughts of death and weight gain.  Frequency of symptoms: most days   Severity: moderate   Sleep quality: poor  Nighttime awakenings: several  Compliance with medications:  %           Pt has some allergy symptoms.  Pt is taking citalopram which is helping. Pt is taking 20 mg of citalopram.   Pt still wants to isolate.     Pt had a few days of low blood sugar on ozempic.   Pt had cramping that was severe after laxative, blue pill from [employer]. Pt had vomiting. Pt has changed to docusate. Pt has not had any problems. Pt states that her blood sugar jumped up to 200 after that happened.     Pt wants to stop in later this week for Covid and flu vaccine.  Pt is unsure if she has been vaccinated for hepatitis B.  Pt used to work  and did get hepatitis A vaccine.   The following portions of the patient's history were reviewed and updated as appropriate: allergies, current medications, past  family history, past medical history, past social history, past surgical history and problem list.    Past Medical History:   Diagnosis Date   • Allergic    • Anemia     during pregnancy   • Anxiety    • Arthritis    • Blood in urine    • Chronic fatigue    • Colon polyp 02/2018    7 at last colonoscopy   • Degeneration of L4-L5 intervertebral disc 12/10/2019   • Depression    • Diabetes mellitus type 2 in obese (HCC) 03/11/2021   • Elevated cholesterol    • Endometriosis    • Fibromyalgia, primary    • Fracture    • GERD (gastroesophageal reflux disease)    • H/O mammogram 02/2018   • Headache    • Herniated intervertebral disc of lumbar spine     l4-l5   • HL (hearing loss)    • Hypertension    • Irritable bowel syndrome    • Pap smear for cervical cancer screening 2018?   • Wears glasses       Past Surgical History:   Procedure Laterality Date   • CARDIAC CATHETERIZATION  11/13/2020    Dr Salas, EF 60%, no significant stenosis   • CARDIAC CATHETERIZATION N/A 11/13/2020    Procedure: Left Heart Cath;  Surgeon: Sai Salas MD;  Location:  [facility];  Service: Cardiology;  Laterality: N/A;   • CHOLECYSTECTOMY     • COLONOSCOPY  02/06/2018    3 year f/u polypectomy Dr MARTA Waller   • COLONOSCOPY W/ POLYPECTOMY  07/07/2021    Dr Waller.  hyperplastic   • HYSTERECTOMY     • INNER EAR SURGERY     • LUMBAR DISCECTOMY Right 11/25/2020    Procedure: LUMBAR DISCECTOMY L4-5 RIGHT;  Surgeon: Qasim Kelsey MD;  Location: [facility] OR;  Service: Neurosurgery;  Laterality: Right;   • OOPHORECTOMY     • TEMPOROMANDIBULAR JOINT ARTHROPLASTY  1984   • TONSILLECTOMY     • URETHRA SURGERY      as a baby       Family History   Problem Relation Age of Onset   • Melanoma Mother         eye   • Diabetes Mother    • Heart disease Mother    • Arthritis Mother    • Depression Mother    • Colon polyps Mother    • Thyroid disease Mother    • Hyperlipidemia Mother    • Cancer Mother    • Heart attack Mother    • Migraines  Mother    • Cancer Father         pancreatic and liver   • Diabetes Father    • Depression Father    • Colon polyps Father    • Bipolar disorder Father         split personality   • Schizophrenia Father    • Migraines Sister    • Coronary artery disease Brother    • Bipolar disorder Daughter    • ADD / ADHD Son    • Depression Son    • Diabetes Maternal Aunt    • Diabetes Maternal Uncle    • Diabetes Paternal Aunt    • Diabetes Paternal Uncle    • Cancer Maternal Grandmother         uterine/cervical   • Heart disease Maternal Grandfather    • Heart disease Paternal Grandfather    • Breast cancer Neg Hx    • Ovarian cancer Neg Hx       Social History     Socioeconomic History   • Marital status:    Tobacco Use   • Smoking status: Every Day     Packs/day: 0.50     Years: 34.00     Pack years: 17.00     Types: Cigarettes     Start date: 1984   • Smokeless tobacco: Never   • Tobacco comments:     0.5-1ppd   Vaping Use   • Vaping Use: Never used   Substance and Sexual Activity   • Alcohol use: Yes     Comment: occ   • Drug use: No   • Sexual activity: Not Currently     Birth control/protection: None      Allergies   Allergen Reactions   • Statins Myalgia   • Erythromycin GI Intolerance     Tolerates zpak   • Lipitor [Atorvastatin Calcium] Myalgia   • Metformin Myalgia   • Penicillins Hives     States ok with keflex   • Pravastatin Myalgia   • Relafen [Nabumetone] GI Intolerance   • Sertraline Other (See Comments)     Restless leg   • Cymbalta [Duloxetine Hcl] Rash       Review of Systems   Constitutional: Positive for irritability (improved) and weight loss (that she attributes to Ozempic). Negative for weight gain.   Respiratory: Negative for choking and shortness of breath.    Cardiovascular: Negative for palpitations.   Gastrointestinal: Positive for nausea and vomiting (with laxative).   Psychiatric/Behavioral: Positive for decreased concentration. Negative for confusion and suicidal ideas. The patient is  nervous/anxious and has insomnia.      PHQ-9 Depression Screening  Little interest or pleasure in doing things? 2-->more than half the days   Feeling down, depressed, or hopeless? 1-->several days   Trouble falling or staying asleep, or sleeping too much? 2-->more than half the days   Feeling tired or having little energy? 3-->nearly every day   Poor appetite or overeating? 1-->several days   Feeling bad about yourself - or that you are a failure or have let yourself or your family down? 1-->several days   Trouble concentrating on things, such as reading the newspaper or watching television? 1-->several days   Moving or speaking so slowly that other people could have noticed? Or the opposite - being so fidgety or restless that you have been moving around a lot more than usual? 0-->not at all   Thoughts that you would be better off dead, or of hurting yourself in some way? 0-->not at all   PHQ-9 Total Score 11   If you checked off any problems, how difficult have these problems made it for you to do your work, take care of things at home, or get along with other people? somewhat difficult           Objective   Physical Exam  Constitutional:       Comments: Video visit   HENT:      Head: Normocephalic and atraumatic.      Nose: Nose normal.   Eyes:      Pupils: Pupils are equal, round, and reactive to light.   Pulmonary:      Effort: Pulmonary effort is normal. No respiratory distress.   Musculoskeletal:      Cervical back: Normal range of motion.   Neurological:      Mental Status: She is alert and oriented to person, place, and time.   Psychiatric:         Mood and Affect: Mood normal.         Behavior: Behavior normal.         Thought Content: Thought content normal.         Judgment: Judgment normal.         Assessment & Plan   Diagnoses and all orders for this visit:    1. Moderate episode of recurrent major depressive disorder (HCC) (Primary)  -     citalopram (CeleXA) 20 MG tablet; Take 1.5 tablets by mouth  Daily.  Dispense: 45 tablet; Refill: 1    2. Need for immunization against influenza  -     FluLaval/Fluzone >6 mos (0782-4139); Future    3. Need for COVID-19 vaccine  -     COVID-19 Bivalent Booster (Pfizer) 12+yrs; Future    4. Immunity status testing  -     Hepatitis B Surface Antibody; Future      Increase citalopram to 30 mg per day    No change in Ozempic. Use stool softener instead of laxative.  Keep fluid intake high.  Will have her get vaccines later this week.  Pt can check hep B status at lab.          Current Outpatient Medications:   •  amLODIPine-benazepril (LOTREL 5-20) 5-20 MG per capsule, Take 1 capsule by mouth Daily., Disp: , Rfl:   •  citalopram (CeleXA) 20 MG tablet, Take 1.5 tablets by mouth Daily., Disp: 45 tablet, Rfl: 1  •  Continuous Blood Gluc  (Dexcom G6 ) device, 1 each Continuous. To monitor glucose E11.65, z79.4, Disp: 1 each, Rfl: 0  •  Continuous Blood Gluc Sensor (Dexcom G6 Sensor), Every 10 (Ten) Days. Apply to appropriate area to monitor glucose. E11.65, z79.4, Disp: 3 each, Rfl: 12  •  Continuous Blood Gluc Transmit (Dexcom G6 Transmitter) misc, 1 each Continuous. To monitor glucose. E11.65, z79.4, Disp: 1 each, Rfl: 3  •  docusate sodium (COLACE) 100 MG capsule, Take 100 mg by mouth As Needed., Disp: , Rfl:   •  ezetimibe (Zetia) 10 MG tablet, Take 1 tablet by mouth Daily., Disp: 30 tablet, Rfl: 5  •  Farxiga 10 MG tablet, Take 1 tablet by mouth once daily, Disp: 60 tablet, Rfl: 0  •  furosemide (LASIX) 20 MG tablet, TAKE 1 TABLET BY MOUTH ONCE DAILY AS NEEDED FOR SWELLING OF LEGS, Disp: , Rfl:   •  gabapentin (NEURONTIN) 800 MG tablet, Take 1 tablet by mouth 3 (Three) Times a Day., Disp: , Rfl:   •  Hydrocortisone, Perianal, (ANUSOL-HC) 2.5 % rectal cream, Procto-Med HC 2.5 % topical cream perineal applicator  INSERT INTO RECTUM TWICE DAILY AS DIRECTED FOR 14 DAYS, Disp: , Rfl:   •  hydrOXYzine pamoate (Vistaril) 25 MG capsule, Take 1 capsule by mouth 3 (Three)  Times a Day As Needed for Itching., Disp: 60 capsule, Rfl: 2  •  Insulin Glargine (LANTUS SOLOSTAR) 100 UNIT/ML injection pen, Inject 28 units nightly E11.65, Z79.4, Disp: 6 pen, Rfl: 3  •  Insulin Pen Needle (Pen Needles) 32G X 6 MM misc, 1 each Daily., Disp: 30 each, Rfl: 11  •  ipratropium (ATROVENT) 0.03 % nasal spray, 2 sprays into the nostril(s) as directed by provider Every 12 (Twelve) Hours., Disp: 30 mL, Rfl: 1  •  lidocaine (LIDODERM) 5 %, Place 1 patch on the skin as directed by provider Daily. Remove & Discard patch within 12 hours or as directed by MD, Disp: 30 each, Rfl: 5  •  methocarbamol (Robaxin) 500 MG tablet, Take 1 tablet by mouth Daily. With lunch, Disp: 30 tablet, Rfl: 2  •  montelukast (SINGULAIR) 10 MG tablet, Take 1 tablet by mouth Every Night., Disp: 30 tablet, Rfl: 6  •  ondansetron ODT (Zofran ODT) 4 MG disintegrating tablet, Place 1 tablet on the tongue Every 8 (Eight) Hours As Needed for Nausea or Vomiting., Disp: 40 tablet, Rfl: 2  •  potassium chloride 10 MEQ CR tablet, Take 1 tablet by mouth Daily., Disp: 90 tablet, Rfl: 0  •  Semaglutide,0.25 or 0.5MG/DOS, (OZEMPIC) 2 MG/1.5ML solution pen-injector, Inject 0.25 mg under the skin into the appropriate area as directed 1 (One) Time Per Week., Disp: 1.5 mL, Rfl: 2  •  spironolactone (ALDACTONE) 25 MG tablet, , Disp: , Rfl:   •  tiZANidine (ZANAFLEX) 4 MG tablet, Take 1 tablet by mouth Every 8 (Eight) Hours As Needed for Muscle Spasms., Disp: 90 tablet, Rfl: 2  •  Ultram 50 MG tablet, Take 1 tablet by mouth Every 4 (Four) Hours As Needed for Moderate Pain ., Disp: 60 tablet, Rfl: 0  No current facility-administered medications for this visit.    Facility-Administered Medications Ordered in Other Visits:   •  mupirocin (BACTROBAN) 2 % nasal ointment, , Nasal, BID, Kassidy Camilo PA-C    Return in about 23 days (around 11/23/2022), or if symptoms worsen or fail to improve, for Next scheduled follow up, Recheck.     I spent 28 minutes  caring for Lizette on this date of service. This time includes time spent by me in the following activities: preparing for the visit, obtaining and/or reviewing a separately obtained history, performing a medically appropriate examination and/or evaluation, counseling and educating the patient/family/caregiver, ordering medications, tests, or procedures and documenting information in the medical record

## 2022-11-18 DIAGNOSIS — E87.6 HYPOKALEMIA: ICD-10-CM

## 2022-11-18 RX ORDER — POTASSIUM CHLORIDE 750 MG/1
TABLET, FILM COATED, EXTENDED RELEASE ORAL
Qty: 90 TABLET | Refills: 0 | Status: SHIPPED | OUTPATIENT
Start: 2022-11-18 | End: 2023-02-16

## 2022-11-23 ENCOUNTER — OFFICE VISIT (OUTPATIENT)
Dept: INTERNAL MEDICINE | Facility: CLINIC | Age: 57
End: 2022-11-23

## 2022-11-23 VITALS
HEIGHT: 65 IN | WEIGHT: 192 LBS | TEMPERATURE: 97.8 F | OXYGEN SATURATION: 97 % | SYSTOLIC BLOOD PRESSURE: 130 MMHG | DIASTOLIC BLOOD PRESSURE: 84 MMHG | HEART RATE: 96 BPM | BODY MASS INDEX: 31.99 KG/M2

## 2022-11-23 DIAGNOSIS — M79.7 FIBROMYALGIA: ICD-10-CM

## 2022-11-23 DIAGNOSIS — Z23 NEED FOR VACCINATION: ICD-10-CM

## 2022-11-23 DIAGNOSIS — Z83.79 FAMILY HISTORY OF CELIAC DISEASE: ICD-10-CM

## 2022-11-23 DIAGNOSIS — Z79.4 TYPE 2 DIABETES MELLITUS WITH HYPERGLYCEMIA, WITH LONG-TERM CURRENT USE OF INSULIN: Primary | ICD-10-CM

## 2022-11-23 DIAGNOSIS — F33.1 MODERATE EPISODE OF RECURRENT MAJOR DEPRESSIVE DISORDER: ICD-10-CM

## 2022-11-23 DIAGNOSIS — M62.830 BACK MUSCLE SPASM: ICD-10-CM

## 2022-11-23 DIAGNOSIS — E11.65 TYPE 2 DIABETES MELLITUS WITH HYPERGLYCEMIA, WITH LONG-TERM CURRENT USE OF INSULIN: Primary | ICD-10-CM

## 2022-11-23 LAB
EXPIRATION DATE: ABNORMAL
HBA1C MFR BLD: 7.4 %
Lab: ABNORMAL

## 2022-11-23 PROCEDURE — 0124A COVID-19 (PFIZER) BIVALENT BOOSTER 12+YRS: CPT | Performed by: FAMILY MEDICINE

## 2022-11-23 PROCEDURE — 3051F HG A1C>EQUAL 7.0%<8.0%: CPT | Performed by: FAMILY MEDICINE

## 2022-11-23 PROCEDURE — 99214 OFFICE O/P EST MOD 30 MIN: CPT | Performed by: FAMILY MEDICINE

## 2022-11-23 PROCEDURE — 91312 COVID-19 (PFIZER) BIVALENT BOOSTER 12+YRS: CPT | Performed by: FAMILY MEDICINE

## 2022-11-23 PROCEDURE — 90471 IMMUNIZATION ADMIN: CPT | Performed by: FAMILY MEDICINE

## 2022-11-23 PROCEDURE — 83036 HEMOGLOBIN GLYCOSYLATED A1C: CPT | Performed by: FAMILY MEDICINE

## 2022-11-23 PROCEDURE — 90686 IIV4 VACC NO PRSV 0.5 ML IM: CPT | Performed by: FAMILY MEDICINE

## 2022-11-23 RX ORDER — TIZANIDINE 4 MG/1
4 TABLET ORAL EVERY 8 HOURS PRN
Qty: 90 TABLET | Refills: 2 | Status: SHIPPED | OUTPATIENT
Start: 2022-11-23 | End: 2023-02-15

## 2022-11-23 RX ORDER — DAPAGLIFLOZIN 10 MG/1
1 TABLET, FILM COATED ORAL DAILY
Qty: 90 TABLET | Refills: 0 | Status: SHIPPED | OUTPATIENT
Start: 2022-11-23 | End: 2023-03-23 | Stop reason: SDUPTHER

## 2022-11-23 RX ORDER — LANOLIN ALCOHOL/MO/W.PET/CERES
400 CREAM (GRAM) TOPICAL DAILY
COMMUNITY

## 2022-11-23 RX ORDER — CITALOPRAM 20 MG/1
30 TABLET ORAL DAILY
Qty: 45 TABLET | Refills: 2 | Status: SHIPPED | OUTPATIENT
Start: 2022-11-23 | End: 2023-03-23 | Stop reason: SDUPTHER

## 2022-11-23 NOTE — PROGRESS NOTES
"Subjective   Lizette Keith is a 57 y.o. female.     Chief Complaint   Patient presents with   • Diabetes     A1C 8/24=8.5   • Hypertension   • Depression       Visit Vitals  /84   Pulse 96   Temp 97.8 °F (36.6 °C)   Ht 165.1 cm (65\")   Wt 87.1 kg (192 lb)   SpO2 97%   BMI 31.95 kg/m²       Wt Readings from Last 3 Encounters:   11/23/22 87.1 kg (192 lb)   09/30/22 90.7 kg (200 lb)   08/24/22 92.1 kg (203 lb)   06/06.22          97.1 kg (214 lb)  02/21/22         100   kg (221 lb)    Diabetes  She presents for her follow-up diabetic visit. She has type 2 diabetes mellitus. Her disease course has been improving. Hypoglycemia symptoms include headaches, nervousness/anxiousness and sweats (occasional). Pertinent negatives for hypoglycemia include no confusion. Associated symptoms include fatigue, foot paresthesias and weight loss (with ozempic). Pertinent negatives for diabetes include no blurred vision, no chest pain, no foot ulcerations, no polydipsia, no polyphagia, no polyuria, no visual change and no weakness. There are no hypoglycemic complications. Symptoms are improving. Diabetic complications include nephropathy and peripheral neuropathy. Pertinent negatives for diabetic complications include no CVA, heart disease, PVD or retinopathy. Risk factors for coronary artery disease include tobacco exposure, hypertension, dyslipidemia, diabetes mellitus, obesity, post-menopausal and family history. Current diabetic treatment includes oral agent (dual therapy). She is compliant with treatment most of the time. Her weight is decreasing steadily. She is following a generally healthy diet. Meal planning includes avoidance of concentrated sweets. Her home blood glucose trend is decreasing steadily. Her breakfast blood glucose range is generally  mg/dl. An ACE inhibitor/angiotensin II receptor blocker is being taken. She sees a podiatrist.Eye exam is not current.   Hypertension  This is a chronic problem. The " Chief complaint  This is a 50y.o. year old male  who comes to see me with a chief complaint of   Chief Complaint   Patient presents with    COPD     HPI  Here with cc of ILD    He is doing ok. He has limitation with things and at other times he is doing better. He went to [facility] and felt a little less SOB mainly due to more consistent weather and drier weather. He is using inhalers but feels like they are not helping him even though there was bronchodilator response. He is still on cellcept bid something he was on in the past with Dr. Ramon Benton. He was desperate to see if he could get better and therefore wanted to go back on cellcept. He is using oxygen with exertion when needed (2-3 liters). He is not sick. He is going to get flu shot at the end of this month. He is using BIPAP every night and sleeping well. He has come to terms with his limitations and tries his best    Historical:  He had been following with Dr. Ramon Benton for ILD related to bronchiolitis. Giuliana Blackwell had been concerned about following up all the way out in [address] and asked if he could follow up with me. I asked Dr. Ramon Benton and he was ok with it. Dr. Ramon Benton has been following Giuliana Blackwell for years. 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 He was tried on various immune suppressant with no improvement in symptoms. Last medication he was on was cellcept about 4 years ago. He was seen at [facility] for lung transplant in the past, but was not felt to be sick enough to require transplant, nor was his weight low enough. He has not been seen at [facility] clinic since then.           Past Medical History:   Diagnosis Date    CAD S/P percutaneous coronary angioplasty     Chronic respiratory failure (HCC)     COPD exacerbation ([facility] [address] 9/7/2016    Coronary artery disease involving native coronary artery of native heart with unstable angina pectoris (HCC)     Dyslipidemia     Dyspnea     Goodpasture syndrome ([facility] [address] current episode started more than 1 year ago. The problem is unchanged. The problem is controlled. Associated symptoms include anxiety, headaches, malaise/fatigue and sweats (occasional). Pertinent negatives include no blurred vision, chest pain, neck pain, orthopnea, palpitations, peripheral edema, PND or shortness of breath. There are no associated agents to hypertension. Risk factors for coronary artery disease include diabetes mellitus, dyslipidemia, family history, obesity, post-menopausal state and smoking/tobacco exposure. Current antihypertension treatment includes ACE inhibitors and calcium channel blockers. The current treatment provides significant improvement. There are no compliance problems.  Hypertensive end-organ damage includes kidney disease. There is no history of angina, CAD/MI, CVA, heart failure, left ventricular hypertrophy, PVD or retinopathy. There is no history of sleep apnea or a thyroid problem.   Depression  Visit Type: follow-up  Patient presents with the following symptoms: decreased concentration, depressed mood (occasional), dry mouth, fatigue, memory impairment, nervousness/anxiety and weight loss (with ozempic).  Patient is not experiencing: anhedonia, chest pain, choking sensation, compulsions, confusion, dizziness, excessive worry, feelings of hopelessness, feelings of worthlessness, hypersomnia, hyperventilation, insomnia, irritability, malaise, muscle tension, nausea, obsessions, palpitations, panic, psychomotor agitation, psychomotor retardation, restlessness, shortness of breath, suicidal ideas, suicidal planning, thoughts of death and weight gain.  Frequency of symptoms: occasionally   Severity: moderate   Sleep quality: fair  Nighttime awakenings: one to two  Compliance with medications:  %        Fibromyalgia  This is a chronic problem. The current episode started more than 1 year ago. The problem occurs constantly. The problem has been unchanged. Associated symptoms  reason pt is on cellcept. He will need lung transplant eventually    High risk medications (not anticoagulants) long-term use     HTN (hypertension)     Hyperlipidemia     Hypoxemia     [facility]D [facility]interstitial lung disease) ([facility] [address]     Left ureteral stone 4/13/2018    ESTRELLITA on CPAP     Prediabetes     Ureteral calculus of right kidney transplant 2005       Past Surgical History:   Procedure Laterality Date    BRONCHOSCOPY      DIAGNOSTIC CARDIAC CATH LAB PROCEDURE  2006??    LUNG REMOVAL, PARTIAL Right 2005    Partial right lobectomy for diagnostic purpose. Surgery performed in [place], [place]           Current Outpatient Prescriptions:     BREO ELLIPTA 200-25 MCG/INH AEPB, INHALE ONE PUFF BY MOUTH ONE TIME A DAY, Disp: 1 each, Rfl: 6    mycophenolate (CELLCEPT) 500 MG tablet, TAKE TWO TABLETS BY MOUTH TWICE A DAY, Disp: 180 tablet, Rfl: 1    INCRUSE ELLIPTA 62.5 MCG/INH AEPB, INHALE ONE PUFF BY MOUTH ONE TIME A DAY, Disp: 1 each, Rfl: 4    rosuvastatin (CRESTOR) 40 MG tablet, Take 1 tablet by mouth every evening, Disp: 90 tablet, Rfl: 1    furosemide (LASIX) 20 MG tablet, Take 1 tablet by mouth every 48 hours as needed (leg swelling, weight gain), Disp: 30 tablet, Rfl: 4    tamsulosin (FLOMAX) 0.4 MG capsule, Take 1 capsule by mouth daily, Disp: 30 capsule, Rfl: 3    omeprazole (PRILOSEC) 20 MG delayed release capsule, TAKE 1 CAPSULE BY MOUTH TWO TIMES A DAY, Disp: 180 capsule, Rfl: 3    hydrocortisone (WESTCORT) 0.2 % cream, Apply topically 2 times daily Apply topically 2 times daily. , Disp: 60 g, Rfl: 0    lisinopril (PRINIVIL;ZESTRIL) 20 MG tablet, TAKE ONE TABLET BY MOUTH ONE TIME A DAY, Disp: 90 tablet, Rfl: 3    aspirin 81 MG chewable tablet, Take 1 tablet by mouth daily, Disp: 90 tablet, Rfl: 3    clopidogrel (PLAVIX) 75 MG tablet, Take 1 tablet by mouth daily, Disp: 90 tablet, Rfl: 3    nitroGLYCERIN (NITROSTAT) 0.4 MG SL tablet, up to max of 3 total doses.  If no relief after 1 dose, call 911., include fatigue and headaches. Pertinent negatives include no abdominal pain, anorexia, arthralgias, change in bowel habit, chest pain, chills, congestion, coughing, diaphoresis, fever, joint swelling, myalgias, nausea, neck pain, numbness, rash, sore throat, swollen glands, urinary symptoms, vertigo, visual change, vomiting or weakness. The symptoms are aggravated by twisting, walking, exertion and bending. Treatments tried: gabapentin. The treatment provided moderate relief.      Pt is losing weight with ozempic.  Pt states that she has not needed lantus since starting ozempic.  Pt has woken up with blood sugars 110 and 90.     Irritability and anxiety are better. Pt still fatigued from fibromyalgia.     Pt is disabled and will go on Medicare early next yr.     Pt's aunt had celiac disease. Most of sisters and mothers family have fibromyalgia.   The following portions of the patient's history were reviewed and updated as appropriate: allergies, current medications, past family history, past medical history, past social history, past surgical history and problem list.    Past Medical History:   Diagnosis Date   • Allergic    • Anemia     during pregnancy   • Anxiety    • Arthritis    • Blood in urine    • Chronic fatigue    • Colon polyp 02/2018    7 at last colonoscopy   • Degeneration of L4-L5 intervertebral disc 12/10/2019   • Depression    • Diabetes mellitus type 2 in obese (HCC) 03/11/2021   • Elevated cholesterol    • Endometriosis    • Fibromyalgia, primary    • Fracture    • GERD (gastroesophageal reflux disease)    • H/O mammogram 02/2018   • Headache    • Herniated intervertebral disc of lumbar spine     l4-l5   • HL (hearing loss)    • Hypertension    • Irritable bowel syndrome    • Pap smear for cervical cancer screening 2018?   • Wears glasses       Past Surgical History:   Procedure Laterality Date   • CARDIAC CATHETERIZATION  11/13/2020    Dr Salas, EF 60%, no significant stenosis   • CARDIAC  CATHETERIZATION N/A 11/13/2020    Procedure: Left Heart Cath;  Surgeon: Sai Salas MD;  Location:  [facility];  Service: Cardiology;  Laterality: N/A;   • CHOLECYSTECTOMY     • COLONOSCOPY  02/06/2018    3 year f/u polypectomy Dr MARTA Waller   • COLONOSCOPY W/ POLYPECTOMY  07/07/2021    Dr Waller.  hyperplastic   • HYSTERECTOMY     • INNER EAR SURGERY     • LUMBAR DISCECTOMY Right 11/25/2020    Procedure: LUMBAR DISCECTOMY L4-5 RIGHT;  Surgeon: Qasim Kelsey MD;  Location:  [facility];  Service: Neurosurgery;  Laterality: Right;   • OOPHORECTOMY     • TEMPOROMANDIBULAR JOINT ARTHROPLASTY  1984   • TONSILLECTOMY     • URETHRA SURGERY      as a baby       Family History   Problem Relation Age of Onset   • Melanoma Mother         eye   • Diabetes Mother    • Heart disease Mother    • Arthritis Mother    • Depression Mother    • Colon polyps Mother    • Thyroid disease Mother    • Hyperlipidemia Mother    • Cancer Mother    • Heart attack Mother    • Migraines Mother    • Cancer Father         pancreatic and liver   • Diabetes Father    • Depression Father    • Colon polyps Father    • Bipolar disorder Father         split personality   • Schizophrenia Father    • Migraines Sister    • Coronary artery disease Brother    • Bipolar disorder Daughter    • ADD / ADHD Son    • Depression Son    • Diabetes Maternal Aunt    • Diabetes Maternal Uncle    • Diabetes Paternal Aunt    • Celiac disease Paternal Aunt    • Diabetes Paternal Uncle    • Cancer Maternal Grandmother         uterine/cervical   • Uterine cancer Maternal Grandmother    • Diabetes Maternal Grandfather    • Heart disease Maternal Grandfather    • Heart disease Paternal Grandfather    • Breast cancer Neg Hx    • Ovarian cancer Neg Hx       Social History     Socioeconomic History   • Marital status:    Tobacco Use   • Smoking status: Every Day     Packs/day: 0.50     Years: 34.00     Pack years: 17.00     Types: Cigarettes      Start date: 1984   • Smokeless tobacco: Never   • Tobacco comments:     0.5-1ppd   Vaping Use   • Vaping Use: Never used   Substance and Sexual Activity   • Alcohol use: Yes     Comment: occ   • Drug use: No   • Sexual activity: Not Currently     Birth control/protection: None      Allergies   Allergen Reactions   • Statins Myalgia   • Erythromycin GI Intolerance     Tolerates zpak   • Lipitor [Atorvastatin Calcium] Myalgia   • Metformin Myalgia   • Penicillins Hives     States ok with keflex   • Pravastatin Myalgia   • Relafen [Nabumetone] GI Intolerance   • Sertraline Other (See Comments)     Restless leg   • Cymbalta [Duloxetine Hcl] Rash       Review of Systems   Constitutional: Positive for fatigue, malaise/fatigue and weight loss (with ozempic). Negative for chills, diaphoresis, fever, irritability and weight gain.   HENT: Negative for congestion and sore throat.    Eyes: Negative for blurred vision.   Respiratory: Negative for cough, choking and shortness of breath.    Cardiovascular: Negative for chest pain, palpitations, orthopnea and PND.   Gastrointestinal: Negative for abdominal pain, anorexia, change in bowel habit, nausea and vomiting.   Endocrine: Negative for polydipsia, polyphagia and polyuria.   Musculoskeletal: Negative for arthralgias, joint swelling, myalgias and neck pain.   Skin: Negative for rash.   Neurological: Positive for headaches. Negative for vertigo, weakness and numbness.   Psychiatric/Behavioral: Positive for decreased concentration. Negative for confusion and suicidal ideas. The patient is nervous/anxious. The patient does not have insomnia.        Objective   Physical Exam  Vitals and nursing note reviewed.   Constitutional:       Appearance: She is well-developed.   HENT:      Head: Normocephalic.      Right Ear: External ear normal.      Left Ear: External ear normal.      Nose: Nose normal.   Eyes:      General: Lids are normal.      Conjunctiva/sclera: Conjunctivae normal.       Pupils: Pupils are equal, round, and reactive to light.   Neck:      Thyroid: No thyroid mass or thyromegaly.      Vascular: No carotid bruit.      Trachea: Trachea normal.   Cardiovascular:      Rate and Rhythm: Normal rate and regular rhythm.      Heart sounds: No murmur heard.  Pulmonary:      Effort: Pulmonary effort is normal. No respiratory distress.      Breath sounds: Normal breath sounds. No decreased breath sounds, wheezing, rhonchi or rales.   Chest:      Chest wall: No tenderness.   Abdominal:      General: Bowel sounds are normal.      Palpations: Abdomen is soft.      Tenderness: There is no abdominal tenderness.   Musculoskeletal:         General: Normal range of motion.      Cervical back: Normal range of motion and neck supple.   Skin:     General: Skin is warm and dry.   Neurological:      Mental Status: She is alert and oriented to person, place, and time.   Psychiatric:         Behavior: Behavior normal.         Assessment & Plan   Diagnoses and all orders for this visit:    1. Type 2 diabetes mellitus with hyperglycemia, with long-term current use of insulin ([facility]) (Primary)  -     POC Glycosylated Hemoglobin (Hb A1C)  -     dapagliflozin Propanediol (Farxiga) 10 MG tablet; Take 10 mg by mouth Daily.  Dispense: 90 tablet; Refill: 0  -     Comprehensive Metabolic Panel; Future  -     Lipid Panel; Future    2. Back muscle spasm  -     tiZANidine (ZANAFLEX) 4 MG tablet; Take 1 tablet by mouth Every 8 (Eight) Hours As Needed for Muscle Spasms.  Dispense: 90 tablet; Refill: 2    3. Need for vaccination  -     FluLaval/Fluzone >6 mos (6663-5036)  -     COVID-19 Bivalent Booster (Pfizer) 12+yrs    4. Moderate episode of recurrent major depressive disorder (HCC)  -     citalopram (CeleXA) 20 MG tablet; Take 1.5 tablets by mouth Daily.  Dispense: 45 tablet; Refill: 2    5. Fibromyalgia    6. Family history of celiac disease  -     Celiac Disease Panel; Future      Samples of ozempic-2 pens    Trial of  gluten free diet           Current Outpatient Medications:   •  amLODIPine-benazepril (LOTREL 5-20) 5-20 MG per capsule, Take 1 capsule by mouth Daily., Disp: , Rfl:   •  citalopram (CeleXA) 20 MG tablet, Take 1.5 tablets by mouth Daily., Disp: 45 tablet, Rfl: 2  •  Continuous Blood Gluc  (Dexcom G6 ) device, 1 each Continuous. To monitor glucose E11.65, z79.4, Disp: 1 each, Rfl: 0  •  Continuous Blood Gluc Sensor (Dexcom G6 Sensor), Every 10 (Ten) Days. Apply to appropriate area to monitor glucose. E11.65, z79.4, Disp: 3 each, Rfl: 12  •  Continuous Blood Gluc Transmit (Dexcom G6 Transmitter) misc, 1 each Continuous. To monitor glucose. E11.65, z79.4, Disp: 1 each, Rfl: 3  •  dapagliflozin Propanediol (Farxiga) 10 MG tablet, Take 10 mg by mouth Daily., Disp: 90 tablet, Rfl: 0  •  docusate sodium (COLACE) 100 MG capsule, Take 100 mg by mouth As Needed., Disp: , Rfl:   •  ezetimibe (Zetia) 10 MG tablet, Take 1 tablet by mouth Daily., Disp: 30 tablet, Rfl: 5  •  furosemide (LASIX) 20 MG tablet, TAKE 1 TABLET BY MOUTH ONCE DAILY AS NEEDED FOR SWELLING OF LEGS, Disp: , Rfl:   •  gabapentin (NEURONTIN) 800 MG tablet, Take 1 tablet by mouth 3 (Three) Times a Day., Disp: , Rfl:   •  Hydrocortisone, Perianal, (ANUSOL-HC) 2.5 % rectal cream, Procto-Med HC 2.5 % topical cream perineal applicator  INSERT INTO RECTUM TWICE DAILY AS DIRECTED FOR 14 DAYS, Disp: , Rfl:   •  hydrOXYzine pamoate (Vistaril) 25 MG capsule, Take 1 capsule by mouth 3 (Three) Times a Day As Needed for Itching., Disp: 60 capsule, Rfl: 2  •  Insulin Glargine (LANTUS SOLOSTAR) 100 UNIT/ML injection pen, Inject 28 units nightly E11.65, Z79.4, Disp: 6 pen, Rfl: 3  •  Insulin Pen Needle (Pen Needles) 32G X 6 MM misc, 1 each Daily., Disp: 30 each, Rfl: 11  •  ipratropium (ATROVENT) 0.03 % nasal spray, 2 sprays into the nostril(s) as directed by provider Every 12 (Twelve) Hours., Disp: 30 mL, Rfl: 1  •  lidocaine (LIDODERM) 5 %, Place 1 patch on  the skin as directed by provider Daily. Remove & Discard patch within 12 hours or as directed by MD, Disp: 30 each, Rfl: 5  •  Magnesium Oxide 400 (240 Mg) MG tablet, Take 400 mg by mouth Daily., Disp: , Rfl:   •  methocarbamol (Robaxin) 500 MG tablet, Take 1 tablet by mouth Daily. With lunch (Patient taking differently: Take 500 mg by mouth Daily As Needed. With lunch), Disp: 30 tablet, Rfl: 2  •  montelukast (SINGULAIR) 10 MG tablet, Take 1 tablet by mouth Every Night., Disp: 30 tablet, Rfl: 6  •  ondansetron ODT (Zofran ODT) 4 MG disintegrating tablet, Place 1 tablet on the tongue Every 8 (Eight) Hours As Needed for Nausea or Vomiting., Disp: 40 tablet, Rfl: 2  •  potassium chloride 10 MEQ CR tablet, Take 1 tablet by mouth once daily, Disp: 90 tablet, Rfl: 0  •  Semaglutide,0.25 or 0.5MG/DOS, (OZEMPIC) 2 MG/1.5ML solution pen-injector, Inject 0.25 mg under the skin into the appropriate area as directed 1 (One) Time Per Week., Disp: 1.5 mL, Rfl: 2  •  spironolactone (ALDACTONE) 25 MG tablet, Daily As Needed., Disp: , Rfl:   •  tiZANidine (ZANAFLEX) 4 MG tablet, Take 1 tablet by mouth Every 8 (Eight) Hours As Needed for Muscle Spasms., Disp: 90 tablet, Rfl: 2  •  Ultram 50 MG tablet, Take 1 tablet by mouth Every 4 (Four) Hours As Needed for Moderate Pain ., Disp: 60 tablet, Rfl: 0  No current facility-administered medications for this visit.    Facility-Administered Medications Ordered in Other Visits:   •  mupirocin (BACTROBAN) 2 % nasal ointment, , Nasal, BID, Kassidy Camilo PA-C    Return in about 3 months (around 2/23/2023), or if symptoms worsen or fail to improve, for Recheck DM lipid.     Hemoglobin A1C   Date Value Ref Range Status   11/23/2022 7.4 % Final   08/24/2022 8.5 % Final   05/23/2022 8.6 % Final   11/23/2020 6.60 (H) 4.80 - 5.60 % Final   11/13/2020 6.50 (H) 4.80 - 5.60 % Final   09/30/2020 6.60 (H) 4.80 - 5.60 % Final

## 2022-12-14 ENCOUNTER — OFFICE VISIT (OUTPATIENT)
Dept: UROLOGY | Facility: CLINIC | Age: 57
End: 2022-12-14

## 2022-12-14 VITALS — HEIGHT: 65 IN | BODY MASS INDEX: 31.99 KG/M2 | WEIGHT: 192 LBS

## 2022-12-14 DIAGNOSIS — N39.45 CONTINUOUS LEAKAGE OF URINE: ICD-10-CM

## 2022-12-14 PROCEDURE — 99204 OFFICE O/P NEW MOD 45 MIN: CPT | Performed by: UROLOGY

## 2022-12-14 PROCEDURE — 51798 US URINE CAPACITY MEASURE: CPT | Performed by: UROLOGY

## 2022-12-14 NOTE — PROGRESS NOTES
LUTS Female Office Visit      Patient Name: Lizette Keith  : 1965   MRN: [id number]     Chief Complaint:  Lower Urinary Tract Symptoms.     Referring Provider: Denise Hanks MD    History of Present Illness: Mrs. Keith is a 57 y.o. female with history of lower urinary tract symptoms.  Past medical history includes hypertension, hyperlipidemia, prediabetes, GERD, IBS, depression, anxiety, fibromyalgia. She presents today for evaluation of lower urinary tract symptoms including frequency urgency and mixed urinary incontinence.  She denies dysuria, hematuria or history of recurrent urinary tract infection.  He reports a urologic surgery at age 5, reports surgery on her urethra is uncertain of procedure type.  She denies any obstructive based urinary symptoms      Subjective      Review of System: Review of Systems   Genitourinary: Negative for decreased urine volume, difficulty urinating, dysuria, enuresis, flank pain, frequency, hematuria and urgency.      I have reviewed the ROS documented by my clinical staff, updated appropriate and I agree. Prasanna Morris MD    Past Medical History:  Past Medical History:   Diagnosis Date   • Allergic    • Anemia     during pregnancy   • Anxiety    • Arthritis    • Blood in urine    • Chronic fatigue    • Colon polyp 2018    9 at last colonoscopy   • Degeneration of L4-L5 intervertebral disc 12/10/2019   • Depression    • Diabetes mellitus type 2 in obese (HCC) 2021   • Elevated cholesterol    • Endometriosis    • Fibromyalgia, primary    • Fracture    • GERD (gastroesophageal reflux disease)    • H/O mammogram 2018   • Headache    • Herniated intervertebral disc of lumbar spine     l4-l5   • HL (hearing loss)    • Hypertension    • Irritable bowel syndrome    • Pap smear for cervical cancer screening ?   • Wears glasses        Past Surgical History:  Past Surgical History:   Procedure Laterality Date   • CARDIAC CATHETERIZATION  2020     Dr Salas, EF 60%, no significant stenosis   • CARDIAC CATHETERIZATION N/A 11/13/2020    Procedure: Left Heart Cath;  Surgeon: Sai Salas MD;  Location:  [facility];  Service: Cardiology;  Laterality: N/A;   • CHOLECYSTECTOMY     • COLONOSCOPY  02/06/2018    3 year f/u polypectomy Dr MARTA Walelr   • COLONOSCOPY W/ POLYPECTOMY  07/07/2021    Dr Waller.  hyperplastic   • HYSTERECTOMY     • INNER EAR SURGERY     • LUMBAR DISCECTOMY Right 11/25/2020    Procedure: LUMBAR DISCECTOMY L4-5 RIGHT;  Surgeon: Qasim Kelsey MD;  Location:  [facility];  Service: Neurosurgery;  Laterality: Right;   • OOPHORECTOMY     • TEMPOROMANDIBULAR JOINT ARTHROPLASTY  1984   • TONSILLECTOMY     • URETHRA SURGERY      as a baby        Medications:    Current Outpatient Medications:   •  amLODIPine-benazepril (LOTREL 5-20) 5-20 MG per capsule, Take 1 capsule by mouth Daily., Disp: , Rfl:   •  citalopram (CeleXA) 20 MG tablet, Take 1.5 tablets by mouth Daily., Disp: 45 tablet, Rfl: 2  •  Continuous Blood Gluc  (Dexcom G6 ) device, 1 each Continuous. To monitor glucose E11.65, z79.4, Disp: 1 each, Rfl: 0  •  Continuous Blood Gluc Sensor (Dexcom G6 Sensor), Every 10 (Ten) Days. Apply to appropriate area to monitor glucose. E11.65, z79.4, Disp: 3 each, Rfl: 12  •  Continuous Blood Gluc Transmit (Dexcom G6 Transmitter) misc, 1 each Continuous. To monitor glucose. E11.65, z79.4, Disp: 1 each, Rfl: 3  •  dapagliflozin Propanediol (Farxiga) 10 MG tablet, Take 10 mg by mouth Daily., Disp: 90 tablet, Rfl: 0  •  docusate sodium (COLACE) 100 MG capsule, Take 100 mg by mouth As Needed., Disp: , Rfl:   •  ezetimibe (Zetia) 10 MG tablet, Take 1 tablet by mouth Daily., Disp: 30 tablet, Rfl: 5  •  furosemide (LASIX) 20 MG tablet, TAKE 1 TABLET BY MOUTH ONCE DAILY AS NEEDED FOR SWELLING OF LEGS, Disp: , Rfl:   •  gabapentin (NEURONTIN) 800 MG tablet, Take 1 tablet by mouth 3 (Three) Times a Day., Disp: , Rfl:   •   Hydrocortisone, Perianal, (ANUSOL-HC) 2.5 % rectal cream, Procto-Med HC 2.5 % topical cream perineal applicator  INSERT INTO RECTUM TWICE DAILY AS DIRECTED FOR 14 DAYS, Disp: , Rfl:   •  hydrOXYzine pamoate (Vistaril) 25 MG capsule, Take 1 capsule by mouth 3 (Three) Times a Day As Needed for Itching., Disp: 60 capsule, Rfl: 2  •  Insulin Glargine (LANTUS SOLOSTAR) 100 UNIT/ML injection pen, Inject 28 units nightly E11.65, Z79.4, Disp: 6 pen, Rfl: 3  •  Insulin Pen Needle (Pen Needles) 32G X 6 MM misc, 1 each Daily., Disp: 30 each, Rfl: 11  •  ipratropium (ATROVENT) 0.03 % nasal spray, 2 sprays into the nostril(s) as directed by provider Every 12 (Twelve) Hours., Disp: 30 mL, Rfl: 1  •  lidocaine (LIDODERM) 5 %, Place 1 patch on the skin as directed by provider Daily. Remove & Discard patch within 12 hours or as directed by MD, Disp: 30 each, Rfl: 5  •  Magnesium Oxide 400 (240 Mg) MG tablet, Take 400 mg by mouth Daily., Disp: , Rfl:   •  methocarbamol (Robaxin) 500 MG tablet, Take 1 tablet by mouth Daily. With lunch (Patient taking differently: Take 500 mg by mouth Daily As Needed. With lunch), Disp: 30 tablet, Rfl: 2  •  montelukast (SINGULAIR) 10 MG tablet, Take 1 tablet by mouth Every Night., Disp: 30 tablet, Rfl: 6  •  ondansetron ODT (Zofran ODT) 4 MG disintegrating tablet, Place 1 tablet on the tongue Every 8 (Eight) Hours As Needed for Nausea or Vomiting., Disp: 40 tablet, Rfl: 2  •  potassium chloride 10 MEQ CR tablet, Take 1 tablet by mouth once daily, Disp: 90 tablet, Rfl: 0  •  Semaglutide,0.25 or 0.5MG/DOS, (OZEMPIC) 2 MG/1.5ML solution pen-injector, Inject 0.25 mg under the skin into the appropriate area as directed 1 (One) Time Per Week., Disp: 1.5 mL, Rfl: 2  •  spironolactone (ALDACTONE) 25 MG tablet, Daily As Needed., Disp: , Rfl:   •  tiZANidine (ZANAFLEX) 4 MG tablet, Take 1 tablet by mouth Every 8 (Eight) Hours As Needed for Muscle Spasms., Disp: 90 tablet, Rfl: 2  •  Ultram 50 MG tablet, Take 1  tablet by mouth Every 4 (Four) Hours As Needed for Moderate Pain ., Disp: 60 tablet, Rfl: 0  No current facility-administered medications for this visit.    Facility-Administered Medications Ordered in Other Visits:   •  mupirocin (BACTROBAN) 2 % nasal ointment, , Nasal, BID, Kassidy Camilo PA-C    Allergies:  Allergies   Allergen Reactions   • Statins Myalgia   • Erythromycin GI Intolerance     Tolerates zpak   • Lipitor [Atorvastatin Calcium] Myalgia   • Metformin Myalgia   • Penicillins Hives     States ok with keflex   • Pravastatin Myalgia   • Relafen [Nabumetone] GI Intolerance   • Sertraline Other (See Comments)     Restless leg   • Cymbalta [Duloxetine Hcl] Rash       Social History:  Social History     Socioeconomic History   • Marital status:    Tobacco Use   • Smoking status: Every Day     Packs/day: 0.50     Years: 34.00     Pack years: 17.00     Types: Cigarettes     Start date: 1984   • Smokeless tobacco: Never   • Tobacco comments:     0.5-1ppd   Vaping Use   • Vaping Use: Never used   Substance and Sexual Activity   • Alcohol use: Yes     Comment: occ   • Drug use: No   • Sexual activity: Not Currently     Birth control/protection: None       Family History:  Family History   Problem Relation Age of Onset   • Melanoma Mother         eye   • Diabetes Mother    • Heart disease Mother    • Arthritis Mother    • Depression Mother    • Colon polyps Mother    • Thyroid disease Mother    • Hyperlipidemia Mother    • Cancer Mother    • Heart attack Mother    • Migraines Mother    • Cancer Father         pancreatic and liver   • Diabetes Father    • Depression Father    • Colon polyps Father    • Bipolar disorder Father         split personality   • Schizophrenia Father    • Migraines Sister    • Coronary artery disease Brother    • Bipolar disorder Daughter    • ADD / ADHD Son    • Depression Son    • Diabetes Maternal Aunt    • Diabetes Maternal Uncle    • Diabetes Paternal Aunt    • Celiac  "disease Paternal Aunt    • Diabetes Paternal Uncle    • Cancer Maternal Grandmother         uterine/cervical   • Uterine cancer Maternal Grandmother    • Diabetes Maternal Grandfather    • Heart disease Maternal Grandfather    • Heart disease Paternal Grandfather    • Breast cancer Neg Hx    • Ovarian cancer Neg Hx          PVR:   62mL    Objective     Physical Exam:   Vital Signs:   Vitals:    12/14/22 1240   Weight: 87.1 kg (192 lb)   Height: 165.1 cm (65\")   PainSc: 0-No pain     Body mass index is 31.95 kg/m².     Physical Exam  Vitals and nursing note reviewed.   Constitutional:       Appearance: Normal appearance.   HENT:      Head: Normocephalic and atraumatic.   Cardiovascular:      Comments: Well perfused  Pulmonary:      Effort: Pulmonary effort is normal.   Abdominal:      General: Abdomen is flat.      Palpations: Abdomen is soft.   Musculoskeletal:         General: Normal range of motion.   Skin:     General: Skin is warm and dry.   Neurological:      General: No focal deficit present.      Mental Status: She is alert and oriented to person, place, and time. Mental status is at baseline.   Psychiatric:         Mood and Affect: Mood normal.         Behavior: Behavior normal.         Thought Content: Thought content normal.         Judgment: Judgment normal.         Labs:   Brief Urine Lab Results  (Last result in the past 365 days)      Color   Clarity   Blood   Leuk Est   Nitrite   Protein   CREAT   Urine HCG        02/16/22 0841             109.7         02/16/22 0841 Yellow   Clear   Negative   Negative   Negative   Negative                      Lab Results   Component Value Date    GLUCOSE 179 (H) 08/24/2022    CALCIUM 9.9 08/24/2022     08/24/2022    K 3.3 (L) 08/24/2022    CO2 24.0 08/24/2022     08/24/2022    BUN 9 08/24/2022    CREATININE 0.70 08/24/2022    EGFRIFAFRI 115 06/21/2021    EGFRIFNONA 58 (L) 02/16/2022    BCR 12.9 08/24/2022    ANIONGAP 12.0 08/24/2022       Lab Results "   Component Value Date    WBC 10.23 02/16/2022    HGB 15.0 02/16/2022    HCT 43.9 02/16/2022    MCV 93.4 02/16/2022     02/16/2022       Images:   No Images in the past 120 days found..    Measures:   Tobacco:   Lizette Keith  reports that she has been smoking cigarettes. She started smoking about 38 years ago. She has a 17.00 pack-year smoking history. She has never used smokeless tobacco.. I have educated her on the risk of diseases from using tobacco products.           Urine Incontinence: ( NOUI)  She reports mixed urinary incontinence    Assessment / Plan      Assessment:  Mrs. Keith is a 57 y.o. female who presents today with lower urinary tract symptoms including urinary frequency, urgency and mixed urinary incontinence pattern.  She denies obstructive symptoms.  Of note, she does report a history of urethral surgery at age 5, is unable to provide further details regarding procedural intervention.  She denies dysuria, hematuria, history of urinary tract infection.    Diagnoses and all orders for this visit:    1. Continuous leakage of urine        Patient Education:     Today we discussed in depth the etiology and management of pelvic floor dysfunction.  We discussed the pelvic floor dysfunction is a common condition in which the patient is unable to relax or coordinate the muscles of the pelvic floor with urination, bowel movement or intercourse.  We discussed that this is a common condition seen both men and women.  We discussed the role of the pelvic musculature and that it supports the bladder, vagina, rectum.  We discussed the intricate and dynamic interplay between the pelvic floor and these associated organs.  Discussed that the pelvic floor musculature added support and stability for these organ systems but can also result in symptoms including muscle spasms, pain with urination, bowel movement or sexual intercourse.  We discussed that the causes of pelvic floor dysfunction are often  multifactorial.  We discussed that it can often take a prolonged period of multimodal therapy to improve symptoms.  We discussed conservative treatment strategies including biofeedback, pelvic floor physical therapy, medications, relaxing techniques.  We discussed that improving bowel habits and constipation is important.        Today we discussed the etiology and management of LUTS/OAB. We discussed work-up including history and physical exam as well as urinalysis.  We discussed PVR.  We discussed evaluation and management of urinary urgency and overactivity of the bladder.  We discussed conservative management and behavioral strategies including decreasing irritative p.o. fluid intake, timed voiding, double voiding.  We discussed the role that constipation can play in lower urinary tract symptoms and improving bowel hygiene.  We discussed management of overactive bladder including conservative p.o. medication options.  We discussed anticholinergic medication class and the risks and benefits of this medication including side effects including dry mouth, dry, constipation and others.  We discussed that there are additional medications including beta-3 agonist, and associated risks and benefits including hypertension and requirement to monitor blood pressure after starting.  We also discussed  third line potential procedural interventions including intradetrusor botox injections and sacral nerve neuromodulation. We discussed risks and benefits of these procedures.  I discussed the role of a bladder diary and how this will help identify her most concerning urinary symptoms.    We will have the patient perform a bladder diary based upon her mixed urinary incontinence pattern.  We have also discussed the indication for cystoscopy based upon her prior history of urologic surgery as a child.  She will return in 6 weeks after bladder diary, for further physical examination and cystoscopy, discussion of management            Follow Up:   Return in about 6 weeks (around 1/25/2023) for Recheck, Follow up for Cystoscopy.    I spent approximately 45 minutes providing clinical care for this patient; including review of patient's chart and provider documentation, face to face time spent with patient in examination room (obtaining history, performing physical exam, discussing diagnosis and management options), placing orders, and completing patient documentation.     Prasanna Morris MD  [facility] [place]

## 2023-01-03 DIAGNOSIS — R11.0 NAUSEA: ICD-10-CM

## 2023-01-03 RX ORDER — ONDANSETRON 4 MG/1
TABLET, ORALLY DISINTEGRATING ORAL
Qty: 40 TABLET | Refills: 0 | Status: SHIPPED | OUTPATIENT
Start: 2023-01-03 | End: 2023-02-15

## 2023-01-03 NOTE — TELEPHONE ENCOUNTER
Rx Refill Note  Requested Prescriptions     Pending Prescriptions Disp Refills   • ondansetron ODT (ZOFRAN-ODT) 4 MG disintegrating tablet [Pharmacy Med Name: Ondansetron 4 MG Oral Tablet Disintegrating] 40 tablet 0     Sig: DISSOLVE 1 TABLET IN MOUTH EVERY 8 HOURS AS NEEDED FOR NAUSEA FOR VOMITING      Last filled: 08/24/2022  Last office visit with prescribing clinician: 11/23/2022      Next office visit with prescribing clinician: 2/23/2023 April REECE Diallo MA  01/03/23, 15:36 EST

## 2023-01-18 NOTE — TELEPHONE ENCOUNTER
[facility] called stating that they needed a PA for this med per insurance.  They sent via Cover My Meds

## 2023-02-03 ENCOUNTER — PRIOR AUTHORIZATION (OUTPATIENT)
Dept: INTERNAL MEDICINE | Facility: CLINIC | Age: 58
End: 2023-02-03
Payer: MEDICAID

## 2023-02-03 NOTE — TELEPHONE ENCOUNTER
SENT PA FOR DEXCOM SENSOR TO INSURANCE THROUGH COVER MY MED[employer] WAITING RESPONSE    Key: [id number] - PA Case ID: [id number]

## 2023-02-10 ENCOUNTER — TELEMEDICINE (OUTPATIENT)
Dept: INTERNAL MEDICINE | Facility: CLINIC | Age: 58
End: 2023-02-10
Payer: MEDICAID

## 2023-02-10 DIAGNOSIS — J01.00 SUBACUTE MAXILLARY SINUSITIS: Primary | ICD-10-CM

## 2023-02-10 PROCEDURE — 99213 OFFICE O/P EST LOW 20 MIN: CPT | Performed by: FAMILY MEDICINE

## 2023-02-10 RX ORDER — DOXYCYCLINE HYCLATE 100 MG/1
100 CAPSULE ORAL 2 TIMES DAILY
Qty: 10 CAPSULE | Refills: 0 | Status: SHIPPED | OUTPATIENT
Start: 2023-02-10 | End: 2023-03-23

## 2023-02-10 RX ORDER — PREDNISONE 10 MG/1
10 TABLET ORAL DAILY
Qty: 10 TABLET | Refills: 0 | Status: SHIPPED | OUTPATIENT
Start: 2023-02-10 | End: 2023-03-23

## 2023-02-10 NOTE — PROGRESS NOTES
Subjective   Lizette Keith is a 57 y.o. female.     Chief Complaint   Patient presents with   • Sinusitis     You have chosen to receive care through a telehealth visit.  Do you consent to use a video/audio connection for your medical care today? Yes    This was an audio and video enabled telemedicine encounter.    Pt is home in [facility] and I am in my office in [facility]  There were no vitals taken for this visit.      Sinusitis  This is a recurrent problem. The current episode started 1 to 4 weeks ago. The problem is unchanged. There has been no fever. Associated symptoms include congestion, ear pain, headaches (behind her eyes) and sinus pressure. Pertinent negatives include no chills, coughing, diaphoresis, hoarse voice, neck pain, shortness of breath, sneezing, sore throat or swollen glands. Past treatments include acetaminophen (sinus decongestant. flonase). The treatment provided no relief.      Pt report that she has been fighting sinus infection for 3 weeks.  Pt had teeth pulled on 12/22/22.    Pt has seen her dentist since the teeth were pulled.  Pt states that her blood sugars are 125-135 in the morning.   Pt has not had fever. Pt will occasionally get an ear pain.   Pt has cloudy drainage from her nose.     Pt will be changing to Medicare in March.   The following portions of the patient's history were reviewed and updated as appropriate: allergies, current medications, past family history, past medical history, past social history, past surgical history and problem list.    Past Medical History:   Diagnosis Date   • Allergic    • Anemia     during pregnancy   • Anxiety    • Arthritis    • Blood in urine    • Chronic fatigue    • Colon polyp 02/2018    7 at last colonoscopy   • Degeneration of L4-L5 intervertebral disc 12/10/2019   • Depression    • Diabetes mellitus type 2 in obese (HCC) 03/11/2021   • Elevated cholesterol    • Endometriosis    • Fibromyalgia, primary    • Fracture    • GERD  (gastroesophageal reflux disease)    • H/O mammogram 02/2018   • Headache    • Herniated intervertebral disc of lumbar spine     l4-l5   • HL (hearing loss)    • Hypertension    • Irritable bowel syndrome    • Pap smear for cervical cancer screening 2018?   • Wears glasses       Past Surgical History:   Procedure Laterality Date   • CARDIAC CATHETERIZATION  11/13/2020    Dr Salas, EF 60%, no significant stenosis   • CARDIAC CATHETERIZATION N/A 11/13/2020    Procedure: Left Heart Cath;  Surgeon: Sai Salas MD;  Location:  [facility];  Service: Cardiology;  Laterality: N/A;   • CHOLECYSTECTOMY     • COLONOSCOPY  02/06/2018    3 year f/u polypectomy Dr MARTA Waller   • COLONOSCOPY W/ POLYPECTOMY  07/07/2021    Dr Waller.  hyperplastic   • HYSTERECTOMY     • INNER EAR SURGERY     • LUMBAR DISCECTOMY Right 11/25/2020    Procedure: LUMBAR DISCECTOMY L4-5 RIGHT;  Surgeon: Qasim Kelsey MD;  Location:  [facility];  Service: Neurosurgery;  Laterality: Right;   • OOPHORECTOMY     • TEMPOROMANDIBULAR JOINT ARTHROPLASTY  1984   • TONSILLECTOMY     • URETHRA SURGERY      as a baby       Family History   Problem Relation Age of Onset   • Melanoma Mother         eye   • Diabetes Mother    • Heart disease Mother    • Arthritis Mother    • Depression Mother    • Colon polyps Mother    • Thyroid disease Mother    • Hyperlipidemia Mother    • Cancer Mother    • Heart attack Mother    • Migraines Mother    • Cancer Father         pancreatic and liver   • Diabetes Father    • Depression Father    • Colon polyps Father    • Bipolar disorder Father         split personality   • Schizophrenia Father    • Migraines Sister    • Coronary artery disease Brother    • Bipolar disorder Daughter    • ADD / ADHD Son    • Depression Son    • Diabetes Maternal Aunt    • Diabetes Maternal Uncle    • Diabetes Paternal Aunt    • Celiac disease Paternal Aunt    • Diabetes Paternal Uncle    • Cancer Maternal Grandmother          uterine/cervical   • Uterine cancer Maternal Grandmother    • Diabetes Maternal Grandfather    • Heart disease Maternal Grandfather    • Heart disease Paternal Grandfather    • Breast cancer Neg Hx    • Ovarian cancer Neg Hx       Social History     Socioeconomic History   • Marital status:    Tobacco Use   • Smoking status: Every Day     Packs/day: 0.50     Years: 34.00     Pack years: 17.00     Types: Cigarettes     Start date: 1984   • Smokeless tobacco: Never   • Tobacco comments:     0.5-1ppd   Vaping Use   • Vaping Use: Never used   Substance and Sexual Activity   • Alcohol use: Yes     Comment: occ   • Drug use: No   • Sexual activity: Not Currently     Birth control/protection: None      Allergies   Allergen Reactions   • Statins Myalgia   • Erythromycin GI Intolerance     Tolerates zpak   • Lipitor [Atorvastatin Calcium] Myalgia   • Metformin Myalgia   • Penicillins Hives     States ok with keflex   • Pravastatin Myalgia   • Relafen [Nabumetone] GI Intolerance   • Sertraline Other (See Comments)     Restless leg   • Cymbalta [Duloxetine Hcl] Rash       Review of Systems   Constitutional: Negative for chills and diaphoresis.   HENT: Positive for congestion, ear pain and sinus pressure. Negative for hoarse voice, sneezing and sore throat.    Respiratory: Negative for cough and shortness of breath.    Musculoskeletal: Negative for neck pain.   Neurological: Positive for headaches (behind her eyes).       Objective   Physical Exam  Eyes:      Extraocular Movements: Extraocular movements intact.   Pulmonary:      Effort: Pulmonary effort is normal. No respiratory distress.   Musculoskeletal:      Cervical back: Normal range of motion.   Neurological:      Mental Status: She is alert and oriented to person, place, and time.   Psychiatric:         Mood and Affect: Mood normal.         Behavior: Behavior normal.         Thought Content: Thought content normal.         Judgment: Judgment normal.          Assessment & Plan   Diagnoses and all orders for this visit:    1. Subacute maxillary sinusitis (Primary)  -     doxycycline (VIBRAMYCIN) 100 MG capsule; Take 1 capsule by mouth 2 (Two) Times a Day.  Dispense: 10 capsule; Refill: 0  -     predniSONE (DELTASONE) 10 MG tablet; Take 1 tablet by mouth Daily.  Dispense: 10 tablet; Refill: 0      Discussed smoking cessation.   Discussed pneumonia vaccine             Current Outpatient Medications:   •  amLODIPine-benazepril (LOTREL 5-20) 5-20 MG per capsule, Take 1 capsule by mouth Daily., Disp: , Rfl:   •  citalopram (CeleXA) 20 MG tablet, Take 1.5 tablets by mouth Daily., Disp: 45 tablet, Rfl: 2  •  Continuous Blood Gluc  (Dexcom G6 ) device, 1 each Continuous. To monitor glucose E11.65, z79.4, Disp: 1 each, Rfl: 0  •  Continuous Blood Gluc Sensor (Dexcom G6 Sensor), Every 10 (Ten) Days. Apply to appropriate area to monitor glucose. E11.65, z79.4, Disp: 3 each, Rfl: 12  •  Continuous Blood Gluc Transmit (Dexcom G6 Transmitter) misc, 1 each Continuous. To monitor glucose. E11.65, z79.4, Disp: 1 each, Rfl: 3  •  dapagliflozin Propanediol (Farxiga) 10 MG tablet, Take 10 mg by mouth Daily., Disp: 90 tablet, Rfl: 0  •  docusate sodium (COLACE) 100 MG capsule, Take 100 mg by mouth As Needed., Disp: , Rfl:   •  doxycycline (VIBRAMYCIN) 100 MG capsule, Take 1 capsule by mouth 2 (Two) Times a Day., Disp: 10 capsule, Rfl: 0  •  ezetimibe (Zetia) 10 MG tablet, Take 1 tablet by mouth Daily., Disp: 30 tablet, Rfl: 5  •  furosemide (LASIX) 20 MG tablet, TAKE 1 TABLET BY MOUTH ONCE DAILY AS NEEDED FOR SWELLING OF LEGS, Disp: , Rfl:   •  gabapentin (NEURONTIN) 800 MG tablet, Take 1 tablet by mouth 3 (Three) Times a Day., Disp: , Rfl:   •  Hydrocortisone, Perianal, (ANUSOL-HC) 2.5 % rectal cream, Procto-Med HC 2.5 % topical cream perineal applicator  INSERT INTO RECTUM TWICE DAILY AS DIRECTED FOR 14 DAYS, Disp: , Rfl:   •  hydrOXYzine pamoate (Vistaril) 25 MG  capsule, Take 1 capsule by mouth 3 (Three) Times a Day As Needed for Itching., Disp: 60 capsule, Rfl: 2  •  Insulin Glargine (LANTUS SOLOSTAR) 100 UNIT/ML injection pen, Inject 28 units nightly E11.65, Z79.4, Disp: 6 pen, Rfl: 3  •  Insulin Pen Needle (Pen Needles) 32G X 6 MM misc, 1 each Daily., Disp: 30 each, Rfl: 11  •  ipratropium (ATROVENT) 0.03 % nasal spray, 2 sprays into the nostril(s) as directed by provider Every 12 (Twelve) Hours., Disp: 30 mL, Rfl: 1  •  lidocaine (LIDODERM) 5 %, Place 1 patch on the skin as directed by provider Daily. Remove & Discard patch within 12 hours or as directed by MD, Disp: 30 each, Rfl: 5  •  Magnesium Oxide 400 (240 Mg) MG tablet, Take 400 mg by mouth Daily., Disp: , Rfl:   •  methocarbamol (Robaxin) 500 MG tablet, Take 1 tablet by mouth Daily. With lunch (Patient taking differently: Take 500 mg by mouth Daily As Needed. With lunch), Disp: 30 tablet, Rfl: 2  •  montelukast (SINGULAIR) 10 MG tablet, Take 1 tablet by mouth Every Night., Disp: 30 tablet, Rfl: 6  •  ondansetron ODT (ZOFRAN-ODT) 4 MG disintegrating tablet, DISSOLVE 1 TABLET IN MOUTH EVERY 8 HOURS AS NEEDED FOR NAUSEA FOR VOMITING, Disp: 40 tablet, Rfl: 0  •  potassium chloride 10 MEQ CR tablet, Take 1 tablet by mouth once daily, Disp: 90 tablet, Rfl: 0  •  predniSONE (DELTASONE) 10 MG tablet, Take 1 tablet by mouth Daily., Disp: 10 tablet, Rfl: 0  •  Semaglutide,0.25 or 0.5MG/DOS, (OZEMPIC) 2 MG/1.5ML solution pen-injector, Inject 0.25 mg under the skin into the appropriate area as directed 1 (One) Time Per Week., Disp: 1.5 mL, Rfl: 2  •  spironolactone (ALDACTONE) 25 MG tablet, Daily As Needed., Disp: , Rfl:   •  tiZANidine (ZANAFLEX) 4 MG tablet, Take 1 tablet by mouth Every 8 (Eight) Hours As Needed for Muscle Spasms., Disp: 90 tablet, Rfl: 2  •  Ultram 50 MG tablet, Take 1 tablet by mouth Every 4 (Four) Hours As Needed for Moderate Pain ., Disp: 60 tablet, Rfl: 0  No current facility-administered medications  for this visit.    Facility-Administered Medications Ordered in Other Visits:   •  mupirocin (BACTROBAN) 2 % nasal ointment, , Nasal, BID, Kassidy Camilo PA-C    Return in about 13 days (around 2/23/2023), or if symptoms worsen or fail to improve, for Recheck, Next scheduled follow up.

## 2023-02-15 DIAGNOSIS — M62.830 BACK MUSCLE SPASM: ICD-10-CM

## 2023-02-15 DIAGNOSIS — E87.6 HYPOKALEMIA: ICD-10-CM

## 2023-02-15 DIAGNOSIS — R11.0 NAUSEA: ICD-10-CM

## 2023-02-15 RX ORDER — ONDANSETRON 4 MG/1
TABLET, ORALLY DISINTEGRATING ORAL
Qty: 40 TABLET | Refills: 0 | Status: SHIPPED | OUTPATIENT
Start: 2023-02-15

## 2023-02-15 RX ORDER — TIZANIDINE 4 MG/1
TABLET ORAL
Qty: 90 TABLET | Refills: 0 | Status: SHIPPED | OUTPATIENT
Start: 2023-02-15 | End: 2023-03-23 | Stop reason: SDUPTHER

## 2023-02-16 RX ORDER — POTASSIUM CHLORIDE 750 MG/1
TABLET, FILM COATED, EXTENDED RELEASE ORAL
Qty: 90 TABLET | Refills: 1 | Status: SHIPPED | OUTPATIENT
Start: 2023-02-16 | End: 2023-03-31 | Stop reason: SDUPTHER

## 2023-02-16 NOTE — TELEPHONE ENCOUNTER
Last Office Visit: telehealth 2/10/23  Next Office Visit:  3/23/23  Labs completed in the past 6 months? yes  Labs completed in the past year? yes    Please review pended refill request for any changes needed on refills or quantities. Thank you!

## 2023-03-23 ENCOUNTER — OFFICE VISIT (OUTPATIENT)
Dept: INTERNAL MEDICINE | Facility: CLINIC | Age: 58
End: 2023-03-23
Payer: MEDICARE

## 2023-03-23 VITALS
BODY MASS INDEX: 30.99 KG/M2 | TEMPERATURE: 98.2 F | WEIGHT: 186 LBS | DIASTOLIC BLOOD PRESSURE: 84 MMHG | SYSTOLIC BLOOD PRESSURE: 122 MMHG | HEIGHT: 65 IN | OXYGEN SATURATION: 98 % | HEART RATE: 115 BPM

## 2023-03-23 DIAGNOSIS — E78.5 HYPERLIPIDEMIA, UNSPECIFIED HYPERLIPIDEMIA TYPE: ICD-10-CM

## 2023-03-23 DIAGNOSIS — M62.838 MUSCLE SPASM OF RIGHT LEG: ICD-10-CM

## 2023-03-23 DIAGNOSIS — R53.83 OTHER FATIGUE: ICD-10-CM

## 2023-03-23 DIAGNOSIS — Z12.31 ENCOUNTER FOR SCREENING MAMMOGRAM FOR MALIGNANT NEOPLASM OF BREAST: ICD-10-CM

## 2023-03-23 DIAGNOSIS — F41.9 ANXIETY: ICD-10-CM

## 2023-03-23 DIAGNOSIS — J30.2 SEASONAL ALLERGIES: ICD-10-CM

## 2023-03-23 DIAGNOSIS — Z79.4 TYPE 2 DIABETES MELLITUS WITH HYPERGLYCEMIA, WITH LONG-TERM CURRENT USE OF INSULIN: Primary | ICD-10-CM

## 2023-03-23 DIAGNOSIS — E11.65 TYPE 2 DIABETES MELLITUS WITH HYPERGLYCEMIA, WITH LONG-TERM CURRENT USE OF INSULIN: Primary | ICD-10-CM

## 2023-03-23 DIAGNOSIS — M62.830 BACK MUSCLE SPASM: ICD-10-CM

## 2023-03-23 DIAGNOSIS — E66.9 CLASS 1 OBESITY WITH SERIOUS COMORBIDITY AND BODY MASS INDEX (BMI) OF 30.0 TO 30.9 IN ADULT, UNSPECIFIED OBESITY TYPE: ICD-10-CM

## 2023-03-23 DIAGNOSIS — F33.1 MODERATE EPISODE OF RECURRENT MAJOR DEPRESSIVE DISORDER: ICD-10-CM

## 2023-03-23 LAB
EXPIRATION DATE: ABNORMAL
HBA1C MFR BLD: 6.7 %
Lab: ABNORMAL

## 2023-03-23 RX ORDER — TIZANIDINE 4 MG/1
4 TABLET ORAL EVERY 8 HOURS PRN
Qty: 90 TABLET | Refills: 0 | Status: SHIPPED | OUTPATIENT
Start: 2023-03-23

## 2023-03-23 RX ORDER — MONTELUKAST SODIUM 10 MG/1
10 TABLET ORAL NIGHTLY
Qty: 30 TABLET | Refills: 6 | Status: SHIPPED | OUTPATIENT
Start: 2023-03-23

## 2023-03-23 RX ORDER — HYDROXYZINE PAMOATE 25 MG/1
25 CAPSULE ORAL 3 TIMES DAILY PRN
Qty: 60 CAPSULE | Refills: 2 | Status: SHIPPED | OUTPATIENT
Start: 2023-03-23

## 2023-03-23 RX ORDER — CITALOPRAM 20 MG/1
30 TABLET ORAL DAILY
Qty: 45 TABLET | Refills: 2 | Status: SHIPPED | OUTPATIENT
Start: 2023-03-23

## 2023-03-23 RX ORDER — EZETIMIBE 10 MG/1
10 TABLET ORAL DAILY
Qty: 30 TABLET | Refills: 5 | Status: SHIPPED | OUTPATIENT
Start: 2023-03-23

## 2023-03-23 RX ORDER — DAPAGLIFLOZIN 10 MG/1
1 TABLET, FILM COATED ORAL DAILY
Qty: 90 TABLET | Refills: 0 | Status: SHIPPED | OUTPATIENT
Start: 2023-03-23

## 2023-03-23 RX ORDER — METHOCARBAMOL 500 MG/1
500 TABLET, FILM COATED ORAL DAILY
Qty: 30 TABLET | Refills: 2 | Status: SHIPPED | OUTPATIENT
Start: 2023-03-23

## 2023-03-23 NOTE — PROGRESS NOTES
"Subjective   Lizette Keith is a 57 y.o. female.     Chief Complaint   Patient presents with   • Diabetes     A1C 11/23=7.4   • Hypertension   • Hyperlipidemia   • Depression       Visit Vitals  /84 (BP Location: Left arm, Patient Position: Sitting, Cuff Size: Adult)   Pulse 115   Temp 98.2 °F (36.8 °C)   Ht 165.1 cm (65\")   Wt 84.4 kg (186 lb)   SpO2 98%   BMI 30.95 kg/m²         Diabetes  She presents for her follow-up diabetic visit. She has type 2 diabetes mellitus. Her disease course has been improving. Hypoglycemia symptoms include headaches and nervousness/anxiousness. Pertinent negatives for hypoglycemia include no confusion, dizziness or sweats. Associated symptoms include fatigue and weight loss. Pertinent negatives for diabetes include no blurred vision, no chest pain, no foot paresthesias, no foot ulcerations, no polydipsia, no polyphagia, no polyuria, no visual change and no weakness. There are no hypoglycemic complications. Symptoms are improving. Diabetic complications include nephropathy and peripheral neuropathy (tingling and burning). Pertinent negatives for diabetic complications include no CVA, heart disease, PVD or retinopathy. Risk factors for coronary artery disease include tobacco exposure, hypertension, obesity, post-menopausal, dyslipidemia, diabetes mellitus and family history. Current diabetic treatment includes oral agent (dual therapy) and insulin injections. She is compliant with treatment most of the time. Her weight is decreasing steadily. She is following a generally healthy diet. Meal planning includes avoidance of concentrated sweets. She participates in exercise intermittently. Her home blood glucose trend is decreasing rapidly. Her breakfast blood glucose range is generally 130-140 mg/dl. An ACE inhibitor/angiotensin II receptor blocker is being taken. She does not see a podiatrist.Eye exam is not current.   Hypertension  This is a chronic problem. The current episode " started more than 1 year ago. The problem is unchanged. The problem is controlled. Associated symptoms include anxiety, headaches and malaise/fatigue. Pertinent negatives include no blurred vision, chest pain, neck pain, orthopnea, palpitations, peripheral edema, PND, shortness of breath or sweats. There are no associated agents to hypertension. Risk factors for coronary artery disease include diabetes mellitus, dyslipidemia, family history, obesity, post-menopausal state and smoking/tobacco exposure. Current antihypertension treatment includes ACE inhibitors, calcium channel blockers and diuretics. The current treatment provides significant improvement. There are no compliance problems.  Hypertensive end-organ damage includes kidney disease. There is no history of angina, CAD/MI, CVA, heart failure, left ventricular hypertrophy, PVD or retinopathy. Identifiable causes of hypertension include chronic renal disease. There is no history of sleep apnea or a thyroid problem.   Hyperlipidemia  This is a chronic problem. The current episode started more than 1 year ago. The problem is controlled. Recent lipid tests were reviewed and are normal. Exacerbating diseases include chronic renal disease, diabetes and obesity. She has no history of hypothyroidism, liver disease or nephrotic syndrome. Factors aggravating her hyperlipidemia include smoking. Pertinent negatives include no chest pain, focal sensory loss, focal weakness, leg pain, myalgias or shortness of breath. Current antihyperlipidemic treatment includes ezetimibe. The current treatment provides mild improvement of lipids. There are no compliance problems.  Risk factors for coronary artery disease include diabetes mellitus, dyslipidemia, family history, hypertension, obesity and post-menopausal.   Depression  Visit Type: follow-up  Patient presents with the following symptoms: decreased concentration, fatigue, muscle tension, nervousness/anxiety and weight  loss.  Patient is not experiencing: anhedonia, chest pain, choking sensation, compulsions, confusion, depressed mood, dizziness, dry mouth, excessive worry, feelings of hopelessness, feelings of worthlessness, hypersomnia, hyperventilation, insomnia, irritability, malaise, memory impairment, nausea, obsessions, palpitations, panic, psychomotor agitation, psychomotor retardation, restlessness, shortness of breath, suicidal ideas, suicidal planning, thoughts of death and weight gain.  Frequency of symptoms: rarely   Severity: mild   Sleep quality: fair  Nighttime awakenings: one to two  Compliance with medications:  %        Anxiety  Presents for follow-up visit. Symptoms include decreased concentration, muscle tension and nervous/anxious behavior. Patient reports no chest pain, compulsions, confusion, depressed mood, dizziness, dry mouth, excessive worry, feeling of choking, hyperventilation, insomnia, irritability, malaise, nausea, obsessions, palpitations, panic, restlessness, shortness of breath or suicidal ideas. Symptoms occur most days. The severity of symptoms is mild. The quality of sleep is fair. Nighttime awakenings: one to two.     Her past medical history is significant for depression. Compliance with medications is %.      Pt is taking ozempic and is loosing weight. Pt does not have teeth since December and will be fitted for dentures April 25th.   Pt has continuous glucose monitor.  Pt is not needing insulin every night.     The following portions of the patient's history were reviewed and updated as appropriate: allergies, current medications, past family history, past medical history, past social history, past surgical history and problem list.    Past Medical History:   Diagnosis Date   • Allergic    • Anemia     during pregnancy   • Anxiety    • Arthritis    • Blood in urine    • Chronic fatigue    • Colon polyp 02/2018    7 at last colonoscopy   • Degeneration of L4-L5 intervertebral  disc 12/10/2019   • Depression    • Diabetes mellitus type 2 in obese (HCC) 03/11/2021   • Elevated cholesterol    • Endometriosis    • Fibromyalgia, primary    • Fracture    • GERD (gastroesophageal reflux disease)    • H/O mammogram 02/2018   • Headache    • Herniated intervertebral disc of lumbar spine     l4-l5   • HL (hearing loss)    • Hypertension    • Irritable bowel syndrome    • Pap smear for cervical cancer screening 2018?   • Wears glasses       Past Surgical History:   Procedure Laterality Date   • CARDIAC CATHETERIZATION  11/13/2020    Dr Salas, EF 60%, no significant stenosis   • CARDIAC CATHETERIZATION N/A 11/13/2020    Procedure: Left Heart Cath;  Surgeon: Sai Salas MD;  Location:  [facility];  Service: Cardiology;  Laterality: N/A;   • CHOLECYSTECTOMY     • COLONOSCOPY  02/06/2018    3 year f/u polypectomy Dr MARTA Waller   • COLONOSCOPY W/ POLYPECTOMY  07/07/2021    Dr Walelr.  hyperplastic   • HYSTERECTOMY     • INNER EAR SURGERY     • LUMBAR DISCECTOMY Right 11/25/2020    Procedure: LUMBAR DISCECTOMY L4-5 RIGHT;  Surgeon: Qasim Kelsey MD;  Location:  [facility];  Service: Neurosurgery;  Laterality: Right;   • OOPHORECTOMY     • TEMPOROMANDIBULAR JOINT ARTHROPLASTY  1984   • TONSILLECTOMY     • URETHRA SURGERY      as a baby       Family History   Problem Relation Age of Onset   • Melanoma Mother         eye   • Diabetes Mother    • Heart disease Mother    • Arthritis Mother    • Depression Mother    • Colon polyps Mother    • Thyroid disease Mother    • Hyperlipidemia Mother    • Cancer Mother    • Heart attack Mother    • Migraines Mother    • Cancer Father         pancreatic and liver   • Diabetes Father    • Depression Father    • Colon polyps Father    • Bipolar disorder Father         split personality   • Schizophrenia Father    • Migraines Sister    • Coronary artery disease Brother    • Bipolar disorder Daughter    • ADD / ADHD Son    • Depression Son    • Diabetes  Maternal Aunt    • Diabetes Maternal Uncle    • Diabetes Paternal Aunt    • Celiac disease Paternal Aunt    • Diabetes Paternal Uncle    • Cancer Maternal Grandmother         uterine/cervical   • Uterine cancer Maternal Grandmother    • Diabetes Maternal Grandfather    • Heart disease Maternal Grandfather    • Heart disease Paternal Grandfather    • Breast cancer Neg Hx    • Ovarian cancer Neg Hx       Social History     Socioeconomic History   • Marital status:    Tobacco Use   • Smoking status: Every Day     Packs/day: 0.50     Years: 34.00     Pack years: 17.00     Types: Cigarettes     Start date: 1984   • Smokeless tobacco: Never   • Tobacco comments:     0.5-1ppd   Vaping Use   • Vaping Use: Never used   Substance and Sexual Activity   • Alcohol use: Yes     Comment: occ   • Drug use: No   • Sexual activity: Not Currently     Birth control/protection: None      Allergies   Allergen Reactions   • Statins Myalgia   • Erythromycin GI Intolerance     Tolerates zpak   • Lipitor [Atorvastatin Calcium] Myalgia   • Metformin Myalgia   • Penicillins Hives     States ok with keflex   • Pravastatin Myalgia   • Relafen [Nabumetone] GI Intolerance   • Sertraline Other (See Comments)     Restless leg   • Cymbalta [Duloxetine Hcl] Rash       Review of Systems   Constitutional: Positive for fatigue, malaise/fatigue and weight loss. Negative for irritability and weight gain.   Eyes: Negative for blurred vision.   Respiratory: Negative for choking and shortness of breath.    Cardiovascular: Negative for chest pain, palpitations, orthopnea and PND.   Gastrointestinal: Negative for nausea.   Endocrine: Negative for polydipsia, polyphagia and polyuria.   Musculoskeletal: Negative for myalgias and neck pain.   Neurological: Positive for headaches. Negative for dizziness, focal weakness and weakness.   Psychiatric/Behavioral: Positive for decreased concentration. Negative for confusion and suicidal ideas. The patient is  nervous/anxious. The patient does not have insomnia.                        Objective   Physical Exam  Vitals and nursing note reviewed.   Constitutional:       Appearance: She is well-developed.   HENT:      Head: Normocephalic.      Right Ear: External ear normal.      Left Ear: External ear normal.      Nose: Nose normal.   Eyes:      General: Lids are normal.      Conjunctiva/sclera: Conjunctivae normal.      Pupils: Pupils are equal, round, and reactive to light.   Neck:      Thyroid: No thyroid mass or thyromegaly.      Vascular: No carotid bruit.      Trachea: Trachea normal.   Cardiovascular:      Rate and Rhythm: Normal rate and regular rhythm.      Pulses:           Dorsalis pedis pulses are 2+ on the right side and 2+ on the left side.        Posterior tibial pulses are 2+ on the right side and 2+ on the left side.      Heart sounds: No murmur heard.  Pulmonary:      Effort: Pulmonary effort is normal. No respiratory distress.      Breath sounds: Normal breath sounds. No decreased breath sounds, wheezing, rhonchi or rales.   Chest:      Chest wall: No tenderness.   Abdominal:      General: Bowel sounds are normal.      Palpations: Abdomen is soft.      Tenderness: There is no abdominal tenderness.   Musculoskeletal:         General: Normal range of motion.      Cervical back: Normal range of motion and neck supple.      Right foot: Normal range of motion. No deformity, bunion, Charcot foot, foot drop or prominent metatarsal heads.      Left foot: Normal range of motion. No deformity, bunion, Charcot foot, foot drop or prominent metatarsal heads.   Feet:      Right foot:      Protective Sensation: 10 sites tested. 10 sites sensed.      Skin integrity: No ulcer, blister, skin breakdown, erythema, warmth, callus, dry skin or fissure.      Left foot:      Protective Sensation: 10 sites tested. 10 sites sensed.      Skin integrity: No ulcer, blister, skin breakdown, erythema, warmth, callus, dry skin or  fissure.      Comments: Diabetic Foot Exam Performed and Monofilament Test Performed    Pt has had toenails removed.   Skin:     General: Skin is warm and dry.   Neurological:      Mental Status: She is alert and oriented to person, place, and time.   Psychiatric:         Behavior: Behavior normal.         Assessment & Plan   Diagnoses and all orders for this visit:    1. Type 2 diabetes mellitus with hyperglycemia, with long-term current use of insulin ([facility]) (Primary)  -     POC Glycosylated Hemoglobin (Hb A1C)  -     Semaglutide,0.25 or 0.5MG/DOS, (OZEMPIC) 2 MG/1.5ML solution pen-injector; Inject 0.25 mg under the skin into the appropriate area as directed 1 (One) Time Per Week.  Dispense: 1.5 mL; Refill: 2  -     dapagliflozin Propanediol (Farxiga) 10 MG tablet; Take 10 mg by mouth Daily.  Dispense: 90 tablet; Refill: 0  -     Comprehensive Metabolic Panel; Future  -     Microalbumin / Creatinine Urine Ratio - Urine, Clean Catch; Future  -     Lipid Panel; Future    2. Muscle spasm of right leg  -     methocarbamol (Robaxin) 500 MG tablet; Take 1 tablet by mouth Daily. With lunch  Dispense: 30 tablet; Refill: 2    3. Moderate episode of recurrent major depressive disorder (HCC)  -     citalopram (CeleXA) 20 MG tablet; Take 1.5 tablets by mouth Daily.  Dispense: 45 tablet; Refill: 2    4. Back muscle spasm  -     tiZANidine (ZANAFLEX) 4 MG tablet; Take 1 tablet by mouth Every 8 (Eight) Hours As Needed for Muscle Spasms.  Dispense: 90 tablet; Refill: 0    5. Seasonal allergies  -     montelukast (SINGULAIR) 10 MG tablet; Take 1 tablet by mouth Every Night.  Dispense: 30 tablet; Refill: 6    6. Hyperlipidemia, unspecified hyperlipidemia type  -     ezetimibe (Zetia) 10 MG tablet; Take 1 tablet by mouth Daily.  Dispense: 30 tablet; Refill: 5  -     Comprehensive Metabolic Panel; Future  -     Lipid Panel; Future    7. Anxiety  -     hydrOXYzine pamoate (Vistaril) 25 MG capsule; Take 1 capsule by mouth 3 (Three)  Times a Day As Needed for Itching.  Dispense: 60 capsule; Refill: 2    8. Encounter for screening mammogram for malignant neoplasm of breast  -     Mammo Screening Digital Tomosynthesis Bilateral With CAD; Future    9. Class 1 obesity with serious comorbidity and body mass index (BMI) of 30.0 to 30.9 in adult, unspecified obesity type    10. Other fatigue  -     TSH Rfx On Abnormal To Free T4; Future  -     Comprehensive Metabolic Panel; Future  -     Vitamin B12; Future  -     Folate; Future  -     Vitamin D,25-Hydroxy; Future  -     CBC & Differential; Future        Please follow a low animal fat diet that is also low in sugar, low in junk food, low in sweet drinks and low in alcohol.  Please increase the amount of fiber in your diet as well as increasing your daily exercise, such as walking.    Patient's (Body mass index is 30.95 kg/m².) indicates that they are obese (BMI >30) with health related conditions that include hypertension, diabetes mellitus, dyslipidemias and osteoarthritis . Weight is improving with lifestyle modifications. BMI is is above average; BMI management plan is completed. We discussed portion control and increasing exercise.     Handout on smoking cessation, calorie counting and exercise to lose weight.      Discussed shingrix vaccine, hep B vaccine, pneumonia vaccine    Current Outpatient Medications:   •  amLODIPine-benazepril (LOTREL 5-20) 5-20 MG per capsule, Take 1 capsule by mouth Daily., Disp: , Rfl:   •  Bisacodyl (LAXATIVE PO), Take  by mouth., Disp: , Rfl:   •  citalopram (CeleXA) 20 MG tablet, Take 1.5 tablets by mouth Daily., Disp: 45 tablet, Rfl: 2  •  Continuous Blood Gluc  (Dexcom G6 ) device, 1 each Continuous. To monitor glucose E11.65, z79.4, Disp: 1 each, Rfl: 0  •  Continuous Blood Gluc Sensor (Dexcom G6 Sensor), Every 10 (Ten) Days. Apply to appropriate area to monitor glucose. E11.65, z79.4, Disp: 3 each, Rfl: 12  •  Continuous Blood Gluc Transmit  (Dexcom G6 Transmitter) misc, 1 each Continuous. To monitor glucose. E11.65, z79.4, Disp: 1 each, Rfl: 3  •  dapagliflozin Propanediol (Farxiga) 10 MG tablet, Take 10 mg by mouth Daily., Disp: 90 tablet, Rfl: 0  •  docusate sodium (COLACE) 100 MG capsule, Take 1 capsule by mouth As Needed., Disp: , Rfl:   •  ezetimibe (Zetia) 10 MG tablet, Take 1 tablet by mouth Daily., Disp: 30 tablet, Rfl: 5  •  furosemide (LASIX) 20 MG tablet, TAKE 1 TABLET BY MOUTH ONCE DAILY AS NEEDED FOR SWELLING OF LEGS, Disp: , Rfl:   •  gabapentin (NEURONTIN) 800 MG tablet, Take 1 tablet by mouth 3 (Three) Times a Day., Disp: , Rfl:   •  Hydrocortisone, Perianal, (ANUSOL-HC) 2.5 % rectal cream, Procto-Med HC 2.5 % topical cream perineal applicator  INSERT INTO RECTUM TWICE DAILY AS DIRECTED FOR 14 DAYS, Disp: , Rfl:   •  hydrOXYzine pamoate (Vistaril) 25 MG capsule, Take 1 capsule by mouth 3 (Three) Times a Day As Needed for Itching., Disp: 60 capsule, Rfl: 2  •  Insulin Glargine (LANTUS SOLOSTAR) 100 UNIT/ML injection pen, Inject 28 units nightly E11.65, Z79.4, Disp: 6 pen, Rfl: 3  •  Insulin Pen Needle (Pen Needles) 32G X 6 MM misc, 1 each Daily., Disp: 30 each, Rfl: 11  •  ipratropium (ATROVENT) 0.03 % nasal spray, 2 sprays into the nostril(s) as directed by provider Every 12 (Twelve) Hours., Disp: 30 mL, Rfl: 1  •  lidocaine (LIDODERM) 5 %, Place 1 patch on the skin as directed by provider Daily. Remove & Discard patch within 12 hours or as directed by MD, Disp: 30 each, Rfl: 5  •  Magnesium Oxide 400 (240 Mg) MG tablet, Take 1 tablet by mouth Daily., Disp: , Rfl:   •  methocarbamol (Robaxin) 500 MG tablet, Take 1 tablet by mouth Daily. With lunch, Disp: 30 tablet, Rfl: 2  •  montelukast (SINGULAIR) 10 MG tablet, Take 1 tablet by mouth Every Night., Disp: 30 tablet, Rfl: 6  •  ondansetron ODT (ZOFRAN-ODT) 4 MG disintegrating tablet, DISSOLVE 1 TABLET IN MOUTH EVERY 8 HOURS AS NEEDED FOR NAUSEA FOR VOMITING, Disp: 40 tablet, Rfl: 0  •   potassium chloride 10 MEQ CR tablet, Take 1 tablet by mouth once daily, Disp: 90 tablet, Rfl: 1  •  Semaglutide,0.25 or 0.5MG/DOS, (OZEMPIC) 2 MG/1.5ML solution pen-injector, Inject 0.25 mg under the skin into the appropriate area as directed 1 (One) Time Per Week., Disp: 1.5 mL, Rfl: 2  •  spironolactone (ALDACTONE) 25 MG tablet, Daily As Needed., Disp: , Rfl:   •  tiZANidine (ZANAFLEX) 4 MG tablet, Take 1 tablet by mouth Every 8 (Eight) Hours As Needed for Muscle Spasms., Disp: 90 tablet, Rfl: 0  •  Ultram 50 MG tablet, Take 1 tablet by mouth Every 4 (Four) Hours As Needed for Moderate Pain ., Disp: 60 tablet, Rfl: 0  No current facility-administered medications for this visit.    Facility-Administered Medications Ordered in Other Visits:   •  mupirocin (BACTROBAN) 2 % nasal ointment, , Nasal, BID, Kassidy Camilo PA-C    Return in about 3 months (around 6/23/2023), or if symptoms worsen or fail to improve, for Annual, Recheck.     Hemoglobin A1C   Date Value Ref Range Status   03/23/2023 6.7 % Final   11/23/2022 7.4 % Final   08/24/2022 8.5 % Final   11/23/2020 6.60 (H) 4.80 - 5.60 % Final   11/13/2020 6.50 (H) 4.80 - 5.60 % Final   09/30/2020 6.60 (H) 4.80 - 5.60 % Final

## 2023-03-24 ENCOUNTER — TELEPHONE (OUTPATIENT)
Dept: INTERNAL MEDICINE | Facility: CLINIC | Age: 58
End: 2023-03-24
Payer: MEDICARE

## 2023-03-24 DIAGNOSIS — M62.830 BACK MUSCLE SPASM: ICD-10-CM

## 2023-03-24 RX ORDER — TIZANIDINE 4 MG/1
4 TABLET ORAL EVERY 8 HOURS PRN
Qty: 90 TABLET | Refills: 0 | Status: CANCELLED | OUTPATIENT
Start: 2023-03-24

## 2023-03-24 NOTE — TELEPHONE ENCOUNTER
Please check with the patient to see if she is taking both for carbromal and tizanidine if so need to get a prior auth

## 2023-03-24 NOTE — TELEPHONE ENCOUNTER
[facility] [address] [facility] [phone] [facility] [phone] FX    Relationship: Pharmacy    Best call back number: [phone]    Requested Prescriptions:   Requested Prescriptions     Pending Prescriptions Disp Refills   • tiZANidine (ZANAFLEX) 4 MG tablet 90 tablet 0     Sig: Take 1 tablet by mouth Every 8 (Eight) Hours As Needed for Muscle Spasms.        Pharmacy where request should be sent: [address] [facility] [phone] [facility] [phone] FX     Last office visit with prescribing clinician: 3/23/2023   Last telemedicine visit with prescribing clinician: 7/7/2023   Next office visit with prescribing clinician: 7/7/2023     Additional details provided by patient: PHARMACY STATES THAT THIS MEDICATION NEEDS A PRIOR AUTHORIZATION.    Does the patient have less than a 3 day supply:  [x] Yes  [] No    Would you like a call back once the refill request has been completed: [x] Yes [] No    If the office needs to give you a call back, can they leave a voicemail: [x] Yes [] No    Bart Potter Rep   03/24/23 08:40 EDT

## 2023-03-24 NOTE — TELEPHONE ENCOUNTER
Pharm asked if should be on both meds (methocarbamol for right leg and tizanidine  For back muscle spasm) If she should be on both will need to do the PA

## 2023-03-27 ENCOUNTER — PRIOR AUTHORIZATION (OUTPATIENT)
Dept: INTERNAL MEDICINE | Facility: CLINIC | Age: 58
End: 2023-03-27
Payer: MEDICARE

## 2023-03-29 ENCOUNTER — LAB (OUTPATIENT)
Dept: LAB | Facility: HOSPITAL | Age: 58
End: 2023-03-29
Payer: MEDICARE

## 2023-03-29 DIAGNOSIS — Z79.4 TYPE 2 DIABETES MELLITUS WITH HYPERGLYCEMIA, WITH LONG-TERM CURRENT USE OF INSULIN: ICD-10-CM

## 2023-03-29 DIAGNOSIS — E78.5 HYPERLIPIDEMIA, UNSPECIFIED HYPERLIPIDEMIA TYPE: ICD-10-CM

## 2023-03-29 DIAGNOSIS — E11.65 TYPE 2 DIABETES MELLITUS WITH HYPERGLYCEMIA, WITH LONG-TERM CURRENT USE OF INSULIN: ICD-10-CM

## 2023-03-29 DIAGNOSIS — R53.83 OTHER FATIGUE: ICD-10-CM

## 2023-03-31 ENCOUNTER — TELEPHONE (OUTPATIENT)
Dept: INTERNAL MEDICINE | Facility: CLINIC | Age: 58
End: 2023-03-31
Payer: MEDICARE

## 2023-03-31 DIAGNOSIS — E87.6 HYPOKALEMIA: ICD-10-CM

## 2023-03-31 LAB
25(OH)D3+25(OH)D2 SERPL-MCNC: 11.4 NG/ML (ref 30–100)
ALBUMIN SERPL-MCNC: 4.4 G/DL (ref 3.8–4.9)
ALBUMIN/GLOB SERPL: 1.8 {RATIO} (ref 1.2–2.2)
ALP SERPL-CCNC: 204 IU/L (ref 44–121)
ALT SERPL-CCNC: 14 IU/L (ref 0–32)
AST SERPL-CCNC: 18 IU/L (ref 0–40)
BASOPHILS # BLD AUTO: 0.1 X10E3/UL (ref 0–0.2)
BASOPHILS NFR BLD AUTO: 1 %
BILIRUB SERPL-MCNC: 0.2 MG/DL (ref 0–1.2)
BUN SERPL-MCNC: 6 MG/DL (ref 6–24)
BUN/CREAT SERPL: 11 (ref 9–23)
CALCIUM SERPL-MCNC: 10 MG/DL (ref 8.7–10.2)
CHLORIDE SERPL-SCNC: 102 MMOL/L (ref 96–106)
CHOLEST SERPL-MCNC: 257 MG/DL (ref 100–199)
CO2 SERPL-SCNC: 24 MMOL/L (ref 20–29)
CREAT SERPL-MCNC: 0.54 MG/DL (ref 0.57–1)
CREAT UR-MCNC: NORMAL MG/DL
EGFRCR SERPLBLD CKD-EPI 2021: 109 ML/MIN/1.73
EOSINOPHIL # BLD AUTO: 0.1 X10E3/UL (ref 0–0.4)
EOSINOPHIL NFR BLD AUTO: 1 %
ERYTHROCYTE [DISTWIDTH] IN BLOOD BY AUTOMATED COUNT: 14.1 % (ref 11.7–15.4)
FOLATE SERPL-MCNC: 7.4 NG/ML
GLOBULIN SER CALC-MCNC: 2.5 G/DL (ref 1.5–4.5)
GLUCOSE SERPL-MCNC: 162 MG/DL (ref 70–99)
HCT VFR BLD AUTO: 44.6 % (ref 34–46.6)
HDLC SERPL-MCNC: 39 MG/DL
HGB BLD-MCNC: 15.2 G/DL (ref 11.1–15.9)
IMM GRANULOCYTES # BLD AUTO: 0 X10E3/UL (ref 0–0.1)
IMM GRANULOCYTES NFR BLD AUTO: 0 %
LDLC SERPL CALC-MCNC: 165 MG/DL (ref 0–99)
LYMPHOCYTES # BLD AUTO: 2.9 X10E3/UL (ref 0.7–3.1)
LYMPHOCYTES NFR BLD AUTO: 30 %
MCH RBC QN AUTO: 30.1 PG (ref 26.6–33)
MCHC RBC AUTO-ENTMCNC: 34.1 G/DL (ref 31.5–35.7)
MCV RBC AUTO: 88 FL (ref 79–97)
MICROALBUMIN UR-MCNC: NORMAL
MONOCYTES # BLD AUTO: 0.5 X10E3/UL (ref 0.1–0.9)
MONOCYTES NFR BLD AUTO: 5 %
NEUTROPHILS # BLD AUTO: 6.2 X10E3/UL (ref 1.4–7)
NEUTROPHILS NFR BLD AUTO: 63 %
PLATELET # BLD AUTO: 245 X10E3/UL (ref 150–450)
POTASSIUM SERPL-SCNC: 3.2 MMOL/L (ref 3.5–5.2)
PROT SERPL-MCNC: 6.9 G/DL (ref 6–8.5)
RBC # BLD AUTO: 5.05 X10E6/UL (ref 3.77–5.28)
SODIUM SERPL-SCNC: 143 MMOL/L (ref 134–144)
SPECIMEN STATUS: NORMAL
TRIGL SERPL-MCNC: 283 MG/DL (ref 0–149)
TSH SERPL DL<=0.005 MIU/L-ACNC: 0.79 UIU/ML (ref 0.45–4.5)
VIT B12 SERPL-MCNC: 259 PG/ML (ref 232–1245)
VLDLC SERPL CALC-MCNC: 53 MG/DL (ref 5–40)
WBC # BLD AUTO: 9.8 X10E3/UL (ref 3.4–10.8)

## 2023-03-31 RX ORDER — POTASSIUM CHLORIDE 750 MG/1
10 TABLET, FILM COATED, EXTENDED RELEASE ORAL 2 TIMES DAILY
Qty: 180 TABLET | Refills: 1 | Status: SHIPPED | OUTPATIENT
Start: 2023-03-31

## 2023-03-31 NOTE — TELEPHONE ENCOUNTER
Glucose is is high if this is fasting lab.    Cholesterol and triglycerides were high.  Please follow low animal fat, low sugar, low alcohol diet.      Vitamin D is very low.  Please start over-the-counter vitamin D at 1000 units/day.  If you are already taking this then we need to send in prescription strength.    B12 is low.  Please add over the counter vitamin B12 1000 mcg/day.  If she is already taking this we can add B12 shots to boost the level.    Potassium is low.  Please increase your potassium to a twice a day dose.  A new prescription was sent to [facility].  Kidney function looks good.

## 2023-04-03 NOTE — TELEPHONE ENCOUNTER
To go over lab results.  I let her know that Dr. Hanks also sent a [employer] message and a letter through [employer]

## 2023-04-18 DIAGNOSIS — M62.830 BACK MUSCLE SPASM: ICD-10-CM

## 2023-04-18 RX ORDER — TIZANIDINE 4 MG/1
TABLET ORAL
Qty: 90 TABLET | Refills: 0 | Status: SHIPPED | OUTPATIENT
Start: 2023-04-18

## 2023-05-16 DIAGNOSIS — Z79.4 TYPE 2 DIABETES MELLITUS WITH HYPERGLYCEMIA, WITH LONG-TERM CURRENT USE OF INSULIN: ICD-10-CM

## 2023-05-16 DIAGNOSIS — E11.65 TYPE 2 DIABETES MELLITUS WITH HYPERGLYCEMIA, WITH LONG-TERM CURRENT USE OF INSULIN: ICD-10-CM

## 2023-05-17 DIAGNOSIS — M62.830 BACK MUSCLE SPASM: ICD-10-CM

## 2023-05-17 RX ORDER — TIZANIDINE 4 MG/1
TABLET ORAL
Qty: 90 TABLET | Refills: 0 | Status: SHIPPED | OUTPATIENT
Start: 2023-05-17

## 2023-05-17 RX ORDER — PROCHLORPERAZINE 25 MG/1
SUPPOSITORY RECTAL
Qty: 3 EACH | Refills: 0 | Status: SHIPPED | OUTPATIENT
Start: 2023-05-17

## 2023-05-19 ENCOUNTER — OFFICE VISIT (OUTPATIENT)
Dept: INTERNAL MEDICINE | Facility: CLINIC | Age: 58
End: 2023-05-19
Payer: MEDICARE

## 2023-05-19 VITALS
DIASTOLIC BLOOD PRESSURE: 76 MMHG | WEIGHT: 189.4 LBS | HEIGHT: 65 IN | OXYGEN SATURATION: 98 % | SYSTOLIC BLOOD PRESSURE: 112 MMHG | RESPIRATION RATE: 18 BRPM | TEMPERATURE: 97.5 F | BODY MASS INDEX: 31.56 KG/M2 | HEART RATE: 98 BPM

## 2023-05-19 DIAGNOSIS — K58.0 IRRITABLE BOWEL SYNDROME WITH DIARRHEA: Primary | ICD-10-CM

## 2023-05-19 DIAGNOSIS — F41.9 ANXIETY: ICD-10-CM

## 2023-05-19 PROCEDURE — 1160F RVW MEDS BY RX/DR IN RCRD: CPT | Performed by: NURSE PRACTITIONER

## 2023-05-19 PROCEDURE — 3074F SYST BP LT 130 MM HG: CPT | Performed by: NURSE PRACTITIONER

## 2023-05-19 PROCEDURE — 99214 OFFICE O/P EST MOD 30 MIN: CPT | Performed by: NURSE PRACTITIONER

## 2023-05-19 PROCEDURE — 3078F DIAST BP <80 MM HG: CPT | Performed by: NURSE PRACTITIONER

## 2023-05-19 PROCEDURE — 3044F HG A1C LEVEL LT 7.0%: CPT | Performed by: NURSE PRACTITIONER

## 2023-05-19 PROCEDURE — 1159F MED LIST DOCD IN RCRD: CPT | Performed by: NURSE PRACTITIONER

## 2023-05-19 RX ORDER — DICYCLOMINE HCL 20 MG
20 TABLET ORAL 4 TIMES DAILY PRN
Qty: 120 TABLET | Refills: 3 | Status: SHIPPED | OUTPATIENT
Start: 2023-05-19

## 2023-05-19 NOTE — PROGRESS NOTES
Subjective   Lizette Keith is a 57 y.o. female.     Chief Complaint   Patient presents with   • Diarrhea     Pt states she took some krill oil for two weeks and has had diarrhea since then        PCP: Denise Hanks MD    History of Present Illness     The patient presents to the office today to discuss diarrhea after taking krill oil for 2 weeks.     The patient states she discontinued the krill oil approximately 1 month ago, but she continues to experience diarrhea and notes she has not had a solid bowel movement since starting the krill oil. She reports she experienced 3 to 4 episodes of diarrhea this morning, but denies any hematochezia, mucus in the stool, or unusual malodor. The patient states she has not taken antibiotics recently and denies any recent travel. She reports experiencing occasional nausea secondary to her pain medications of gabapentin and tramadol, which she takes for her back. She denies any recent changes to her diet, but she notes she does experience abdominal cramping on occasion depending on what she is eating. However, the patient does not experience cramping frequently. She believes the krill oil aggravated her irritable bowel syndrome as she has a history of irritable bowel syndrome with diarrhea, which was successfully treated with Bentyl in the past. The patient notes she is not currently taking Bentyl at this time.     The patient reports an increase in stress similar to the time of onset of diarrhea. She states her sister was in a sober living house, but her medications were discontinued; therefore, the patient moved her to a transitional house and was responsible for transporting her to iron infusions for the first 3 weeks after the move which was stressful. Additionally, the patient reportedly takes care of her elderly mother. She notes her stress has decreased slightly as her sister has completed her infusions, but she continues to be overwhelmed. She states she was  "prescribed citalopram 1.5 tablets daily by Dr. Hanks, but \"got messed up on it\" and when she returned to taking it, she began taking only 20 mg. The patient inquires if she should increase her dosage back to the 30 mg.     The patient does not have any teeth at this time.     The following portions of the patient's history were reviewed and updated as appropriate: allergies, current medications, past family history, past medical history, past social history, past surgical history and problem list.        Review of Systems   Constitutional: Negative for fatigue, fever and unexpected weight loss.   HENT:        Pt does not have teeth.   Eyes: Negative for blurred vision, double vision and visual disturbance.   Respiratory: Negative for cough, shortness of breath and wheezing.    Cardiovascular: Negative for chest pain, palpitations and leg swelling.   Gastrointestinal: Positive for abdominal pain (infrequent abdominal cramping), diarrhea and nausea (occasional, secondary to her pain medications). Negative for blood in stool, constipation and vomiting.   Genitourinary: Negative for difficulty urinating, frequency and urgency.   Musculoskeletal: Negative for arthralgias and myalgias.   Skin: Negative for color change and rash.   Neurological: Negative for dizziness, weakness and headache.   Hematological: Negative for adenopathy. Does not bruise/bleed easily.   Psychiatric/Behavioral: Positive for stress.           Outpatient Medications Marked as Taking for the 5/19/23 encounter (Office Visit) with Breonna Paul APRN   Medication Sig Dispense Refill   • amLODIPine-benazepril (LOTREL 5-20) 5-20 MG per capsule Take 1 capsule by mouth Daily.     • citalopram (CeleXA) 20 MG tablet Take 1.5 tablets by mouth Daily. (Patient taking differently: Take 1.5 tablets by mouth Daily. Pt is only taking 1 tablet daily) 45 tablet 2   • Continuous Blood Gluc  (Dexcom G6 ) device 1 each Continuous. To monitor " glucose E11.65, z79.4 1 each 0   • Continuous Blood Gluc Sensor (Dexcom G6 Sensor) CHANGE SENSORS EVERY 10 DAYS 3 each 0   • Continuous Blood Gluc Transmit (Dexcom G6 Transmitter) misc 1 each Continuous. To monitor glucose. E11.65, z79.4 1 each 3   • dapagliflozin Propanediol (Farxiga) 10 MG tablet Take 10 mg by mouth Daily. 90 tablet 0   • ezetimibe (Zetia) 10 MG tablet Take 1 tablet by mouth Daily. 30 tablet 5   • gabapentin (NEURONTIN) 800 MG tablet Take 1 tablet by mouth 3 (Three) Times a Day.     • hydrOXYzine pamoate (Vistaril) 25 MG capsule Take 1 capsule by mouth 3 (Three) Times a Day As Needed for Itching. 60 capsule 2   • Insulin Pen Needle (Pen Needles) 32G X 6 MM misc 1 each Daily. 30 each 11   • ipratropium (ATROVENT) 0.03 % nasal spray 2 sprays into the nostril(s) as directed by provider Every 12 (Twelve) Hours. 30 mL 1   • lidocaine (LIDODERM) 5 % Place 1 patch on the skin as directed by provider Daily. Remove & Discard patch within 12 hours or as directed by MD 30 each 5   • Magnesium Oxide 400 (240 Mg) MG tablet Take 1 tablet by mouth Daily.     • methocarbamol (Robaxin) 500 MG tablet Take 1 tablet by mouth Daily. With lunch 30 tablet 2   • montelukast (SINGULAIR) 10 MG tablet Take 1 tablet by mouth Every Night. 30 tablet 6   • ondansetron ODT (ZOFRAN-ODT) 4 MG disintegrating tablet DISSOLVE 1 TABLET IN MOUTH EVERY 8 HOURS AS NEEDED FOR NAUSEA FOR VOMITING 40 tablet 0   • potassium chloride 10 MEQ CR tablet Take 1 tablet by mouth 2 (Two) Times a Day. 180 tablet 1   • Semaglutide,0.25 or 0.5MG/DOS, (OZEMPIC) 2 MG/1.5ML solution pen-injector Inject 0.25 mg under the skin into the appropriate area as directed 1 (One) Time Per Week. 1.5 mL 2   • tiZANidine (ZANAFLEX) 4 MG tablet TAKE 1 TABLET BY MOUTH EVERY 8 HOURS AS NEEDED FOR MUSCLE SPASM 90 tablet 0   • Ultram 50 MG tablet Take 1 tablet by mouth Every 4 (Four) Hours As Needed for Moderate Pain . 60 tablet 0     Allergies   Allergen Reactions   •  "Statins Myalgia   • Erythromycin GI Intolerance     Tolerates zpak   • Lipitor [Atorvastatin Calcium] Myalgia   • Metformin Myalgia   • Penicillins Hives     States ok with keflex   • Pravastatin Myalgia   • Relafen [Nabumetone] GI Intolerance   • Sertraline Other (See Comments)     Restless leg   • Cymbalta [Duloxetine Hcl] Rash           Objective   Physical Exam  Constitutional:       Appearance: Normal appearance. She is well-developed.   HENT:      Head: Normocephalic and atraumatic.   Eyes:      General: No scleral icterus.     Conjunctiva/sclera: Conjunctivae normal.   Cardiovascular:      Rate and Rhythm: Normal rate and regular rhythm.      Heart sounds: Normal heart sounds.   Pulmonary:      Effort: Pulmonary effort is normal. No respiratory distress.      Breath sounds: Normal breath sounds.   Abdominal:      General: Bowel sounds are normal.      Palpations: Abdomen is soft.      Tenderness: There is no abdominal tenderness.   Musculoskeletal:         General: Normal range of motion.      Cervical back: Normal range of motion.      Right lower leg: No edema.      Left lower leg: No edema.   Skin:     General: Skin is warm and dry.   Neurological:      General: No focal deficit present.      Mental Status: She is alert and oriented to person, place, and time.   Psychiatric:         Attention and Perception: Attention normal.         Mood and Affect: Mood and affect normal.         Behavior: Behavior normal. Behavior is cooperative.         Thought Content: Thought content normal.         Cognition and Memory: Cognition normal.         Judgment: Judgment normal.         Vitals:    05/19/23 1015   BP: 112/76   BP Location: Right arm   Patient Position: Sitting   Cuff Size: Adult   Pulse: 98   Resp: 18   Temp: 97.5 °F (36.4 °C)   TempSrc: Infrared   SpO2: 98%   Weight: 85.9 kg (189 lb 6.4 oz)   Height: 165.1 cm (65\")   PainSc:   6   PainLoc: Back     Body mass index is 31.52 kg/m².  Wt Readings from Last 3 " Encounters:   05/19/23 85.9 kg (189 lb 6.4 oz)   03/23/23 84.4 kg (186 lb)   12/14/22 87.1 kg (192 lb)             Assessment & Plan   Diagnoses and all orders for this visit:    1. Irritable bowel syndrome with diarrhea (Primary)  -     dicyclomine (BENTYL) 20 MG tablet; Take 1 tablet by mouth 4 (Four) Times a Day As Needed (diarrhea).  Dispense: 120 tablet; Refill: 3    2. Anxiety      1. Irritable bowel syndrome  - Will start the patient on dicyclomine as needed. Encouraged stress reduction, balanced diet, and increased physical activity.     2. Anxiety  - Patient will continue on Celexa, but will increase back to 30 mg as she has only been taking 20 mg.     The patient will keep her next appointment with Dr. Hanks on 07/07/2023 as scheduled.     Return for Next scheduled follow up, with PCP.    I discussed my findings,recommendations, and plan of care was with the patient. They verbalized understanding and agreement.  Patient was encouraged to keep me informed of any acute changes, lack of improvement, or any new concerning symptoms.     Transcribed from ambient dictation for ASHLEY John by Angela Singh.  05/19/23   12:24 EDT    Patient or patient representative verbalized consent to the visit recording.  I have personally performed the services described in this document as transcribed by the above individual, and it is both accurate and complete.  ASHLEY John  5/19/2023  12:31 EDT

## 2023-05-23 ENCOUNTER — TELEPHONE (OUTPATIENT)
Dept: INTERNAL MEDICINE | Facility: CLINIC | Age: 58
End: 2023-05-23
Payer: MEDICARE

## 2023-05-24 ENCOUNTER — TELEPHONE (OUTPATIENT)
Dept: INTERNAL MEDICINE | Facility: CLINIC | Age: 58
End: 2023-05-24

## 2023-05-24 NOTE — TELEPHONE ENCOUNTER
Caller: [facility] [address] [place] [address] [facility] [phone] [facility] [phone]     Relationship: Pharmacy    What was the call regarding: Continuous Blood Gluc Sensor (Dexcom G6 Sensor) [[id number]] (Order [id number])    PHARMACY STATES THIS IS NOT COVERED UNDER MEDICARE PART D LAW.     Do you require a callback: YES

## 2023-05-24 NOTE — TELEPHONE ENCOUNTER
Caller: [facility] [address] [place] [address] [facility] [phone]  - [phone]    Relationship to patient: Pharmacy    Best call back number: [phone]    INFORM ALFREDA THAT PRIOR AUTHORIZATION IS NOT NEEDED FOR CONTINUOUS BLOOD GLUC -  SENSOR DEXCOM G6 SENSOR

## 2023-05-24 NOTE — TELEPHONE ENCOUNTER
I tried to initiate the PA.  It says I need to do this through [employer] or to call the number listed.  I called and was on hold for 20 min.  While waiting I noticed in chart that PA has already been done and approve.  I called the pharmacy to let them know.  I also faxed them the PA approval

## 2023-06-08 DIAGNOSIS — M62.830 BACK MUSCLE SPASM: ICD-10-CM

## 2023-06-08 RX ORDER — TIZANIDINE 4 MG/1
TABLET ORAL
Qty: 90 TABLET | Refills: 0 | Status: SHIPPED | OUTPATIENT
Start: 2023-06-08

## 2023-06-12 DIAGNOSIS — E11.65 TYPE 2 DIABETES MELLITUS WITH HYPERGLYCEMIA, WITH LONG-TERM CURRENT USE OF INSULIN: ICD-10-CM

## 2023-06-12 DIAGNOSIS — Z79.4 TYPE 2 DIABETES MELLITUS WITH HYPERGLYCEMIA, WITH LONG-TERM CURRENT USE OF INSULIN: ICD-10-CM

## 2023-06-12 RX ORDER — PROCHLORPERAZINE 25 MG/1
SUPPOSITORY RECTAL
Qty: 3 EACH | Refills: 0 | Status: SHIPPED | OUTPATIENT
Start: 2023-06-12

## 2023-07-22 DIAGNOSIS — Z79.4 TYPE 2 DIABETES MELLITUS WITH HYPERGLYCEMIA, WITH LONG-TERM CURRENT USE OF INSULIN: ICD-10-CM

## 2023-07-22 DIAGNOSIS — E11.65 TYPE 2 DIABETES MELLITUS WITH HYPERGLYCEMIA, WITH LONG-TERM CURRENT USE OF INSULIN: ICD-10-CM

## 2023-07-24 RX ORDER — PROCHLORPERAZINE 25 MG/1
SUPPOSITORY RECTAL
Qty: 3 EACH | Refills: 0 | Status: SHIPPED | OUTPATIENT
Start: 2023-07-24

## 2023-08-03 DIAGNOSIS — E11.65 TYPE 2 DIABETES MELLITUS WITH HYPERGLYCEMIA, WITH LONG-TERM CURRENT USE OF INSULIN: ICD-10-CM

## 2023-08-03 DIAGNOSIS — Z79.4 TYPE 2 DIABETES MELLITUS WITH HYPERGLYCEMIA, WITH LONG-TERM CURRENT USE OF INSULIN: ICD-10-CM

## 2023-08-03 RX ORDER — DAPAGLIFLOZIN 10 MG/1
TABLET, FILM COATED ORAL
Qty: 90 TABLET | Refills: 0 | Status: SHIPPED | OUTPATIENT
Start: 2023-08-03

## 2023-08-15 DIAGNOSIS — E11.65 TYPE 2 DIABETES MELLITUS WITH HYPERGLYCEMIA, WITH LONG-TERM CURRENT USE OF INSULIN: ICD-10-CM

## 2023-08-15 DIAGNOSIS — Z79.4 TYPE 2 DIABETES MELLITUS WITH HYPERGLYCEMIA, WITH LONG-TERM CURRENT USE OF INSULIN: ICD-10-CM

## 2023-08-16 RX ORDER — PROCHLORPERAZINE 25 MG/1
SUPPOSITORY RECTAL
Qty: 3 EACH | Refills: 0 | Status: SHIPPED | OUTPATIENT
Start: 2023-08-16

## 2023-09-05 DIAGNOSIS — R11.0 NAUSEA: ICD-10-CM

## 2023-09-05 DIAGNOSIS — M62.838 MUSCLE SPASM OF RIGHT LEG: ICD-10-CM

## 2023-09-05 RX ORDER — METHOCARBAMOL 500 MG/1
TABLET, FILM COATED ORAL
Qty: 30 TABLET | Refills: 0 | Status: SHIPPED | OUTPATIENT
Start: 2023-09-05

## 2023-09-05 RX ORDER — ONDANSETRON 4 MG/1
TABLET, ORALLY DISINTEGRATING ORAL
Qty: 40 TABLET | Refills: 0 | Status: SHIPPED | OUTPATIENT
Start: 2023-09-05

## 2023-09-20 DIAGNOSIS — M62.830 BACK MUSCLE SPASM: ICD-10-CM

## 2023-09-20 RX ORDER — TIZANIDINE 4 MG/1
TABLET ORAL
Qty: 90 TABLET | Refills: 0 | Status: SHIPPED | OUTPATIENT
Start: 2023-09-20

## 2023-10-12 DIAGNOSIS — Z79.4 TYPE 2 DIABETES MELLITUS WITH HYPERGLYCEMIA, WITH LONG-TERM CURRENT USE OF INSULIN: ICD-10-CM

## 2023-10-12 DIAGNOSIS — E11.65 TYPE 2 DIABETES MELLITUS WITH HYPERGLYCEMIA, WITH LONG-TERM CURRENT USE OF INSULIN: ICD-10-CM

## 2023-10-12 RX ORDER — PROCHLORPERAZINE 25 MG/1
SUPPOSITORY RECTAL
Qty: 3 EACH | Refills: 0 | Status: SHIPPED | OUTPATIENT
Start: 2023-10-12

## 2023-10-12 RX ORDER — PROCHLORPERAZINE 25 MG/1
SUPPOSITORY RECTAL
Qty: 1 EACH | Refills: 0 | Status: SHIPPED | OUTPATIENT
Start: 2023-10-12

## 2023-11-07 DIAGNOSIS — F41.9 ANXIETY: ICD-10-CM

## 2023-11-07 RX ORDER — HYDROXYZINE PAMOATE 25 MG/1
25 CAPSULE ORAL 3 TIMES DAILY PRN
Qty: 60 CAPSULE | Refills: 0 | Status: SHIPPED | OUTPATIENT
Start: 2023-11-07

## 2023-11-24 DIAGNOSIS — F41.9 ANXIETY: ICD-10-CM

## 2023-11-24 RX ORDER — HYDROXYZINE PAMOATE 25 MG/1
25 CAPSULE ORAL 3 TIMES DAILY PRN
Qty: 60 CAPSULE | Refills: 0 | OUTPATIENT
Start: 2023-11-24

## 2023-12-22 DIAGNOSIS — F41.9 ANXIETY: ICD-10-CM

## 2023-12-22 RX ORDER — HYDROXYZINE PAMOATE 25 MG/1
25 CAPSULE ORAL 3 TIMES DAILY PRN
Qty: 60 CAPSULE | Refills: 0 | OUTPATIENT
Start: 2023-12-22

## 2024-01-03 NOTE — H&P
[facility] Medicine Services  HISTORY AND PHYSICAL    Patient Name: Lizette Keith  : 1965  MRN: [id number]  Primary Care Physician: Denise Hanks MD  Date of admission: 2018      Subjective   Subjective     Chief Complaint:  headache    HPI:  Lizette Keith is a 53 y.o. female with a past medical history significant for HLD, HTN, IBS, fibromyalgia, and anxiety/depression who presents with complaints of intractable global headache going on for the past two days. Pain is constant and described as a dull, pounding sensation. Worse with with cough. There is associated photophobia, phonophobia, and nausea with vomiting X 1 episode. No blood in emesis. There is no neck stiffness, visual disturbance, syncope, or focal weakness/parathesias. Patient is also reporting progressive congestion with dry cough and sore throat. States she has been around her sick sister who has a virus. She got her Flu shot yesterday. Patient was initially evaluated for severe headache this afternoon at urgent care but per level of severity was directed to ED. She is also noting a recent visit to urgent care 1 week ago for a rash presumed related to allergy. Has been on a medrol dose pack since then. Patient has no other complaints at this time. Denies associated fever/chills, abdominal pain, dysuria or diarrhea. She is a smoker of 1/2 PPD. Will admit for further evaluation and treatment.     Emergency Department Evaluation; hypotensive and tachycardic to 131. . Creatinine 1.44. Alk phos 149. WBC 12.13. Rapid strep, influenza PCR negative. CXR, UA negative.     Review of Systems   Constitutional: Negative for chills and fever.   HENT: Positive for congestion, rhinorrhea, sinus pressure and sore throat. Negative for trouble swallowing and voice change.    Eyes: Positive for photophobia. Negative for visual disturbance.   Respiratory: Positive for cough. Negative for shortness of breath.     Cardiovascular: Negative for chest pain and leg swelling.   Gastrointestinal: Positive for nausea and vomiting. Negative for abdominal pain and diarrhea.   Endocrine: Negative for cold intolerance and heat intolerance.   Genitourinary: Negative for dysuria and flank pain.   Musculoskeletal: Negative for arthralgias.   Skin: Negative for pallor and rash.   Allergic/Immunologic: Negative for immunocompromised state.   Neurological: Positive for headaches. Negative for dizziness, seizures, speech difficulty and numbness.   Hematological: Does not bruise/bleed easily.   Psychiatric/Behavioral: Negative for agitation and confusion.          Otherwise 10-system ROS reviewed and is negative except as mentioned in the HPI.    Personal History     Past Medical History:   Diagnosis Date   • Allergic    • Anemia     during pregnancy   • Anxiety    • Arthritis    • Blood in urine    • Chronic fatigue    • Colon polyp 02/2018    9 at last colonoscopy   • Depression    • Elevated cholesterol    • Endometriosis    • Fibromyalgia    • Fibromyalgia, primary    • Fracture    • GERD (gastroesophageal reflux disease)    • H/O mammogram 02/2018   • HL (hearing loss)    • Hypertension    • Irritable bowel syndrome    • Pap smear for cervical cancer screening 2018?       Past Surgical History:   Procedure Laterality Date   • CHOLECYSTECTOMY     • COLONOSCOPY  02/06/2018    3 year f/u polypectomy Dr MARTA Waller   • GALLBLADDER SURGERY     • HYSTERECTOMY     • INNER EAR SURGERY     • OOPHORECTOMY     • TEMPOROMANDIBULAR JOINT ARTHROPLASTY  1984       Family History: family history includes ADD / ADHD in her son; Arthritis in her mother; Cancer in her father, maternal grandmother, and mother; Colon polyps in her father and mother; Depression in her father and mother; Diabetes in her father, maternal aunt, maternal uncle, mother, paternal aunt, and paternal uncle; Heart attack in her mother; Heart disease in her maternal grandfather, mother,  and paternal grandfather; Hyperlipidemia in her mother; Melanoma in her mother; Migraines in her mother and sister; Thyroid disease in her mother. Otherwise pertinent FHx was reviewed and unremarkable.     Social History:  reports that she has been smoking cigarettes.  She has a 25.50 pack-year smoking history. she has never used smokeless tobacco. She reports that she does not drink alcohol or use drugs.  Social History     Social History Narrative   • Not on file       Medications:  Available home medication information reviewed.    Allergies   Allergen Reactions   • Erythromycin GI Intolerance     Tolerates zpak   • Penicillins Hives   • Statins Myalgia       Objective   Objective     Vital Signs:   Temp:  [96.6 °F (35.9 °C)-98 °F (36.7 °C)] 98 °F (36.7 °C)  Heart Rate:  [] 93  Resp:  [18] 18  BP: ()/(53-75) 118/75        Physical Exam   Constitutional: No acute distress, awake, alert  Eyes: PERRLA, sclerae anicteric, no conjunctival injection  HENT: NCAT, mucous membranes moist, mild erythema and edema or oropharynx  Neck: Supple, no thyromegaly, no lymphadenopathy, trachea midline  Respiratory: Clear to auscultation bilaterally, nonlabored respirations   Cardiovascular: RRR, no murmurs, rubs, or gallops, palpable pedal pulses bilaterally  Gastrointestinal: Positive bowel sounds, soft, nontender, nondistended  Musculoskeletal: No bilateral ankle edema, no clubbing or cyanosis to extremities  Psychiatric: Appropriate affect, cooperative  Neurologic: Oriented x 3, strength symmetric in all extremities, Cranial Nerves grossly intact to confrontation, speech clear  Skin: No rashes      Results Reviewed:  I have personally reviewed current lab, radiology, and data and agree.    Results from last 7 days   Lab Units  12/02/18   1740   WBC 10*3/mm3  12.13*   HEMOGLOBIN g/dL  15.4   HEMATOCRIT %  45.3*   PLATELETS 10*3/mm3  235     Results from last 7 days   Lab Units  12/02/18   1740   SODIUM mmol/L  134    POTASSIUM mmol/L  3.4*   CHLORIDE mmol/L  102   CO2 mmol/L  24.0   BUN mg/dL  14   CREATININE mg/dL  1.44*   GLUCOSE mg/dL  120*   CALCIUM mg/dL  9.6   ALT (SGPT) U/L  15   AST (SGOT) U/L  17     Estimated Creatinine Clearance: 52.8 mL/min (A) (by C-G formula based on SCr of 1.44 mg/dL (H)).  Brief Urine Lab Results  (Last result in the past 365 days)      Color   Clarity   Blood   Leuk Est   Nitrite   Protein   CREAT   Urine HCG        12/02/18 1956 Yellow Cloudy Negative Negative Negative 100 mg/dL (2+)             No results found for: BNP  Imaging Results (last 24 hours)     Procedure Component Value Units Date/Time    XR Chest 1 View [[id number]] Collected:  12/02/18 1858     Updated:  12/02/18 1858    Narrative:          EXAMINATION: XR CHEST 1 VW - 12/2/2018     INDICATION: Fever, hypotension.     COMPARISON: 3/10/2016     FINDINGS: Portable chest reveals cardiac and mediastinal silhouettes  within normal limits. The lung fields are grossly clear. No focal  parenchymal opacification is present. No pleural effusion or  pneumothorax. The bony structures are unremarkable. The pulmonary  vascularity is within normal limits.       Impression:       No acute cardiopulmonary disease.     DICTATED:   12/2/2018  EDITED/ls :   12/2/2018           CT Abdomen Pelvis Without Contrast [[id number]] Collected:  12/02/18 1856     Updated:  12/02/18 1856    Narrative:       EXAMINATION: CT ABDOMEN PELVIS WO CONTRAST - 12/2/2018     INDICATION:  Flulike symptoms.     TECHNIQUE: Multiple axial CT imaging is obtained of the abdomen and  pelvis from the lung bases to the pubic symphysis without the  administration of oral or intravenous contrast.     The radiation dose reduction device was turned on for each scan per the  ALARA (As Low as Reasonably Achievable) protocol.     COMPARISON: None.     FINDINGS:      ABDOMEN: The lung bases are grossly clear. The liver is homogeneous in  appearance. Fatty infiltration identified of the  liver. The spleen is  homogeneous in appearance. Pancreas is unremarkable. The kidneys and  adrenal glands are within normal limits. Vascular calcification seen  within the abdominal aorta. No free fluid or free air. The abdominal  portion gastrointestinal tract is within normal limits. No abnormal mass  or fluid collections identified. No abdominal or retroperitoneal  lymphadenopathy.     The appendix is radiographically normal in appearance.     PELVIS: The pelvic organs are unremarkable. Pelvic portion  gastrointestinal tract is within normal limits. No free fluid or free  air. No abnormal mass or fluid collections identified. The bony  structures are unremarkable.       Impression:       No acute intra-abdominal or pelvic abnormality.     DICTATED:   12/2/2018  EDITED/ls :   12/2/2018                    Assessment/Plan   Assessment / Plan     Active Hospital Problems    Diagnosis   • **SIRS (systemic inflammatory response syndrome) (CMS/HCC)   • WINSTON (acute kidney injury) (CMS/HCC)   • Headache   • Hypertension   • Anxiety and depression   • Tobacco abuse   • GERD (gastroesophageal reflux disease)   • IBS (irritable bowel syndrome)   • Hyperlipemia   • Fibromyalgia         Assessment & Plan:  1. SIRS:  - CXR, UA, CT A/P negative. Mildly toxic appearing. Lactic acidosis with elevated procalcitonin.  - broad spectrum coverage with merrem and zosyn. Blood cultures pending  - will obtain CT head without contrast. Do not suspect meningitis at this time per physical exam; no nuchal rigidity. Will consider as an option down the line pending clinical progression.  - administered 2L bolus in ED. Continue IVF at 100 cc/hr  - additional supportive care as needed  - consider ID consult in am  - am labs     2. WINSTON:  - last creatinine 9/2018 0.55  - pre-renal. Volume depletion/dehydration  - continue with IVF as above and hold/avoid nephrotoxins    3. Hypertension:  - BP labile. Holding anti-hypertensive medications. Resume as  tolerated    4. Tobacco abuse:  - nicotine patch as needed. Smokes 1/2 PPD. Tobacco Cessation Counselin minutes of tobacco cessation counseling provided, including but not limited to, risks of ongoing tobacco use, pertinence to current or future health status and availability/examples of cessation resources.  Patient expresses desire to quit.      DVT prophylaxis: mechanical    CODE STATUS:  Full code, full support  Code Status and Medical Interventions:   Ordered at: 18     Level Of Support Discussed With:    Patient     Code Status:    CPR     Medical Interventions (Level of Support Prior to Arrest):    Full       Admission Status:  I believe this patient meets INPATIENT status due to the need for care which can only be reasonably provided in an hospital setting such as aggressive/expedited ancillary services and/or consultation services, the necessity for IV medications, close physician monitoring and/or the possible need for procedures.  In such, I feel patient’s risk for adverse outcomes and need for care warrant INPATIENT evaluation and predict the patient’s care encounter to likely last beyond 2 midnights.        Electronically signed by Pamela Delvalle PA-C, 18, 8:45 PM.    PHYSICIAN NOTE(ADDENDUM)       Vitals:    18 2201   BP: 80/56-1:30/76    Pulse:     Resp: 16   Temp: 98.6    SpO2: 96% on room air.         HPI.  53-year-old female presented to ED with the chief complaint of cough-dry, do not complain of any overt fever, associated with congestion, runny nose, generalized headache frontal parietal, associated with phonophobia photophobia, do not have any neck pain, headache worse with coughing.  Do not complain of any wheezing.  Today patient was very weak, had near syncope event associated with vomiting after that.  Do not complain of any chest pain, abdominal pain, diarrhea dizziness,  Got Merrem/vancomycin in the ED.    Exam  RS- CTA-BL, ,  No wheezing , no crackles,  good effort.  CVS- s1s2 Regular, no murmur.  ABD- soft, non tender, not distended, no organomegaly.  EXT- no edema.  ENT-minimal erythema in the posterior pharynx, do not palpate any lymph nodes.  NEURO- AAO-3, no focal defecit.      Old records reviewed and summarized in PM hx.      PM hx  Hypertension-lisinopril/HCTZ 20/25 mg by mouth daily  Fibromyalgia  Anxiety      LABS  EKG sinus rhythm 110 bpm with Q waves in lead 3 no acute ST-T wave changes, QTC of 473, chest x-ray do not show any acute changes, abdomen do not show any acute abnormality.  Troponin negative, potassium of 3.4, BUN/creatinine 14/1.4, alkaline phosphatase 149, other LFTs normal, TSH of 1.04, free T4 of 0.8,  Lactic acid of 3.0, WBC of 12, hemoglobin of 15, neutrophils-66  UA negative    A/P  -As documented by PAs note.      I have independently seen and examined the patient with PA.  and the note above reflects my changes and contributions. I have discussed with findings, diagnosis and plans with the patient and family.     Sukhdeep Nielsen MD 12/02/18 10:08 PM            Otezla Pregnancy And Lactation Text: This medication is Pregnancy Category C and it isn't known if it is safe during pregnancy. It is unknown if it is excreted in breast milk. 5-Fu Pregnancy And Lactation Text: This medication is Pregnancy Category X and contraindicated in pregnancy and in women who may become pregnant. It is unknown if this medication is excreted in breast milk. Solaraze Pregnancy And Lactation Text: This medication is Pregnancy Category B and is considered safe. There is some data to suggest avoiding during the third trimester. It is unknown if this medication is excreted in breast milk. Picato Counseling:  I discussed with the patient the risks of Picato including but not limited to erythema, scaling, itching, weeping, crusting, and pain. Cimzia Counseling:  I discussed with the patient the risks of Cimzia including but not limited to immunosuppression, allergic reactions and infections.  The patient understands that monitoring is required including a PPD at baseline and must alert us or the primary physician if symptoms of infection or other concerning signs are noted. Azathioprine Pregnancy And Lactation Text: This medication is Pregnancy Category D and isn't considered safe during pregnancy. It is unknown if this medication is excreted in breast milk. Rifampin Counseling: I discussed with the patient the risks of rifampin including but not limited to liver damage, kidney damage, red-orange body fluids, nausea/vomiting and severe allergy. Xolair Counseling:  Patient informed of potential adverse effects including but not limited to fever, muscle aches, rash and allergic reactions.  The patient verbalized understanding of the proper use and possible adverse effects of Xolair.  All of the patient's questions and concerns were addressed. Rinvoq Pregnancy And Lactation Text: Based on animal studies, Rinvoq may cause embryo-fetal harm when administered to pregnant women.  The medication should not be used in pregnancy.  Breastfeeding is not recommended during treatment and for 6 days after the last dose. Benzoyl Peroxide Counseling: Patient counseled that medicine may cause skin irritation and bleach clothing.  In the event of skin irritation, the patient was advised to reduce the amount of the drug applied or use it less frequently.   The patient verbalized understanding of the proper use and possible adverse effects of benzoyl peroxide.  All of the patient's questions and concerns were addressed. Niacinamide Counseling: I recommended taking niacin or niacinamide, also know as vitamin B3, twice daily. Recent evidence suggests that taking vitamin B3 (500 mg twice daily) can reduce the risk of actinic keratoses and non-melanoma skin cancers. Side effects of vitamin B3 include flushing and headache. Simponi Pregnancy And Lactation Text: The risk during pregnancy and breastfeeding is uncertain with this medication. Opioid Counseling: I discussed with the patient the potential side effects of opioids including but not limited to addiction, altered mental status, and depression. I stressed avoiding alcohol, benzodiazepines, muscle relaxants and sleep aids unless specifically okayed by a physician. The patient verbalized understanding of the proper use and possible adverse effects of opioids. All of the patient's questions and concerns were addressed. They were instructed to flush the remaining pills down the toilet if they did not need them for pain. Griseofulvin Pregnancy And Lactation Text: This medication is Pregnancy Category X and is known to cause serious birth defects. It is unknown if this medication is excreted in breast milk but breast feeding should be avoided. Imiquimod Pregnancy And Lactation Text: This medication is Pregnancy Category C. It is unknown if this medication is excreted in breast milk. Doxycycline Pregnancy And Lactation Text: This medication is Pregnancy Category D and not consider safe during pregnancy. It is also excreted in breast milk but is considered safe for shorter treatment courses. Isotretinoin Counseling: Patient should get monthly blood tests, not donate blood, not drive at night if vision affected, not share medication, and not undergo elective surgery for 6 months after tx completed. Side effects reviewed, pt to contact office should one occur. Doxepin Counseling:  Patient advised that the medication is sedating and not to drive a car after taking this medication. Patient informed of potential adverse effects including but not limited to dry mouth, urinary retention, and blurry vision.  The patient verbalized understanding of the proper use and possible adverse effects of doxepin.  All of the patient's questions and concerns were addressed. Prednisone Pregnancy And Lactation Text: This medication is Pregnancy Category C and it isn't know if it is safe during pregnancy. This medication is excreted in breast milk. Tranexamic Acid Pregnancy And Lactation Text: It is unknown if this medication is safe during pregnancy or breast feeding. Dapsone Pregnancy And Lactation Text: This medication is Pregnancy Category C and is not considered safe during pregnancy or breast feeding. Azithromycin Counseling:  I discussed with the patient the risks of azithromycin including but not limited to GI upset, allergic reaction, drug rash, diarrhea, and yeast infections. Oxybutynin Counseling:  I discussed with the patient the risks of oxybutynin including but not limited to skin rash, drowsiness, dry mouth, difficulty urinating, and blurred vision. Dutasteride Pregnancy And Lactation Text: This medication is absolutely contraindicated in women, especially during pregnancy and breast feeding. Feminization of male fetuses is possible if taking while pregnant. Ilumya Counseling: I discussed with the patient the risks of tildrakizumab including but not limited to immunosuppression, malignancy, posterior leukoencephalopathy syndrome, and serious infections.  The patient understands that monitoring is required including a PPD at baseline and must alert us or the primary physician if symptoms of infection or other concerning signs are noted. Drysol Counseling:  I discussed with the patient the risks of drysol/aluminum chloride including but not limited to skin rash, itching, irritation, burning. Topical Retinoid counseling:  Patient advised to apply a pea-sized amount only at bedtime and wait 30 minutes after washing their face before applying.  If too drying, patient may add a non-comedogenic moisturizer. The patient verbalized understanding of the proper use and possible adverse effects of retinoids.  All of the patient's questions and concerns were addressed. Cimzia Pregnancy And Lactation Text: This medication crosses the placenta but can be considered safe in certain situations. Cimzia may be excreted in breast milk. Benzoyl Peroxide Pregnancy And Lactation Text: This medication is Pregnancy Category C. It is unknown if benzoyl peroxide is excreted in breast milk. Cellcept Counseling:  I discussed with the patient the risks of mycophenolate mofetil including but not limited to infection/immunosuppression, GI upset, hypokalemia, hypercholesterolemia, bone marrow suppression, lymphoproliferative disorders, malignancy, GI ulceration/bleed/perforation, colitis, interstitial lung disease, kidney failure, progressive multifocal leukoencephalopathy, and birth defects.  The patient understands that monitoring is required including a baseline creatinine and regular CBC testing. In addition, patient must alert us immediately if symptoms of infection or other concerning signs are noted. Xolair Pregnancy And Lactation Text: This medication is Pregnancy Category B and is considered safe during pregnancy. This medication is excreted in breast milk. Rifampin Pregnancy And Lactation Text: This medication is Pregnancy Category C and it isn't know if it is safe during pregnancy. It is also excreted in breast milk and should not be used if you are breast feeding. Sotyktu Counseling:  I discussed the most common side effects of Sotyktu including: common cold, sore throat, sinus infections, cold sores, canker sores, folliculitis, and acne.  I also discussed more serious side effects of Sotyktu including but not limited to: serious allergic reactions; increased risk for infections such as TB; cancers such as lymphomas; rhabdomyolysis and elevated CPK; and elevated triglycerides and liver enzymes.  Winlevi Counseling:  I discussed with the patient the risks of topical clascoterone including but not limited to erythema, scaling, itching, and stinging. Patient voiced their understanding. Niacinamide Pregnancy And Lactation Text: These medications are considered safe during pregnancy. Opioid Pregnancy And Lactation Text: These medications can lead to premature delivery and should be avoided during pregnancy. These medications are also present in breast milk in small amounts. Skyrizi Counseling: I discussed with the patient the risks of risankizumab-rzaa including but not limited to immunosuppression, and serious infections.  The patient understands that monitoring is required including a PPD at baseline and must alert us or the primary physician if symptoms of infection or other concerning signs are noted. Isotretinoin Pregnancy And Lactation Text: This medication is Pregnancy Category X and is considered extremely dangerous during pregnancy. It is unknown if it is excreted in breast milk. Itraconazole Counseling:  I discussed with the patient the risks of itraconazole including but not limited to liver damage, nausea/vomiting, neuropathy, and severe allergy.  The patient understands that this medication is best absorbed when taken with acidic beverages such as non-diet cola or ginger ale.  The patient understands that monitoring is required including baseline LFTs and repeat LFTs at intervals.  The patient understands that they are to contact us or the primary physician if concerning signs are noted. Klisyri Counseling:  I discussed with the patient the risks of Klisyri including but not limited to erythema, scaling, itching, weeping, crusting, and pain. Valtrex Counseling: I discussed with the patient the risks of valacyclovir including but not limited to kidney damage, nausea, vomiting and severe allergy.  The patient understands that if the infection seems to be worsening or is not improving, they are to call. Gabapentin Counseling: I discussed with the patient the risks of gabapentin including but not limited to dizziness, somnolence, fatigue and ataxia. Doxepin Pregnancy And Lactation Text: This medication is Pregnancy Category C and it isn't known if it is safe during pregnancy. It is also excreted in breast milk and breast feeding isn't recommended. Erythromycin Counseling:  I discussed with the patient the risks of erythromycin including but not limited to GI upset, allergic reaction, drug rash, diarrhea, increase in liver enzymes, and yeast infections. Azithromycin Pregnancy And Lactation Text: This medication is considered safe during pregnancy and is also secreted in breast milk. Finasteride Male Counseling: Finasteride Counseling:  I discussed with the patient the risks of use of finasteride including but not limited to decreased libido, decreased ejaculate volume, gynecomastia, and depression. Women should not handle medication.  All of the patient's questions and concerns were addressed. Drysol Pregnancy And Lactation Text: This medication is considered safe during pregnancy and breast feeding. Cosentyx Counseling:  I discussed with the patient the risks of Cosentyx including but not limited to worsening of Crohn's disease, immunosuppression, allergic reactions and infections.  The patient understands that monitoring is required including a PPD at baseline and must alert us or the primary physician if symptoms of infection or other concerning signs are noted. Oxybutynin Pregnancy And Lactation Text: This medication is Pregnancy Category B and is considered safe during pregnancy. It is unknown if it is excreted in breast milk. Cibinqo Counseling: I discussed with the patient the risks of Cibinqo therapy including but not limited to common cold, nausea, headache, cold sores, increased blood CPK levels, dizziness, UTIs, fatigue, acne, and vomitting. Live vaccines should be avoided.  This medication has been linked to serious infections; higher rate of mortality; malignancy and lymphoproliferative disorders; major adverse cardiovascular events; thrombosis; thrombocytopenia and lymphopenia; lipid elevations; and retinal detachment. Carac Counseling:  I discussed with the patient the risks of Carac including but not limited to erythema, scaling, itching, weeping, crusting, and pain. Protopic Counseling: Patient may experience a mild burning sensation during topical application. Protopic is not approved in children less than 2 years of age. There have been case reports of hematologic and skin malignancies in patients using topical calcineurin inhibitors although causality is questionable. Nsaids Counseling: NSAID Counseling: I discussed with the patient that NSAIDs should be taken with food. Prolonged use of NSAIDs can result in the development of stomach ulcers.  Patient advised to stop taking NSAIDs if abdominal pain occurs.  The patient verbalized understanding of the proper use and possible adverse effects of NSAIDs.  All of the patient's questions and concerns were addressed. Sotyktu Pregnancy And Lactation Text: There is insufficient data to evaluate whether or not Sotyktu is safe to use during pregnancy.   It is not known if Sotyktu passes into breast milk and whether or not it is safe to use when breastfeeding.   Winlevi Pregnancy And Lactation Text: This medication is considered safe during pregnancy and breastfeeding. Sarecycline Counseling: Patient advised regarding possible photosensitivity and discoloration of the teeth, skin, lips, tongue and gums.  Patient instructed to avoid sunlight, if possible.  When exposed to sunlight, patients should wear protective clothing, sunglasses, and sunscreen.  The patient was instructed to call the office immediately if the following severe adverse effects occur:  hearing changes, easy bruising/bleeding, severe headache, or vision changes.  The patient verbalized understanding of the proper use and possible adverse effects of sarecycline.  All of the patient's questions and concerns were addressed. Erythromycin Pregnancy And Lactation Text: This medication is Pregnancy Category B and is considered safe during pregnancy. It is also excreted in breast milk. Valtrex Pregnancy And Lactation Text: this medication is Pregnancy Category B and is considered safe during pregnancy. This medication is not directly found in breast milk but it's metabolite acyclovir is present. Itraconazole Pregnancy And Lactation Text: This medication is Pregnancy Category C and it isn't know if it is safe during pregnancy. It is also excreted in breast milk. Klisyri Pregnancy And Lactation Text: It is unknown if this medication can harm a developing fetus or if it is excreted in breast milk. High Dose Vitamin A Counseling: Side effects reviewed, pt to contact office should one occur. Erivedge Counseling- I discussed with the patient the risks of Erivedge including but not limited to nausea, vomiting, diarrhea, constipation, weight loss, changes in the sense of taste, decreased appetite, muscle spasms, and hair loss.  The patient verbalized understanding of the proper use and possible adverse effects of Erivedge.  All of the patient's questions and concerns were addressed. Gabapentin Pregnancy And Lactation Text: This medication is Pregnancy Category C and isn't considered safe during pregnancy. It is excreted in breast milk. Infliximab Counseling:  I discussed with the patient the risks of infliximab including but not limited to myelosuppression, immunosuppression, autoimmune hepatitis, demyelinating diseases, lymphoma, and serious infections.  The patient understands that monitoring is required including a PPD at baseline and must alert us or the primary physician if symptoms of infection or other concerning signs are noted. Tazorac Counseling:  Patient advised that medication is irritating and drying.  Patient may need to apply sparingly and wash off after an hour before eventually leaving it on overnight.  The patient verbalized understanding of the proper use and possible adverse effects of tazorac.  All of the patient's questions and concerns were addressed. Elidel Counseling: Patient may experience a mild burning sensation during topical application. Elidel is not approved in children less than 2 years of age. There have been case reports of hematologic and skin malignancies in patients using topical calcineurin inhibitors although causality is questionable. Hydroxyzine Counseling: Patient advised that the medication is sedating and not to drive a car after taking this medication.  Patient informed of potential adverse effects including but not limited to dry mouth, urinary retention, and blurry vision.  The patient verbalized understanding of the proper use and possible adverse effects of hydroxyzine.  All of the patient's questions and concerns were addressed. Finasteride Pregnancy And Lactation Text: This medication is absolutely contraindicated during pregnancy. It is unknown if it is excreted in breast milk. Bactrim Counseling:  I discussed with the patient the risks of sulfa antibiotics including but not limited to GI upset, allergic reaction, drug rash, diarrhea, dizziness, photosensitivity, and yeast infections.  Rarely, more serious reactions can occur including but not limited to aplastic anemia, agranulocytosis, methemoglobinemia, blood dyscrasias, liver or kidney failure, lung infiltrates or desquamative/blistering drug rashes. Cyclophosphamide Counseling:  I discussed with the patient the risks of cyclophosphamide including but not limited to hair loss, hormonal abnormalities, decreased fertility, abdominal pain, diarrhea, nausea and vomiting, bone marrow suppression and infection. The patient understands that monitoring is required while taking this medication. Arava Counseling:  Patient counseled regarding adverse effects of Arava including but not limited to nausea, vomiting, abnormalities in liver function tests. Patients may develop mouth sores, rash, diarrhea, and abnormalities in blood counts. The patient understands that monitoring is required including LFTs and blood counts.  There is a rare possibility of scarring of the liver and lung problems that can occur when taking methotrexate. Persistent nausea, loss of appetite, pale stools, dark urine, cough, and shortness of breath should be reported immediately. Patient advised to discontinue Arava treatment and consult with a physician prior to attempting conception. The patient will have to undergo a treatment to eliminate Arava from the body prior to conception. Propranolol Counseling:  I discussed with the patient the risks of propranolol including but not limited to low heart rate, low blood pressure, low blood sugar, restlessness and increased cold sensitivity. They should call the office if they experience any of these side effects. Nsaids Pregnancy And Lactation Text: These medications are considered safe up to 30 weeks gestation. It is excreted in breast milk. Cosentyx Pregnancy And Lactation Text: This medication is Pregnancy Category B and is considered safe during pregnancy. It is unknown if this medication is excreted in breast milk. VTAMA Counseling: I discussed with the patient that VTAMA is not for use in the eyes, mouth or mouth. They should call the office if they develop any signs of allergic reactions to VTAMA. The patient verbalized understanding of the proper use and possible adverse effects of VTAMA.  All of the patient's questions and concerns were addressed. Cibinqo Pregnancy And Lactation Text: It is unknown if this medication will adversely affect pregnancy or breast feeding.  You should not take this medication if you are currently pregnant or planning a pregnancy or while breastfeeding. Protopic Pregnancy And Lactation Text: This medication is Pregnancy Category C. It is unknown if this medication is excreted in breast milk when applied topically. Xeljanz Counseling: I discussed with the patient the risks of Xeljanz therapy including increased risk of infection, liver issues, headache, diarrhea, or cold symptoms. Live vaccines should be avoided. They were instructed to call if they have any problems. Minoxidil Counseling: Minoxidil is a topical medication which can increase blood flow where it is applied. It is uncertain how this medication increases hair growth. Side effects are uncommon and include stinging and allergic reactions. High Dose Vitamin A Pregnancy And Lactation Text: High dose vitamin A therapy is contraindicated during pregnancy and breast feeding. Ketoconazole Counseling:   Patient counseled regarding improving absorption with orange juice.  Adverse effects include but are not limited to breast enlargement, headache, diarrhea, nausea, upset stomach, liver function test abnormalities, taste disturbance, and stomach pain.  There is a rare possibility of liver failure that can occur when taking ketoconazole. The patient understands that monitoring of LFTs may be required, especially at baseline. The patient verbalized understanding of the proper use and possible adverse effects of ketoconazole.  All of the patient's questions and concerns were addressed. Sarecycline Pregnancy And Lactation Text: This medication is Pregnancy Category D and not consider safe during pregnancy. It is also excreted in breast milk. Metronidazole Counseling:  I discussed with the patient the risks of metronidazole including but not limited to seizures, nausea/vomiting, a metallic taste in the mouth, nausea/vomiting and severe allergy. Stelara Counseling:  I discussed with the patient the risks of ustekinumab including but not limited to immunosuppression, malignancy, posterior leukoencephalopathy syndrome, and serious infections.  The patient understands that monitoring is required including a PPD at baseline and must alert us or the primary physician if symptoms of infection or other concerning signs are noted. Erivedge Pregnancy And Lactation Text: This medication is Pregnancy Category X and is absolutely contraindicated during pregnancy. It is unknown if it is excreted in breast milk. Use Enhanced Medication Counseling?: No Glycopyrrolate Counseling:  I discussed with the patient the risks of glycopyrrolate including but not limited to skin rash, drowsiness, dry mouth, difficulty urinating, and blurred vision. Hydroxyzine Pregnancy And Lactation Text: This medication is not safe during pregnancy and should not be taken. It is also excreted in breast milk and breast feeding isn't recommended. Birth Control Pills Counseling: Birth Control Pill Counseling: I discussed with the patient the potential side effects of OCPs including but not limited to increased risk of stroke, heart attack, thrombophlebitis, deep venous thrombosis, hepatic adenomas, breast changes, GI upset, headaches, and depression.  The patient verbalized understanding of the proper use and possible adverse effects of OCPs. All of the patient's questions and concerns were addressed. Tazorac Pregnancy And Lactation Text: This medication is not safe during pregnancy. It is unknown if this medication is excreted in breast milk. Propranolol Pregnancy And Lactation Text: This medication is Pregnancy Category C and it isn't known if it is safe during pregnancy. It is excreted in breast milk. Cyclophosphamide Pregnancy And Lactation Text: This medication is Pregnancy Category D and it isn't considered safe during pregnancy. This medication is excreted in breast milk. Bactrim Pregnancy And Lactation Text: This medication is Pregnancy Category D and is known to cause fetal risk.  It is also excreted in breast milk. Dupixent Counseling: I discussed with the patient the risks of dupilumab including but not limited to eye infection and irritation, cold sores, injection site reactions, worsening of asthma, allergic reactions and increased risk of parasitic infection.  Live vaccines should be avoided while taking dupilumab. Dupilumab will also interact with certain medications such as warfarin and cyclosporine. The patient understands that monitoring is required and they must alert us or the primary physician if symptoms of infection or other concerning signs are noted. Vtama Pregnancy And Lactation Text: It is unknown if this medication can cause problems during pregnancy and breastfeeding. Tetracycline Counseling: Patient counseled regarding possible photosensitivity and increased risk for sunburn.  Patient instructed to avoid sunlight, if possible.  When exposed to sunlight, patients should wear protective clothing, sunglasses, and sunscreen.  The patient was instructed to call the office immediately if the following severe adverse effects occur:  hearing changes, easy bruising/bleeding, severe headache, or vision changes.  The patient verbalized understanding of the proper use and possible adverse effects of tetracycline.  All of the patient's questions and concerns were addressed. Patient understands to avoid pregnancy while on therapy due to potential birth defects. Qbrexza Counseling:  I discussed with the patient the risks of Qbrexza including but not limited to headache, mydriasis, blurred vision, dry eyes, nasal dryness, dry mouth, dry throat, dry skin, urinary hesitation, and constipation.  Local skin reactions including erythema, burning, stinging, and itching can also occur. Calcipotriene Counseling: Cantharidin Counseling:  I discussed with the patient the risks of Cantharidin including but not limited to pain, redness, burning, itching, and blistering. Olumiant Counseling: I discussed with the patient the risks of Olumiant therapy including but not limited to upper respiratory tract infections, shingles, cold sores, and nausea. Live vaccines should be avoided.  This medication has been linked to serious infections; higher rate of mortality; malignancy and lymphoproliferative disorders; major adverse cardiovascular events; thrombosis; gastrointestinal perforations; neutropenia; lymphopenia; anemia; liver enzyme elevations; and lipid elevations. Olanzapine Counseling- I discussed with the patient the common side effects of olanzapine including but are not limited to: lack of energy, dry mouth, increased appetite, sleepiness, tremor, constipation, dizziness, changes in behavior, or restlessness.  Explained that teenagers are more likely to experience headaches, abdominal pain, pain in the arms or legs, tiredness, and sleepiness.  Serious side effects include but are not limited: increased risk of death in elderly patients who are confused, have memory loss, or dementia-related psychosis; hyperglycemia; increased cholesterol and triglycerides; and weight gain. Xelelsiz Pregnancy And Lactation Text: This medication is Pregnancy Category D and is not considered safe during pregnancy.  The risk during breast feeding is also uncertain. Ketoconazole Pregnancy And Lactation Text: This medication is Pregnancy Category C and it isn't know if it is safe during pregnancy. It is also excreted in breast milk and breast feeding isn't recommended. Glycopyrrolate Pregnancy And Lactation Text: This medication is Pregnancy Category B and is considered safe during pregnancy. It is unknown if it is excreted breast milk. Minoxidil Pregnancy And Lactation Text: This medication has not been assigned a Pregnancy Risk Category but animal studies failed to show danger with the topical medication. It is unknown if the medication is excreted in breast milk. Metronidazole Pregnancy And Lactation Text: This medication is Pregnancy Category B and considered safe during pregnancy.  It is also excreted in breast milk. Libtayo Counseling- I discussed with the patient the risks of Libtayo including but not limited to nausea, vomiting, diarrhea, and bone or muscle pain.  The patient verbalized understanding of the proper use and possible adverse effects of Libtayo.  All of the patient's questions and concerns were addressed. Topical Clindamycin Counseling: Patient counseled that this medication may cause skin irritation or allergic reactions.  In the event of skin irritation, the patient was advised to reduce the amount of the drug applied or use it less frequently.   The patient verbalized understanding of the proper use and possible adverse effects of clindamycin.  All of the patient's questions and concerns were addressed. Rituxan Counseling:  I discussed with the patient the risks of Rituxan infusions. Side effects can include infusion reactions, severe drug rashes including mucocutaneous reactions, reactivation of latent hepatitis and other infections and rarely progressive multifocal leukoencephalopathy.  All of the patient's questions and concerns were addressed. Cephalexin Counseling: I counseled the patient regarding use of cephalexin as an antibiotic for prophylactic and/or therapeutic purposes. Cephalexin (commonly prescribed under brand name Keflex) is a cephalosporin antibiotic which is active against numerous classes of bacteria, including most skin bacteria. Side effects may include nausea, diarrhea, gastrointestinal upset, rash, hives, yeast infections, and in rare cases, hepatitis, kidney disease, seizures, fever, confusion, neurologic symptoms, and others. Patients with severe allergies to penicillin medications are cautioned that there is about a 10% incidence of cross-reactivity with cephalosporins. When possible, patients with penicillin allergies should use alternatives to cephalosporins for antibiotic therapy. SSKI Counseling:  I discussed with the patient the risks of SSKI including but not limited to thyroid abnormalities, metallic taste, GI upset, fever, headache, acne, arthralgias, paraesthesias, lymphadenopathy, easy bleeding, arrhythmias, and allergic reaction. Eucrisa Counseling: Patient may experience a mild burning sensation during topical application. Eucrisa is not approved in children less than 3 months of age. Birth Control Pills Pregnancy And Lactation Text: This medication should be avoided if pregnant and for the first 30 days post-partum. Clofazimine Counseling:  I discussed with the patient the risks of clofazimine including but not limited to skin and eye pigmentation, liver damage, nausea/vomiting, gastrointestinal bleeding and allergy. Cyclosporine Counseling:  I discussed with the patient the risks of cyclosporine including but not limited to hypertension, gingival hyperplasia,myelosuppression, immunosuppression, liver damage, kidney damage, neurotoxicity, lymphoma, and serious infections. The patient understands that monitoring is required including baseline blood pressure, CBC, CMP, lipid panel and uric acid, and then 1-2 times monthly CMP and blood pressure. Albendazole Counseling:  I discussed with the patient the risks of albendazole including but not limited to cytopenia, kidney damage, nausea/vomiting and severe allergy.  The patient understands that this medication is being used in an off-label manner. Dupixent Pregnancy And Lactation Text: This medication likely crosses the placenta but the risk for the fetus is uncertain. This medication is excreted in breast milk. Zoryve Counseling:  I discussed with the patient that Zoryve is not for use in the eyes, mouth or vagina. The most commonly reported side effects include diarrhea, headache, insomnia, application site pain, upper respiratory tract infections, and urinary tract infections.  All of the patient's questions and concerns were addressed. Qbrexza Pregnancy And Lactation Text: There is no available data on Qbrexza use in pregnant women.  There is no available data on Qbrexza use in lactation. Calcipotriene Pregnancy And Lactation Text: This medication has not been proven safe during pregnancy. It is unknown if this medication is excreted in breast milk. Olumiant Pregnancy And Lactation Text: Based on animal studies, Olumiant may cause embryo-fetal harm when administered to pregnant women.  The medication should not be used in pregnancy.  Breastfeeding is not recommended during treatment. Olanzapine Pregnancy And Lactation Text: This medication is pregnancy category C.   There are no adequate and well controlled trials with olanzapine in pregnant females.  Olanzapine should be used during pregnancy only if the potential benefit justifies the potential risk to the fetus.   In a study in lactating healthy women, olanzapine was excreted in breast milk.  It is recommended that women taking olanzapine should not breast feed. Taltz Counseling: I discussed with the patient the risks of ixekizumab including but not limited to immunosuppression, serious infections, worsening of inflammatory bowel disease and drug reactions.  The patient understands that monitoring is required including a PPD at baseline and must alert us or the primary physician if symptoms of infection or other concerning signs are noted. Terbinafine Counseling: Patient counseling regarding adverse effects of terbinafine including but not limited to headache, diarrhea, rash, upset stomach, liver function test abnormalities, itching, taste/smell disturbance, nausea, abdominal pain, and flatulence.  There is a rare possibility of liver failure that can occur when taking terbinafine.  The patient understands that a baseline LFT and kidney function test may be required. The patient verbalized understanding of the proper use and possible adverse effects of terbinafine.  All of the patient's questions and concerns were addressed. Aklief counseling:  Patient advised to apply a pea-sized amount only at bedtime and wait 30 minutes after washing their face before applying.  If too drying, patient may add a non-comedogenic moisturizer.  The most commonly reported side effects including irritation, redness, scaling, dryness, stinging, burning, itching, and increased risk of sunburn.  The patient verbalized understanding of the proper use and possible adverse effects of retinoids.  All of the patient's questions and concerns were addressed. Hydroxychloroquine Counseling:  I discussed with the patient that a baseline ophthalmologic exam is needed at the start of therapy and every year thereafter while on therapy. A CBC may also be warranted for monitoring.  The side effects of this medication were discussed with the patient, including but not limited to agranulocytosis, aplastic anemia, seizures, rashes, retinopathy, and liver toxicity. Patient instructed to call the office should any adverse effect occur.  The patient verbalized understanding of the proper use and possible adverse effects of Plaquenil.  All the patient's questions and concerns were addressed. Libtayo Pregnancy And Lactation Text: This medication is contraindicated in pregnancy and when breast feeding. Rituxan Pregnancy And Lactation Text: This medication is Pregnancy Category C and it isn't know if it is safe during pregnancy. It is unknown if this medication is excreted in breast milk but similar antibodies are known to be excreted. Mirvaso Counseling: Mirvaso is a topical medication which can decrease superficial blood flow where applied. Side effects are uncommon and include stinging, redness and allergic reactions. Acitretin Counseling:  I discussed with the patient the risks of acitretin including but not limited to hair loss, dry lips/skin/eyes, liver damage, hyperlipidemia, depression/suicidal ideation, photosensitivity.  Serious rare side effects can include but are not limited to pancreatitis, pseudotumor cerebri, bony changes, clot formation/stroke/heart attack.  Patient understands that alcohol is contraindicated since it can result in liver toxicity and significantly prolong the elimination of the drug by many years. Minocycline Counseling: Patient advised regarding possible photosensitivity and discoloration of the teeth, skin, lips, tongue and gums.  Patient instructed to avoid sunlight, if possible.  When exposed to sunlight, patients should wear protective clothing, sunglasses, and sunscreen.  The patient was instructed to call the office immediately if the following severe adverse effects occur:  hearing changes, easy bruising/bleeding, severe headache, or vision changes.  The patient verbalized understanding of the proper use and possible adverse effects of minocycline.  All of the patient's questions and concerns were addressed. Cephalexin Pregnancy And Lactation Text: This medication is Pregnancy Category B and considered safe during pregnancy.  It is also excreted in breast milk but can be used safely for shorter doses. Spironolactone Counseling: Patient advised regarding risks of diarrhea, abdominal pain, hyperkalemia, birth defects (for female patients), liver toxicity and renal toxicity. The patient may need blood work to monitor liver and kidney function and potassium levels while on therapy. The patient verbalized understanding of the proper use and possible adverse effects of spironolactone.  All of the patient's questions and concerns were addressed. Sski Pregnancy And Lactation Text: This medication is Pregnancy Category D and isn't considered safe during pregnancy. It is excreted in breast milk. Detail Level: Simple Enbrel Counseling:  I discussed with the patient the risks of etanercept including but not limited to myelosuppression, immunosuppression, autoimmune hepatitis, demyelinating diseases, lymphoma, and infections.  The patient understands that monitoring is required including a PPD at baseline and must alert us or the primary physician if symptoms of infection or other concerning signs are noted. Cantharidin Counseling: Calcipotriene Counseling:  I discussed with the patient the risks of calcipotriene including but not limited to erythema, scaling, itching, and irritation. Rhofade Counseling: Rhofade is a topical medication which can decrease superficial blood flow where applied. Side effects are uncommon and include stinging, redness and allergic reactions. Albendazole Pregnancy And Lactation Text: This medication is Pregnancy Category C and it isn't known if it is safe during pregnancy. It is also excreted in breast milk. Oral Minoxidil Counseling- I discussed with the patient the risks of oral minoxidil including but not limited to shortness of breath, swelling of the feet or ankles, dizziness, lightheadedness, unwanted hair growth and allergic reaction.  The patient verbalized understanding of the proper use and possible adverse effects of oral minoxidil.  All of the patient's questions and concerns were addressed. Hydroxychloroquine Pregnancy And Lactation Text: This medication has been shown to cause fetal harm but it isn't assigned a Pregnancy Risk Category. There are small amounts excreted in breast milk. Topical Sulfur Applications Counseling: Topical Sulfur Counseling: Patient counseled that this medication may cause skin irritation or allergic reactions.  In the event of skin irritation, the patient was advised to reduce the amount of the drug applied or use it less frequently.   The patient verbalized understanding of the proper use and possible adverse effects of topical sulfur application.  All of the patient's questions and concerns were addressed. Mirvaso Pregnancy And Lactation Text: This medication has not been assigned a Pregnancy Risk Category. It is unknown if the medication is excreted in breast milk. Aklief Pregnancy And Lactation Text: It is unknown if this medication is safe to use during pregnancy.  It is unknown if this medication is excreted in breast milk.  Breastfeeding women should use the topical cream on the smallest area of the skin for the shortest time needed while breastfeeding.  Do not apply to nipple and areola. Terbinafine Pregnancy And Lactation Text: This medication is Pregnancy Category B and is considered safe during pregnancy. It is also excreted in breast milk and breast feeding isn't recommended. Odomzo Counseling- I discussed with the patient the risks of Odomzo including but not limited to nausea, vomiting, diarrhea, constipation, weight loss, changes in the sense of taste, decreased appetite, muscle spasms, and hair loss.  The patient verbalized understanding of the proper use and possible adverse effects of Odomzo.  All of the patient's questions and concerns were addressed. Acitretin Pregnancy And Lactation Text: This medication is Pregnancy Category X and should not be given to women who are pregnant or may become pregnant in the future. This medication is excreted in breast milk. Spironolactone Pregnancy And Lactation Text: This medication can cause feminization of the male fetus and should be avoided during pregnancy. The active metabolite is also found in breast milk. Fluconazole Counseling:  Patient counseled regarding adverse effects of fluconazole including but not limited to headache, diarrhea, nausea, upset stomach, liver function test abnormalities, taste disturbance, and stomach pain.  There is a rare possibility of liver failure that can occur when taking fluconazole.  The patient understands that monitoring of LFTs and kidney function test may be required, especially at baseline. The patient verbalized understanding of the proper use and possible adverse effects of fluconazole.  All of the patient's questions and concerns were addressed. Siliq Counseling:  I discussed with the patient the risks of Siliq including but not limited to new or worsening depression, suicidal thoughts and behavior, immunosuppression, malignancy, posterior leukoencephalopathy syndrome, and serious infections.  The patient understands that monitoring is required including a PPD at baseline and must alert us or the primary physician if symptoms of infection or other concerning signs are noted. There is also a special program designed to monitor depression which is required with Siliq. Hydroquinone Counseling:  Patient advised that medication may result in skin irritation, lightening (hypopigmentation), dryness, and burning.  In the event of skin irritation, the patient was advised to reduce the amount of the drug applied or use it less frequently.  Rarely, spots that are treated with hydroquinone can become darker (pseudoochronosis).  Should this occur, patient instructed to stop medication and call the office. The patient verbalized understanding of the proper use and possible adverse effects of hydroquinone.  All of the patient's questions and concerns were addressed. Colchicine Counseling:  Patient counseled regarding adverse effects including but not limited to stomach upset (nausea, vomiting, stomach pain, or diarrhea).  Patient instructed to limit alcohol consumption while taking this medication.  Colchicine may reduce blood counts especially with prolonged use.  The patient understands that monitoring of kidney function and blood counts may be required, especially at baseline. The patient verbalized understanding of the proper use and possible adverse effects of colchicine.  All of the patient's questions and concerns were addressed. Thalidomide Counseling: I discussed with the patient the risks of thalidomide including but not limited to birth defects, anxiety, weakness, chest pain, dizziness, cough and severe allergy. Topical Ketoconazole Counseling: Patient counseled that this medication may cause skin irritation or allergic reactions.  In the event of skin irritation, the patient was advised to reduce the amount of the drug applied or use it less frequently.   The patient verbalized understanding of the proper use and possible adverse effects of ketoconazole.  All of the patient's questions and concerns were addressed. Clindamycin Counseling: I counseled the patient regarding use of clindamycin as an antibiotic for prophylactic and/or therapeutic purposes. Clindamycin is active against numerous classes of bacteria, including skin bacteria. Side effects may include nausea, diarrhea, gastrointestinal upset, rash, hives, yeast infections, and in rare cases, colitis. Methotrexate Counseling:  Patient counseled regarding adverse effects of methotrexate including but not limited to nausea, vomiting, abnormalities in liver function tests. Patients may develop mouth sores, rash, diarrhea, and abnormalities in blood counts. The patient understands that monitoring is required including LFT's and blood counts.  There is a rare possibility of scarring of the liver and lung problems that can occur when taking methotrexate. Persistent nausea, loss of appetite, pale stools, dark urine, cough, and shortness of breath should be reported immediately. Patient advised to discontinue methotrexate treatment at least three months before attempting to become pregnant.  I discussed the need for folate supplements while taking methotrexate.  These supplements can decrease side effects during methotrexate treatment. The patient verbalized understanding of the proper use and possible adverse effects of methotrexate.  All of the patient's questions and concerns were addressed. Cantharidin Pregnancy And Lactation Text: The use of this medication during pregnancy or lactation is not recommended as there is insufficient data. Ivermectin Counseling:  Patient instructed to take medication on an empty stomach with a full glass of water.  Patient informed of potential adverse effects including but not limited to nausea, diarrhea, dizziness, itching, and swelling of the extremities or lymph nodes.  The patient verbalized understanding of the proper use and possible adverse effects of ivermectin.  All of the patient's questions and concerns were addressed. Oral Minoxidil Pregnancy And Lactation Text: This medication should only be used when clearly needed if you are pregnant, attempting to become pregnant or breast feeding. Zyclara Counseling:  I discussed with the patient the risks of imiquimod including but not limited to erythema, scaling, itching, weeping, crusting, and pain.  Patient understands that the inflammatory response to imiquimod is variable from person to person and was educated regarded proper titration schedule.  If flu-like symptoms develop, patient knows to discontinue the medication and contact us. Tremfya Counseling: I discussed with the patient the risks of guselkumab including but not limited to immunosuppression, serious infections, and drug reactions.  The patient understands that monitoring is required including a PPD at baseline and must alert us or the primary physician if symptoms of infection or other concerning signs are noted. Opzelura Counseling:  I discussed with the patient the risks of Opzelura including but not limited to nasopharngitis, bronchitis, ear infection, eosinophila, hives, diarrhea, folliculitis, tonsillitis, and rhinorrhea.  Taken orally, this medication has been linked to serious infections; higher rate of mortality; malignancy and lymphoproliferative disorders; major adverse cardiovascular events; thrombosis; thrombocytopenia, anemia, and neutropenia; and lipid elevations. Adbry Counseling: I discussed with the patient the risks of tralokinumab including but not limited to eye infection and irritation, cold sores, injection site reactions, worsening of asthma, allergic reactions and increased risk of parasitic infection.  Live vaccines should be avoided while taking tralokinumab. The patient understands that monitoring is required and they must alert us or the primary physician if symptoms of infection or other concerning signs are noted. Azelaic Acid Counseling: Patient counseled that medicine may cause skin irritation and to avoid applying near the eyes.  In the event of skin irritation, the patient was advised to reduce the amount of the drug applied or use it less frequently.   The patient verbalized understanding of the proper use and possible adverse effects of azelaic acid.  All of the patient's questions and concerns were addressed. Topical Sulfur Applications Pregnancy And Lactation Text: This medication is considered safe during pregnancy and breast feeding secondary to limited systemic absorption. Quinolones Counseling:  I discussed with the patient the risks of fluoroquinolones including but not limited to GI upset, allergic reaction, drug rash, diarrhea, dizziness, photosensitivity, yeast infections, liver function test abnormalities, tendonitis/tendon rupture. Low Dose Naltrexone Counseling- I discussed with the patient the potential risks and side effects of low dose naltrexone including but not limited to: more vivid dreams, headaches, nausea, vomiting, abdominal pain, fatigue, dizziness, and anxiety. Clindamycin Pregnancy And Lactation Text: This medication can be used in pregnancy if certain situations. Clindamycin is also present in breast milk. Bexarotene Counseling:  I discussed with the patient the risks of bexarotene including but not limited to hair loss, dry lips/skin/eyes, liver abnormalities, hyperlipidemia, pancreatitis, depression/suicidal ideation, photosensitivity, drug rash/allergic reactions, hypothyroidism, anemia, leukopenia, infection, cataracts, and teratogenicity.  Patient understands that they will need regular blood tests to check lipid profile, liver function tests, white blood cell count, thyroid function tests and pregnancy test if applicable. Humira Counseling:  I discussed with the patient the risks of adalimumab including but not limited to myelosuppression, immunosuppression, autoimmune hepatitis, demyelinating diseases, lymphoma, and serious infections.  The patient understands that monitoring is required including a PPD at baseline and must alert us or the primary physician if symptoms of infection or other concerning signs are noted. 5-Fu Counseling: 5-Fluorouracil Counseling:  I discussed with the patient the risks of 5-fluorouracil including but not limited to erythema, scaling, itching, weeping, crusting, and pain. Cimetidine Counseling:  I discussed with the patient the risks of Cimetidine including but not limited to gynecomastia, headache, diarrhea, nausea, drowsiness, arrhythmias, pancreatitis, skin rashes, psychosis, bone marrow suppression and kidney toxicity. Methotrexate Pregnancy And Lactation Text: This medication is Pregnancy Category X and is known to cause fetal harm. This medication is excreted in breast milk. Azathioprine Counseling:  I discussed with the patient the risks of azathioprine including but not limited to myelosuppression, immunosuppression, hepatotoxicity, lymphoma, and infections.  The patient understands that monitoring is required including baseline LFTs, Creatinine, possible TPMP genotyping and weekly CBCs for the first month and then every 2 weeks thereafter.  The patient verbalized understanding of the proper use and possible adverse effects of azathioprine.  All of the patient's questions and concerns were addressed. Otezla Counseling: The side effects of Otezla were discussed with the patient, including but not limited to worsening or new depression, weight loss, diarrhea, nausea, upper respiratory tract infection, and headache. Patient instructed to call the office should any adverse effect occur.  The patient verbalized understanding of the proper use and possible adverse effects of Otezla.  All the patient's questions and concerns were addressed. Adbry Pregnancy And Lactation Text: It is unknown if this medication will adversely affect pregnancy or breast feeding. Solaraze Counseling:  I discussed with the patient the risks of Solaraze including but not limited to erythema, scaling, itching, weeping, crusting, and pain. Wartpeel Counseling:  I discussed with the patient the risks of Wartpeel including but not limited to erythema, scaling, itching, weeping, crusting, and pain. Opzelura Pregnancy And Lactation Text: There is insufficient data to evaluate drug-associated risk for major birth defects, miscarriage, or other adverse maternal or fetal outcomes.  There is a pregnancy registry that monitors pregnancy outcomes in pregnant persons exposed to the medication during pregnancy.  It is unknown if this medication is excreted in breast milk.  Do not breastfeed during treatment and for about 4 weeks after the last dose. Rinvoq Counseling: I discussed with the patient the risks of Rinvoq therapy including but not limited to upper respiratory tract infections, shingles, cold sores, bronchitis, nausea, cough, fever, acne, and headache. Live vaccines should be avoided.  This medication has been linked to serious infections; higher rate of mortality; malignancy and lymphoproliferative disorders; major adverse cardiovascular events; thrombosis; thrombocytopenia, anemia, and neutropenia; lipid elevations; liver enzyme elevations; and gastrointestinal perforations. Low Dose Naltrexone Pregnancy And Lactation Text: Naltrexone is pregnancy category C.  There have been no adequate and well-controlled studies in pregnant women.  It should be used in pregnancy only if the potential benefit justifies the potential risk to the fetus.   Limited data indicates that naltrexone is minimally excreted into breastmilk. Bexarotene Pregnancy And Lactation Text: This medication is Pregnancy Category X and should not be given to women who are pregnant or may become pregnant. This medication should not be used if you are breast feeding. Simponi Counseling:  I discussed with the patient the risks of golimumab including but not limited to myelosuppression, immunosuppression, autoimmune hepatitis, demyelinating diseases, lymphoma, and serious infections.  The patient understands that monitoring is required including a PPD at baseline and must alert us or the primary physician if symptoms of infection or other concerning signs are noted. Dapsone Counseling: I discussed with the patient the risks of dapsone including but not limited to hemolytic anemia, agranulocytosis, rashes, methemoglobinemia, kidney failure, peripheral neuropathy, headaches, GI upset, and liver toxicity.  Patients who start dapsone require monitoring including baseline LFTs and weekly CBCs for the first month, then every month thereafter.  The patient verbalized understanding of the proper use and possible adverse effects of dapsone.  All of the patient's questions and concerns were addressed. Imiquimod Counseling:  I discussed with the patient the risks of imiquimod including but not limited to erythema, scaling, itching, weeping, crusting, and pain.  Patient understands that the inflammatory response to imiquimod is variable from person to person and was educated regarded proper titration schedule.  If flu-like symptoms develop, patient knows to discontinue the medication and contact us. Griseofulvin Counseling:  I discussed with the patient the risks of griseofulvin including but not limited to photosensitivity, cytopenia, liver damage, nausea/vomiting and severe allergy.  The patient understands that this medication is best absorbed when taken with a fatty meal (e.g., ice cream or french fries). Prednisone Counseling:  I discussed with the patient the risks of prolonged use of prednisone including but not limited to weight gain, insomnia, osteoporosis, mood changes, diabetes, susceptibility to infection, glaucoma and high blood pressure.  In cases where prednisone use is prolonged, patients should be monitored with blood pressure checks, serum glucose levels and an eye exam.  Additionally, the patient may need to be placed on GI prophylaxis, PCP prophylaxis, and calcium and vitamin D supplementation and/or a bisphosphonate.  The patient verbalized understanding of the proper use and the possible adverse effects of prednisone.  All of the patient's questions and concerns were addressed. Tranexamic Acid Counseling:  Patient advised of the small risk of bleeding problems with tranexamic acid. They were also instructed to call if they developed any nausea, vomiting or diarrhea. All of the patient's questions and concerns were addressed. Doxycycline Counseling:  Patient counseled regarding possible photosensitivity and increased risk for sunburn.  Patient instructed to avoid sunlight, if possible.  When exposed to sunlight, patients should wear protective clothing, sunglasses, and sunscreen.  The patient was instructed to call the office immediately if the following severe adverse effects occur:  hearing changes, easy bruising/bleeding, severe headache, or vision changes.  The patient verbalized understanding of the proper use and possible adverse effects of doxycycline.  All of the patient's questions and concerns were addressed. Dutasteride Male Counseling: Dustasteride Counseling:  I discussed with the patient the risks of use of dutasteride including but not limited to decreased libido, decreased ejaculate volume, and gynecomastia. Women who can become pregnant should not handle medication.  All of the patient's questions and concerns were addressed.

## 2024-03-23 NOTE — TELEPHONE ENCOUNTER
Do PA   Please consult with your primary care physician for further care recommendations for your chest pain next week    Also please consult with your primary care physician for your medication refill    If having worsening symptoms return to ER

## (undated) DEVICE — ANTIBACTERIAL UNDYED BRAIDED (POLYGLACTIN 910), SYNTHETIC ABSORBABLE SUTURE: Brand: COATED VICRYL

## (undated) DEVICE — PK CATH CARD 10

## (undated) DEVICE — GLV SURG PREMIERPRO MIC LTX PF SZ6.5 BRN

## (undated) DEVICE — NEEDLE, QUINCKE, 18GX3.5": Brand: MEDLINE

## (undated) DEVICE — HDRST INTUB GENTLETOUCH SLOT 7IN RT

## (undated) DEVICE — GLV SURG PREMIERPRO MIC LTX PF SZ7.5 BRN

## (undated) DEVICE — SPNG GZ WOVN 4X4IN 12PLY 10/BX STRL

## (undated) DEVICE — TOOL 14MH30 LEGEND 14CM 3MM: Brand: MIDAS REX ™

## (undated) DEVICE — DEV COMP RAD PRELUDESYNC 24CM

## (undated) DEVICE — GLIDESHEATH SLENDER STAINLESS STEEL KIT: Brand: GLIDESHEATH SLENDER

## (undated) DEVICE — 3M™ MEDIPORE™ H SOFT CLOTH SURGICAL TAPE, 2863, 3 IN X 10 YD, 12/CASE: Brand: 3M™ MEDIPORE™

## (undated) DEVICE — CVR HNDL LIGHT RIGID

## (undated) DEVICE — ADHS LIQ MASTISOL 2/3ML

## (undated) DEVICE — CATH DIAG EXPO M/ PK 5F FL4/FR4 PIG

## (undated) DEVICE — PK NEURO DISC 10

## (undated) DEVICE — RADIFOCUS GLIDEWIRE: Brand: GLIDEWIRE

## (undated) DEVICE — STRAP POSTN KN/BDY FM 5X72IN DISP

## (undated) DEVICE — CATH DIAG EXPO .045 FL3  5F 100CM

## (undated) DEVICE — SKIN PREP TRAY W/CHG: Brand: MEDLINE INDUSTRIES, INC.

## (undated) DEVICE — GLV SURG PREMIERPRO MIC LTX PF SZ7 BRN

## (undated) DEVICE — 3M™ STERI-STRIP™ REINFORCED ADHESIVE SKIN CLOSURES, R1547, 1/2 IN X 4 IN (12 MM X 100 MM), 6 STRIPS/ENVELOPE: Brand: 3M™ STERI-STRIP™

## (undated) DEVICE — GW TPR/CORE CERBRL HEP HN .035 15X260

## (undated) DEVICE — 3M™ STERI-DRAPE™ INSTRUMENT POUCH 1018: Brand: STERI-DRAPE™

## (undated) DEVICE — ACCY PA700 LUBRICANT DIFFUSER MR7 4 PACK: Brand: MIDAS REX

## (undated) DEVICE — ELECTRD BLD EZ CLN STD 4IN

## (undated) DEVICE — 3M™ WARMING BLANKET, UPPER BODY, 10 PER CASE, 42268: Brand: BAIR HUGGER™

## (undated) DEVICE — ELECTRD BLD EZ CLN STD 2.5IN